# Patient Record
Sex: MALE | Race: WHITE | NOT HISPANIC OR LATINO | Employment: OTHER | URBAN - METROPOLITAN AREA
[De-identification: names, ages, dates, MRNs, and addresses within clinical notes are randomized per-mention and may not be internally consistent; named-entity substitution may affect disease eponyms.]

---

## 2017-02-17 ENCOUNTER — OFFICE VISIT (OUTPATIENT)
Dept: FAMILY MEDICINE | Facility: CLINIC | Age: 57
End: 2017-02-17
Payer: COMMERCIAL

## 2017-02-17 VITALS
DIASTOLIC BLOOD PRESSURE: 92 MMHG | HEART RATE: 73 BPM | HEIGHT: 71 IN | TEMPERATURE: 98 F | WEIGHT: 196.56 LBS | BODY MASS INDEX: 27.52 KG/M2 | SYSTOLIC BLOOD PRESSURE: 138 MMHG

## 2017-02-17 DIAGNOSIS — H61.22 IMPACTED CERUMEN, LEFT EAR: Primary | ICD-10-CM

## 2017-02-17 PROCEDURE — 99213 OFFICE O/P EST LOW 20 MIN: CPT | Mod: S$GLB,,, | Performed by: NURSE PRACTITIONER

## 2017-02-17 PROCEDURE — 99999 PR PBB SHADOW E&M-EST. PATIENT-LVL III: CPT | Mod: PBBFAC,,, | Performed by: NURSE PRACTITIONER

## 2017-02-17 NOTE — PROGRESS NOTES
Subjective:       Patient ID: Ned Toledo Jr. is a 56 y.o. male.    Chief Complaint: Ear Fullness    Ear Fullness    There is pain in the left ear. This is a new problem. The current episode started 1 to 4 weeks ago. The problem occurs constantly. The problem has been unchanged. There has been no fever. The patient is experiencing no pain. Pertinent negatives include no abdominal pain, coughing, diarrhea, ear discharge, headaches, hearing loss, neck pain, rash, rhinorrhea, sore throat or vomiting. He has tried nothing for the symptoms. The treatment provided no relief. There is no history of a chronic ear infection, hearing loss or a tympanostomy tube.   History reviewed. No pertinent past medical history.  Social History     Social History    Marital status:      Spouse name: N/A    Number of children: N/A    Years of education: N/A     Occupational History         Social History Main Topics    Smoking status: Never Smoker    Smokeless tobacco: Not on file    Alcohol use No    Drug use: Not on file    Sexual activity: Not on file     Past Surgical History   Procedure Laterality Date    Hernia repair         Review of Systems   Constitutional: Negative.    HENT: Negative.  Negative for ear discharge, hearing loss, rhinorrhea and sore throat.    Eyes: Negative.    Respiratory: Negative.  Negative for cough.    Cardiovascular: Negative.    Gastrointestinal: Negative.  Negative for abdominal pain, diarrhea and vomiting.   Endocrine: Negative.    Genitourinary: Negative.    Musculoskeletal: Negative.  Negative for neck pain.   Skin: Negative.  Negative for rash.   Allergic/Immunologic: Negative.    Neurological: Negative.  Negative for headaches.   Psychiatric/Behavioral: Negative.        Objective:      Physical Exam   Constitutional: He is oriented to person, place, and time. He appears well-developed and well-nourished.   HENT:   Head: Normocephalic.   Right Ear: Hearing, tympanic membrane,  external ear and ear canal normal.   Left Ear: Hearing, tympanic membrane, external ear and ear canal normal.   Nose: Nose normal.   Mouth/Throat: Oropharynx is clear and moist.   Impacted cerumen to left ear prior to ear wash   Eyes: Conjunctivae are normal. Pupils are equal, round, and reactive to light.   Neck: Normal range of motion. Neck supple.   Cardiovascular: Normal rate, regular rhythm and normal heart sounds.    Pulmonary/Chest: Effort normal and breath sounds normal.   Abdominal: Soft. Bowel sounds are normal.   Musculoskeletal: Normal range of motion.   Neurological: He is alert and oriented to person, place, and time.   Skin: Skin is warm and dry.   Psychiatric: He has a normal mood and affect. His behavior is normal. Judgment and thought content normal.   Nursing note and vitals reviewed.      Assessment:       1. Impacted cerumen, left ear        Plan:           Ned was seen today for ear fullness.    Diagnoses and all orders for this visit:    Impacted cerumen, left ear  -     Ear wax removal  Avoid qtips  Debrox OTC as directed   RTC as needed

## 2017-02-27 ENCOUNTER — PATIENT OUTREACH (OUTPATIENT)
Dept: ADMINISTRATIVE | Facility: HOSPITAL | Age: 57
End: 2017-02-27
Payer: COMMERCIAL

## 2017-02-27 DIAGNOSIS — Z13.220 SCREENING FOR CHOLESTEROL LEVEL: Primary | ICD-10-CM

## 2017-02-27 NOTE — LETTER
February 27, 2017    Ned Toledo JrShiraz  [address] [address]           [facility]  [address]  [facility] [address]  Phone: [phone] Dear Mr. Toledo:    [facility] is committed to your overall health.  To help you get the most out of each of your visits, we will review your information to make sure you are up to date on all of your recommended tests and/or procedures.      Jaime Hernández MD has found that you may be due for   Health Maintenance Due   Topic    Lipid Panel     Colonoscopy     Influenza Vaccine         If you have had any of the above done at another facility, please bring the records or information with you so that your record at [facility] will be complete.    If you are currently taking medication, please bring it with you to your appointment for review.    We will be happy to assist you with scheduling any necessary appointments or you may contact the [facility] appointment desk at [phone] to schedule at your convenience.     Thank you for choosing [facility] for your healthcare needs,        If you have any questions or concerns, please don't hesitate to call.    Sincerely,    Angélica OWENS LPN  Care Coordination Department  [facility]

## 2017-03-14 ENCOUNTER — OFFICE VISIT (OUTPATIENT)
Dept: FAMILY MEDICINE | Facility: CLINIC | Age: 57
End: 2017-03-14
Payer: COMMERCIAL

## 2017-03-14 VITALS
HEIGHT: 71 IN | HEART RATE: 110 BPM | BODY MASS INDEX: 27.78 KG/M2 | SYSTOLIC BLOOD PRESSURE: 129 MMHG | WEIGHT: 198.44 LBS | TEMPERATURE: 98 F | DIASTOLIC BLOOD PRESSURE: 83 MMHG

## 2017-03-14 DIAGNOSIS — M54.50 CHRONIC LEFT-SIDED LOW BACK PAIN WITHOUT SCIATICA: Primary | ICD-10-CM

## 2017-03-14 DIAGNOSIS — G89.29 CHRONIC LEFT-SIDED LOW BACK PAIN WITHOUT SCIATICA: Primary | ICD-10-CM

## 2017-03-14 PROCEDURE — 1160F RVW MEDS BY RX/DR IN RCRD: CPT | Mod: S$GLB,,, | Performed by: FAMILY MEDICINE

## 2017-03-14 PROCEDURE — 99999 PR PBB SHADOW E&M-EST. PATIENT-LVL III: CPT | Mod: PBBFAC,,, | Performed by: FAMILY MEDICINE

## 2017-03-14 PROCEDURE — 99213 OFFICE O/P EST LOW 20 MIN: CPT | Mod: S$GLB,,, | Performed by: FAMILY MEDICINE

## 2017-03-14 RX ORDER — CARISOPRODOL 350 MG/1
350 TABLET ORAL 4 TIMES DAILY PRN
Qty: 120 TABLET | Refills: 0 | Status: SHIPPED | OUTPATIENT
Start: 2017-05-13 | End: 2017-06-14 | Stop reason: SDUPTHER

## 2017-03-14 RX ORDER — HYDROCODONE BITARTRATE AND ACETAMINOPHEN 7.5; 325 MG/1; MG/1
2 TABLET ORAL EVERY 6 HOURS PRN
Qty: 240 TABLET | Refills: 0 | Status: SHIPPED | OUTPATIENT
Start: 2017-03-14 | End: 2017-06-14 | Stop reason: SDUPTHER

## 2017-03-14 RX ORDER — CARISOPRODOL 350 MG/1
350 TABLET ORAL 4 TIMES DAILY PRN
Qty: 120 TABLET | Refills: 0 | Status: SHIPPED | OUTPATIENT
Start: 2017-04-13 | End: 2017-06-14 | Stop reason: SDUPTHER

## 2017-03-14 RX ORDER — HYDROCODONE BITARTRATE AND ACETAMINOPHEN 7.5; 325 MG/1; MG/1
2 TABLET ORAL EVERY 6 HOURS PRN
Qty: 240 TABLET | Refills: 0 | Status: SHIPPED | OUTPATIENT
Start: 2017-04-13 | End: 2017-06-14 | Stop reason: SDUPTHER

## 2017-03-14 RX ORDER — HYDROCODONE BITARTRATE AND ACETAMINOPHEN 7.5; 325 MG/1; MG/1
2 TABLET ORAL EVERY 6 HOURS PRN
Qty: 240 TABLET | Refills: 0 | Status: SHIPPED | OUTPATIENT
Start: 2017-05-13 | End: 2017-06-14 | Stop reason: SDUPTHER

## 2017-03-14 RX ORDER — CARISOPRODOL 350 MG/1
350 TABLET ORAL 4 TIMES DAILY PRN
Qty: 120 TABLET | Refills: 0 | Status: SHIPPED | OUTPATIENT
Start: 2017-03-14 | End: 2017-06-14 | Stop reason: SDUPTHER

## 2017-03-14 NOTE — PROGRESS NOTES
Ned Toledo Jr. presents with waxing waning LBP primary lt SI and paralumbar         History reviewed. No pertinent past medical history.  Past Surgical History:   Procedure Laterality Date    HERNIA REPAIR       Review of patient's allergies indicates:   Allergen Reactions    Amoxil [amoxicillin]      Current Outpatient Prescriptions on File Prior to Visit   Medication Sig Dispense Refill    carisoprodol (SOMA) 350 MG tablet Take 1 tablet (350 mg total) by mouth 4 (four) times daily as needed. 120 tablet 0    carisoprodol (SOMA) 350 MG tablet Take 1 tablet (350 mg total) by mouth 4 (four) times daily as needed. 120 tablet 0    carisoprodol (SOMA) 350 MG tablet Take 1 tablet (350 mg total) by mouth 4 (four) times daily as needed. 120 tablet 0    hydrocodone-acetaminophen 7.5-325mg (NORCO) 7.5-325 mg per tablet Take 2 tablets by mouth every 6 (six) hours as needed for Pain. 240 tablet 0    hydrocodone-acetaminophen 7.5-325mg (NORCO) 7.5-325 mg per tablet Take 2 tablets by mouth every 6 (six) hours as needed for Pain. 240 tablet 0    hydrocodone-acetaminophen 7.5-325mg (NORCO) 7.5-325 mg per tablet Take 2 tablets by mouth every 6 (six) hours as needed for Pain. 240 tablet 0    naproxen (NAPROSYN) 500 MG tablet 6 TAKE 1 TABLET BY MOUTH TWICE DAILY 60 tablet 11    ketoconazole (NIZORAL) 2 % cream Apply topically 2 (two) times daily. 60 g 6     No current facility-administered medications on file prior to visit.      Social History     Social History    Marital status:      Spouse name: N/A    Number of children: N/A    Years of education: N/A     Occupational History    Not on file.     Social History Main Topics    Smoking status: Never Smoker    Smokeless tobacco: Not on file    Alcohol use No    Drug use: Not on file    Sexual activity: Not on file     Other Topics Concern    Not on file     Social History Narrative     History reviewed. No pertinent family history.      ROS:  SKIN:  No rashes, itching or changes in color or texture of skin.  EYES: Visual acuity fine. No photophobia, ocular pain or diplopia.EARS: Denies ear pain, discharge or vertigo.NOSE: No loss of smell, no epistaxis some postnasal drip.MOUTH & THROAT: No hoarseness or change in voice. No excessive gum bleeding.CHEST: Denies YANCEY, cyanosis, wheezing  CARDIOVASCULAR: Denies chest pain, PND, orthopnea or reduced exercise tolerance.  ABDOMEN:  No weight loss.No abdominal pain, no hematemesis or blood in stool.  URINARY: No flank pain, dysuria or hematuria.  PERIPHERAL VASCULAR: No claudication or cyanosis.  MUSCULOSKELETAL: Negative   NEUROLOGIC: No history of seizures, paralysis, alteration of gait or coordination.    PE: Vital signs as noted  Heent:Normocephalic with no recent cranial trauma,PERRLA,EOMI,conjunctiva clear,fundi reveal no hemmorhage exudate or papilledema.Otic canals clear, tympanic membranes slightly dull bilaterally.Nasal mucosa slightly red and edematous.Posterior pharynx slightly red but without exudate.  Neck:Supple with minimal anterior cervical adenopathy.  Chest:Clear bilateral breath sounds    Heart:Regular rhthym without murmer  Abdomen:Soft, non tender,no masses, no hepatosplenomegaly  Extremeties:Tender lateral ankle Neurologic:Grossly within normal limits  Back:Severe Tenderness lt paravertebral L1L3 but also tender L3-4 midline;increased tenderness    Impression:LBP acute exac    Ankle sprain   Plan: Continue HC,  carisopradol  If ftp PT   Considered retrial gabapentin (over sedated w 300 hs)

## 2017-06-14 ENCOUNTER — TELEPHONE (OUTPATIENT)
Dept: FAMILY MEDICINE | Facility: CLINIC | Age: 57
End: 2017-06-14

## 2017-06-14 ENCOUNTER — OFFICE VISIT (OUTPATIENT)
Dept: FAMILY MEDICINE | Facility: CLINIC | Age: 57
End: 2017-06-14
Payer: COMMERCIAL

## 2017-06-14 VITALS
HEIGHT: 71 IN | DIASTOLIC BLOOD PRESSURE: 88 MMHG | WEIGHT: 196.19 LBS | SYSTOLIC BLOOD PRESSURE: 138 MMHG | TEMPERATURE: 98 F | HEART RATE: 122 BPM | BODY MASS INDEX: 27.47 KG/M2

## 2017-06-14 DIAGNOSIS — M54.50 CHRONIC LEFT-SIDED LOW BACK PAIN WITHOUT SCIATICA: ICD-10-CM

## 2017-06-14 DIAGNOSIS — Z00.00 ROUTINE HEALTH MAINTENANCE: Primary | ICD-10-CM

## 2017-06-14 DIAGNOSIS — J06.9 UPPER RESPIRATORY TRACT INFECTION, UNSPECIFIED TYPE: Primary | ICD-10-CM

## 2017-06-14 DIAGNOSIS — G89.29 CHRONIC LEFT-SIDED LOW BACK PAIN WITHOUT SCIATICA: ICD-10-CM

## 2017-06-14 PROCEDURE — 99213 OFFICE O/P EST LOW 20 MIN: CPT | Mod: S$GLB,,, | Performed by: FAMILY MEDICINE

## 2017-06-14 PROCEDURE — 99999 PR PBB SHADOW E&M-EST. PATIENT-LVL II: CPT | Mod: PBBFAC,,, | Performed by: FAMILY MEDICINE

## 2017-06-14 RX ORDER — CHLOPHEDIANOL HYDROCHLORIDE, DEXBROMPHENIRAMINE MALEATE 12.5; 1 MG/5ML; MG/5ML
LIQUID ORAL
Refills: 0 | COMMUNITY
Start: 2017-06-11 | End: 2017-09-14

## 2017-06-14 RX ORDER — CARISOPRODOL 350 MG/1
350 TABLET ORAL 4 TIMES DAILY PRN
Qty: 120 TABLET | Refills: 0 | Status: SHIPPED | OUTPATIENT
Start: 2017-08-12 | End: 2017-09-14 | Stop reason: SDUPTHER

## 2017-06-14 RX ORDER — MONTELUKAST SODIUM 10 MG/1
10 TABLET ORAL NIGHTLY
Qty: 30 TABLET | Refills: 0 | Status: SHIPPED | OUTPATIENT
Start: 2017-06-14 | End: 2017-07-14

## 2017-06-14 RX ORDER — DOXYCYCLINE 100 MG/1
CAPSULE ORAL
Refills: 0 | COMMUNITY
Start: 2017-06-11 | End: 2017-09-14

## 2017-06-14 RX ORDER — CARISOPRODOL 350 MG/1
350 TABLET ORAL 4 TIMES DAILY PRN
Qty: 120 TABLET | Refills: 0 | Status: SHIPPED | OUTPATIENT
Start: 2017-06-14 | End: 2017-09-14 | Stop reason: SDUPTHER

## 2017-06-14 RX ORDER — PREDNISONE 20 MG/1
TABLET ORAL
Refills: 0 | COMMUNITY
Start: 2017-06-11 | End: 2017-09-14

## 2017-06-14 RX ORDER — HYDROCODONE BITARTRATE AND ACETAMINOPHEN 7.5; 325 MG/1; MG/1
2 TABLET ORAL EVERY 6 HOURS PRN
Qty: 240 TABLET | Refills: 0 | Status: SHIPPED | OUTPATIENT
Start: 2017-06-14 | End: 2017-09-14 | Stop reason: SDUPTHER

## 2017-06-14 RX ORDER — HYDROCODONE BITARTRATE AND ACETAMINOPHEN 7.5; 325 MG/1; MG/1
2 TABLET ORAL EVERY 6 HOURS PRN
Qty: 240 TABLET | Refills: 0 | Status: SHIPPED | OUTPATIENT
Start: 2017-07-14 | End: 2017-09-14 | Stop reason: SDUPTHER

## 2017-06-14 RX ORDER — NAPROXEN 500 MG/1
TABLET ORAL
Qty: 60 TABLET | Refills: 11 | Status: SHIPPED | OUTPATIENT
Start: 2017-06-14 | End: 2018-07-30 | Stop reason: SDUPTHER

## 2017-06-14 RX ORDER — CARISOPRODOL 350 MG/1
350 TABLET ORAL 4 TIMES DAILY PRN
Qty: 120 TABLET | Refills: 0 | Status: SHIPPED | OUTPATIENT
Start: 2017-07-14 | End: 2017-09-14 | Stop reason: SDUPTHER

## 2017-06-14 RX ORDER — HYDROCODONE BITARTRATE AND ACETAMINOPHEN 7.5; 325 MG/1; MG/1
2 TABLET ORAL EVERY 6 HOURS PRN
Qty: 240 TABLET | Refills: 0 | Status: SHIPPED | OUTPATIENT
Start: 2017-08-12 | End: 2017-09-14 | Stop reason: SDUPTHER

## 2017-06-14 NOTE — PROGRESS NOTES
Ned Toledo Jr. presents with moderate upper respiratory congestion,rhinnorhea,moderate cough past 2-3 days. OTC help slightly. Denies nausea,vomiting,diarrhea or significant fever.    History reviewed. No pertinent past medical history.  Past Surgical History:   Procedure Laterality Date    HERNIA REPAIR       Review of patient's allergies indicates:   Allergen Reactions    Amoxil [amoxicillin]      Current Outpatient Prescriptions on File Prior to Visit   Medication Sig Dispense Refill    carisoprodol (SOMA) 350 MG tablet Take 1 tablet (350 mg total) by mouth 4 (four) times daily as needed. 120 tablet 0    carisoprodol (SOMA) 350 MG tablet Take 1 tablet (350 mg total) by mouth 4 (four) times daily as needed. 120 tablet 0    carisoprodol (SOMA) 350 MG tablet Take 1 tablet (350 mg total) by mouth 4 (four) times daily as needed. 120 tablet 0    hydrocodone-acetaminophen 7.5-325mg (NORCO) 7.5-325 mg per tablet Take 2 tablets by mouth every 6 (six) hours as needed for Pain. 240 tablet 0    hydrocodone-acetaminophen 7.5-325mg (NORCO) 7.5-325 mg per tablet Take 2 tablets by mouth every 6 (six) hours as needed for Pain. 240 tablet 0    hydrocodone-acetaminophen 7.5-325mg (NORCO) 7.5-325 mg per tablet Take 2 tablets by mouth every 6 (six) hours as needed for Pain. 240 tablet 0    naproxen (NAPROSYN) 500 MG tablet 6 TAKE 1 TABLET BY MOUTH TWICE DAILY 60 tablet 11    ketoconazole (NIZORAL) 2 % cream Apply topically 2 (two) times daily. 60 g 6     No current facility-administered medications on file prior to visit.      Social History     Social History    Marital status:      Spouse name: N/A    Number of children: N/A    Years of education: N/A     Occupational History    Not on file.     Social History Main Topics    Smoking status: Never Smoker    Smokeless tobacco: Not on file    Alcohol use No    Drug use: Unknown    Sexual activity: Not on file     Other Topics Concern    Not on file      Social History Narrative    No narrative on file     History reviewed. No pertinent family history.      ROS:  SKIN: No rashes, itching or changes in color or texture of skin.  EYES: Visual acuity fine. No photophobia, ocular pain or diplopia.EARS: Denies ear pain, discharge or vertigo.NOSE: No loss of smell, no epistaxis some postnasal drip.MOUTH & THROAT: No hoarseness or change in voice. No excessive gum bleeding.CHEST: Denies YANCEY, cyanosis, wheezing  CARDIOVASCULAR: Denies chest pain, PND, orthopnea or reduced exercise tolerance.  ABDOMEN:  No weight loss.No abdominal pain, no hematemesis or blood in stool.  URINARY: No flank pain, dysuria or hematuria.  PERIPHERAL VASCULAR: No claudication or cyanosis.  MUSCULOSKELETAL: Negative   NEUROLOGIC: No history of seizures, paralysis, alteration of gait or coordination.    PE: Vital signs as noted  Heent:Normocephalic with no recent cranial trauma,PERRLA,EOMI,conjunctiva clear,fundi reveal no hemmorhage exudate or papilledema.Otic canals clear, tympanic membranes slightly dull bilaterally.Nasal mucosa slightly red and edematous.Posterior pharynx slightly red but without exudate.  Neck:Supple with minimal anterior cervical adenopathy.  Chest:Clear bilateral breath sounds with mild scattered ronchi  Heart:Regular rhthym without murmer  Abdomen:Soft, non tender,no masses, no hepatosplenomegalyExtremeties and Neurologic:Grossly within normal limits  Impression: Upper Respiratory Infection. 465.9  Plan:   Add montelukast CWP   Pulse ox 97 RA

## 2017-09-08 ENCOUNTER — LAB VISIT (OUTPATIENT)
Dept: LAB | Facility: HOSPITAL | Age: 57
End: 2017-09-08
Attending: FAMILY MEDICINE
Payer: COMMERCIAL

## 2017-09-08 DIAGNOSIS — Z00.00 ROUTINE HEALTH MAINTENANCE: ICD-10-CM

## 2017-09-08 LAB
ALBUMIN SERPL BCP-MCNC: 4 G/DL
ALP SERPL-CCNC: 68 U/L
ALT SERPL W/O P-5'-P-CCNC: 83 U/L
ANION GAP SERPL CALC-SCNC: 11 MMOL/L
AST SERPL-CCNC: 55 U/L
BASOPHILS # BLD AUTO: 0.01 K/UL
BASOPHILS NFR BLD: 0.2 %
BILIRUB SERPL-MCNC: 1 MG/DL
BUN SERPL-MCNC: 16 MG/DL
CALCIUM SERPL-MCNC: 9.7 MG/DL
CHLORIDE SERPL-SCNC: 103 MMOL/L
CHOLEST SERPL-MCNC: 217 MG/DL
CHOLEST/HDLC SERPL: 5.6 {RATIO}
CO2 SERPL-SCNC: 23 MMOL/L
CREAT SERPL-MCNC: 1 MG/DL
DIFFERENTIAL METHOD: ABNORMAL
EOSINOPHIL # BLD AUTO: 0.2 K/UL
EOSINOPHIL NFR BLD: 2.4 %
ERYTHROCYTE [DISTWIDTH] IN BLOOD BY AUTOMATED COUNT: 12.5 %
EST. GFR  (AFRICAN AMERICAN): >60 ML/MIN/1.73 M^2
EST. GFR  (NON AFRICAN AMERICAN): >60 ML/MIN/1.73 M^2
GLUCOSE SERPL-MCNC: 132 MG/DL
HCT VFR BLD AUTO: 45.4 %
HDLC SERPL-MCNC: 39 MG/DL
HDLC SERPL: 18 %
HGB BLD-MCNC: 15.7 G/DL
LDLC SERPL CALC-MCNC: 151.6 MG/DL
LYMPHOCYTES # BLD AUTO: 2.4 K/UL
LYMPHOCYTES NFR BLD: 38.7 %
MCH RBC QN AUTO: 32.2 PG
MCHC RBC AUTO-ENTMCNC: 34.6 G/DL
MCV RBC AUTO: 93 FL
MONOCYTES # BLD AUTO: 0.5 K/UL
MONOCYTES NFR BLD: 8.5 %
NEUTROPHILS # BLD AUTO: 3.1 K/UL
NEUTROPHILS NFR BLD: 50 %
NONHDLC SERPL-MCNC: 178 MG/DL
PLATELET # BLD AUTO: 311 K/UL
PMV BLD AUTO: 9.2 FL
POTASSIUM SERPL-SCNC: 4.4 MMOL/L
PROT SERPL-MCNC: 7.8 G/DL
RBC # BLD AUTO: 4.87 M/UL
SODIUM SERPL-SCNC: 137 MMOL/L
TRIGL SERPL-MCNC: 132 MG/DL
TSH SERPL DL<=0.005 MIU/L-ACNC: 1.46 UIU/ML
WBC # BLD AUTO: 6.23 K/UL

## 2017-09-08 PROCEDURE — 85025 COMPLETE CBC W/AUTO DIFF WBC: CPT

## 2017-09-08 PROCEDURE — 80061 LIPID PANEL: CPT

## 2017-09-08 PROCEDURE — 36415 COLL VENOUS BLD VENIPUNCTURE: CPT | Mod: PO

## 2017-09-08 PROCEDURE — 84443 ASSAY THYROID STIM HORMONE: CPT

## 2017-09-08 PROCEDURE — 80053 COMPREHEN METABOLIC PANEL: CPT

## 2017-09-14 ENCOUNTER — OFFICE VISIT (OUTPATIENT)
Dept: FAMILY MEDICINE | Facility: CLINIC | Age: 57
End: 2017-09-14
Payer: COMMERCIAL

## 2017-09-14 VITALS
HEART RATE: 98 BPM | SYSTOLIC BLOOD PRESSURE: 130 MMHG | BODY MASS INDEX: 27.97 KG/M2 | TEMPERATURE: 98 F | DIASTOLIC BLOOD PRESSURE: 84 MMHG | WEIGHT: 199.81 LBS | HEIGHT: 71 IN

## 2017-09-14 DIAGNOSIS — Z12.11 SPECIAL SCREENING FOR MALIGNANT NEOPLASMS, COLON: ICD-10-CM

## 2017-09-14 DIAGNOSIS — Z00.00 ROUTINE CHECK-UP: Primary | ICD-10-CM

## 2017-09-14 PROCEDURE — 99396 PREV VISIT EST AGE 40-64: CPT | Mod: S$GLB,,, | Performed by: FAMILY MEDICINE

## 2017-09-14 PROCEDURE — 99999 PR PBB SHADOW E&M-EST. PATIENT-LVL II: CPT | Mod: PBBFAC,,, | Performed by: FAMILY MEDICINE

## 2017-09-14 RX ORDER — CARISOPRODOL 350 MG/1
350 TABLET ORAL 4 TIMES DAILY PRN
Qty: 120 TABLET | Refills: 0 | Status: SHIPPED | OUTPATIENT
Start: 2017-10-14 | End: 2017-10-19

## 2017-09-14 RX ORDER — CARISOPRODOL 350 MG/1
350 TABLET ORAL 4 TIMES DAILY PRN
Qty: 120 TABLET | Refills: 0 | Status: SHIPPED | OUTPATIENT
Start: 2017-09-14 | End: 2017-12-14 | Stop reason: SDUPTHER

## 2017-09-14 RX ORDER — HYDROCODONE BITARTRATE AND ACETAMINOPHEN 7.5; 325 MG/1; MG/1
2 TABLET ORAL EVERY 6 HOURS PRN
Qty: 240 TABLET | Refills: 0 | Status: SHIPPED | OUTPATIENT
Start: 2017-09-14 | End: 2017-12-14 | Stop reason: SDUPTHER

## 2017-09-14 RX ORDER — HYDROCODONE BITARTRATE AND ACETAMINOPHEN 7.5; 325 MG/1; MG/1
2 TABLET ORAL EVERY 6 HOURS PRN
Qty: 240 TABLET | Refills: 0 | Status: SHIPPED | OUTPATIENT
Start: 2017-11-13 | End: 2017-12-14 | Stop reason: SDUPTHER

## 2017-09-14 RX ORDER — CARISOPRODOL 350 MG/1
350 TABLET ORAL 4 TIMES DAILY PRN
Qty: 120 TABLET | Refills: 0 | Status: SHIPPED | OUTPATIENT
Start: 2017-11-13 | End: 2017-12-14 | Stop reason: SDUPTHER

## 2017-09-14 RX ORDER — HYDROCODONE BITARTRATE AND ACETAMINOPHEN 7.5; 325 MG/1; MG/1
2 TABLET ORAL EVERY 6 HOURS PRN
Qty: 240 TABLET | Refills: 0 | Status: SHIPPED | OUTPATIENT
Start: 2017-10-14 | End: 2017-12-14 | Stop reason: SDUPTHER

## 2017-09-14 RX ORDER — KETOCONAZOLE 20 MG/G
CREAM TOPICAL 2 TIMES DAILY
Qty: 60 G | Refills: 6 | Status: SHIPPED | OUTPATIENT
Start: 2017-09-14 | End: 2018-01-02 | Stop reason: SDUPTHER

## 2017-09-14 NOTE — PROGRESS NOTES
The patient presents today for general health evaluation and counseling      Past Medical History:  History reviewed. No pertinent past medical history.  Past Surgical History:   Procedure Laterality Date    HERNIA REPAIR       Review of patient's allergies indicates:   Allergen Reactions    Amoxil [amoxicillin]      Current Outpatient Prescriptions on File Prior to Visit   Medication Sig Dispense Refill    carisoprodol (SOMA) 350 MG tablet Take 1 tablet (350 mg total) by mouth 4 (four) times daily as needed. 120 tablet 0    carisoprodol (SOMA) 350 MG tablet Take 1 tablet (350 mg total) by mouth 4 (four) times daily as needed. 120 tablet 0    carisoprodol (SOMA) 350 MG tablet Take 1 tablet (350 mg total) by mouth 4 (four) times daily as needed. 120 tablet 0    hydrocodone-acetaminophen 7.5-325mg (NORCO) 7.5-325 mg per tablet Take 2 tablets by mouth every 6 (six) hours as needed for Pain. 240 tablet 0    hydrocodone-acetaminophen 7.5-325mg (NORCO) 7.5-325 mg per tablet Take 2 tablets by mouth every 6 (six) hours as needed for Pain. 240 tablet 0    hydrocodone-acetaminophen 7.5-325mg (NORCO) 7.5-325 mg per tablet Take 2 tablets by mouth every 6 (six) hours as needed for Pain. 240 tablet 0    ketoconazole (NIZORAL) 2 % cream Apply topically 2 (two) times daily. 60 g 6    naproxen (NAPROSYN) 500 MG tablet 6 TAKE 1 TABLET BY MOUTH TWICE DAILY 60 tablet 11    [DISCONTINUED] CHLO HIST 1-12.5 mg/5 mL Soln TK 10ML PO Q 6 H PRF COUGH  0    [DISCONTINUED] doxycycline (MONODOX) 100 MG capsule TK ONE C PO Q 12 H FOR 10 DAYS  0    [DISCONTINUED] predniSONE (DELTASONE) 20 MG tablet TK 1 T PO QD FOR 5 DAYS  0     No current facility-administered medications on file prior to visit.      Social History     Social History    Marital status:      Spouse name: N/A    Number of children: N/A    Years of education: N/A     Occupational History    Not on file.     Social History Main Topics    Smoking status: Never  Smoker    Smokeless tobacco: Not on file    Alcohol use No    Drug use: Unknown    Sexual activity: Not on file     Other Topics Concern    Not on file     Social History Narrative    No narrative on file     History reviewed. No pertinent family history.      ROS:GENERAL: No fever, chills, fatigability or weight loss.  SKIN: No rashes, itching or changes in color or texture of skin.  HEAD: No headaches or recent head trauma.EYES: Visual acuity fine. No photophobia, ocular pain or diplopia.EARS: Denies ear pain, discharge or vertigo.NOSE: No loss of smell, no epistaxis or postnasal drip.MOUTH & THROAT: No hoarseness or change in voice. No excessive gum bleeding.NODES: Denies swollen glands.  CHEST: Denies YANCEY, cyanosis, wheezing, cough and sputum production.  CARDIOVASCULAR: Denies chest pain, PND, orthopnea or reduced exercise tolerance.  ABDOMEN: Appetite fine. No weight loss. Denies diarrhea, abdominal pain, hematemesis or blood in stool.  URINARY: No flank pain, dysuria or hematuria.  PERIPHERAL VASCULAR: No claudication or cyanosis.  MUSCULOSKELETAL: See above.  NEUROLOGIC: No history of seizures, paralysis, alteration of gait or coordination.  PE:   HEAD: Normocephalic, atraumatic.EYES: PERRL. EOMI.   EARS: TM's intact. Light reflex normal. No retraction or perforation.   NOSE: Mucosa pink. Airway clear.MOUTH & THROAT: No tonsillar enlargement. No pharyngeal erythema or exudate. No stridor.  NODES: No cervical, axillary or inguinal lymph node enlargement.  CHEST: Lungs clear to auscultation.  CARDIOVASCULAR: Normal S1, S2. No rubs, murmurs or gallops.  ABDOMEN: Bowel sounds normal. Not distended. Soft. No tenderness or masses.  MUSCULOSKELETAL: No palpable abnormality  NEUROLOGIC: Cranial Nerves: II-XII grossly intact.  Motor: 5/5 strength major flexors/extensors.  DTR's: Knees, Ankles 2+ and equal bilaterally; downgoing toes.  Sensory: Intact to light touch distally.  Gait & Posture: Normal gait and  fine motion. No cerebellar signs.     Impression:Routine health check  Plan:Lab eval  Rec diet and ex recs  Rev age appropriate screenings    Defers c scope OK w FIT

## 2017-10-02 ENCOUNTER — PATIENT OUTREACH (OUTPATIENT)
Dept: ADMINISTRATIVE | Facility: HOSPITAL | Age: 57
End: 2017-10-02

## 2017-10-02 NOTE — PROGRESS NOTES
Call patient as a reminder to mail fit kit. Patient will try to collect sample on Saturday, 10/07/17. Patient in agreement and vocalize understanding.

## 2017-10-19 ENCOUNTER — OFFICE VISIT (OUTPATIENT)
Dept: FAMILY MEDICINE | Facility: CLINIC | Age: 57
End: 2017-10-19
Payer: COMMERCIAL

## 2017-10-19 VITALS
HEIGHT: 71 IN | SYSTOLIC BLOOD PRESSURE: 135 MMHG | TEMPERATURE: 99 F | HEART RATE: 59 BPM | BODY MASS INDEX: 27.72 KG/M2 | DIASTOLIC BLOOD PRESSURE: 87 MMHG | WEIGHT: 198 LBS

## 2017-10-19 DIAGNOSIS — H61.23 BILATERAL IMPACTED CERUMEN: Primary | ICD-10-CM

## 2017-10-19 PROCEDURE — 99213 OFFICE O/P EST LOW 20 MIN: CPT | Mod: 25,S$GLB,, | Performed by: NURSE PRACTITIONER

## 2017-10-19 PROCEDURE — 69209 REMOVE IMPACTED EAR WAX UNI: CPT | Mod: 50,S$GLB,, | Performed by: NURSE PRACTITIONER

## 2017-10-19 PROCEDURE — 99999 PR PBB SHADOW E&M-EST. PATIENT-LVL III: CPT | Mod: PBBFAC,,, | Performed by: NURSE PRACTITIONER

## 2017-10-19 NOTE — PROGRESS NOTES
"Subjective:      Patient ID: Ned Toledo Jr. is a 56 y.o. male.    Chief Complaint: Hearing Problem    HPI   Patient to clinic with report of decreased hearing over the last 2 weeks, right ear worse than left.  He has been wearing ear plugs for work and has had a cerumen impaction in past.  He denies pain, fever, sinus pressure/pain, rhinorrhea, cough, post nasal drip.    Review of Systems   Constitutional: Negative for chills, fatigue and fever.   HENT: Positive for hearing loss. Negative for congestion, ear discharge, ear pain, postnasal drip, rhinorrhea, sinus pain, sinus pressure and sore throat.    Eyes: Negative.    Respiratory: Negative for cough, shortness of breath and wheezing.    Cardiovascular: Negative for chest pain, palpitations and leg swelling.   Gastrointestinal: Negative.    Endocrine: Negative.    Skin: Negative for rash and wound.   Allergic/Immunologic: Negative.    Neurological: Negative for weakness and numbness.   Psychiatric/Behavioral: The patient is not nervous/anxious.        Objective:     /87   Pulse (!) 59   Temp 98.7 °F (37.1 °C) (Oral)   Ht 5' 11" (1.803 m)   Wt 89.8 kg (198 lb)   BMI 27.62 kg/m²     Physical Exam   Constitutional: He is oriented to person, place, and time. He appears well-developed and well-nourished.   HENT:   Head: Normocephalic.   Nose: No mucosal edema or rhinorrhea. Right sinus exhibits no maxillary sinus tenderness and no frontal sinus tenderness. Left sinus exhibits no maxillary sinus tenderness and no frontal sinus tenderness.   Mouth/Throat: Oropharynx is clear and moist. No oropharyngeal exudate, posterior oropharyngeal edema or posterior oropharyngeal erythema.   Bilateral T.M. View obstructed by cerumen.   Eyes: Pupils are equal, round, and reactive to light.   Cardiovascular: Normal rate, regular rhythm and normal heart sounds.    Pulmonary/Chest: Effort normal and breath sounds normal. No respiratory distress. He has no wheezes. He has " no rales.   Lymphadenopathy:     He has no cervical adenopathy.   Neurological: He is alert and oriented to person, place, and time.   Skin: Skin is warm and dry. No rash noted.   Psychiatric: He has a normal mood and affect. His behavior is normal. Judgment and thought content normal.   Vitals reviewed.      Assessment:     1. Bilateral impacted cerumen        Plan:   Bilateral impacted cerumen    Bilateral ear irrigation performed by JASON Lei LPN.  After irrigation performed, bilateral T.M. Visualized and are WNL.  Educational handout provided and reviewed.  Instructed patient to avoid putting Q-tips in ears.    Return if symptoms worsen or fail to improve.

## 2017-10-19 NOTE — PATIENT INSTRUCTIONS
Impacted Earwax    Impacted earwax is a buildup of the natural wax in the ear (cerumen). Impacted earwax is very common. It can cause symptoms such as hearing loss. It can also stop a doctor doing an exam of your ear.  Understanding earwax  Tiny glands in your ear make substances that combine with dead skin cells to form earwax. Earwax helps protect your ear canal from water, dirt, infection, and injury. Over time, earwax travels from the inner part of your ear canal to the entrance of the canal. Then it falls away naturally. But in some cases, it cant travel to the entrance of the canal. This may be because of a health condition or objects put in the ear. With age, earwax tends to become harder and less fluid. Older adults are more likely to have problems with earwax buildup.  What causes impacted earwax?  Earwax can build up because of many health conditions. Some cause a physical blockage. Others cause too much earwax to be made. Health conditions that can cause earwax buildup include:  · Bony blockage in the ear (osteoma or exostoses)  · Infections, such as swimmers ear (external otitis)  · Skin disease, such as eczema  · Autoimmune diseases, such as lupus  · A narrowed ear canal from birth, chronic inflammation, or injury  · Too much earwax because of injury  · Too much earwax because of  water in the ear canal  Objects repeatedly placed in the ear can also cause impacted earwax. For example, putting cotton swabs in the ear may push the wax deeper into the ear. Over time, this may cause blockage. Hearing aids, swimming plugs, and swim molds can cause the same problem when used again and again.  In some cases, the cause of impacted earwax is not known.  Symptoms of impacted earwax  Excess earwax usually does not cause any symptoms, unless there is a large amount of buildup. Then it may cause symptoms such as:  · Hearing loss  · Earache  · Sense of ear fullness  · Itching in the ear  · Dizziness  · Ringing in  the ears  · Cough  Treatment for impacted earwax  If you dont have symptoms, you may not need treatment. Often the earwax goes away on its own with time. If you have symptoms, you may have 1 or more treatments such as:  · Ear drops. These help to soften the earwax. This helps it leave the ear over time.  · Rinsing (irrigation) of the ear canal with water. This is done in a doctors office.  · Removal of the earwax with small tools. This is also done in a doctors office.  In rare cases, some treatments for earwax removal may cause complications such as:  · Swimmers ear (otitis external)  · Earache  · Short-term hearing loss  · Dizziness  · Water trapped in the ear canal  · Hole in the eardrum  · Ringing in the ears  · Bleeding from the ear  Talk with your health care provider about which risks apply most to you.  Dont use these at home  Health care providers do not advise use of ear candles or ear vacuum kits. These methods are not shown to work.   Preventing impacted earwax  You may not be able to prevent impacted earwax if you have a health condition that causes it, such as eczema. In other cases, you may be able to prevent earwax buildup by:  · Using ear drops once a week  · Having routine cleaning of the ear about every 6 months  · Not using cotton swabs in the ear  When to call the health care provider  Call your health care provider right away if you have severe symptoms after earwax removal. These may include bleeding or severe ear pain.   Date Last Reviewed: 3/19/2015  © 6531-1852 [employer]. [address] [place] [address], [place] [address]. All rights reserved. This information is not intended as a substitute for professional medical care. Always follow your healthcare professional's instructions.        Earwax Removal    The ear canal makes earwax from the canals lining. The ears make wax to lubricate and protect the ear canal. The ear canal is the tube that connects the middle ear to the  outside of the ear. The wax protects the ear from bacteria, infection, and damage from water or trauma.  The wax that forms in the canal naturally moves toward the outside of the ear and falls out. In some cases, the ear may make too much wax. If the wax causes problems or keeps the healthcare provider from seeing into the ear, the extra wax may be removed.  Too much wax can affect your hearing. It can cause itching. In rare cases, it can be painful. Earwax should not be removed unless it is causing a problem. You should not stick objects into your ear to remove wax unless told to do so by your healthcare provider.  Healthcare providers can remove earwax safely. It is important to stay still during the procedure to avoid damage to the ear canal. But removing earwax generally doesnt hurt. You will not usually need anesthesia or pain medicine when the provider removes the earwax.  A number of conditions lead to earwax buildup. These include some skin problems, a narrow ear canal, or ears that make too much earwax. Using cotton swabs in the canal pushes earwax deeper into the ear and contributes to the buildup of earwax.  Home care  · The healthcare provider may recommend mineral oil or an over-the-counter eardrop to use at home to soften the earwax. Use these products only if the provider recommends them. Use these products only if the provider recommends them. Carefully follow the instructions given.  · Dont use mineral oil or OTC eardrops if you might have an ear infection or a ruptured eardrum. Tell your healthcare provider right away if you have diabetes or an immune disorder.  · Dont use cotton swabs in your ears. Cotton swabs may push wax deeper into the ear canal or damage the eardrum. Use cotton gauze or a wet washcloth  to gently remove wax on the outside of the ear and around the opening to the ear canal.  · Don't use any probing device or object such as cotton-tipped swabs or sal pins to clean the  inside of your ears.  · Dont use ear candles to clean your ears. Candling can be dangerous. It can burn the ear canal. It can also make the condition worse instead of better.  · Dont use cold water to rinse the ear. This will make you dizzy. If your provider tells you to rinse your ear, use only warm water or follow his or her instructions.  · Check the ear for signs of infection or irritation listed below under When to seek medical advice.  Steps for using eardrops  1. Warm the medicine bottle by rubbing it between your hands for a few minutes.  2. Lie down on your side, with the affected ear up.  3. Place the recommended number of drops in the ear. Wet a cotton ball with the medicine. Gently put the cotton ball into the ear opening.  Follow-up care  Follow up with your healthcare provider, or as directed.  When to seek medical advice  Call the provider right away if you have:  · Ear pain that gets worse  · Fever of 100.4F°F (38°C) or higher, or as directed by your healthcare provider  · Worsening wax buildup  · Severe pain, dizziness, or nausea  · Bleeding from the ear  · Hearing problems  · Signs of irritation from the eardrops, such as burning, stinging, or swelling and tenderness  · Foul-smelling fluid draining from the ear  · Swelling, redness, or tenderness of the outer ear  · Headache, neck pain, or stiff neck  Date Last Reviewed: 3/22/2015  © 8557-4954 [employer]. [address] [place] [address], [place] [address]. All rights reserved. This information is not intended as a substitute for professional medical care. Always follow your healthcare professional's instructions.

## 2017-12-07 ENCOUNTER — PATIENT OUTREACH (OUTPATIENT)
Dept: ADMINISTRATIVE | Facility: HOSPITAL | Age: 57
End: 2017-12-07

## 2017-12-07 NOTE — LETTER
December 7, 2017    Ned Toledo JrShiraz  [address] [address]  [facility] [address]           [facility]  [address]  [facility] [address]  Phone: [phone] Dear Mr. Toledo:    [facility] is committed to your overall health.  We see that you where issued a Fit Kit to check for colon cancer screening 9/14/2017.  Dr Hernández wants to make sure your test is completed and dropped off at any [facility] lab that is closet to you.   If you had this test done at another facility, please let us know so that your record at [facility] will be complete. We will be happy to answer any questions or concerns .    Thank you for choosing [facility] for your healthcare needs,       THIAGO Lindsay  Care Coordination Department  [facility] [address]  [phone]

## 2017-12-07 NOTE — PROGRESS NOTES
Attempted to contact pt concerning FitKit issued 9/14/2017. No answer and unable to leave message.  Pt has an appt scheduled 12/14/17.  Note placed in Appt note regard overdue FitKit.  Letter also mailed regarding FitKit.

## 2017-12-14 ENCOUNTER — OFFICE VISIT (OUTPATIENT)
Dept: FAMILY MEDICINE | Facility: CLINIC | Age: 57
End: 2017-12-14
Payer: COMMERCIAL

## 2017-12-14 VITALS
BODY MASS INDEX: 27.28 KG/M2 | SYSTOLIC BLOOD PRESSURE: 135 MMHG | DIASTOLIC BLOOD PRESSURE: 82 MMHG | TEMPERATURE: 98 F | WEIGHT: 194.88 LBS | HEIGHT: 71 IN | HEART RATE: 86 BPM

## 2017-12-14 DIAGNOSIS — G89.29 CHRONIC LEFT-SIDED LOW BACK PAIN WITHOUT SCIATICA: Primary | ICD-10-CM

## 2017-12-14 DIAGNOSIS — M54.50 CHRONIC LEFT-SIDED LOW BACK PAIN WITHOUT SCIATICA: Primary | ICD-10-CM

## 2017-12-14 PROCEDURE — 99999 PR PBB SHADOW E&M-EST. PATIENT-LVL III: CPT | Mod: PBBFAC,,, | Performed by: FAMILY MEDICINE

## 2017-12-14 PROCEDURE — 99213 OFFICE O/P EST LOW 20 MIN: CPT | Mod: S$GLB,,, | Performed by: FAMILY MEDICINE

## 2017-12-14 RX ORDER — CARISOPRODOL 350 MG/1
350 TABLET ORAL 4 TIMES DAILY PRN
Qty: 120 TABLET | Refills: 0 | Status: SHIPPED | OUTPATIENT
Start: 2018-02-12 | End: 2018-03-14 | Stop reason: SDUPTHER

## 2017-12-14 RX ORDER — CARISOPRODOL 350 MG/1
350 TABLET ORAL 4 TIMES DAILY PRN
Qty: 120 TABLET | Refills: 0 | Status: SHIPPED | OUTPATIENT
Start: 2018-01-13 | End: 2018-01-02 | Stop reason: SDUPTHER

## 2017-12-14 RX ORDER — CARISOPRODOL 350 MG/1
350 TABLET ORAL 4 TIMES DAILY PRN
Qty: 120 TABLET | Refills: 0 | Status: SHIPPED | OUTPATIENT
Start: 2017-12-14 | End: 2018-01-02

## 2017-12-14 RX ORDER — HYDROCODONE BITARTRATE AND ACETAMINOPHEN 7.5; 325 MG/1; MG/1
2 TABLET ORAL EVERY 6 HOURS PRN
Qty: 240 TABLET | Refills: 0 | Status: SHIPPED | OUTPATIENT
Start: 2018-02-12 | End: 2018-01-02

## 2017-12-14 RX ORDER — MONTELUKAST SODIUM 10 MG/1
10 TABLET ORAL NIGHTLY
Qty: 30 TABLET | Refills: 0 | Status: SHIPPED | OUTPATIENT
Start: 2017-12-14 | End: 2018-01-02

## 2017-12-14 RX ORDER — HYDROCODONE BITARTRATE AND ACETAMINOPHEN 7.5; 325 MG/1; MG/1
2 TABLET ORAL EVERY 6 HOURS PRN
Qty: 240 TABLET | Refills: 0 | Status: SHIPPED | OUTPATIENT
Start: 2018-01-13 | End: 2018-01-02

## 2017-12-14 RX ORDER — HYDROCODONE BITARTRATE AND ACETAMINOPHEN 7.5; 325 MG/1; MG/1
2 TABLET ORAL EVERY 6 HOURS PRN
Qty: 240 TABLET | Refills: 0 | Status: SHIPPED | OUTPATIENT
Start: 2017-12-14 | End: 2017-12-15 | Stop reason: SDUPTHER

## 2017-12-14 NOTE — PROGRESS NOTES
The patient presents today to  chronic LBP    Past Medical History:  History reviewed. No pertinent past medical history.  Past Surgical History:   Procedure Laterality Date    HERNIA REPAIR       Review of patient's allergies indicates:   Allergen Reactions    Amoxil [amoxicillin]      Current Outpatient Prescriptions on File Prior to Visit   Medication Sig Dispense Refill    carisoprodol (SOMA) 350 MG tablet Take 1 tablet (350 mg total) by mouth 4 (four) times daily as needed. 120 tablet 0    carisoprodol (SOMA) 350 MG tablet Take 1 tablet (350 mg total) by mouth 4 (four) times daily as needed. 120 tablet 0    hydrocodone-acetaminophen 7.5-325mg (NORCO) 7.5-325 mg per tablet Take 2 tablets by mouth every 6 (six) hours as needed for Pain. 240 tablet 0    hydrocodone-acetaminophen 7.5-325mg (NORCO) 7.5-325 mg per tablet Take 2 tablets by mouth every 6 (six) hours as needed for Pain. 240 tablet 0    hydrocodone-acetaminophen 7.5-325mg (NORCO) 7.5-325 mg per tablet Take 2 tablets by mouth every 6 (six) hours as needed for Pain. 240 tablet 0    naproxen (NAPROSYN) 500 MG tablet 6 TAKE 1 TABLET BY MOUTH TWICE DAILY 60 tablet 11    ketoconazole (NIZORAL) 2 % cream Apply topically 2 (two) times daily. 60 g 6     No current facility-administered medications on file prior to visit.      Social History     Social History    Marital status:      Spouse name: N/A    Number of children: N/A    Years of education: N/A     Occupational History    Not on file.     Social History Main Topics    Smoking status: Never Smoker    Smokeless tobacco: Not on file    Alcohol use No    Drug use: Unknown    Sexual activity: Not on file     Other Topics Concern    Not on file     Social History Narrative    No narrative on file     History reviewed. No pertinent family history.      ROS:GENERAL: No fever, chills, fatigability or weight loss.  SKIN: No rashes, itching or changes in color or texture of skin.  HEAD:  No headaches or recent head trauma.EYES: Visual acuity fine. No photophobia, ocular pain or diplopia.EARS: Denies ear pain, discharge or vertigo.NOSE: No loss of smell, no epistaxis or postnasal drip.MOUTH & THROAT: No hoarseness or change in voice. No excessive gum bleeding.NODES: Denies swollen glands.  CHEST: Denies YANCEY, cyanosis, wheezing, cough and sputum production.  CARDIOVASCULAR: Denies chest pain, PND, orthopnea or reduced exercise tolerance.  ABDOMEN: Appetite fine. No weight loss. Denies diarrhea, abdominal pain, hematemesis or blood in stool.  URINARY: No flank pain, dysuria or hematuria.  PERIPHERAL VASCULAR: No claudication or cyanosis.  MUSCULOSKELETAL: See above.  NEUROLOGIC: No history of seizures, paralysis, alteration of gait or coordination.  PE:   HEAD: Normocephalic, atraumatic.EYES: PERRL. EOMI.   EARS: TM's intact. Light reflex normal. No retraction or perforation.   NOSE: Mucosa pink. Airway clear.MOUTH & THROAT: No tonsillar enlargement. No pharyngeal erythema or exudate. No stridor.  NODES: No cervical, axillary or inguinal lymph node enlargement.  CHEST: Lungs clear to auscultation.  CARDIOVASCULAR: Normal S1, S2. No rubs, murmurs or gallops.  ABDOMEN: Bowel sounds normal. Not distended. Soft. No tenderness or masses.  MUSCULOSKELETAL: Tender bilateral paralumbar w spasm   NEUROLOGIC: Cranial Nerves: II-XII grossly intact.  Motor: 5/5 strength major flexors/extensors.  DTR's: Knees, Ankles 2+ and equal bilaterally; downgoing toes.  Sensory: Intact to light touch distally.  Gait & Posture: Normal gait and fine motion. No cerebellar signs.     Impression:LBP   Plan:  meds

## 2017-12-15 RX ORDER — HYDROCODONE BITARTRATE AND ACETAMINOPHEN 7.5; 325 MG/1; MG/1
2 TABLET ORAL EVERY 6 HOURS PRN
Qty: 240 TABLET | Refills: 0 | Status: SHIPPED | OUTPATIENT
Start: 2017-12-15 | End: 2017-12-18 | Stop reason: SDUPTHER

## 2017-12-15 NOTE — TELEPHONE ENCOUNTER
jakimary is unable to fill his hydrocodone prescription at this time because they can't get it in, states he can get it at [facility]. Asking if this months can be sent there.

## 2017-12-15 NOTE — TELEPHONE ENCOUNTER
----- Message from Paloma Kruse sent at 12/14/2017  4:56 PM CST -----  Contact: Pt   Pt calling in regards to his medication. Pt would like to receive a call back as soon as possible.     Pt can be reached at .[phone] (home) [phone] (work)

## 2017-12-18 ENCOUNTER — TELEPHONE (OUTPATIENT)
Dept: FAMILY MEDICINE | Facility: CLINIC | Age: 57
End: 2017-12-18

## 2017-12-18 RX ORDER — HYDROCODONE BITARTRATE AND ACETAMINOPHEN 7.5; 325 MG/1; MG/1
2 TABLET ORAL EVERY 6 HOURS PRN
Qty: 240 TABLET | Refills: 0 | Status: SHIPPED | OUTPATIENT
Start: 2017-12-18 | End: 2018-01-02

## 2017-12-18 NOTE — TELEPHONE ENCOUNTER
Sorry, I didn't change the pharmacy on Friday when I sent the message to you about [facility] not having the medication

## 2017-12-18 NOTE — TELEPHONE ENCOUNTER
----- Message from Hafsa Strong sent at 12/18/2017  8:59 AM CST -----  Contact: patient  Calling concerning the status of his RX Hydrocodone. Please call patient ASAP @ [phone]. Thanks, mily      [facility] [place]

## 2017-12-18 NOTE — TELEPHONE ENCOUNTER
----- Message from Paloma Parekh sent at 12/18/2017  2:00 PM CST -----  Contact: Patient  Patient stated that he is still waiting on someone to call him about the prescription request, Please call him at [phone].    Thanks  td

## 2018-01-02 ENCOUNTER — OFFICE VISIT (OUTPATIENT)
Dept: FAMILY MEDICINE | Facility: CLINIC | Age: 58
End: 2018-01-02
Payer: COMMERCIAL

## 2018-01-02 VITALS
BODY MASS INDEX: 26.67 KG/M2 | HEART RATE: 85 BPM | TEMPERATURE: 98 F | HEIGHT: 72 IN | SYSTOLIC BLOOD PRESSURE: 133 MMHG | WEIGHT: 196.88 LBS | DIASTOLIC BLOOD PRESSURE: 89 MMHG

## 2018-01-02 DIAGNOSIS — J40 BRONCHITIS: Primary | ICD-10-CM

## 2018-01-02 DIAGNOSIS — H61.23 BILATERAL IMPACTED CERUMEN: ICD-10-CM

## 2018-01-02 PROCEDURE — 99999 PR PBB SHADOW E&M-EST. PATIENT-LVL III: CPT | Mod: PBBFAC,,, | Performed by: NURSE PRACTITIONER

## 2018-01-02 PROCEDURE — 99213 OFFICE O/P EST LOW 20 MIN: CPT | Mod: S$GLB,,, | Performed by: NURSE PRACTITIONER

## 2018-01-02 RX ORDER — SULFAMETHOXAZOLE AND TRIMETHOPRIM 800; 160 MG/1; MG/1
1 TABLET ORAL 2 TIMES DAILY
Qty: 20 TABLET | Refills: 0 | Status: SHIPPED | OUTPATIENT
Start: 2018-01-02 | End: 2018-01-12

## 2018-01-02 RX ORDER — KETOCONAZOLE 20 MG/G
CREAM TOPICAL
Refills: 5 | COMMUNITY
Start: 2017-12-20 | End: 2019-09-09

## 2018-01-02 RX ORDER — PREDNISONE 20 MG/1
20 TABLET ORAL 2 TIMES DAILY
Qty: 10 TABLET | Refills: 0 | Status: SHIPPED | OUTPATIENT
Start: 2018-01-02 | End: 2018-01-07

## 2018-01-02 NOTE — PROGRESS NOTES
Subjective:       Patient ID: Ned Toledo Jr. is a 57 y.o. male.    Chief Complaint: Nasal Congestion and Sore Throat    Cough   This is a new problem. The current episode started in the past 7 days. The problem has been unchanged. The problem occurs constantly. The cough is non-productive. Associated symptoms include postnasal drip and a sore throat. Pertinent negatives include no chest pain, ear pain, fever, headaches, myalgias, rash or shortness of breath. Nothing aggravates the symptoms. He has tried OTC cough suppressant for the symptoms. The treatment provided no relief.       Review of Systems   Constitutional: Negative for fatigue, fever and unexpected weight change.   HENT: Positive for congestion, postnasal drip and sore throat. Negative for ear pain.         Bilateral canals with impacted cerumen   Eyes: Negative.  Negative for pain and visual disturbance.   Respiratory: Positive for cough. Negative for shortness of breath.    Cardiovascular: Negative for chest pain and palpitations.   Gastrointestinal: Negative for abdominal pain, diarrhea, nausea and vomiting.   Genitourinary: Negative for dysuria and frequency.   Musculoskeletal: Negative for arthralgias and myalgias.   Skin: Negative for color change and rash.   Neurological: Negative for dizziness and headaches.   Psychiatric/Behavioral: Negative for sleep disturbance. The patient is not nervous/anxious.        Vitals:    01/02/18 0852   BP: 133/89   Pulse: 85   Temp: 98.1 °F (36.7 °C)       Objective:     Current Outpatient Prescriptions   Medication Sig Dispense Refill    [START ON 2/12/2018] carisoprodol (SOMA) 350 MG tablet Take 1 tablet (350 mg total) by mouth 4 (four) times daily as needed for Muscle spasms. 120 tablet 0    [START ON 1/13/2018] carisoprodol (SOMA) 350 MG tablet Take 1 tablet (350 mg total) by mouth 4 (four) times daily as needed. 120 tablet 0    [START ON 1/13/2018] hydrocodone-acetaminophen 7.5-325mg (NORCO) 7.5-325 mg  per tablet Take 2 tablets by mouth every 6 (six) hours as needed for Pain. 240 tablet 0    [START ON 2/12/2018] hydrocodone-acetaminophen 7.5-325mg (NORCO) 7.5-325 mg per tablet Take 2 tablets by mouth every 6 (six) hours as needed for Pain. 240 tablet 0    hydrocodone-acetaminophen 7.5-325mg (NORCO) 7.5-325 mg per tablet Take 2 tablets by mouth every 6 (six) hours as needed for Pain. 240 tablet 0    ketoconazole (NIZORAL) 2 % cream EVELIA AA BID  5    naproxen (NAPROSYN) 500 MG tablet 6 TAKE 1 TABLET BY MOUTH TWICE DAILY 60 tablet 11    predniSONE (DELTASONE) 20 MG tablet Take 1 tablet (20 mg total) by mouth 2 (two) times daily. 10 tablet 0    sulfamethoxazole-trimethoprim 800-160mg (BACTRIM DS) 800-160 mg Tab Take 1 tablet by mouth 2 (two) times daily. 20 tablet 0     No current facility-administered medications for this visit.        Physical Exam   Constitutional: He is oriented to person, place, and time. He appears well-developed. No distress.   HENT:   Head: Normocephalic and atraumatic.   Right Ear: Tympanic membrane normal.   Left Ear: Tympanic membrane normal.   Nose: Nose normal.   Mouth/Throat: Posterior oropharyngeal edema ( post nasal mucus) present.   Eyes: EOM are normal. Pupils are equal, round, and reactive to light.   Neck: Normal range of motion. Neck supple.   Cardiovascular: Normal rate and regular rhythm.    Pulmonary/Chest: Effort normal. He has wheezes. He has rhonchi.   + dry cough   Musculoskeletal: Normal range of motion.   Neurological: He is alert and oriented to person, place, and time.   Skin: Skin is warm and dry. No rash noted.   Psychiatric: He has a normal mood and affect. Thought content normal.   Nursing note and vitals reviewed.      Ear lavage performed with warm water and peroxide, alligator forceps used to remove remaining cerumen. Canals with mild erythema from irritation. Pt tolerated well.     Assessment:       1. Bronchitis    2. Bilateral impacted cerumen         Plan:   Bronchitis    Bilateral impacted cerumen    Other orders  -     sulfamethoxazole-trimethoprim 800-160mg (BACTRIM DS) 800-160 mg Tab; Take 1 tablet by mouth 2 (two) times daily.  Dispense: 20 tablet; Refill: 0  -     predniSONE (DELTASONE) 20 MG tablet; Take 1 tablet (20 mg total) by mouth 2 (two) times daily.  Dispense: 10 tablet; Refill: 0        Return if symptoms worsen or fail to improve.

## 2018-03-14 ENCOUNTER — OFFICE VISIT (OUTPATIENT)
Dept: FAMILY MEDICINE | Facility: CLINIC | Age: 58
End: 2018-03-14
Payer: COMMERCIAL

## 2018-03-14 VITALS
WEIGHT: 195 LBS | HEART RATE: 71 BPM | HEIGHT: 72 IN | DIASTOLIC BLOOD PRESSURE: 89 MMHG | SYSTOLIC BLOOD PRESSURE: 139 MMHG | BODY MASS INDEX: 26.41 KG/M2

## 2018-03-14 DIAGNOSIS — G89.29 CHRONIC LEFT-SIDED LOW BACK PAIN WITHOUT SCIATICA: Primary | ICD-10-CM

## 2018-03-14 DIAGNOSIS — G43.109 MIGRAINE WITH AURA AND WITHOUT STATUS MIGRAINOSUS, NOT INTRACTABLE: ICD-10-CM

## 2018-03-14 DIAGNOSIS — M54.50 CHRONIC LEFT-SIDED LOW BACK PAIN WITHOUT SCIATICA: Primary | ICD-10-CM

## 2018-03-14 PROCEDURE — 99999 PR PBB SHADOW E&M-EST. PATIENT-LVL II: CPT | Mod: PBBFAC,,, | Performed by: FAMILY MEDICINE

## 2018-03-14 PROCEDURE — 99213 OFFICE O/P EST LOW 20 MIN: CPT | Mod: S$GLB,,, | Performed by: FAMILY MEDICINE

## 2018-03-14 RX ORDER — MINERAL OIL
180 ENEMA (ML) RECTAL DAILY
Qty: 90 TABLET | Refills: 3 | Status: SHIPPED | OUTPATIENT
Start: 2018-03-14 | End: 2020-09-09 | Stop reason: SDUPTHER

## 2018-03-14 RX ORDER — HYDROCODONE BITARTRATE AND ACETAMINOPHEN 7.5; 325 MG/1; MG/1
2 TABLET ORAL EVERY 6 HOURS PRN
Qty: 120 TABLET | Refills: 0 | Status: SHIPPED | OUTPATIENT
Start: 2018-05-12 | End: 2018-05-10

## 2018-03-14 RX ORDER — CARISOPRODOL 350 MG/1
350 TABLET ORAL 4 TIMES DAILY PRN
Qty: 120 TABLET | Refills: 0 | Status: SHIPPED | OUTPATIENT
Start: 2018-05-14 | End: 2018-05-24

## 2018-03-14 RX ORDER — PROMETHAZINE HYDROCHLORIDE 25 MG/1
25 TABLET ORAL EVERY 4 HOURS
Qty: 40 TABLET | Refills: 1 | Status: SHIPPED | OUTPATIENT
Start: 2018-03-14 | End: 2018-12-12 | Stop reason: SDUPTHER

## 2018-03-14 RX ORDER — HYDROCODONE BITARTRATE AND ACETAMINOPHEN 7.5; 325 MG/1; MG/1
2 TABLET ORAL EVERY 6 HOURS PRN
Qty: 120 TABLET | Refills: 0 | Status: SHIPPED | OUTPATIENT
Start: 2018-03-14 | End: 2018-04-13

## 2018-03-14 RX ORDER — CARISOPRODOL 350 MG/1
350 TABLET ORAL 4 TIMES DAILY PRN
Qty: 120 TABLET | Refills: 0 | Status: SHIPPED | OUTPATIENT
Start: 2018-04-14 | End: 2018-04-24

## 2018-03-14 RX ORDER — HYDROCODONE BITARTRATE AND ACETAMINOPHEN 7.5; 325 MG/1; MG/1
2 TABLET ORAL EVERY 6 HOURS PRN
Qty: 120 TABLET | Refills: 0 | Status: SHIPPED | OUTPATIENT
Start: 2018-04-13 | End: 2018-05-12

## 2018-03-14 RX ORDER — CARISOPRODOL 350 MG/1
350 TABLET ORAL 4 TIMES DAILY PRN
Qty: 120 TABLET | Refills: 0 | Status: SHIPPED | OUTPATIENT
Start: 2018-03-14 | End: 2018-04-13

## 2018-03-14 RX ORDER — HYDROCODONE BITARTRATE AND ACETAMINOPHEN 7.5; 325 MG/1; MG/1
TABLET ORAL
Refills: 0 | COMMUNITY
Start: 2018-02-17 | End: 2018-03-14

## 2018-03-14 NOTE — PROGRESS NOTES
The patient presents today to fu chronic left LBP    Past Medical History:  History reviewed. No pertinent past medical history.  Past Surgical History:   Procedure Laterality Date    HERNIA REPAIR       Review of patient's allergies indicates:   Allergen Reactions    Amoxil [amoxicillin]      Current Outpatient Prescriptions on File Prior to Visit   Medication Sig Dispense Refill    carisoprodol (SOMA) 350 MG tablet Take 1 tablet (350 mg total) by mouth 4 (four) times daily as needed for Muscle spasms. 120 tablet 0    ketoconazole (NIZORAL) 2 % cream EVELIA AA BID  5    naproxen (NAPROSYN) 500 MG tablet 6 TAKE 1 TABLET BY MOUTH TWICE DAILY 60 tablet 11     No current facility-administered medications on file prior to visit.      Social History     Social History    Marital status:      Spouse name: N/A    Number of children: N/A    Years of education: N/A     Occupational History    Not on file.     Social History Main Topics    Smoking status: Never Smoker    Smokeless tobacco: Not on file    Alcohol use No    Drug use: Unknown    Sexual activity: Not on file     Other Topics Concern    Not on file     Social History Narrative    No narrative on file     History reviewed. No pertinent family history.      ROS:GENERAL: No fever, chills, fatigability or weight loss.  SKIN: No rashes, itching or changes in color or texture of skin.  HEAD: No headaches or recent head trauma.EYES: Visual acuity fine. No photophobia, ocular pain or diplopia.EARS: Denies ear pain, discharge or vertigo.NOSE: No loss of smell, no epistaxis or postnasal drip.MOUTH & THROAT: No hoarseness or change in voice. No excessive gum bleeding.NODES: Denies swollen glands.  CHEST: Denies YANCEY, cyanosis, wheezing, cough and sputum production.  CARDIOVASCULAR: Denies chest pain, PND, orthopnea or reduced exercise tolerance.  ABDOMEN: Appetite fine. No weight loss. Denies diarrhea, abdominal pain, hematemesis or blood in  stool.  URINARY: No flank pain, dysuria or hematuria.  PERIPHERAL VASCULAR: No claudication or cyanosis.  MUSCULOSKELETAL: See above.  NEUROLOGIC: No history of seizures, paralysis, alteration of gait or coordination.  PE:   HEAD: Normocephalic, atraumatic.EYES: PERRL. EOMI.   EARS: TM's intact. Light reflex normal. No retraction or perforation.   NOSE: Mucosa pink. Airway clear.MOUTH & THROAT: No tonsillar enlargement. No pharyngeal erythema or exudate. No stridor.  NODES: No cervical, axillary or inguinal lymph node enlargement.  CHEST: Lungs clear to auscultation.  CARDIOVASCULAR: Normal S1, S2. No rubs, murmurs or gallops.  ABDOMEN: Bowel sounds normal. Not distended. Soft. No tenderness or masses.  MUSCULOSKELETAL: Tender bilateral paralumbar w spasm   NEUROLOGIC: Cranial Nerves: II-XII grossly intact.  Motor: 5/5 strength major flexors/extensors.  DTR's: Knees, Ankles 2+ and equal bilaterally; downgoing toes.  Sensory: Intact to light touch distally.  Gait & Posture: Normal gait and fine motion. No cerebellar signs.     Impression:LBP   Migraines   Plan: RF meds   Rx phenergan ,allegra

## 2018-05-10 ENCOUNTER — OFFICE VISIT (OUTPATIENT)
Dept: FAMILY MEDICINE | Facility: CLINIC | Age: 58
End: 2018-05-10
Payer: COMMERCIAL

## 2018-05-10 VITALS
BODY MASS INDEX: 28.14 KG/M2 | SYSTOLIC BLOOD PRESSURE: 132 MMHG | TEMPERATURE: 98 F | HEART RATE: 96 BPM | HEIGHT: 71 IN | OXYGEN SATURATION: 97 % | WEIGHT: 201 LBS | DIASTOLIC BLOOD PRESSURE: 87 MMHG

## 2018-05-10 DIAGNOSIS — J20.9 BRONCHITIS WITH BRONCHOSPASM: Primary | ICD-10-CM

## 2018-05-10 PROCEDURE — 3008F BODY MASS INDEX DOCD: CPT | Mod: CPTII,S$GLB,, | Performed by: NURSE PRACTITIONER

## 2018-05-10 PROCEDURE — 99999 PR PBB SHADOW E&M-EST. PATIENT-LVL III: CPT | Mod: PBBFAC,,, | Performed by: NURSE PRACTITIONER

## 2018-05-10 PROCEDURE — 99213 OFFICE O/P EST LOW 20 MIN: CPT | Mod: S$GLB,,, | Performed by: NURSE PRACTITIONER

## 2018-05-10 RX ORDER — SULFAMETHOXAZOLE AND TRIMETHOPRIM 800; 160 MG/1; MG/1
1 TABLET ORAL 2 TIMES DAILY
Qty: 20 TABLET | Refills: 0 | Status: SHIPPED | OUTPATIENT
Start: 2018-05-10 | End: 2018-05-20

## 2018-05-10 RX ORDER — PREDNISONE 20 MG/1
20 TABLET ORAL DAILY
Qty: 10 TABLET | Refills: 0 | Status: SHIPPED | OUTPATIENT
Start: 2018-05-10 | End: 2018-05-20

## 2018-05-10 NOTE — PROGRESS NOTES
Subjective:       Patient ID: Ned Toledo Jr. is a 57 y.o. male.    Chief Complaint: Cough and Wheezing    Cough   This is a new problem. The current episode started 1 to 4 weeks ago. The problem has been unchanged. The problem occurs every few minutes. The cough is productive of sputum. Associated symptoms include nasal congestion, rhinorrhea, shortness of breath and wheezing. Pertinent negatives include no chest pain, ear pain, fever, headaches, myalgias, rash or sore throat. Exacerbated by: activity. [place] [facility] [place] doxy[place]. The treatment provided no relief.       Review of Systems   Constitutional: Negative for fatigue, fever and unexpected weight change.   HENT: Positive for rhinorrhea. Negative for ear pain and sore throat.    Eyes: Negative.  Negative for pain and visual disturbance.   Respiratory: Positive for shortness of breath and wheezing. Negative for cough.    Cardiovascular: Negative for chest pain and palpitations.   Gastrointestinal: Negative for abdominal pain, diarrhea, nausea and vomiting.   Genitourinary: Negative for dysuria and frequency.   Musculoskeletal: Negative for arthralgias and myalgias.   Skin: Negative for color change and rash.   Neurological: Negative for dizziness and headaches.   Psychiatric/Behavioral: Negative for sleep disturbance. The patient is not nervous/anxious.        Vitals:    05/10/18 1508   BP: 132/87   Pulse: 96   Temp: 97.9 °F (36.6 °C)       Objective:     Current Outpatient Prescriptions   Medication Sig Dispense Refill    [START ON 5/14/2018] carisoprodol (SOMA) 350 MG tablet Take 1 tablet (350 mg total) by mouth 4 (four) times daily as needed for Muscle spasms. 120 tablet 0    fexofenadine (ALLEGRA) 180 MG tablet Take 1 tablet (180 mg total) by mouth once daily. 90 tablet 3    hydrocodone-acetaminophen 7.5-325mg (NORCO) 7.5-325 mg per tablet Take 2 tablets by mouth every 6 (six) hours as needed for Pain. 120 tablet 0     ketoconazole (NIZORAL) 2 % cream EVELIA AA BID  5    naproxen (NAPROSYN) 500 MG tablet 6 TAKE 1 TABLET BY MOUTH TWICE DAILY 60 tablet 11    promethazine (PHENERGAN) 25 MG tablet Take 1 tablet (25 mg total) by mouth every 4 (four) hours. 40 tablet 1    predniSONE (DELTASONE) 20 MG tablet Take 1 tablet (20 mg total) by mouth once daily. 10 tablet 0    sulfamethoxazole-trimethoprim 800-160mg (BACTRIM DS) 800-160 mg Tab Take 1 tablet by mouth 2 (two) times daily. 20 tablet 0     No current facility-administered medications for this visit.        Physical Exam   Constitutional: He is oriented to person, place, and time. He appears well-developed. No distress.   HENT:   Head: Normocephalic and atraumatic.   Right Ear: Tympanic membrane normal.   Left Ear: Tympanic membrane normal.   Nose: Mucosal edema present.   Mouth/Throat: Posterior oropharyngeal edema present.   Eyes: EOM are normal. Pupils are equal, round, and reactive to light.   Neck: Normal range of motion. Neck supple.   Cardiovascular: Normal rate and regular rhythm.    Pulmonary/Chest: Effort normal. He has wheezes. He has rhonchi.   Loose cough   Musculoskeletal: Normal range of motion.   Neurological: He is alert and oriented to person, place, and time.   Skin: Skin is warm and dry. No rash noted.   Psychiatric: He has a normal mood and affect. Thought content normal.   Nursing note and vitals reviewed.      Assessment:       1. Bronchitis with bronchospasm        Plan:   Bronchitis with bronchospasm    Other orders  -     sulfamethoxazole-trimethoprim 800-160mg (BACTRIM DS) 800-160 mg Tab; Take 1 tablet by mouth 2 (two) times daily.  Dispense: 20 tablet; Refill: 0  -     predniSONE (DELTASONE) 20 MG tablet; Take 1 tablet (20 mg total) by mouth once daily.  Dispense: 10 tablet; Refill: 0        Follow-up if symptoms worsen or fail to improve.

## 2018-05-10 NOTE — LETTER
May 10, 2018      [facility]  [address] [address]  Phone: [phone]  Fax: [phone]       Patient: Ned Toledo   YOB: 1960  Date of Visit: 05/10/2018    To Whom It May Concern:    Samir Toledo  was at [facility] on 05/10/2018. He may return to work/school on 5/11/18 with no restrictions. If you have any questions or concerns, or if I can be of further assistance, please do not hesitate to contact me.    Sincerely,    Dre Aguila NP

## 2018-05-31 ENCOUNTER — PATIENT OUTREACH (OUTPATIENT)
Dept: ADMINISTRATIVE | Facility: HOSPITAL | Age: 58
End: 2018-05-31

## 2018-06-13 NOTE — PROGRESS NOTES
The patient presents today to fu chronic left LBP    Past Medical History:  No past medical history on file.  Past Surgical History:   Procedure Laterality Date    HERNIA REPAIR       Review of patient's allergies indicates:   Allergen Reactions    Amoxil [amoxicillin]      Current Outpatient Prescriptions on File Prior to Visit   Medication Sig Dispense Refill    fexofenadine (ALLEGRA) 180 MG tablet Take 1 tablet (180 mg total) by mouth once daily. 90 tablet 3    ketoconazole (NIZORAL) 2 % cream EVELIA AA BID  5    naproxen (NAPROSYN) 500 MG tablet 6 TAKE 1 TABLET BY MOUTH TWICE DAILY 60 tablet 11    promethazine (PHENERGAN) 25 MG tablet Take 1 tablet (25 mg total) by mouth every 4 (four) hours. 40 tablet 1     No current facility-administered medications on file prior to visit.      Social History     Social History    Marital status:      Spouse name: N/A    Number of children: N/A    Years of education: N/A     Occupational History    Not on file.     Social History Main Topics    Smoking status: Never Smoker    Smokeless tobacco: Not on file    Alcohol use No    Drug use: Unknown    Sexual activity: Not on file     Other Topics Concern    Not on file     Social History Narrative    No narrative on file     No family history on file.      ROS:GENERAL: No fever, chills, fatigability or weight loss.  SKIN: No rashes, itching or changes in color or texture of skin.  HEAD: No headaches or recent head trauma.EYES: Visual acuity fine. No photophobia, ocular pain or diplopia.EARS: Denies ear pain, discharge or vertigo.NOSE: No loss of smell, no epistaxis or postnasal drip.MOUTH & THROAT: No hoarseness or change in voice. No excessive gum bleeding.NODES: Denies swollen glands.  CHEST: Denies YANCEY, cyanosis, wheezing, cough and sputum production.  CARDIOVASCULAR: Denies chest pain, PND, orthopnea or reduced exercise tolerance.  ABDOMEN: Appetite fine. No weight loss. Denies diarrhea, abdominal pain,  hematemesis or blood in stool.  URINARY: No flank pain, dysuria or hematuria.  PERIPHERAL VASCULAR: No claudication or cyanosis.  MUSCULOSKELETAL: See above.  NEUROLOGIC: No history of seizures, paralysis, alteration of gait or coordination.  PE:   HEAD: Normocephalic, atraumatic.EYES: PERRL. EOMI.   EARS: TM's intact. Light reflex normal. No retraction or perforation.   NOSE: Mucosa pink. Airway clear.MOUTH & THROAT: No tonsillar enlargement. No pharyngeal erythema or exudate. No stridor.  NODES: No cervical, axillary or inguinal lymph node enlargement.  CHEST: Lungs clear to auscultation.  CARDIOVASCULAR: Normal S1, S2. No rubs, murmurs or gallops.  ABDOMEN: Bowel sounds normal. Not distended. Soft. No tenderness or masses.  MUSCULOSKELETAL: Tender bilateral paralumbar w spasm   NEUROLOGIC: Cranial Nerves: II-XII grossly intact.  Motor: 5/5 strength major flexors/extensors.  DTR's: Knees, Ankles 2+ and equal bilaterally; downgoing toes.  Sensory: Intact to light touch distally.  Gait & Posture: Normal gait and fine motion. No cerebellar signs.     Impression:LBP   Migraines   Plan: RF meds   Rx phenergan prn

## 2018-06-14 ENCOUNTER — OFFICE VISIT (OUTPATIENT)
Dept: FAMILY MEDICINE | Facility: CLINIC | Age: 58
End: 2018-06-14
Payer: COMMERCIAL

## 2018-06-14 VITALS
BODY MASS INDEX: 28.56 KG/M2 | HEART RATE: 107 BPM | WEIGHT: 204 LBS | DIASTOLIC BLOOD PRESSURE: 78 MMHG | SYSTOLIC BLOOD PRESSURE: 122 MMHG | TEMPERATURE: 98 F | HEIGHT: 71 IN

## 2018-06-14 DIAGNOSIS — M54.50 CHRONIC LEFT-SIDED LOW BACK PAIN WITHOUT SCIATICA: Primary | ICD-10-CM

## 2018-06-14 DIAGNOSIS — G89.29 CHRONIC LEFT-SIDED LOW BACK PAIN WITHOUT SCIATICA: Primary | ICD-10-CM

## 2018-06-14 PROCEDURE — 3008F BODY MASS INDEX DOCD: CPT | Mod: CPTII,S$GLB,, | Performed by: FAMILY MEDICINE

## 2018-06-14 PROCEDURE — 99213 OFFICE O/P EST LOW 20 MIN: CPT | Mod: S$GLB,,, | Performed by: FAMILY MEDICINE

## 2018-06-14 PROCEDURE — 99999 PR PBB SHADOW E&M-EST. PATIENT-LVL III: CPT | Mod: PBBFAC,,, | Performed by: FAMILY MEDICINE

## 2018-06-14 RX ORDER — CARISOPRODOL 350 MG/1
350 TABLET ORAL 4 TIMES DAILY PRN
Qty: 120 TABLET | Refills: 0 | Status: SHIPPED | OUTPATIENT
Start: 2018-06-14 | End: 2018-06-24

## 2018-06-14 RX ORDER — HYDROCODONE BITARTRATE AND ACETAMINOPHEN 7.5; 325 MG/1; MG/1
1 TABLET ORAL EVERY 6 HOURS PRN
Qty: 120 TABLET | Refills: 0 | Status: SHIPPED | OUTPATIENT
Start: 2018-08-13 | End: 2018-09-13 | Stop reason: SDUPTHER

## 2018-06-14 RX ORDER — CARISOPRODOL 350 MG/1
350 TABLET ORAL 4 TIMES DAILY PRN
COMMUNITY
End: 2018-09-13 | Stop reason: SDUPTHER

## 2018-06-14 RX ORDER — HYDROCODONE BITARTRATE AND ACETAMINOPHEN 7.5; 325 MG/1; MG/1
1 TABLET ORAL EVERY 6 HOURS PRN
COMMUNITY
End: 2019-08-20

## 2018-06-14 RX ORDER — HYDROCODONE BITARTRATE AND ACETAMINOPHEN 7.5; 325 MG/1; MG/1
1 TABLET ORAL EVERY 6 HOURS PRN
Qty: 120 TABLET | Refills: 0 | Status: SHIPPED | OUTPATIENT
Start: 2018-07-14 | End: 2018-09-13 | Stop reason: SDUPTHER

## 2018-06-14 RX ORDER — HYDROCODONE BITARTRATE AND ACETAMINOPHEN 7.5; 325 MG/1; MG/1
1 TABLET ORAL EVERY 6 HOURS PRN
Qty: 120 TABLET | Refills: 0 | Status: SHIPPED | OUTPATIENT
Start: 2018-06-14 | End: 2018-09-13 | Stop reason: SDUPTHER

## 2018-06-14 RX ORDER — CARISOPRODOL 350 MG/1
350 TABLET ORAL 4 TIMES DAILY PRN
Qty: 120 TABLET | Refills: 0 | Status: SHIPPED | OUTPATIENT
Start: 2018-08-13 | End: 2018-08-23

## 2018-06-14 RX ORDER — CARISOPRODOL 350 MG/1
350 TABLET ORAL 4 TIMES DAILY PRN
Qty: 120 TABLET | Refills: 0 | Status: SHIPPED | OUTPATIENT
Start: 2018-07-14 | End: 2018-07-24

## 2018-07-31 RX ORDER — NAPROXEN 500 MG/1
TABLET ORAL
Qty: 60 TABLET | Refills: 12 | Status: SHIPPED | OUTPATIENT
Start: 2018-07-31 | End: 2018-12-31

## 2018-08-30 ENCOUNTER — PATIENT OUTREACH (OUTPATIENT)
Dept: ADMINISTRATIVE | Facility: HOSPITAL | Age: 58
End: 2018-08-30

## 2018-09-12 NOTE — PROGRESS NOTES
The patient presents today to fu chronic left LBP    Past Medical History:  No past medical history on file.  Past Surgical History:   Procedure Laterality Date    HERNIA REPAIR       Review of patient's allergies indicates:   Allergen Reactions    Amoxil [amoxicillin]      Current Outpatient Medications on File Prior to Visit   Medication Sig Dispense Refill    carisoprodol (SOMA) 350 MG tablet Take 350 mg by mouth 4 (four) times daily as needed for Muscle spasms.      fexofenadine (ALLEGRA) 180 MG tablet Take 1 tablet (180 mg total) by mouth once daily. 90 tablet 3    HYDROcodone-acetaminophen (NORCO) 7.5-325 mg per tablet Take 1 tablet by mouth every 6 (six) hours as needed for Pain. 120 tablet 0    HYDROcodone-acetaminophen (NORCO) 7.5-325 mg per tablet Take 1 tablet by mouth every 6 (six) hours as needed for Pain. 120 tablet 0    HYDROcodone-acetaminophen (NORCO) 7.5-325 mg per tablet Take 1 tablet by mouth every 6 (six) hours as needed for Pain. 120 tablet 0    HYDROcodone-acetaminophen (NORCO) 7.5-325 mg per tablet Take 1 tablet by mouth every 6 (six) hours as needed for Pain.      ketoconazole (NIZORAL) 2 % cream EVELIA AA BID  5    naproxen (NAPROSYN) 500 MG tablet TAKE 1 TABLET BY MOUTH TWICE DAILY 60 tablet 12    promethazine (PHENERGAN) 25 MG tablet Take 1 tablet (25 mg total) by mouth every 4 (four) hours. 40 tablet 1     No current facility-administered medications on file prior to visit.      Social History     Socioeconomic History    Marital status:      Spouse name: Not on file    Number of children: Not on file    Years of education: Not on file    Highest education level: Not on file   Social Needs    Financial resource strain: Not on file    Food insecurity - worry: Not on file    Food insecurity - inability: Not on file    Transportation needs - medical: Not on file    Transportation needs - non-medical: Not on file   Occupational History    Not on file   Tobacco Use     Smoking status: Never Smoker   Substance and Sexual Activity    Alcohol use: No    Drug use: Not on file    Sexual activity: Not on file   Other Topics Concern    Not on file   Social History Narrative    Not on file     No family history on file.      ROS:GENERAL: No fever, chills, fatigability or weight loss.  SKIN: No rashes, itching or changes in color or texture of skin.  HEAD: No headaches or recent head trauma.EYES: Visual acuity fine. No photophobia, ocular pain or diplopia.EARS: Denies ear pain, discharge or vertigo.NOSE: No loss of smell, no epistaxis or postnasal drip.MOUTH & THROAT: No hoarseness or change in voice. No excessive gum bleeding.NODES: Denies swollen glands.  CHEST: Denies YANCEY, cyanosis, wheezing, cough and sputum production.  CARDIOVASCULAR: Denies chest pain, PND, orthopnea or reduced exercise tolerance.  ABDOMEN: Appetite fine. No weight loss. Denies diarrhea, abdominal pain, hematemesis or blood in stool.  URINARY: No flank pain, dysuria or hematuria.  PERIPHERAL VASCULAR: No claudication or cyanosis.  MUSCULOSKELETAL: See above.  NEUROLOGIC: No history of seizures, paralysis, alteration of gait or coordination.  PE:   HEAD: Normocephalic, atraumatic.EYES: PERRL. EOMI.   EARS: TM's intact. Light reflex normal. No retraction or perforation.   NOSE: Mucosa pink. Airway clear.MOUTH & THROAT: No tonsillar enlargement. No pharyngeal erythema or exudate. No stridor.  NODES: No cervical, axillary or inguinal lymph node enlargement.  CHEST: Lungs clear to auscultation.  CARDIOVASCULAR: Normal S1, S2. No rubs, murmurs or gallops.  ABDOMEN: Bowel sounds normal. Not distended. Soft. No tenderness or masses.  MUSCULOSKELETAL: Tender bilateral paralumbar w spasm   NEUROLOGIC: Cranial Nerves: II-XII grossly intact.  Motor: 5/5 strength major flexors/extensors.  DTR's: Knees, Ankles 2+ and equal bilaterally; downgoing toes.  Sensory: Intact to light touch distally.  Gait & Posture: Normal gait  and fine motion. No cerebellar signs.     Impression:LBP   Pharyngitis  Migraines   Plan: RF meds   Rx phenergan prn

## 2018-09-13 ENCOUNTER — OFFICE VISIT (OUTPATIENT)
Dept: FAMILY MEDICINE | Facility: CLINIC | Age: 58
End: 2018-09-13
Payer: COMMERCIAL

## 2018-09-13 ENCOUNTER — TELEPHONE (OUTPATIENT)
Dept: FAMILY MEDICINE | Facility: CLINIC | Age: 58
End: 2018-09-13

## 2018-09-13 VITALS
WEIGHT: 207 LBS | HEART RATE: 87 BPM | TEMPERATURE: 98 F | DIASTOLIC BLOOD PRESSURE: 75 MMHG | BODY MASS INDEX: 28.04 KG/M2 | SYSTOLIC BLOOD PRESSURE: 124 MMHG | HEIGHT: 72 IN

## 2018-09-13 DIAGNOSIS — J02.9 PHARYNGITIS, UNSPECIFIED ETIOLOGY: ICD-10-CM

## 2018-09-13 DIAGNOSIS — M54.50 CHRONIC LEFT-SIDED LOW BACK PAIN WITHOUT SCIATICA: Primary | ICD-10-CM

## 2018-09-13 DIAGNOSIS — G89.29 CHRONIC LEFT-SIDED LOW BACK PAIN WITHOUT SCIATICA: Primary | ICD-10-CM

## 2018-09-13 DIAGNOSIS — Z00.00 ROUTINE ADULT HEALTH MAINTENANCE: Primary | ICD-10-CM

## 2018-09-13 PROCEDURE — 3008F BODY MASS INDEX DOCD: CPT | Mod: CPTII,S$GLB,, | Performed by: FAMILY MEDICINE

## 2018-09-13 PROCEDURE — 99213 OFFICE O/P EST LOW 20 MIN: CPT | Mod: S$GLB,,, | Performed by: FAMILY MEDICINE

## 2018-09-13 PROCEDURE — 99999 PR PBB SHADOW E&M-EST. PATIENT-LVL III: CPT | Mod: PBBFAC,,, | Performed by: FAMILY MEDICINE

## 2018-09-13 RX ORDER — CARISOPRODOL 350 MG/1
350 TABLET ORAL 4 TIMES DAILY PRN
Qty: 120 TABLET | Refills: 0 | Status: SHIPPED | OUTPATIENT
Start: 2018-11-12 | End: 2018-11-22

## 2018-09-13 RX ORDER — CARISOPRODOL 350 MG/1
350 TABLET ORAL 4 TIMES DAILY PRN
Qty: 120 TABLET | Refills: 0 | Status: SHIPPED | OUTPATIENT
Start: 2018-10-13 | End: 2018-10-23

## 2018-09-13 RX ORDER — HYDROCODONE BITARTRATE AND ACETAMINOPHEN 7.5; 325 MG/1; MG/1
1 TABLET ORAL EVERY 6 HOURS PRN
Qty: 120 TABLET | Refills: 0 | Status: SHIPPED | OUTPATIENT
Start: 2018-10-13 | End: 2018-12-12 | Stop reason: SDUPTHER

## 2018-09-13 RX ORDER — CARISOPRODOL 350 MG/1
350 TABLET ORAL 4 TIMES DAILY PRN
Qty: 120 TABLET | Refills: 0 | Status: SHIPPED | OUTPATIENT
Start: 2018-09-13 | End: 2018-12-12 | Stop reason: SDUPTHER

## 2018-09-13 RX ORDER — HYDROCODONE BITARTRATE AND ACETAMINOPHEN 7.5; 325 MG/1; MG/1
1 TABLET ORAL EVERY 6 HOURS PRN
Qty: 120 TABLET | Refills: 0 | Status: SHIPPED | OUTPATIENT
Start: 2018-09-13 | End: 2018-12-12 | Stop reason: SDUPTHER

## 2018-09-13 RX ORDER — HYDROCODONE BITARTRATE AND ACETAMINOPHEN 7.5; 325 MG/1; MG/1
1 TABLET ORAL EVERY 6 HOURS PRN
Qty: 120 TABLET | Refills: 0 | Status: SHIPPED | OUTPATIENT
Start: 2018-11-12 | End: 2018-12-12 | Stop reason: SDUPTHER

## 2018-09-13 RX ORDER — CEPHALEXIN 500 MG/1
500 CAPSULE ORAL EVERY 12 HOURS
Qty: 14 CAPSULE | Refills: 0 | Status: SHIPPED | OUTPATIENT
Start: 2018-09-13 | End: 2018-09-27

## 2018-09-27 ENCOUNTER — TELEPHONE (OUTPATIENT)
Dept: FAMILY MEDICINE | Facility: CLINIC | Age: 58
End: 2018-09-27

## 2018-09-27 ENCOUNTER — OFFICE VISIT (OUTPATIENT)
Dept: FAMILY MEDICINE | Facility: CLINIC | Age: 58
End: 2018-09-27
Payer: COMMERCIAL

## 2018-09-27 VITALS
BODY MASS INDEX: 28.22 KG/M2 | HEIGHT: 72 IN | DIASTOLIC BLOOD PRESSURE: 95 MMHG | TEMPERATURE: 99 F | WEIGHT: 208.38 LBS | SYSTOLIC BLOOD PRESSURE: 155 MMHG | HEART RATE: 86 BPM

## 2018-09-27 DIAGNOSIS — S29.019A THORACIC MYOFASCIAL STRAIN, INITIAL ENCOUNTER: ICD-10-CM

## 2018-09-27 DIAGNOSIS — S16.1XXA STRAIN OF NECK MUSCLE, INITIAL ENCOUNTER: Primary | ICD-10-CM

## 2018-09-27 DIAGNOSIS — S93.402A INVERSION SPRAIN OF ANKLE, LEFT, INITIAL ENCOUNTER: ICD-10-CM

## 2018-09-27 PROCEDURE — 99999 PR PBB SHADOW E&M-EST. PATIENT-LVL III: CPT | Mod: PBBFAC,,, | Performed by: FAMILY MEDICINE

## 2018-09-27 PROCEDURE — 3008F BODY MASS INDEX DOCD: CPT | Mod: CPTII,S$GLB,, | Performed by: FAMILY MEDICINE

## 2018-09-27 PROCEDURE — 99214 OFFICE O/P EST MOD 30 MIN: CPT | Mod: S$GLB,,, | Performed by: FAMILY MEDICINE
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
## 2018-10-08 ENCOUNTER — OFFICE VISIT (OUTPATIENT)
Dept: FAMILY MEDICINE | Facility: CLINIC | Age: 58
End: 2018-10-08
Payer: COMMERCIAL

## 2018-10-08 VITALS
DIASTOLIC BLOOD PRESSURE: 80 MMHG | WEIGHT: 206 LBS | TEMPERATURE: 98 F | SYSTOLIC BLOOD PRESSURE: 128 MMHG | HEIGHT: 72 IN | BODY MASS INDEX: 27.9 KG/M2 | HEART RATE: 85 BPM

## 2018-10-08 DIAGNOSIS — S83.92XD SPRAIN OF LEFT KNEE, UNSPECIFIED LIGAMENT, SUBSEQUENT ENCOUNTER: ICD-10-CM

## 2018-10-08 DIAGNOSIS — S29.019D THORACIC MYOFASCIAL STRAIN, SUBSEQUENT ENCOUNTER: ICD-10-CM

## 2018-10-08 DIAGNOSIS — S16.1XXD STRAIN OF NECK MUSCLE, SUBSEQUENT ENCOUNTER: Primary | ICD-10-CM

## 2018-10-08 PROCEDURE — 99214 OFFICE O/P EST MOD 30 MIN: CPT | Mod: S$GLB,,, | Performed by: FAMILY MEDICINE

## 2018-10-08 PROCEDURE — 99999 PR PBB SHADOW E&M-EST. PATIENT-LVL III: CPT | Mod: PBBFAC,,, | Performed by: FAMILY MEDICINE

## 2018-10-08 PROCEDURE — 3008F BODY MASS INDEX DOCD: CPT | Mod: CPTII,S$GLB,, | Performed by: FAMILY MEDICINE

## 2018-10-08 RX ORDER — METHYLPREDNISOLONE 4 MG/1
TABLET ORAL
Qty: 1 PACKAGE | Refills: 0 | Status: SHIPPED | OUTPATIENT
Start: 2018-10-08 | End: 2018-12-12
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
## 2018-10-11 ENCOUNTER — TELEPHONE (OUTPATIENT)
Dept: FAMILY MEDICINE | Facility: CLINIC | Age: 58
End: 2018-10-11

## 2018-10-11 NOTE — TELEPHONE ENCOUNTER
----- Message from Jarad Nieves sent at 10/11/2018 12:50 PM CDT -----  Contact: pt   Pt is requesting a call back from the nurse in regards to pt getting a new Dr myrna stating that he was under Dr care 10/08/2018 to 10/17/2018 so that his job can pay him for his days off.  [phone] (home)

## 2018-11-05 ENCOUNTER — TELEPHONE (OUTPATIENT)
Dept: FAMILY MEDICINE | Facility: CLINIC | Age: 58
End: 2018-11-05

## 2018-11-05 DIAGNOSIS — M54.2 NECK AND SHOULDER PAIN: Primary | ICD-10-CM

## 2018-11-05 DIAGNOSIS — M25.519 NECK AND SHOULDER PAIN: Primary | ICD-10-CM

## 2018-11-05 NOTE — TELEPHONE ENCOUNTER
----- Message from Bebeto Manzano sent at 11/5/2018  1:14 PM CST -----  Contact: self  Requesting a call back regarding getting a sooner appt than the next available. pleae call back at [phone].    Thanks,  Bebeto Manzano

## 2018-11-05 NOTE — TELEPHONE ENCOUNTER
----- Message from Kelsey Jennings sent at 11/5/2018  7:08 AM CST -----  Contact: pt  Please call pt @ [phone] regarding result from last visit, pt is still having neck/shoulder pain, pt need to know what to do.

## 2018-12-05 ENCOUNTER — LAB VISIT (OUTPATIENT)
Dept: LAB | Facility: HOSPITAL | Age: 58
End: 2018-12-05
Attending: FAMILY MEDICINE
Payer: COMMERCIAL

## 2018-12-05 DIAGNOSIS — Z00.00 ROUTINE ADULT HEALTH MAINTENANCE: ICD-10-CM

## 2018-12-05 LAB
ALBUMIN SERPL BCP-MCNC: 3.9 G/DL
ALP SERPL-CCNC: 75 U/L
ALT SERPL W/O P-5'-P-CCNC: 72 U/L
ANION GAP SERPL CALC-SCNC: 6 MMOL/L
AST SERPL-CCNC: 48 U/L
BASOPHILS # BLD AUTO: 0.05 K/UL
BASOPHILS NFR BLD: 0.7 %
BILIRUB SERPL-MCNC: 1 MG/DL
BUN SERPL-MCNC: 12 MG/DL
CALCIUM SERPL-MCNC: 9.7 MG/DL
CHLORIDE SERPL-SCNC: 104 MMOL/L
CHOLEST SERPL-MCNC: 189 MG/DL
CHOLEST/HDLC SERPL: 5.3 {RATIO}
CO2 SERPL-SCNC: 28 MMOL/L
CREAT SERPL-MCNC: 1.1 MG/DL
DIFFERENTIAL METHOD: ABNORMAL
EOSINOPHIL # BLD AUTO: 0.2 K/UL
EOSINOPHIL NFR BLD: 2.8 %
ERYTHROCYTE [DISTWIDTH] IN BLOOD BY AUTOMATED COUNT: 11.9 %
EST. GFR  (AFRICAN AMERICAN): >60 ML/MIN/1.73 M^2
EST. GFR  (NON AFRICAN AMERICAN): >60 ML/MIN/1.73 M^2
GLUCOSE SERPL-MCNC: 138 MG/DL
HCT VFR BLD AUTO: 49.2 %
HDLC SERPL-MCNC: 36 MG/DL
HDLC SERPL: 19 %
HGB BLD-MCNC: 16 G/DL
IMM GRANULOCYTES # BLD AUTO: 0.04 K/UL
IMM GRANULOCYTES NFR BLD AUTO: 0.6 %
LDLC SERPL CALC-MCNC: 131.4 MG/DL
LYMPHOCYTES # BLD AUTO: 2.3 K/UL
LYMPHOCYTES NFR BLD: 32.9 %
MCH RBC QN AUTO: 32.1 PG
MCHC RBC AUTO-ENTMCNC: 32.5 G/DL
MCV RBC AUTO: 99 FL
MONOCYTES # BLD AUTO: 0.7 K/UL
MONOCYTES NFR BLD: 9.3 %
NEUTROPHILS # BLD AUTO: 3.8 K/UL
NEUTROPHILS NFR BLD: 53.7 %
NONHDLC SERPL-MCNC: 153 MG/DL
NRBC BLD-RTO: 0 /100 WBC
PLATELET # BLD AUTO: 407 K/UL
PMV BLD AUTO: 9.2 FL
POTASSIUM SERPL-SCNC: 4.4 MMOL/L
PROT SERPL-MCNC: 7.9 G/DL
RBC # BLD AUTO: 4.98 M/UL
SODIUM SERPL-SCNC: 138 MMOL/L
TRIGL SERPL-MCNC: 108 MG/DL
TSH SERPL DL<=0.005 MIU/L-ACNC: 1.63 UIU/ML
WBC # BLD AUTO: 7.09 K/UL

## 2018-12-05 PROCEDURE — 85025 COMPLETE CBC W/AUTO DIFF WBC: CPT

## 2018-12-05 PROCEDURE — 80053 COMPREHEN METABOLIC PANEL: CPT

## 2018-12-05 PROCEDURE — 36415 COLL VENOUS BLD VENIPUNCTURE: CPT | Mod: PO

## 2018-12-05 PROCEDURE — 80061 LIPID PANEL: CPT

## 2018-12-05 PROCEDURE — 84443 ASSAY THYROID STIM HORMONE: CPT

## 2018-12-12 ENCOUNTER — OFFICE VISIT (OUTPATIENT)
Dept: FAMILY MEDICINE | Facility: CLINIC | Age: 58
End: 2018-12-12
Payer: COMMERCIAL

## 2018-12-12 VITALS
DIASTOLIC BLOOD PRESSURE: 82 MMHG | WEIGHT: 204 LBS | HEART RATE: 87 BPM | SYSTOLIC BLOOD PRESSURE: 126 MMHG | TEMPERATURE: 98 F | BODY MASS INDEX: 28.56 KG/M2 | HEIGHT: 71 IN

## 2018-12-12 DIAGNOSIS — D75.839 THROMBOCYTOSIS: ICD-10-CM

## 2018-12-12 DIAGNOSIS — R73.9 HYPERGLYCEMIA: ICD-10-CM

## 2018-12-12 DIAGNOSIS — Z00.00 ROUTINE CHECK-UP: Primary | ICD-10-CM

## 2018-12-12 PROCEDURE — 99396 PREV VISIT EST AGE 40-64: CPT | Mod: S$GLB,,, | Performed by: FAMILY MEDICINE

## 2018-12-12 PROCEDURE — 99999 PR PBB SHADOW E&M-EST. PATIENT-LVL III: CPT | Mod: PBBFAC,,, | Performed by: FAMILY MEDICINE

## 2018-12-12 RX ORDER — HYDROCODONE BITARTRATE AND ACETAMINOPHEN 7.5; 325 MG/1; MG/1
1 TABLET ORAL EVERY 6 HOURS PRN
Qty: 120 TABLET | Refills: 0 | Status: SHIPPED | OUTPATIENT
Start: 2019-02-09 | End: 2019-03-12 | Stop reason: SDUPTHER

## 2018-12-12 RX ORDER — CARISOPRODOL 350 MG/1
350 TABLET ORAL 4 TIMES DAILY PRN
Qty: 120 TABLET | Refills: 0 | Status: SHIPPED | OUTPATIENT
Start: 2018-12-12 | End: 2019-03-12 | Stop reason: SDUPTHER

## 2018-12-12 RX ORDER — CARISOPRODOL 350 MG/1
350 TABLET ORAL 4 TIMES DAILY PRN
Qty: 120 TABLET | Refills: 0 | Status: SHIPPED | OUTPATIENT
Start: 2019-01-11 | End: 2019-01-21

## 2018-12-12 RX ORDER — PROMETHAZINE HYDROCHLORIDE 25 MG/1
25 TABLET ORAL EVERY 4 HOURS
Qty: 60 TABLET | Refills: 6 | Status: SHIPPED | OUTPATIENT
Start: 2018-12-12 | End: 2019-09-09

## 2018-12-12 RX ORDER — HYDROCODONE BITARTRATE AND ACETAMINOPHEN 7.5; 325 MG/1; MG/1
1 TABLET ORAL EVERY 6 HOURS PRN
Qty: 120 TABLET | Refills: 0 | Status: SHIPPED | OUTPATIENT
Start: 2018-12-12 | End: 2019-03-12 | Stop reason: SDUPTHER

## 2018-12-12 RX ORDER — HYDROCODONE BITARTRATE AND ACETAMINOPHEN 7.5; 325 MG/1; MG/1
1 TABLET ORAL EVERY 6 HOURS PRN
Qty: 120 TABLET | Refills: 0 | Status: SHIPPED | OUTPATIENT
Start: 2019-01-11 | End: 2019-03-12 | Stop reason: SDUPTHER

## 2018-12-12 RX ORDER — CARISOPRODOL 350 MG/1
350 TABLET ORAL 4 TIMES DAILY PRN
Qty: 120 TABLET | Refills: 0 | Status: SHIPPED | OUTPATIENT
Start: 2019-02-09 | End: 2019-02-19

## 2018-12-12 NOTE — PROGRESS NOTES
The patient presents today for general health evaluation and counseling      Past Medical History:  History reviewed. No pertinent past medical history.  Past Surgical History:   Procedure Laterality Date    HERNIA REPAIR       Review of patient's allergies indicates:  No Known Allergies  Current Outpatient Medications on File Prior to Visit   Medication Sig Dispense Refill    carisoprodol (SOMA) 350 MG tablet Take 1 tablet (350 mg total) by mouth 4 (four) times daily as needed for Muscle spasms. 120 tablet 0    fexofenadine (ALLEGRA) 180 MG tablet Take 1 tablet (180 mg total) by mouth once daily. 90 tablet 3    HYDROcodone-acetaminophen (NORCO) 7.5-325 mg per tablet Take 1 tablet by mouth every 6 (six) hours as needed for Pain. 120 tablet 0    HYDROcodone-acetaminophen (NORCO) 7.5-325 mg per tablet Take 1 tablet by mouth every 6 (six) hours as needed for Pain. 120 tablet 0    HYDROcodone-acetaminophen (NORCO) 7.5-325 mg per tablet Take 1 tablet by mouth every 6 (six) hours as needed for Pain. 120 tablet 0    ketoconazole (NIZORAL) 2 % cream EVELIA AA BID  5    naproxen (NAPROSYN) 500 MG tablet TAKE 1 TABLET BY MOUTH TWICE DAILY 60 tablet 12    promethazine (PHENERGAN) 25 MG tablet Take 1 tablet (25 mg total) by mouth every 4 (four) hours. 40 tablet 1    HYDROcodone-acetaminophen (NORCO) 7.5-325 mg per tablet Take 1 tablet by mouth every 6 (six) hours as needed for Pain.      [DISCONTINUED] methylPREDNISolone (MEDROL DOSEPACK) 4 mg tablet As directed 1 Package 0     No current facility-administered medications on file prior to visit.      Social History     Socioeconomic History    Marital status:      Spouse name: Not on file    Number of children: Not on file    Years of education: Not on file    Highest education level: Not on file   Social Needs    Financial resource strain: Not on file    Food insecurity - worry: Not on file    Food insecurity - inability: Not on file    Transportation  needs - medical: Not on file    Transportation needs - non-medical: Not on file   Occupational History    Not on file   Tobacco Use    Smoking status: Never Smoker   Substance and Sexual Activity    Alcohol use: No    Drug use: Not on file    Sexual activity: Not on file   Other Topics Concern    Not on file   Social History Narrative    Not on file     History reviewed. No pertinent family history.      ROS:GENERAL: No fever, chills, fatigability or weight loss.  SKIN: No rashes, itching or changes in color or texture of skin.  HEAD: No headaches or recent head trauma.EYES: Visual acuity fine. No photophobia, ocular pain or diplopia.EARS: Denies ear pain, discharge or vertigo.NOSE: No loss of smell, no epistaxis or postnasal drip.MOUTH & THROAT: No hoarseness or change in voice. No excessive gum bleeding.NODES: Denies swollen glands.  CHEST: Denies YANCEY, cyanosis, wheezing, cough and sputum production.  CARDIOVASCULAR: Denies chest pain, PND, orthopnea or reduced exercise tolerance.  ABDOMEN: Appetite fine. No weight loss. Denies diarrhea, abdominal pain, hematemesis or blood in stool.  URINARY: No flank pain, dysuria or hematuria.  PERIPHERAL VASCULAR: No claudication or cyanosis.  MUSCULOSKELETAL: See above.  NEUROLOGIC: No history of seizures, paralysis, alteration of gait or coordination.  PE:   HEAD: Normocephalic, atraumatic.EYES: PERRL. EOMI.   EARS: TM's intact. Light reflex normal. No retraction or perforation.   NOSE: Mucosa pink. Airway clear.MOUTH & THROAT: No tonsillar enlargement. No pharyngeal erythema or exudate. No stridor.  NODES: No cervical, axillary or inguinal lymph node enlargement.  CHEST: Lungs clear to auscultation.  CARDIOVASCULAR: Normal S1, S2. No rubs, murmurs or gallops.  ABDOMEN: Bowel sounds normal. Not distended. Soft. No tenderness or masses.  MUSCULOSKELETAL: No palpable abnormality  NEUROLOGIC: Cranial Nerves: II-XII grossly intact.  Motor: 5/5 strength major  flexors/extensors.  DTR's: Knees, Ankles 2+ and equal bilaterally; downgoing toes.  Sensory: Intact to light touch distally.  Gait & Posture: Normal gait and fine motion. No cerebellar signs.   SALVATORE-Pt defers   Impression:Routine health check  Plan:Lab eval  Rec diet and ex recs  Rev age appropriate screenings    Health Maintenance Due   Topic Date Due    Pneumococcal Vaccine (Medium Risk) (1 of 1 - PPSV23) 10/24/1978    Influenza Vaccine  08/01/2018

## 2018-12-19 ENCOUNTER — PATIENT OUTREACH (OUTPATIENT)
Dept: ADMINISTRATIVE | Facility: HOSPITAL | Age: 58
End: 2018-12-19

## 2019-03-12 ENCOUNTER — OFFICE VISIT (OUTPATIENT)
Dept: FAMILY MEDICINE | Facility: CLINIC | Age: 59
End: 2019-03-12
Payer: COMMERCIAL

## 2019-03-12 VITALS
TEMPERATURE: 98 F | HEIGHT: 71 IN | DIASTOLIC BLOOD PRESSURE: 82 MMHG | WEIGHT: 204 LBS | HEART RATE: 96 BPM | BODY MASS INDEX: 28.56 KG/M2 | SYSTOLIC BLOOD PRESSURE: 121 MMHG

## 2019-03-12 DIAGNOSIS — M54.50 CHRONIC LEFT-SIDED LOW BACK PAIN WITHOUT SCIATICA: Primary | ICD-10-CM

## 2019-03-12 DIAGNOSIS — G89.29 CHRONIC LEFT-SIDED LOW BACK PAIN WITHOUT SCIATICA: Primary | ICD-10-CM

## 2019-03-12 PROCEDURE — 99999 PR PBB SHADOW E&M-EST. PATIENT-LVL III: CPT | Mod: PBBFAC,,, | Performed by: FAMILY MEDICINE

## 2019-03-12 PROCEDURE — 3008F BODY MASS INDEX DOCD: CPT | Mod: CPTII,S$GLB,, | Performed by: FAMILY MEDICINE

## 2019-03-12 PROCEDURE — 99999 PR PBB SHADOW E&M-EST. PATIENT-LVL III: ICD-10-PCS | Mod: PBBFAC,,, | Performed by: FAMILY MEDICINE

## 2019-03-12 PROCEDURE — 3008F PR BODY MASS INDEX (BMI) DOCUMENTED: ICD-10-PCS | Mod: CPTII,S$GLB,, | Performed by: FAMILY MEDICINE

## 2019-03-12 PROCEDURE — 99213 OFFICE O/P EST LOW 20 MIN: CPT | Mod: S$GLB,,, | Performed by: FAMILY MEDICINE

## 2019-03-12 PROCEDURE — 99213 PR OFFICE/OUTPT VISIT, EST, LEVL III, 20-29 MIN: ICD-10-PCS | Mod: S$GLB,,, | Performed by: FAMILY MEDICINE

## 2019-03-12 RX ORDER — CARISOPRODOL 350 MG/1
350 TABLET ORAL 4 TIMES DAILY PRN
Qty: 120 TABLET | Refills: 0 | Status: SHIPPED | OUTPATIENT
Start: 2019-04-11 | End: 2019-04-12 | Stop reason: SDUPTHER

## 2019-03-12 RX ORDER — CARISOPRODOL 350 MG/1
350 TABLET ORAL 4 TIMES DAILY PRN
Qty: 120 TABLET | Refills: 0 | Status: SHIPPED | OUTPATIENT
Start: 2019-03-12 | End: 2019-04-12 | Stop reason: SDUPTHER

## 2019-03-12 RX ORDER — HYDROCODONE BITARTRATE AND ACETAMINOPHEN 7.5; 325 MG/1; MG/1
1 TABLET ORAL EVERY 6 HOURS PRN
Qty: 120 TABLET | Refills: 0 | Status: SHIPPED | OUTPATIENT
Start: 2019-03-12 | End: 2019-06-12 | Stop reason: SDUPTHER

## 2019-03-12 RX ORDER — HYDROCODONE BITARTRATE AND ACETAMINOPHEN 7.5; 325 MG/1; MG/1
1 TABLET ORAL EVERY 6 HOURS PRN
Qty: 120 TABLET | Refills: 0 | Status: SHIPPED | OUTPATIENT
Start: 2019-05-11 | End: 2019-06-12 | Stop reason: SDUPTHER

## 2019-03-12 RX ORDER — CARISOPRODOL 350 MG/1
350 TABLET ORAL 4 TIMES DAILY PRN
Qty: 120 TABLET | Refills: 0 | Status: SHIPPED | OUTPATIENT
Start: 2019-05-11 | End: 2019-05-21

## 2019-03-12 RX ORDER — HYDROCODONE BITARTRATE AND ACETAMINOPHEN 7.5; 325 MG/1; MG/1
1 TABLET ORAL EVERY 6 HOURS PRN
Qty: 120 TABLET | Refills: 0 | Status: SHIPPED | OUTPATIENT
Start: 2019-04-11 | End: 2019-06-12 | Stop reason: SDUPTHER

## 2019-03-12 NOTE — PROGRESS NOTES
The patient presents today to fu back and lt leg pain Had exacerbation 12/31 and is sl better now   History reviewed. No pertinent past medical history.  Past Surgical History:   Procedure Laterality Date    HERNIA REPAIR       Review of patient's allergies indicates:   Allergen Reactions    Amoxil [amoxicillin]      Current Outpatient Medications on File Prior to Visit   Medication Sig Dispense Refill    carisoprodol (SOMA) 350 MG tablet Take 1 tablet (350 mg total) by mouth 4 (four) times daily as needed for Muscle spasms. 120 tablet 0    fexofenadine (ALLEGRA) 180 MG tablet Take 1 tablet (180 mg total) by mouth once daily. 90 tablet 3    HYDROcodone-acetaminophen (NORCO) 7.5-325 mg per tablet Take 1 tablet by mouth every 6 (six) hours as needed for Pain.      HYDROcodone-acetaminophen (NORCO) 7.5-325 mg per tablet Take 1 tablet by mouth every 6 (six) hours as needed for Pain. 120 tablet 0    HYDROcodone-acetaminophen (NORCO) 7.5-325 mg per tablet Take 1 tablet by mouth every 6 (six) hours as needed for Pain. 120 tablet 0    HYDROcodone-acetaminophen (NORCO) 7.5-325 mg per tablet Take 1 tablet by mouth every 6 (six) hours as needed for Pain. 120 tablet 0    ketoconazole (NIZORAL) 2 % cream EVELIA AA BID  5    naproxen (NAPROSYN) 500 MG tablet Take 1 tablet (500 mg total) by mouth 2 (two) times daily with meals. 20 tablet 0    promethazine (PHENERGAN) 25 MG tablet Take 1 tablet (25 mg total) by mouth every 4 (four) hours. 60 tablet 6     No current facility-administered medications on file prior to visit.      Social History     Socioeconomic History    Marital status:      Spouse name: Not on file    Number of children: Not on file    Years of education: Not on file    Highest education level: Not on file   Social Needs    Financial resource strain: Not on file    Food insecurity - worry: Not on file    Food insecurity - inability: Not on file    Transportation needs - medical: Not on file     Transportation needs - non-medical: Not on file   Occupational History    Not on file   Tobacco Use    Smoking status: Never Smoker   Substance and Sexual Activity    Alcohol use: No    Drug use: Not on file    Sexual activity: Not on file   Other Topics Concern    Not on file   Social History Narrative    Not on file     History reviewed. No pertinent family history.      ROS:GENERAL: No fever, chills, fatigability or weight loss.  SKIN: No rashes, itching or changes in color or texture of skin.  HEAD: No headaches or recent head trauma.EYES: Visual acuity fine. No photophobia, ocular pain or diplopia.EARS: Denies ear pain, discharge or vertigo.NOSE: No loss of smell, no epistaxis or postnasal drip.MOUTH & THROAT: No hoarseness or change in voice. No excessive gum bleeding.NODES: Denies swollen glands.  CHEST: Denies YANCEY, cyanosis, wheezing, cough and sputum production.  CARDIOVASCULAR: Denies chest pain, PND, orthopnea or reduced exercise tolerance.  ABDOMEN: Appetite fine. No weight loss. Denies diarrhea, abdominal pain, hematemesis or blood in stool.  URINARY: No flank pain, dysuria or hematuria.  PERIPHERAL VASCULAR: No claudication or cyanosis.  MUSCULOSKELETAL: See above.  NEUROLOGIC: No history of seizures, paralysis, alteration of gait or coordination.  PE:   HEAD: Normocephalic, atraumatic.EYES: PERRL. EOMI.   EARS: TM's intact. Light reflex normal. No retraction or perforation.   NOSE: Mucosa pink. Airway clear.MOUTH & THROAT: No tonsillar enlargement. No pharyngeal erythema or exudate. No stridor.  NODES: No cervical, axillary or inguinal lymph node enlargement.  CHEST: Lungs clear to auscultation.  CARDIOVASCULAR: Normal S1, S2. No rubs, murmurs or gallops.  ABDOMEN: Bowel sounds normal. Not distended. Soft. No tenderness or masses.  MUSCULOSKELETAL: Mod tender bilateral paracervicals  neg spurling ,paralumbar tenderness w spasm   NEUROLOGIC: Cranial Nerves: II-XII grossly intact.  Motor: 5/5  strength major flexors/extensors.  DTR's: Knees, Ankles 2+ and equal bilaterally; downgoing toes.  Sensory: Intact to light touch distally.  Gait & Posture: Normal gait and fine motion. No cerebellar signs.     Impression:Lumbar strain   Knee sprain   Plan: Rev phys therapy measures heat stretching  Add medrol dspk  RTO 3m

## 2019-04-12 ENCOUNTER — TELEPHONE (OUTPATIENT)
Dept: FAMILY MEDICINE | Facility: CLINIC | Age: 59
End: 2019-04-12

## 2019-04-12 NOTE — TELEPHONE ENCOUNTER
----- Message from Kelsey Jennings sent at 4/12/2019  7:43 AM CDT -----  Contact: pt  Type:  RX Refill Request    Who Called: Patient  Refill or New Rx Refill  RX Name and Strength:Soma/Mulsce relaxer  How is the patient currently taking it? (ex. 1XDay):1xday  Is this a 30 day or 90 day RX:30  Preferred Pharmacy with phone number:[facility]/[place]/[phone]  Local or Mail Order:Local  Ordering Provider:Local  Would the patient rather a call back or a response via [facility]? Call back  Best Call Back Number:[phone]  Additional Information: na

## 2019-04-12 NOTE — TELEPHONE ENCOUNTER
Spoke with Cira at  who states that there are no refills on file of patient's  Soma, the med is also on back order and they aren't expecting the med any time soon, unable to contact patient, will call again.

## 2019-04-12 NOTE — TELEPHONE ENCOUNTER
Spoke with patient to let him know what Cira at  stated that they had no refills for his soma and had no soma as it was on backorder.  Patient request RX refills be redone and printed and he is ok with waiting until Monday, orders pended

## 2019-04-12 NOTE — TELEPHONE ENCOUNTER
----- Message from Kelsey Jennings sent at 4/12/2019  7:43 AM CDT -----  Contact: pt  Type:  RX Refill Request    Who Called: Patient  Refill or New Rx Refill  RX Name and Strength:Soma/Mulsce relaxer  How is the patient currently taking it? (ex. 1XDay):1xday  Is this a 30 day or 90 day RX:30  Preferred Pharmacy with phone number:[facility]/[place]/[phone]  Local or Mail Order:Local  Ordering Provider:Local  Would the patient rather a call back or a response via [facility]? Call back  Best Call Back Number:[phone]  Additional Information: na

## 2019-04-15 RX ORDER — CARISOPRODOL 350 MG/1
350 TABLET ORAL 4 TIMES DAILY PRN
Qty: 120 TABLET | Refills: 0 | Status: SHIPPED | OUTPATIENT
Start: 2019-04-15 | End: 2019-06-12 | Stop reason: SDUPTHER

## 2019-04-15 RX ORDER — CARISOPRODOL 350 MG/1
350 TABLET ORAL 4 TIMES DAILY PRN
Qty: 120 TABLET | Refills: 0 | Status: SHIPPED | OUTPATIENT
Start: 2019-05-11 | End: 2019-05-21

## 2019-05-08 ENCOUNTER — TELEPHONE (OUTPATIENT)
Dept: FAMILY MEDICINE | Facility: CLINIC | Age: 59
End: 2019-05-08

## 2019-05-08 ENCOUNTER — NURSE TRIAGE (OUTPATIENT)
Dept: ADMINISTRATIVE | Facility: CLINIC | Age: 59
End: 2019-05-08

## 2019-05-08 LAB — HBA1C MFR BLD: 12.4 %

## 2019-05-08 NOTE — TELEPHONE ENCOUNTER
Spoke with patient instructed patient to go to the emergency room, patient verbalized understanding, stated that on-call nurse had said the same thing.

## 2019-05-08 NOTE — TELEPHONE ENCOUNTER
Reason for Disposition   Blood glucose > 500 mg/dl (27.5 mmol/l)    Protocols used: DIABETES - HIGH BLOOD SUGAR-A-OH    Blood Sugars have been trending high, patient states that he has been feeling very thirsty, enough to drink a gallon of Gatorade and water after that, has Lost 15LBS in 1 wk. Feeling dizzy with a CBG of 553. No current dibetes diagnoses or management of Blood sugarst. Advised to go to the ED.

## 2019-05-08 NOTE — TELEPHONE ENCOUNTER
----- Message from Renetta Duran sent at 5/8/2019  4:31 PM CDT -----  Contact: self  Pt is calling to speak with nurse in regards to pt's blood sugar level. Pt states blood sugar is 553 and he's feeling a little dizzy. Pt was transferred to on call nurse. Please call pt back at [phone].      Thanks,   Renetta Duran

## 2019-05-09 ENCOUNTER — OFFICE VISIT (OUTPATIENT)
Dept: FAMILY MEDICINE | Facility: CLINIC | Age: 59
End: 2019-05-09
Payer: COMMERCIAL

## 2019-05-09 VITALS
TEMPERATURE: 98 F | HEART RATE: 69 BPM | BODY MASS INDEX: 26.63 KG/M2 | DIASTOLIC BLOOD PRESSURE: 88 MMHG | WEIGHT: 190.19 LBS | HEIGHT: 71 IN | SYSTOLIC BLOOD PRESSURE: 136 MMHG

## 2019-05-09 DIAGNOSIS — R73.9 ELEVATED BLOOD SUGAR LEVEL: ICD-10-CM

## 2019-05-09 DIAGNOSIS — R03.0 ELEVATED BP WITHOUT DIAGNOSIS OF HYPERTENSION: ICD-10-CM

## 2019-05-09 DIAGNOSIS — E11.9 NEW ONSET TYPE 2 DIABETES MELLITUS: Primary | ICD-10-CM

## 2019-05-09 LAB — GLUCOSE SERPL-MCNC: 279 MG/DL (ref 70–110)

## 2019-05-09 PROCEDURE — 99214 PR OFFICE/OUTPT VISIT, EST, LEVL IV, 30-39 MIN: ICD-10-PCS | Mod: S$GLB,,, | Performed by: FAMILY MEDICINE

## 2019-05-09 PROCEDURE — 3008F BODY MASS INDEX DOCD: CPT | Mod: CPTII,S$GLB,, | Performed by: FAMILY MEDICINE

## 2019-05-09 PROCEDURE — 99999 PR PBB SHADOW E&M-EST. PATIENT-LVL III: ICD-10-PCS | Mod: PBBFAC,,, | Performed by: FAMILY MEDICINE

## 2019-05-09 PROCEDURE — 99214 OFFICE O/P EST MOD 30 MIN: CPT | Mod: S$GLB,,, | Performed by: FAMILY MEDICINE

## 2019-05-09 PROCEDURE — 3008F PR BODY MASS INDEX (BMI) DOCUMENTED: ICD-10-PCS | Mod: CPTII,S$GLB,, | Performed by: FAMILY MEDICINE

## 2019-05-09 PROCEDURE — 82962 GLUCOSE BLOOD TEST: CPT | Mod: S$GLB,,, | Performed by: FAMILY MEDICINE

## 2019-05-09 PROCEDURE — 99999 PR PBB SHADOW E&M-EST. PATIENT-LVL III: CPT | Mod: PBBFAC,,, | Performed by: FAMILY MEDICINE

## 2019-05-09 PROCEDURE — 82962 POCT GLUCOSE, HAND-HELD DEVICE: ICD-10-PCS | Mod: S$GLB,,, | Performed by: FAMILY MEDICINE

## 2019-05-09 RX ORDER — METFORMIN HYDROCHLORIDE 500 MG/1
500 TABLET ORAL
COMMUNITY
Start: 2019-05-08 | End: 2019-05-14

## 2019-05-09 RX ORDER — INSULIN PUMP SYRINGE, 3 ML
EACH MISCELLANEOUS
Qty: 1 EACH | Refills: 0 | Status: SHIPPED | OUTPATIENT
Start: 2019-05-09 | End: 2020-02-11

## 2019-05-09 RX ORDER — LANCETS
EACH MISCELLANEOUS
Qty: 100 EACH | Refills: 11 | Status: SHIPPED | OUTPATIENT
Start: 2019-05-09 | End: 2020-02-11

## 2019-05-09 NOTE — PROGRESS NOTES
The patient presents today to fu onset of weakness polydipsia polyuria  and glu >500. Rev med records. BP was elevated See below    Past Medical History:  History reviewed. No pertinent past medical history.  Past Surgical History:   Procedure Laterality Date    HERNIA REPAIR       Review of patient's allergies indicates:  No Known Allergies  Current Outpatient Medications on File Prior to Visit   Medication Sig Dispense Refill    [START ON 5/11/2019] carisoprodol (SOMA) 350 MG tablet Take 1 tablet (350 mg total) by mouth 4 (four) times daily as needed for Muscle spasms. 120 tablet 0    [START ON 5/11/2019] carisoprodol (SOMA) 350 MG tablet Take 1 tablet (350 mg total) by mouth 4 (four) times daily as needed for Muscle spasms. 120 tablet 0    carisoprodol (SOMA) 350 MG tablet Take 1 tablet (350 mg total) by mouth 4 (four) times daily as needed for Muscle spasms. 120 tablet 0    fexofenadine (ALLEGRA) 180 MG tablet Take 1 tablet (180 mg total) by mouth once daily. 90 tablet 3    HYDROcodone-acetaminophen (NORCO) 7.5-325 mg per tablet Take 1 tablet by mouth every 6 (six) hours as needed for Pain.      [START ON 5/11/2019] HYDROcodone-acetaminophen (NORCO) 7.5-325 mg per tablet Take 1 tablet by mouth every 6 (six) hours as needed for Pain. 120 tablet 0    HYDROcodone-acetaminophen (NORCO) 7.5-325 mg per tablet Take 1 tablet by mouth every 6 (six) hours as needed for Pain. 120 tablet 0    HYDROcodone-acetaminophen (NORCO) 7.5-325 mg per tablet Take 1 tablet by mouth every 6 (six) hours as needed for Pain. 120 tablet 0    ketoconazole (NIZORAL) 2 % cream EVELIA AA BID  5    metFORMIN (GLUCOPHAGE) 500 MG tablet Take 500 mg by mouth.      naproxen (NAPROSYN) 500 MG tablet Take 1 tablet (500 mg total) by mouth 2 (two) times daily with meals. 20 tablet 0    promethazine (PHENERGAN) 25 MG tablet Take 1 tablet (25 mg total) by mouth every 4 (four) hours. 60 tablet 6     No current facility-administered medications on  file prior to visit.      Social History     Socioeconomic History    Marital status:      Spouse name: Not on file    Number of children: Not on file    Years of education: Not on file    Highest education level: Not on file   Occupational History    Not on file   Social Needs    Financial resource strain: Not on file    Food insecurity:     Worry: Not on file     Inability: Not on file    Transportation needs:     Medical: Not on file     Non-medical: Not on file   Tobacco Use    Smoking status: Never Smoker   Substance and Sexual Activity    Alcohol use: No    Drug use: Not on file    Sexual activity: Not on file   Lifestyle    Physical activity:     Days per week: Not on file     Minutes per session: Not on file    Stress: Not on file   Relationships    Social connections:     Talks on phone: Not on file     Gets together: Not on file     Attends Baptism service: Not on file     Active member of club or organization: Not on file     Attends meetings of clubs or organizations: Not on file     Relationship status: Not on file   Other Topics Concern    Not on file   Social History Narrative    Not on file     History reviewed. No pertinent family history.      ROS:GENERAL: No fever, chills, fatigability or weight loss.  SKIN: No rashes, itching or changes in color or texture of skin.  HEAD: No headaches or recent head trauma.EYES: Visual acuity fine. No photophobia, ocular pain or diplopia.EARS: Denies ear pain, discharge or vertigo.NOSE: No loss of smell, no epistaxis or postnasal drip.MOUTH & THROAT: No hoarseness or change in voice. No excessive gum bleeding.NODES: Denies swollen glands.  CHEST: Denies YANCEY, cyanosis, wheezing, cough and sputum production.  CARDIOVASCULAR: Denies chest pain, PND, orthopnea or reduced exercise tolerance.  ABDOMEN: Appetite fine. No weight loss. Denies diarrhea, abdominal pain, hematemesis or blood in stool.  URINARY: No flank pain, dysuria or  hematuria.  PERIPHERAL VASCULAR: No claudication or cyanosis.  MUSCULOSKELETAL: See above.  NEUROLOGIC: No history of seizures, paralysis, alteration of gait or coordination.  PE:   HEAD: Normocephalic, atraumatic.EYES: PERRL. EOMI.   EARS: TM's intact. Light reflex normal. No retraction or perforation.   NOSE: Mucosa pink. Airway clear.MOUTH & THROAT: No tonsillar enlargement. No pharyngeal erythema or exudate. No stridor.  NODES: No cervical, axillary or inguinal lymph node enlargement.  CHEST: Lungs clear to auscultation.  CARDIOVASCULAR: Normal S1, S2. No rubs, murmurs or gallops.  ABDOMEN: Bowel sounds normal. Not distended. Soft. No tenderness or masses.  MUSCULOSKELETAL: No palpable abnormality  NEUROLOGIC: Cranial Nerves: II-XII grossly intact.  Motor: 5/5 strength major flexors/extensors.  DTR's: Knees, Ankles 2+ and equal bilaterally; downgoing toes.  Sensory: Intact to light touch distally.  Gait & Posture: Normal gait and fine motion. No cerebellar signs.     Impression:New onset DM  Hpt     Plan:Lab eval rev  Rec diet and ex recs   DM Ed con get glucometer   Glu now 279  Metformin 500 bid

## 2019-05-13 NOTE — TELEPHONE ENCOUNTER
----- Message from Erma Jarvis sent at 5/13/2019  8:16 AM CDT -----  Contact: pt   States his blood sugar running high [id number] not sure what to do, he can be reached at [phone]

## 2019-05-14 RX ORDER — METFORMIN HYDROCHLORIDE 1000 MG/1
1000 TABLET ORAL 2 TIMES DAILY WITH MEALS
Qty: 180 TABLET | Refills: 3 | Status: SHIPPED | OUTPATIENT
Start: 2019-05-14 | End: 2019-08-20

## 2019-05-17 ENCOUNTER — TELEPHONE (OUTPATIENT)
Dept: FAMILY MEDICINE | Facility: CLINIC | Age: 59
End: 2019-05-17

## 2019-05-17 NOTE — TELEPHONE ENCOUNTER
pts wife states that pt did have diarrhea on 500mg bid but wasn't as bad. Asking if they can try once a day with the 50mg januvia. Also asking for a rx for zofran to help with the nausea. She is aware this will be addressed tomorrow

## 2019-05-17 NOTE — TELEPHONE ENCOUNTER
----- Message from Chase Jenkins sent at 5/17/2019  3:12 PM CDT -----  Contact: Mrs. Toledo-Pt's Wife  Type:  Needs Medical Advice    Who Called: Pt  Symptoms (please be specific): allergic reaction possibly to his Metformin 1000 mg, the patient is having loose bowels & nausea   How long has patient had these symptoms:  Last few days  Pharmacy name and phone #:  n/a  Would the patient rather a call back or a response via [facility]? Call back   Best Call Back Number: [phone]  Additional Information: n/a

## 2019-05-17 NOTE — TELEPHONE ENCOUNTER
See if he was tolerating 500  Bid-if so return to that  (1/2 1000mg tab) and I'll send in another rx to take once a day

## 2019-05-18 RX ORDER — ONDANSETRON 8 MG/1
8 TABLET, ORALLY DISINTEGRATING ORAL EVERY 6 HOURS PRN
Qty: 20 TABLET | Refills: 1 | Status: SHIPPED | OUTPATIENT
Start: 2019-05-18 | End: 2020-09-09

## 2019-05-29 ENCOUNTER — PATIENT OUTREACH (OUTPATIENT)
Dept: ADMINISTRATIVE | Facility: HOSPITAL | Age: 59
End: 2019-05-29

## 2019-06-12 ENCOUNTER — OFFICE VISIT (OUTPATIENT)
Dept: FAMILY MEDICINE | Facility: CLINIC | Age: 59
End: 2019-06-12
Payer: COMMERCIAL

## 2019-06-12 VITALS
DIASTOLIC BLOOD PRESSURE: 75 MMHG | BODY MASS INDEX: 26.18 KG/M2 | TEMPERATURE: 98 F | HEART RATE: 74 BPM | HEIGHT: 71 IN | WEIGHT: 187 LBS | SYSTOLIC BLOOD PRESSURE: 115 MMHG

## 2019-06-12 DIAGNOSIS — I10 HYPERTENSION, UNSPECIFIED TYPE: ICD-10-CM

## 2019-06-12 DIAGNOSIS — E11.9 TYPE 2 DIABETES MELLITUS WITHOUT COMPLICATION, WITHOUT LONG-TERM CURRENT USE OF INSULIN: ICD-10-CM

## 2019-06-12 DIAGNOSIS — M54.50 CHRONIC LEFT-SIDED LOW BACK PAIN WITHOUT SCIATICA: Primary | ICD-10-CM

## 2019-06-12 DIAGNOSIS — G89.29 CHRONIC LEFT-SIDED LOW BACK PAIN WITHOUT SCIATICA: Primary | ICD-10-CM

## 2019-06-12 PROCEDURE — 3078F PR MOST RECENT DIASTOLIC BLOOD PRESSURE < 80 MM HG: ICD-10-PCS | Mod: CPTII,S$GLB,, | Performed by: FAMILY MEDICINE

## 2019-06-12 PROCEDURE — 3078F DIAST BP <80 MM HG: CPT | Mod: CPTII,S$GLB,, | Performed by: FAMILY MEDICINE

## 2019-06-12 PROCEDURE — 99214 OFFICE O/P EST MOD 30 MIN: CPT | Mod: S$GLB,,, | Performed by: FAMILY MEDICINE

## 2019-06-12 PROCEDURE — 99214 PR OFFICE/OUTPT VISIT, EST, LEVL IV, 30-39 MIN: ICD-10-PCS | Mod: S$GLB,,, | Performed by: FAMILY MEDICINE

## 2019-06-12 PROCEDURE — 3008F BODY MASS INDEX DOCD: CPT | Mod: CPTII,S$GLB,, | Performed by: FAMILY MEDICINE

## 2019-06-12 PROCEDURE — 3074F PR MOST RECENT SYSTOLIC BLOOD PRESSURE < 130 MM HG: ICD-10-PCS | Mod: CPTII,S$GLB,, | Performed by: FAMILY MEDICINE

## 2019-06-12 PROCEDURE — 3074F SYST BP LT 130 MM HG: CPT | Mod: CPTII,S$GLB,, | Performed by: FAMILY MEDICINE

## 2019-06-12 PROCEDURE — 3008F PR BODY MASS INDEX (BMI) DOCUMENTED: ICD-10-PCS | Mod: CPTII,S$GLB,, | Performed by: FAMILY MEDICINE

## 2019-06-12 PROCEDURE — 99999 PR PBB SHADOW E&M-EST. PATIENT-LVL III: CPT | Mod: PBBFAC,,, | Performed by: FAMILY MEDICINE

## 2019-06-12 PROCEDURE — 99999 PR PBB SHADOW E&M-EST. PATIENT-LVL III: ICD-10-PCS | Mod: PBBFAC,,, | Performed by: FAMILY MEDICINE

## 2019-06-12 RX ORDER — CARISOPRODOL 350 MG/1
350 TABLET ORAL 4 TIMES DAILY PRN
Qty: 120 TABLET | Refills: 0 | Status: SHIPPED | OUTPATIENT
Start: 2019-06-12 | End: 2019-09-09 | Stop reason: SDUPTHER

## 2019-06-12 RX ORDER — HYDROCODONE BITARTRATE AND ACETAMINOPHEN 7.5; 325 MG/1; MG/1
1 TABLET ORAL EVERY 6 HOURS PRN
Qty: 120 TABLET | Refills: 0 | Status: SHIPPED | OUTPATIENT
Start: 2019-08-10 | End: 2019-09-09

## 2019-06-12 RX ORDER — CARISOPRODOL 350 MG/1
350 TABLET ORAL 4 TIMES DAILY PRN
Qty: 120 TABLET | Refills: 0 | Status: SHIPPED | OUTPATIENT
Start: 2019-07-12 | End: 2019-07-22

## 2019-06-12 RX ORDER — HYDROCODONE BITARTRATE AND ACETAMINOPHEN 7.5; 325 MG/1; MG/1
1 TABLET ORAL EVERY 6 HOURS PRN
Qty: 120 TABLET | Refills: 0 | Status: SHIPPED | OUTPATIENT
Start: 2019-07-12 | End: 2019-08-11

## 2019-06-12 RX ORDER — CARISOPRODOL 350 MG/1
350 TABLET ORAL 4 TIMES DAILY PRN
Qty: 120 TABLET | Refills: 0 | Status: SHIPPED | OUTPATIENT
Start: 2019-08-10 | End: 2019-08-20

## 2019-06-12 RX ORDER — HYDROCODONE BITARTRATE AND ACETAMINOPHEN 7.5; 325 MG/1; MG/1
1 TABLET ORAL EVERY 6 HOURS PRN
Qty: 120 TABLET | Refills: 0 | Status: SHIPPED | OUTPATIENT
Start: 2019-06-12 | End: 2019-08-20 | Stop reason: SDUPTHER

## 2019-06-12 NOTE — PROGRESS NOTES
The patient presents today to fu DM and  BP sig better w diet and off meds 2 w with cont good numbers   Back pain persistys w intermittent exac Past Medical History:  History reviewed. No pertinent past medical history.  Past Surgical History:   Procedure Laterality Date    HERNIA REPAIR       Review of patient's allergies indicates:  No Known Allergies  Current Outpatient Medications on File Prior to Visit   Medication Sig Dispense Refill    blood sugar diagnostic Strp To check BG 2 times daily, to use with insurance preferred meter 100 each 11    blood-glucose meter kit To check BG 2 times daily, to use with insurance preferred meter 1 each 0    carisoprodol (SOMA) 350 MG tablet Take 1 tablet (350 mg total) by mouth 4 (four) times daily as needed for Muscle spasms. 120 tablet 0    fexofenadine (ALLEGRA) 180 MG tablet Take 1 tablet (180 mg total) by mouth once daily. 90 tablet 3    HYDROcodone-acetaminophen (NORCO) 7.5-325 mg per tablet Take 1 tablet by mouth every 6 (six) hours as needed for Pain.      HYDROcodone-acetaminophen (NORCO) 7.5-325 mg per tablet Take 1 tablet by mouth every 6 (six) hours as needed for Pain. 120 tablet 0    HYDROcodone-acetaminophen (NORCO) 7.5-325 mg per tablet Take 1 tablet by mouth every 6 (six) hours as needed for Pain. 120 tablet 0    HYDROcodone-acetaminophen (NORCO) 7.5-325 mg per tablet Take 1 tablet by mouth every 6 (six) hours as needed for Pain. 120 tablet 0    ketoconazole (NIZORAL) 2 % cream EVELIA AA BID  5    lancets Misc To check BG 2 times daily, to use with insurance preferred meter 100 each 11    naproxen (NAPROSYN) 500 MG tablet Take 1 tablet (500 mg total) by mouth 2 (two) times daily with meals. 20 tablet 0    ondansetron (ZOFRAN-ODT) 8 MG TbDL Take 1 tablet (8 mg total) by mouth every 6 (six) hours as needed. 20 tablet 1    promethazine (PHENERGAN) 25 MG tablet Take 1 tablet (25 mg total) by mouth every 4 (four) hours. 60 tablet 6    metFORMIN  (GLUCOPHAGE) 1000 MG tablet Take 1 tablet (1,000 mg total) by mouth 2 (two) times daily with meals. 180 tablet 3    SITagliptin (JANUVIA) 50 MG Tab Take 1 tablet (50 mg total) by mouth once daily. 90 tablet 3     No current facility-administered medications on file prior to visit.      Social History     Socioeconomic History    Marital status:      Spouse name: Not on file    Number of children: Not on file    Years of education: Not on file    Highest education level: Not on file   Occupational History    Not on file   Social Needs    Financial resource strain: Not on file    Food insecurity:     Worry: Not on file     Inability: Not on file    Transportation needs:     Medical: Not on file     Non-medical: Not on file   Tobacco Use    Smoking status: Never Smoker   Substance and Sexual Activity    Alcohol use: No    Drug use: Not on file    Sexual activity: Not on file   Lifestyle    Physical activity:     Days per week: Not on file     Minutes per session: Not on file    Stress: Not on file   Relationships    Social connections:     Talks on phone: Not on file     Gets together: Not on file     Attends Tenriism service: Not on file     Active member of club or organization: Not on file     Attends meetings of clubs or organizations: Not on file     Relationship status: Not on file   Other Topics Concern    Not on file   Social History Narrative    Not on file     History reviewed. No pertinent family history.      ROS:GENERAL: No fever, chills, fatigability or weight loss.  SKIN: No rashes, itching or changes in color or texture of skin.  HEAD: No headaches or recent head trauma.EYES: Visual acuity fine. No photophobia, ocular pain or diplopia.EARS: Denies ear pain, discharge or vertigo.NOSE: No loss of smell, no epistaxis or postnasal drip.MOUTH & THROAT: No hoarseness or change in voice. No excessive gum bleeding.NODES: Denies swollen glands.  CHEST: Denies YANCEY, cyanosis, wheezing,  cough and sputum production.  CARDIOVASCULAR: Denies chest pain, PND, orthopnea or reduced exercise tolerance.  ABDOMEN: Appetite fine. No weight loss. Denies diarrhea, abdominal pain, hematemesis or blood in stool.  URINARY: No flank pain, dysuria or hematuria.  PERIPHERAL VASCULAR: No claudication or cyanosis.  MUSCULOSKELETAL: See above.  NEUROLOGIC: No history of seizures, paralysis, alteration of gait or coordination.  PE:   HEAD: Normocephalic, atraumatic.EYES: PERRL. EOMI.   EARS: TM's intact. Light reflex normal. No retraction or perforation.   NOSE: Mucosa pink. Airway clear.MOUTH & THROAT: No tonsillar enlargement. No pharyngeal erythema or exudate. No stridor.  NODES: No cervical, axillary or inguinal lymph node enlargement.  CHEST: Lungs clear to auscultation.  CARDIOVASCULAR: Normal S1, S2. No rubs, murmurs or gallops.  ABDOMEN: Bowel sounds normal. Not distended. Soft. No tenderness or masses.  MUSCULOSKELETAL: No palpable abnormality  NEUROLOGIC: Cranial Nerves: II-XII grossly intact.  Motor: 5/5 strength major flexors/extensors.  DTR's: Knees, Ankles 2+ and equal bilaterally; downgoing toes.  Sensory: Intact to light touch distally.  Gait & Posture: Normal gait and fine motion. No cerebellar signs.     Impression: DM  Hpt   LBP  Plan:Lab eval rev  Rec diet and ex recs

## 2019-06-14 ENCOUNTER — PATIENT OUTREACH (OUTPATIENT)
Dept: ADMINISTRATIVE | Facility: HOSPITAL | Age: 59
End: 2019-06-14

## 2019-06-14 DIAGNOSIS — E11.9 TYPE 2 DIABETES MELLITUS WITHOUT COMPLICATION, WITHOUT LONG-TERM CURRENT USE OF INSULIN: Primary | ICD-10-CM

## 2019-06-14 NOTE — PROGRESS NOTES
Health Maintenance reviewed. Hemoglobin A1c performed at [place] dated 5/8/19 entered.  Urine Microalbumin ordered and linked to labs.

## 2019-07-24 ENCOUNTER — PATIENT OUTREACH (OUTPATIENT)
Dept: ADMINISTRATIVE | Facility: HOSPITAL | Age: 59
End: 2019-07-24

## 2019-07-24 NOTE — PROGRESS NOTES
I have contacted patient to see if he had his dm eye exam done. Patient stated that he had his eye exam at [facility] in [place]. Report requested

## 2019-08-20 ENCOUNTER — OFFICE VISIT (OUTPATIENT)
Dept: FAMILY MEDICINE | Facility: CLINIC | Age: 59
End: 2019-08-20
Payer: COMMERCIAL

## 2019-08-20 VITALS
BODY MASS INDEX: 25.76 KG/M2 | WEIGHT: 184 LBS | SYSTOLIC BLOOD PRESSURE: 138 MMHG | TEMPERATURE: 98 F | HEIGHT: 71 IN | HEART RATE: 69 BPM | OXYGEN SATURATION: 98 % | DIASTOLIC BLOOD PRESSURE: 87 MMHG

## 2019-08-20 DIAGNOSIS — J20.9 BRONCHITIS WITH BRONCHOSPASM: Primary | ICD-10-CM

## 2019-08-20 DIAGNOSIS — H61.23 IMPACTED CERUMEN OF BOTH EARS: ICD-10-CM

## 2019-08-20 PROCEDURE — 99214 PR OFFICE/OUTPT VISIT, EST, LEVL IV, 30-39 MIN: ICD-10-PCS | Mod: S$GLB,,, | Performed by: NURSE PRACTITIONER

## 2019-08-20 PROCEDURE — 3079F DIAST BP 80-89 MM HG: CPT | Mod: CPTII,S$GLB,, | Performed by: NURSE PRACTITIONER

## 2019-08-20 PROCEDURE — 99214 OFFICE O/P EST MOD 30 MIN: CPT | Mod: S$GLB,,, | Performed by: NURSE PRACTITIONER

## 2019-08-20 PROCEDURE — 3079F PR MOST RECENT DIASTOLIC BLOOD PRESSURE 80-89 MM HG: ICD-10-PCS | Mod: CPTII,S$GLB,, | Performed by: NURSE PRACTITIONER

## 2019-08-20 PROCEDURE — 3008F BODY MASS INDEX DOCD: CPT | Mod: CPTII,S$GLB,, | Performed by: NURSE PRACTITIONER

## 2019-08-20 PROCEDURE — 3075F PR MOST RECENT SYSTOLIC BLOOD PRESS GE 130-139MM HG: ICD-10-PCS | Mod: CPTII,S$GLB,, | Performed by: NURSE PRACTITIONER

## 2019-08-20 PROCEDURE — 3008F PR BODY MASS INDEX (BMI) DOCUMENTED: ICD-10-PCS | Mod: CPTII,S$GLB,, | Performed by: NURSE PRACTITIONER

## 2019-08-20 PROCEDURE — 99999 PR PBB SHADOW E&M-EST. PATIENT-LVL III: ICD-10-PCS | Mod: PBBFAC,,, | Performed by: NURSE PRACTITIONER

## 2019-08-20 PROCEDURE — 99999 PR PBB SHADOW E&M-EST. PATIENT-LVL III: CPT | Mod: PBBFAC,,, | Performed by: NURSE PRACTITIONER

## 2019-08-20 PROCEDURE — 3075F SYST BP GE 130 - 139MM HG: CPT | Mod: CPTII,S$GLB,, | Performed by: NURSE PRACTITIONER

## 2019-08-20 RX ORDER — SULFAMETHOXAZOLE AND TRIMETHOPRIM 800; 160 MG/1; MG/1
1 TABLET ORAL 2 TIMES DAILY
Qty: 20 TABLET | Refills: 0 | Status: SHIPPED | OUTPATIENT
Start: 2019-08-20 | End: 2019-08-20 | Stop reason: ALTCHOICE

## 2019-08-20 RX ORDER — AMOXICILLIN 875 MG/1
875 TABLET, FILM COATED ORAL EVERY 12 HOURS
Qty: 20 TABLET | Refills: 0 | Status: SHIPPED | OUTPATIENT
Start: 2019-08-20 | End: 2019-09-09

## 2019-08-20 RX ORDER — PREDNISONE 20 MG/1
20 TABLET ORAL 2 TIMES DAILY
Qty: 10 TABLET | Refills: 0 | Status: SHIPPED | OUTPATIENT
Start: 2019-08-20 | End: 2019-08-25

## 2019-08-20 NOTE — PROGRESS NOTES
Subjective:       Patient ID: Ned Toledo Jr. is a 58 y.o. male.    Chief Complaint: Cough    Cough   This is a new problem. The current episode started 1 to 4 weeks ago. The problem has been rapidly worsening. The problem occurs every few minutes. The cough is non-productive. Associated symptoms include postnasal drip, shortness of breath and wheezing. Pertinent negatives include no chest pain, ear pain, fever, headaches, myalgias, rash or sore throat.        Review of Systems   Constitutional: Negative for fatigue, fever and unexpected weight change.   HENT: Positive for postnasal drip. Negative for ear pain and sore throat.    Eyes: Negative.  Negative for pain and visual disturbance.   Respiratory: Positive for shortness of breath and wheezing. Negative for cough.    Cardiovascular: Negative for chest pain and palpitations.   Gastrointestinal: Negative for abdominal pain, diarrhea, nausea and vomiting.   Genitourinary: Negative for dysuria and frequency.   Musculoskeletal: Negative for arthralgias and myalgias.   Skin: Negative for color change and rash.   Neurological: Negative for dizziness and headaches.   Psychiatric/Behavioral: Negative for sleep disturbance. The patient is not nervous/anxious.        Vitals:    08/20/19 0902   BP: 138/87   Pulse: 69   Temp: 98 °F (36.7 °C)       Objective:     Current Outpatient Medications   Medication Sig Dispense Refill    blood sugar diagnostic Strp To check BG 2 times daily, to use with insurance preferred meter 100 each 11    blood-glucose meter kit To check BG 2 times daily, to use with insurance preferred meter 1 each 0    carisoprodol (SOMA) 350 MG tablet Take 1 tablet (350 mg total) by mouth 4 (four) times daily as needed for Muscle spasms. 120 tablet 0    HYDROcodone-acetaminophen (NORCO) 7.5-325 mg per tablet Take 1 tablet by mouth every 6 (six) hours as needed for Pain. 120 tablet 0    ketoconazole (NIZORAL) 2 % cream EVELIA AA BID  5    lancets Misc To  check BG 2 times daily, to use with insurance preferred meter 100 each 11    naproxen (NAPROSYN) 500 MG tablet Take 1 tablet (500 mg total) by mouth 2 (two) times daily with meals. 20 tablet 0    ondansetron (ZOFRAN-ODT) 8 MG TbDL Take 1 tablet (8 mg total) by mouth every 6 (six) hours as needed. 20 tablet 1    promethazine (PHENERGAN) 25 MG tablet Take 1 tablet (25 mg total) by mouth every 4 (four) hours. 60 tablet 6    amoxicillin (AMOXIL) 875 MG tablet Take 1 tablet (875 mg total) by mouth every 12 (twelve) hours. 20 tablet 0    fexofenadine (ALLEGRA) 180 MG tablet Take 1 tablet (180 mg total) by mouth once daily. 90 tablet 3     No current facility-administered medications for this visit.        Physical Exam   Constitutional: He is oriented to person, place, and time. He appears well-developed. No distress.   HENT:   Head: Normocephalic and atraumatic.   Eyes: Pupils are equal, round, and reactive to light. EOM are normal.   Neck: Normal range of motion. Neck supple.   Cardiovascular: Normal rate and regular rhythm.   Pulmonary/Chest: Effort normal. He has wheezes. He has rhonchi.   Musculoskeletal: Normal range of motion.   Neurological: He is alert and oriented to person, place, and time.   Skin: Skin is warm and dry. No rash noted.   Psychiatric: He has a normal mood and affect. Thought content normal.   Nursing note and vitals reviewed.      Assessment:       1. Bronchitis with bronchospasm    2. Impacted cerumen of both ears        Plan:   Bronchitis with bronchospasm    Other orders  -     predniSONE (DELTASONE) 20 MG tablet; Take 1 tablet (20 mg total) by mouth 2 (two) times daily. for 5 days  Dispense: 10 tablet; Refill: 0  -     amoxicillin (AMOXIL) 875 MG tablet; Take 1 tablet (875 mg total) by mouth every 12 (twelve) hours.  Dispense: 20 tablet; Refill: 0          Follow up if symptoms worsen or fail to improve.    There are no Patient Instructions on file for this visit.

## 2019-08-21 ENCOUNTER — PATIENT OUTREACH (OUTPATIENT)
Dept: ADMINISTRATIVE | Facility: HOSPITAL | Age: 59
End: 2019-08-21

## 2019-08-21 DIAGNOSIS — Z12.11 SCREENING FOR COLON CANCER: Primary | ICD-10-CM

## 2019-08-21 NOTE — PROGRESS NOTES
Contacted patient to follow up on overdue Colonoscopy. Patient opted for a fit kit Patient requested fit kit be mailed to their home.     FitKit was mailed to patient on 8/21/2019 3:50 PM

## 2019-08-27 ENCOUNTER — PATIENT OUTREACH (OUTPATIENT)
Dept: ADMINISTRATIVE | Facility: HOSPITAL | Age: 59
End: 2019-08-27

## 2019-08-27 NOTE — PROGRESS NOTES
Contacted patient to follow up on overdue fit kit. Patient states he hasn't received the kit, but will check the mail for it today.

## 2019-09-06 ENCOUNTER — LAB VISIT (OUTPATIENT)
Dept: LAB | Facility: HOSPITAL | Age: 59
End: 2019-09-06
Attending: FAMILY MEDICINE
Payer: COMMERCIAL

## 2019-09-06 DIAGNOSIS — E11.9 TYPE 2 DIABETES MELLITUS WITHOUT COMPLICATION, WITHOUT LONG-TERM CURRENT USE OF INSULIN: ICD-10-CM

## 2019-09-06 LAB
ALBUMIN/CREAT UR: 8.4 UG/MG (ref 0–30)
CREAT UR-MCNC: 251 MG/DL (ref 23–375)
ESTIMATED AVG GLUCOSE: 131 MG/DL (ref 68–131)
HBA1C MFR BLD HPLC: 6.2 % (ref 4–5.6)
MICROALBUMIN UR DL<=1MG/L-MCNC: 21 UG/ML

## 2019-09-06 PROCEDURE — 82043 UR ALBUMIN QUANTITATIVE: CPT

## 2019-09-06 PROCEDURE — 36415 COLL VENOUS BLD VENIPUNCTURE: CPT | Mod: PO

## 2019-09-06 PROCEDURE — 83036 HEMOGLOBIN GLYCOSYLATED A1C: CPT

## 2019-09-09 ENCOUNTER — OFFICE VISIT (OUTPATIENT)
Dept: FAMILY MEDICINE | Facility: CLINIC | Age: 59
End: 2019-09-09
Payer: COMMERCIAL

## 2019-09-09 VITALS
HEART RATE: 85 BPM | DIASTOLIC BLOOD PRESSURE: 72 MMHG | SYSTOLIC BLOOD PRESSURE: 113 MMHG | BODY MASS INDEX: 24.64 KG/M2 | HEIGHT: 71 IN | WEIGHT: 176 LBS

## 2019-09-09 DIAGNOSIS — E11.9 TYPE 2 DIABETES MELLITUS WITHOUT COMPLICATION, WITHOUT LONG-TERM CURRENT USE OF INSULIN: ICD-10-CM

## 2019-09-09 DIAGNOSIS — M54.50 CHRONIC LEFT-SIDED LOW BACK PAIN WITHOUT SCIATICA: Primary | ICD-10-CM

## 2019-09-09 DIAGNOSIS — I10 HYPERTENSION, UNSPECIFIED TYPE: ICD-10-CM

## 2019-09-09 DIAGNOSIS — G89.29 CHRONIC LEFT-SIDED LOW BACK PAIN WITHOUT SCIATICA: Primary | ICD-10-CM

## 2019-09-09 PROCEDURE — 3044F PR MOST RECENT HEMOGLOBIN A1C LEVEL <7.0%: ICD-10-PCS | Mod: CPTII,S$GLB,, | Performed by: FAMILY MEDICINE

## 2019-09-09 PROCEDURE — 3074F SYST BP LT 130 MM HG: CPT | Mod: CPTII,S$GLB,, | Performed by: FAMILY MEDICINE

## 2019-09-09 PROCEDURE — 3044F HG A1C LEVEL LT 7.0%: CPT | Mod: CPTII,S$GLB,, | Performed by: FAMILY MEDICINE

## 2019-09-09 PROCEDURE — 99999 PR PBB SHADOW E&M-EST. PATIENT-LVL III: ICD-10-PCS | Mod: PBBFAC,,, | Performed by: FAMILY MEDICINE

## 2019-09-09 PROCEDURE — 3008F PR BODY MASS INDEX (BMI) DOCUMENTED: ICD-10-PCS | Mod: CPTII,S$GLB,, | Performed by: FAMILY MEDICINE

## 2019-09-09 PROCEDURE — 3078F PR MOST RECENT DIASTOLIC BLOOD PRESSURE < 80 MM HG: ICD-10-PCS | Mod: CPTII,S$GLB,, | Performed by: FAMILY MEDICINE

## 2019-09-09 PROCEDURE — 3074F PR MOST RECENT SYSTOLIC BLOOD PRESSURE < 130 MM HG: ICD-10-PCS | Mod: CPTII,S$GLB,, | Performed by: FAMILY MEDICINE

## 2019-09-09 PROCEDURE — 99999 PR PBB SHADOW E&M-EST. PATIENT-LVL III: CPT | Mod: PBBFAC,,, | Performed by: FAMILY MEDICINE

## 2019-09-09 PROCEDURE — 3078F DIAST BP <80 MM HG: CPT | Mod: CPTII,S$GLB,, | Performed by: FAMILY MEDICINE

## 2019-09-09 PROCEDURE — 99213 OFFICE O/P EST LOW 20 MIN: CPT | Mod: S$GLB,,, | Performed by: FAMILY MEDICINE

## 2019-09-09 PROCEDURE — 99213 PR OFFICE/OUTPT VISIT, EST, LEVL III, 20-29 MIN: ICD-10-PCS | Mod: S$GLB,,, | Performed by: FAMILY MEDICINE

## 2019-09-09 PROCEDURE — 3008F BODY MASS INDEX DOCD: CPT | Mod: CPTII,S$GLB,, | Performed by: FAMILY MEDICINE

## 2019-09-09 RX ORDER — HYDROCODONE BITARTRATE AND ACETAMINOPHEN 7.5; 325 MG/1; MG/1
1 TABLET ORAL 3 TIMES DAILY PRN
Qty: 120 TABLET | Refills: 0 | Status: SHIPPED | OUTPATIENT
Start: 2019-09-09 | End: 2019-10-09

## 2019-09-09 RX ORDER — HYDROCODONE BITARTRATE AND ACETAMINOPHEN 7.5; 325 MG/1; MG/1
1 TABLET ORAL EVERY 6 HOURS PRN
Qty: 120 TABLET | Refills: 0 | Status: CANCELLED | OUTPATIENT
Start: 2019-09-09 | End: 2019-10-09

## 2019-09-09 RX ORDER — HYDROCODONE BITARTRATE AND ACETAMINOPHEN 7.5; 325 MG/1; MG/1
1 TABLET ORAL 3 TIMES DAILY PRN
Qty: 120 TABLET | Refills: 0 | Status: SHIPPED | OUTPATIENT
Start: 2019-11-09 | End: 2019-10-14 | Stop reason: SDUPTHER

## 2019-09-09 RX ORDER — CARISOPRODOL 350 MG/1
350 TABLET ORAL 4 TIMES DAILY PRN
Qty: 120 TABLET | Refills: 0 | Status: SHIPPED | OUTPATIENT
Start: 2019-11-09 | End: 2019-11-19

## 2019-09-09 RX ORDER — CARISOPRODOL 350 MG/1
350 TABLET ORAL 4 TIMES DAILY PRN
Qty: 120 TABLET | Refills: 0 | Status: SHIPPED | OUTPATIENT
Start: 2019-09-09 | End: 2019-12-12 | Stop reason: SDUPTHER

## 2019-09-09 RX ORDER — CARISOPRODOL 350 MG/1
350 TABLET ORAL 4 TIMES DAILY PRN
Qty: 120 TABLET | Refills: 0 | Status: SHIPPED | OUTPATIENT
Start: 2019-10-10 | End: 2019-10-20

## 2019-09-09 RX ORDER — HYDROCODONE BITARTRATE AND ACETAMINOPHEN 7.5; 325 MG/1; MG/1
1 TABLET ORAL 3 TIMES DAILY PRN
Qty: 120 TABLET | Refills: 0 | Status: SHIPPED | OUTPATIENT
Start: 2019-10-10 | End: 2019-10-14 | Stop reason: SDUPTHER

## 2019-09-09 NOTE — PROGRESS NOTES
The patient presents today to fu DM and  BP sig better w diet and off meds a1c 6.2 Back pain persistys w intermittent exac. Lt shoulder MRI showed tear    Past Medical History:  History reviewed. No pertinent past medical history.  Past Surgical History:   Procedure Laterality Date    HERNIA REPAIR       Review of patient's allergies indicates:  No Known Allergies  Current Outpatient Medications on File Prior to Visit   Medication Sig Dispense Refill    blood sugar diagnostic Strp To check BG 2 times daily, to use with insurance preferred meter 100 each 11    blood-glucose meter kit To check BG 2 times daily, to use with insurance preferred meter 1 each 0    carisoprodol (SOMA) 350 MG tablet Take 1 tablet (350 mg total) by mouth 4 (four) times daily as needed for Muscle spasms. 120 tablet 0    HYDROcodone-acetaminophen (NORCO) 7.5-325 mg per tablet Take 1 tablet by mouth every 6 (six) hours as needed for Pain. 120 tablet 0    lancets Misc To check BG 2 times daily, to use with insurance preferred meter 100 each 11    naproxen (NAPROSYN) 500 MG tablet Take 1 tablet (500 mg total) by mouth 2 (two) times daily with meals. 20 tablet 0    ondansetron (ZOFRAN-ODT) 8 MG TbDL Take 1 tablet (8 mg total) by mouth every 6 (six) hours as needed. 20 tablet 1    fexofenadine (ALLEGRA) 180 MG tablet Take 1 tablet (180 mg total) by mouth once daily. 90 tablet 3    [DISCONTINUED] amoxicillin (AMOXIL) 875 MG tablet Take 1 tablet (875 mg total) by mouth every 12 (twelve) hours. 20 tablet 0    [DISCONTINUED] ketoconazole (NIZORAL) 2 % cream EVELIA AA BID  5    [DISCONTINUED] promethazine (PHENERGAN) 25 MG tablet Take 1 tablet (25 mg total) by mouth every 4 (four) hours. 60 tablet 6     No current facility-administered medications on file prior to visit.      Social History     Socioeconomic History    Marital status:      Spouse name: Not on file    Number of children: Not on file    Years of education: Not on file     Highest education level: Not on file   Occupational History    Not on file   Social Needs    Financial resource strain: Not on file    Food insecurity:     Worry: Not on file     Inability: Not on file    Transportation needs:     Medical: Not on file     Non-medical: Not on file   Tobacco Use    Smoking status: Never Smoker   Substance and Sexual Activity    Alcohol use: No    Drug use: Not on file    Sexual activity: Not on file   Lifestyle    Physical activity:     Days per week: Not on file     Minutes per session: Not on file    Stress: Not on file   Relationships    Social connections:     Talks on phone: Not on file     Gets together: Not on file     Attends [employer] service: Not on file     Active member of club or organization: Not on file     Attends meetings of clubs or organizations: Not on file     Relationship status: Not on file   Other Topics Concern    Not on file   Social History Narrative    Not on file     History reviewed. No pertinent family history.      ROS:GENERAL: No fever, chills, fatigability or weight loss.  SKIN: No rashes, itching or changes in color or texture of skin.  HEAD: No headaches or recent head trauma.EYES: Visual acuity fine. No photophobia, ocular pain or diplopia.EARS: Denies ear pain, discharge or vertigo.NOSE: No loss of smell, no epistaxis or postnasal drip.MOUTH & THROAT: No hoarseness or change in voice. No excessive gum bleeding.NODES: Denies swollen glands.  CHEST: Denies YANCEY, cyanosis, wheezing, cough and sputum production.  CARDIOVASCULAR: Denies chest pain, PND, orthopnea or reduced exercise tolerance.  ABDOMEN: Appetite fine. No weight loss. Denies diarrhea, abdominal pain, hematemesis or blood in stool.  URINARY: No flank pain, dysuria or hematuria.  PERIPHERAL VASCULAR: No claudication or cyanosis.  MUSCULOSKELETAL: See above.  NEUROLOGIC: No history of seizures, paralysis, alteration of gait or coordination.  PE:   HEAD: Normocephalic,  atraumatic.EYES: PERRL. EOMI.   EARS: TM's intact. Light reflex normal. No retraction or perforation.   NOSE: Mucosa pink. Airway clear.MOUTH & THROAT: No tonsillar enlargement. No pharyngeal erythema or exudate. No stridor.  NODES: No cervical, axillary or inguinal lymph node enlargement.  CHEST: Lungs clear to auscultation.  CARDIOVASCULAR: Normal S1, S2. No rubs, murmurs or gallops.  ABDOMEN: Bowel sounds normal. Not distended. Soft. No tenderness or masses.  MUSCULOSKELETAL: Tender post/ant lt shoulder   NEUROLOGIC: Cranial Nerves: II-XII grossly intact.  Motor: 5/5 strength major flexors/extensors.  DTR's: Knees, Ankles 2+ and equal bilaterally; downgoing toes.  Sensory: Intact to light touch distally.  Gait & Posture: Normal gait and fine motion. No cerebellar signs.     Impression: DM  Hpt   LBP  Plan:Lab eval rev  Rec diet and ex recs

## 2019-09-26 ENCOUNTER — PATIENT OUTREACH (OUTPATIENT)
Dept: ADMINISTRATIVE | Facility: HOSPITAL | Age: 59
End: 2019-09-26

## 2019-09-26 NOTE — PROGRESS NOTES
No indication in record that statin has been addressed.  Appt. Note message to PCP to address statin.

## 2019-10-02 ENCOUNTER — TELEPHONE (OUTPATIENT)
Dept: FAMILY MEDICINE | Facility: CLINIC | Age: 59
End: 2019-10-02

## 2019-10-02 ENCOUNTER — OFFICE VISIT (OUTPATIENT)
Dept: FAMILY MEDICINE | Facility: CLINIC | Age: 59
End: 2019-10-02
Payer: COMMERCIAL

## 2019-10-02 VITALS
WEIGHT: 176.38 LBS | DIASTOLIC BLOOD PRESSURE: 80 MMHG | HEART RATE: 70 BPM | HEIGHT: 71 IN | SYSTOLIC BLOOD PRESSURE: 117 MMHG | TEMPERATURE: 98 F | BODY MASS INDEX: 24.69 KG/M2

## 2019-10-02 DIAGNOSIS — J06.9 UPPER RESPIRATORY TRACT INFECTION, UNSPECIFIED TYPE: Primary | ICD-10-CM

## 2019-10-02 PROCEDURE — 99213 OFFICE O/P EST LOW 20 MIN: CPT | Mod: S$GLB,,, | Performed by: FAMILY MEDICINE

## 2019-10-02 PROCEDURE — 3008F BODY MASS INDEX DOCD: CPT | Mod: CPTII,S$GLB,, | Performed by: FAMILY MEDICINE

## 2019-10-02 PROCEDURE — 99999 PR PBB SHADOW E&M-EST. PATIENT-LVL III: ICD-10-PCS | Mod: PBBFAC,,, | Performed by: FAMILY MEDICINE

## 2019-10-02 PROCEDURE — 99213 PR OFFICE/OUTPT VISIT, EST, LEVL III, 20-29 MIN: ICD-10-PCS | Mod: S$GLB,,, | Performed by: FAMILY MEDICINE

## 2019-10-02 PROCEDURE — 3074F PR MOST RECENT SYSTOLIC BLOOD PRESSURE < 130 MM HG: ICD-10-PCS | Mod: CPTII,S$GLB,, | Performed by: FAMILY MEDICINE

## 2019-10-02 PROCEDURE — 3079F DIAST BP 80-89 MM HG: CPT | Mod: CPTII,S$GLB,, | Performed by: FAMILY MEDICINE

## 2019-10-02 PROCEDURE — 3074F SYST BP LT 130 MM HG: CPT | Mod: CPTII,S$GLB,, | Performed by: FAMILY MEDICINE

## 2019-10-02 PROCEDURE — 3008F PR BODY MASS INDEX (BMI) DOCUMENTED: ICD-10-PCS | Mod: CPTII,S$GLB,, | Performed by: FAMILY MEDICINE

## 2019-10-02 PROCEDURE — 3079F PR MOST RECENT DIASTOLIC BLOOD PRESSURE 80-89 MM HG: ICD-10-PCS | Mod: CPTII,S$GLB,, | Performed by: FAMILY MEDICINE

## 2019-10-02 PROCEDURE — 99999 PR PBB SHADOW E&M-EST. PATIENT-LVL III: CPT | Mod: PBBFAC,,, | Performed by: FAMILY MEDICINE

## 2019-10-02 RX ORDER — CODEINE PHOSPHATE AND GUAIFENESIN 10; 100 MG/5ML; MG/5ML
5 SOLUTION ORAL EVERY 6 HOURS PRN
Qty: 240 ML | Refills: 0 | Status: SHIPPED | OUTPATIENT
Start: 2019-10-02 | End: 2019-10-12

## 2019-10-02 NOTE — PROGRESS NOTES
Ned Toledo . presents with moderate upper respiratory congestion,rhinnorhea,moderate cough past 2-3 days. OTC help slightly. Denies nausea,vomiting,diarrhea or significant fever.    History reviewed. No pertinent past medical history.  Past Surgical History:   Procedure Laterality Date    HERNIA REPAIR       Review of patient's allergies indicates:  No Known Allergies  Current Outpatient Medications on File Prior to Visit   Medication Sig Dispense Refill    blood sugar diagnostic Strp To check BG 2 times daily, to use with insurance preferred meter 100 each 11    blood-glucose meter kit To check BG 2 times daily, to use with insurance preferred meter 1 each 0    carisoprodol (SOMA) 350 MG tablet Take 1 tablet (350 mg total) by mouth 4 (four) times daily as needed for Muscle spasms. 120 tablet 0    [START ON 11/9/2019] carisoprodol (SOMA) 350 MG tablet Take 1 tablet (350 mg total) by mouth 4 (four) times daily as needed for Muscle spasms. 120 tablet 0    [START ON 10/10/2019] carisoprodol (SOMA) 350 MG tablet Take 1 tablet (350 mg total) by mouth 4 (four) times daily as needed for Muscle spasms. 120 tablet 0    HYDROcodone-acetaminophen (NORCO) 7.5-325 mg per tablet Take 1 tablet by mouth 3 (three) times daily as needed for Pain. 120 tablet 0    [START ON 10/10/2019] HYDROcodone-acetaminophen (NORCO) 7.5-325 mg per tablet Take 1 tablet by mouth 3 (three) times daily as needed for Pain. 120 tablet 0    [START ON 11/9/2019] HYDROcodone-acetaminophen (NORCO) 7.5-325 mg per tablet Take 1 tablet by mouth 3 (three) times daily as needed for Pain. 120 tablet 0    lancets Misc To check BG 2 times daily, to use with insurance preferred meter 100 each 11    naproxen (NAPROSYN) 500 MG tablet Take 1 tablet (500 mg total) by mouth 2 (two) times daily with meals. 20 tablet 0    ondansetron (ZOFRAN-ODT) 8 MG TbDL Take 1 tablet (8 mg total) by mouth every 6 (six) hours as needed. 20 tablet 1    fexofenadine  (ALLEGRA) 180 MG tablet Take 1 tablet (180 mg total) by mouth once daily. 90 tablet 3     No current facility-administered medications on file prior to visit.      Social History     Socioeconomic History    Marital status:      Spouse name: Not on file    Number of children: Not on file    Years of education: Not on file    Highest education level: Not on file   Occupational History    Not on file   Social Needs    Financial resource strain: Not on file    Food insecurity:     Worry: Not on file     Inability: Not on file    Transportation needs:     Medical: Not on file     Non-medical: Not on file   Tobacco Use    Smoking status: Never Smoker   Substance and Sexual Activity    Alcohol use: No    Drug use: Not on file    Sexual activity: Not on file   Lifestyle    Physical activity:     Days per week: Not on file     Minutes per session: Not on file    Stress: Not on file   Relationships    Social connections:     Talks on phone: Not on file     Gets together: Not on file     Attends [employer] service: Not on file     Active member of club or organization: Not on file     Attends meetings of clubs or organizations: Not on file     Relationship status: Not on file   Other Topics Concern    Not on file   Social History Narrative    Not on file     History reviewed. No pertinent family history.      ROS:  SKIN: No rashes, itching or changes in color or texture of skin.  EYES: Visual acuity fine. No photophobia, ocular pain or diplopia.EARS: Denies ear pain, discharge or vertigo.NOSE: No loss of smell, no epistaxis some postnasal drip.MOUTH & THROAT: No hoarseness or change in voice. No excessive gum bleeding.CHEST: Denies YANCEY, cyanosis, wheezing  CARDIOVASCULAR: Denies chest pain, PND, orthopnea or reduced exercise tolerance.  ABDOMEN:  No weight loss.No abdominal pain, no hematemesis or blood in stool.  URINARY: No flank pain, dysuria or hematuria.  PERIPHERAL VASCULAR: No claudication or  cyanosis.  MUSCULOSKELETAL: Negative   NEUROLOGIC: No history of seizures, paralysis, alteration of gait or coordination.    PE: Vital signs as noted  Heent:Normocephalic with no recent cranial trauma,PERRLA,EOMI,conjunctiva clear,fundi reveal no hemmorhage exudate or papilledema.Otic canals clear, tympanic membranes slightly dull bilaterally.Nasal mucosa slightly red and edematous.Posterior pharynx slightly red but without exudate.  Neck:Supple with minimal anterior cervical adenopathy.  Chest:Clear bilateral breath sounds with mild scattered ronchi  Heart:Regular rhthym without murmer  Abdomen:Soft, non tender,no masses, no hepatosplenomegalyExtremeties and Neurologic:Grossly within normal limits  Impression: Upper Respiratory Infection. 465.5  Plan: TO w cod

## 2019-10-02 NOTE — TELEPHONE ENCOUNTER
----- Message from Emmett JUARES Frisard sent at 10/2/2019  7:33 AM CDT -----  Contact: same  Patient called in and wanted to see if he could be seen today Wednesday 10/2/19, anytime.  Patient stated he has a bad cough & congestion.  Call back number is [phone]

## 2019-10-14 RX ORDER — HYDROCODONE BITARTRATE AND ACETAMINOPHEN 7.5; 325 MG/1; MG/1
1 TABLET ORAL EVERY 6 HOURS PRN
Qty: 120 TABLET | Refills: 0 | Status: SHIPPED | OUTPATIENT
Start: 2019-11-13 | End: 2019-12-12 | Stop reason: SDUPTHER

## 2019-10-14 RX ORDER — HYDROCODONE BITARTRATE AND ACETAMINOPHEN 7.5; 325 MG/1; MG/1
1 TABLET ORAL EVERY 6 HOURS PRN
Qty: 120 TABLET | Refills: 0 | Status: SHIPPED | OUTPATIENT
Start: 2019-10-14 | End: 2019-11-12

## 2019-10-14 NOTE — TELEPHONE ENCOUNTER
----- Message from Edith Durbin sent at 10/14/2019  7:53 AM CDT -----  Contact: RUDDY MCKEON JR. [[id number]]  Name of Who is Calling: RUDDY MCKEON JR. [[id number]]      What is the request in detail: patient would like to speak with nurse regarding norco medication. Please call        Can the clinic reply by [facility]: no      What Number to Call Back if not in [facility]: [phone]

## 2019-12-12 ENCOUNTER — OFFICE VISIT (OUTPATIENT)
Dept: FAMILY MEDICINE | Facility: CLINIC | Age: 59
End: 2019-12-12
Payer: COMMERCIAL

## 2019-12-12 VITALS
WEIGHT: 179.19 LBS | HEIGHT: 71 IN | BODY MASS INDEX: 25.09 KG/M2 | DIASTOLIC BLOOD PRESSURE: 70 MMHG | SYSTOLIC BLOOD PRESSURE: 120 MMHG | HEART RATE: 76 BPM | TEMPERATURE: 98 F

## 2019-12-12 DIAGNOSIS — Z23 IMMUNIZATION DUE: ICD-10-CM

## 2019-12-12 DIAGNOSIS — G89.29 CHRONIC LEFT-SIDED LOW BACK PAIN WITHOUT SCIATICA: Primary | ICD-10-CM

## 2019-12-12 DIAGNOSIS — M54.50 CHRONIC LEFT-SIDED LOW BACK PAIN WITHOUT SCIATICA: Primary | ICD-10-CM

## 2019-12-12 PROCEDURE — 3074F SYST BP LT 130 MM HG: CPT | Mod: CPTII,S$GLB,, | Performed by: FAMILY MEDICINE

## 2019-12-12 PROCEDURE — 90686 FLU VACCINE (QUAD) GREATER THAN OR EQUAL TO 3YO PRESERVATIVE FREE IM: ICD-10-PCS | Mod: S$GLB,,, | Performed by: FAMILY MEDICINE

## 2019-12-12 PROCEDURE — 3078F DIAST BP <80 MM HG: CPT | Mod: CPTII,S$GLB,, | Performed by: FAMILY MEDICINE

## 2019-12-12 PROCEDURE — 90686 IIV4 VACC NO PRSV 0.5 ML IM: CPT | Mod: S$GLB,,, | Performed by: FAMILY MEDICINE

## 2019-12-12 PROCEDURE — 99213 PR OFFICE/OUTPT VISIT, EST, LEVL III, 20-29 MIN: ICD-10-PCS | Mod: 25,S$GLB,, | Performed by: FAMILY MEDICINE

## 2019-12-12 PROCEDURE — 99999 PR PBB SHADOW E&M-EST. PATIENT-LVL III: ICD-10-PCS | Mod: PBBFAC,,, | Performed by: FAMILY MEDICINE

## 2019-12-12 PROCEDURE — 90471 FLU VACCINE (QUAD) GREATER THAN OR EQUAL TO 3YO PRESERVATIVE FREE IM: ICD-10-PCS | Mod: S$GLB,,, | Performed by: FAMILY MEDICINE

## 2019-12-12 PROCEDURE — 3008F BODY MASS INDEX DOCD: CPT | Mod: CPTII,S$GLB,, | Performed by: FAMILY MEDICINE

## 2019-12-12 PROCEDURE — 90471 IMMUNIZATION ADMIN: CPT | Mod: S$GLB,,, | Performed by: FAMILY MEDICINE

## 2019-12-12 PROCEDURE — 3078F PR MOST RECENT DIASTOLIC BLOOD PRESSURE < 80 MM HG: ICD-10-PCS | Mod: CPTII,S$GLB,, | Performed by: FAMILY MEDICINE

## 2019-12-12 PROCEDURE — 99999 PR PBB SHADOW E&M-EST. PATIENT-LVL III: CPT | Mod: PBBFAC,,, | Performed by: FAMILY MEDICINE

## 2019-12-12 PROCEDURE — 3074F PR MOST RECENT SYSTOLIC BLOOD PRESSURE < 130 MM HG: ICD-10-PCS | Mod: CPTII,S$GLB,, | Performed by: FAMILY MEDICINE

## 2019-12-12 PROCEDURE — 3008F PR BODY MASS INDEX (BMI) DOCUMENTED: ICD-10-PCS | Mod: CPTII,S$GLB,, | Performed by: FAMILY MEDICINE

## 2019-12-12 PROCEDURE — 99213 OFFICE O/P EST LOW 20 MIN: CPT | Mod: 25,S$GLB,, | Performed by: FAMILY MEDICINE

## 2019-12-12 RX ORDER — CARISOPRODOL 350 MG/1
350 TABLET ORAL 4 TIMES DAILY PRN
Qty: 120 TABLET | Refills: 0 | Status: SHIPPED | OUTPATIENT
Start: 2020-01-10 | End: 2020-01-20

## 2019-12-12 RX ORDER — CARISOPRODOL 350 MG/1
350 TABLET ORAL 4 TIMES DAILY PRN
Qty: 120 TABLET | Refills: 0 | Status: SHIPPED | OUTPATIENT
Start: 2020-02-08 | End: 2020-02-18

## 2019-12-12 RX ORDER — CARISOPRODOL 350 MG/1
350 TABLET ORAL 4 TIMES DAILY PRN
Qty: 120 TABLET | Refills: 0 | Status: SHIPPED | OUTPATIENT
Start: 2019-12-12 | End: 2020-03-12 | Stop reason: SDUPTHER

## 2019-12-12 RX ORDER — HYDROCODONE BITARTRATE AND ACETAMINOPHEN 7.5; 325 MG/1; MG/1
1 TABLET ORAL EVERY 6 HOURS PRN
Qty: 120 TABLET | Refills: 0 | Status: SHIPPED | OUTPATIENT
Start: 2020-01-10 | End: 2020-03-12 | Stop reason: SDUPTHER

## 2019-12-12 RX ORDER — HYDROCODONE BITARTRATE AND ACETAMINOPHEN 7.5; 325 MG/1; MG/1
1 TABLET ORAL EVERY 6 HOURS PRN
Qty: 120 TABLET | Refills: 0 | Status: SHIPPED | OUTPATIENT
Start: 2020-02-08 | End: 2020-03-08

## 2019-12-12 RX ORDER — HYDROCODONE BITARTRATE AND ACETAMINOPHEN 7.5; 325 MG/1; MG/1
1 TABLET ORAL 4 TIMES DAILY
Qty: 120 TABLET | Refills: 0 | Status: SHIPPED | OUTPATIENT
Start: 2019-12-12 | End: 2020-03-12 | Stop reason: SDUPTHER

## 2019-12-12 RX ORDER — PROMETHAZINE HYDROCHLORIDE 25 MG/1
TABLET ORAL
Refills: 6 | COMMUNITY
Start: 2019-11-12 | End: 2020-09-09 | Stop reason: SDUPTHER

## 2019-12-12 NOTE — PROGRESS NOTES
The patient presents today to fu DM and  BP sig better w diet and off meds a1c 6.2 Back pain persistys w intermittent exac. Lt shoulder MRI showed tear    Past Medical History:  History reviewed. No pertinent past medical history.  Past Surgical History:   Procedure Laterality Date    HERNIA REPAIR       Review of patient's allergies indicates:  No Known Allergies  Current Outpatient Medications on File Prior to Visit   Medication Sig Dispense Refill    blood sugar diagnostic Strp To check BG 2 times daily, to use with insurance preferred meter 100 each 11    blood-glucose meter kit To check BG 2 times daily, to use with insurance preferred meter 1 each 0    lancets Misc To check BG 2 times daily, to use with insurance preferred meter 100 each 11    naproxen (NAPROSYN) 500 MG tablet Take 1 tablet (500 mg total) by mouth 2 (two) times daily with meals. 20 tablet 0    ondansetron (ZOFRAN-ODT) 8 MG TbDL Take 1 tablet (8 mg total) by mouth every 6 (six) hours as needed. 20 tablet 1    [DISCONTINUED] carisoprodol (SOMA) 350 MG tablet Take 1 tablet (350 mg total) by mouth 4 (four) times daily as needed for Muscle spasms. 120 tablet 0    [DISCONTINUED] HYDROcodone-acetaminophen (NORCO) 7.5-325 mg per tablet Take 1 tablet by mouth every 6 (six) hours as needed for Pain. 120 tablet 0    fexofenadine (ALLEGRA) 180 MG tablet Take 1 tablet (180 mg total) by mouth once daily. 90 tablet 3    promethazine (PHENERGAN) 25 MG tablet   6     No current facility-administered medications on file prior to visit.      Social History     Socioeconomic History    Marital status:      Spouse name: Not on file    Number of children: Not on file    Years of education: Not on file    Highest education level: Not on file   Occupational History    Not on file   Social Needs    Financial resource strain: Not on file    Food insecurity:     Worry: Not on file     Inability: Not on file    Transportation needs:     Medical:  Not on file     Non-medical: Not on file   Tobacco Use    Smoking status: Never Smoker   Substance and Sexual Activity    Alcohol use: No    Drug use: Not on file    Sexual activity: Not on file   Lifestyle    Physical activity:     Days per week: Not on file     Minutes per session: Not on file    Stress: Not on file   Relationships    Social connections:     Talks on phone: Not on file     Gets together: Not on file     Attends [employer] service: Not on file     Active member of club or organization: Not on file     Attends meetings of clubs or organizations: Not on file     Relationship status: Not on file   Other Topics Concern    Not on file   Social History Narrative    Not on file     History reviewed. No pertinent family history.      ROS:GENERAL: No fever, chills, fatigability or weight loss.  SKIN: No rashes, itching or changes in color or texture of skin.  HEAD: No headaches or recent head trauma.EYES: Visual acuity fine. No photophobia, ocular pain or diplopia.EARS: Denies ear pain, discharge or vertigo.NOSE: No loss of smell, no epistaxis or postnasal drip.MOUTH & THROAT: No hoarseness or change in voice. No excessive gum bleeding.NODES: Denies swollen glands.  CHEST: Denies YANCEY, cyanosis, wheezing, cough and sputum production.  CARDIOVASCULAR: Denies chest pain, PND, orthopnea or reduced exercise tolerance.  ABDOMEN: Appetite fine. No weight loss. Denies diarrhea, abdominal pain, hematemesis or blood in stool.  URINARY: No flank pain, dysuria or hematuria.  PERIPHERAL VASCULAR: No claudication or cyanosis.  MUSCULOSKELETAL: See above.  NEUROLOGIC: No history of seizures, paralysis, alteration of gait or coordination.  PE:   HEAD: Normocephalic, atraumatic.EYES: PERRL. EOMI.   EARS: TM's intact. Light reflex normal. No retraction or perforation.   NOSE: Mucosa pink. Airway clear.MOUTH & THROAT: No tonsillar enlargement. No pharyngeal erythema or exudate. No stridor.  NODES: No cervical,  axillary or inguinal lymph node enlargement.  CHEST: Lungs clear to auscultation.  CARDIOVASCULAR: Normal S1, S2. No rubs, murmurs or gallops.  ABDOMEN: Bowel sounds normal. Not distended. Soft. No tenderness or masses.  MUSCULOSKELETAL: Tender post/ant lt shoulder   NEUROLOGIC: Cranial Nerves: II-XII grossly intact.  Motor: 5/5 strength major flexors/extensors.  DTR's: Knees, Ankles 2+ and equal bilaterally; downgoing toes.  Sensory: Intact to light touch distally.  Gait & Posture: Normal gait and fine motion. No cerebellar signs.     Impression: DM  Hpt   LBP  Plan:Lab eval rev  Rec diet and ex recs

## 2020-02-03 ENCOUNTER — OFFICE VISIT (OUTPATIENT)
Dept: FAMILY MEDICINE | Facility: CLINIC | Age: 60
End: 2020-02-03
Payer: COMMERCIAL

## 2020-02-03 VITALS
WEIGHT: 176.81 LBS | TEMPERATURE: 98 F | SYSTOLIC BLOOD PRESSURE: 134 MMHG | DIASTOLIC BLOOD PRESSURE: 85 MMHG | HEART RATE: 75 BPM | BODY MASS INDEX: 23.95 KG/M2 | HEIGHT: 72 IN

## 2020-02-03 DIAGNOSIS — H65.93 BILATERAL OTITIS MEDIA WITH EFFUSION: Primary | ICD-10-CM

## 2020-02-03 DIAGNOSIS — J32.9 SINUSITIS, UNSPECIFIED CHRONICITY, UNSPECIFIED LOCATION: ICD-10-CM

## 2020-02-03 DIAGNOSIS — H61.23 IMPACTED CERUMEN OF BOTH EARS: ICD-10-CM

## 2020-02-03 PROCEDURE — 99999 PR PBB SHADOW E&M-EST. PATIENT-LVL III: CPT | Mod: PBBFAC,,, | Performed by: NURSE PRACTITIONER

## 2020-02-03 PROCEDURE — 99999 PR PBB SHADOW E&M-EST. PATIENT-LVL III: ICD-10-PCS | Mod: PBBFAC,,, | Performed by: NURSE PRACTITIONER

## 2020-02-03 PROCEDURE — 3079F DIAST BP 80-89 MM HG: CPT | Mod: CPTII,S$GLB,, | Performed by: NURSE PRACTITIONER

## 2020-02-03 PROCEDURE — 99213 OFFICE O/P EST LOW 20 MIN: CPT | Mod: S$GLB,,, | Performed by: NURSE PRACTITIONER

## 2020-02-03 PROCEDURE — 99213 PR OFFICE/OUTPT VISIT, EST, LEVL III, 20-29 MIN: ICD-10-PCS | Mod: S$GLB,,, | Performed by: NURSE PRACTITIONER

## 2020-02-03 PROCEDURE — 3075F PR MOST RECENT SYSTOLIC BLOOD PRESS GE 130-139MM HG: ICD-10-PCS | Mod: CPTII,S$GLB,, | Performed by: NURSE PRACTITIONER

## 2020-02-03 PROCEDURE — 3008F BODY MASS INDEX DOCD: CPT | Mod: CPTII,S$GLB,, | Performed by: NURSE PRACTITIONER

## 2020-02-03 PROCEDURE — 3008F PR BODY MASS INDEX (BMI) DOCUMENTED: ICD-10-PCS | Mod: CPTII,S$GLB,, | Performed by: NURSE PRACTITIONER

## 2020-02-03 PROCEDURE — 3075F SYST BP GE 130 - 139MM HG: CPT | Mod: CPTII,S$GLB,, | Performed by: NURSE PRACTITIONER

## 2020-02-03 PROCEDURE — 3079F PR MOST RECENT DIASTOLIC BLOOD PRESSURE 80-89 MM HG: ICD-10-PCS | Mod: CPTII,S$GLB,, | Performed by: NURSE PRACTITIONER

## 2020-02-03 RX ORDER — PROMETHAZINE HYDROCHLORIDE AND DEXTROMETHORPHAN HYDROBROMIDE 6.25; 15 MG/5ML; MG/5ML
5 SYRUP ORAL 3 TIMES DAILY PRN
Qty: 118 ML | Refills: 0 | Status: SHIPPED | OUTPATIENT
Start: 2020-02-03 | End: 2020-02-13

## 2020-02-03 RX ORDER — AMOXICILLIN 875 MG/1
875 TABLET, FILM COATED ORAL EVERY 12 HOURS
Qty: 20 TABLET | Refills: 0 | Status: SHIPPED | OUTPATIENT
Start: 2020-02-03 | End: 2020-02-13

## 2020-02-03 NOTE — PROGRESS NOTES
Subjective:       Patient ID: Ned Toledo Jr. is a 59 y.o. male.    Chief Complaint: Sore Throat; Sinus Problem; and Headache    Sinus Problem   This is a new problem. The current episode started in the past 7 days. The problem has been rapidly worsening since onset. There has been no fever. The pain is moderate. Associated symptoms include congestion, coughing, ear pain, sinus pressure and a sore throat. Pertinent negatives include no headaches or shortness of breath. Past treatments include oral decongestants and acetaminophen. The treatment provided no relief.        Review of Systems   Constitutional: Negative for fatigue, fever and unexpected weight change.   HENT: Positive for congestion, ear pain, sinus pressure and sore throat.    Eyes: Negative.  Negative for pain and visual disturbance.   Respiratory: Positive for cough. Negative for shortness of breath.    Cardiovascular: Negative for chest pain and palpitations.   Gastrointestinal: Negative for abdominal pain, diarrhea, nausea and vomiting.   Genitourinary: Negative for dysuria and frequency.   Musculoskeletal: Negative for arthralgias and myalgias.   Skin: Negative for color change and rash.   Neurological: Negative for dizziness and headaches.   Psychiatric/Behavioral: Negative for sleep disturbance. The patient is not nervous/anxious.        Vitals:    02/03/20 1015   BP: 134/85   Pulse: 75   Temp: 97.7 °F (36.5 °C)       Objective:     Current Outpatient Medications   Medication Sig Dispense Refill    blood sugar diagnostic Strp To check BG 2 times daily, to use with insurance preferred meter 100 each 11    blood-glucose meter kit To check BG 2 times daily, to use with insurance preferred meter 1 each 0    carisoprodol (SOMA) 350 MG tablet Take 1 tablet (350 mg total) by mouth 4 (four) times daily as needed for Muscle spasms. 120 tablet 0    [START ON 2/8/2020] carisoprodol (SOMA) 350 MG tablet Take 1 tablet (350 mg total) by mouth 4 (four)  times daily as needed for Muscle spasms. 120 tablet 0    fexofenadine (ALLEGRA) 180 MG tablet Take 1 tablet (180 mg total) by mouth once daily. 90 tablet 3    [START ON 2/8/2020] HYDROcodone-acetaminophen (NORCO) 7.5-325 mg per tablet Take 1 tablet by mouth every 6 (six) hours as needed for Pain. 120 tablet 0    HYDROcodone-acetaminophen (NORCO) 7.5-325 mg per tablet Take 1 tablet by mouth every 6 (six) hours as needed for Pain. 120 tablet 0    HYDROcodone-acetaminophen (NORCO) 7.5-325 mg per tablet Take 1 tablet by mouth 4 (four) times daily. 120 tablet 0    lancets Misc To check BG 2 times daily, to use with insurance preferred meter 100 each 11    naproxen (NAPROSYN) 500 MG tablet Take 1 tablet (500 mg total) by mouth 2 (two) times daily with meals. 20 tablet 0    ondansetron (ZOFRAN-ODT) 8 MG TbDL Take 1 tablet (8 mg total) by mouth every 6 (six) hours as needed. 20 tablet 1    promethazine (PHENERGAN) 25 MG tablet   6    amoxicillin (AMOXIL) 875 MG tablet Take 1 tablet (875 mg total) by mouth every 12 (twelve) hours. for 10 days 20 tablet 0    promethazine-dextromethorphan (PROMETHAZINE-DM) 6.25-15 mg/5 mL Syrp Take 5 mLs by mouth 3 (three) times daily as needed. 118 mL 0     No current facility-administered medications for this visit.        Physical Exam   Constitutional: He is oriented to person, place, and time. He appears well-developed. No distress.   HENT:   Head: Normocephalic and atraumatic.   Right Ear: Tympanic membrane is injected and erythematous. A middle ear effusion is present.   Left Ear: Tympanic membrane is injected and erythematous. A middle ear effusion is present.   Nose: Mucosal edema and rhinorrhea present. Right sinus exhibits frontal sinus tenderness. Left sinus exhibits frontal sinus tenderness.   Bilateral canals impacted with cerumen   Eyes: Pupils are equal, round, and reactive to light. EOM are normal.   Neck: Normal range of motion. Neck supple.   Cardiovascular: Normal  rate and regular rhythm.   Pulmonary/Chest: Effort normal and breath sounds normal.   Musculoskeletal: Normal range of motion.   Neurological: He is alert and oriented to person, place, and time.   Skin: Skin is warm and dry. No rash noted.   Psychiatric: He has a normal mood and affect. Thought content normal.   Nursing note and vitals reviewed.      Ear lavage performed, large amount of cerumen removed from both ears. Pt tolerated well.    Assessment:       1. Bilateral otitis media with effusion    2. Sinusitis, unspecified chronicity, unspecified location    3. Impacted cerumen of both ears        Plan:   Bilateral otitis media with effusion    Sinusitis, unspecified chronicity, unspecified location    Impacted cerumen of both ears    Other orders  -     amoxicillin (AMOXIL) 875 MG tablet; Take 1 tablet (875 mg total) by mouth every 12 (twelve) hours. for 10 days  Dispense: 20 tablet; Refill: 0  -     promethazine-dextromethorphan (PROMETHAZINE-DM) 6.25-15 mg/5 mL Syrp; Take 5 mLs by mouth 3 (three) times daily as needed.  Dispense: 118 mL; Refill: 0        Follow up if symptoms worsen or fail to improve.    There are no Patient Instructions on file for this visit.

## 2020-02-11 RX ORDER — LANCETS
EACH MISCELLANEOUS
Qty: 100 EACH | Refills: 0 | Status: SHIPPED | OUTPATIENT
Start: 2020-02-11 | End: 2021-11-23

## 2020-02-11 RX ORDER — INSULIN PUMP SYRINGE, 3 ML
EACH MISCELLANEOUS
Qty: 1 EACH | Refills: 0 | Status: SHIPPED | OUTPATIENT
Start: 2020-02-11 | End: 2021-11-23

## 2020-02-11 NOTE — TELEPHONE ENCOUNTER
----- Message from Cheyanne Villasenor sent at 2/11/2020 10:33 AM CST -----  Contact: pt   He's calling in regards to refill,    pls call pt back at [phone] (home) [phone] (work)        [id number] - [place] - [address] AT [facility] [address]  [address] [address]  Phone: [phone] Fax: [phone]

## 2020-03-12 ENCOUNTER — OFFICE VISIT (OUTPATIENT)
Dept: FAMILY MEDICINE | Facility: CLINIC | Age: 60
End: 2020-03-12
Payer: COMMERCIAL

## 2020-03-12 VITALS
BODY MASS INDEX: 24.36 KG/M2 | DIASTOLIC BLOOD PRESSURE: 84 MMHG | HEIGHT: 71 IN | WEIGHT: 174 LBS | HEART RATE: 76 BPM | SYSTOLIC BLOOD PRESSURE: 138 MMHG

## 2020-03-12 DIAGNOSIS — M54.50 CHRONIC LEFT-SIDED LOW BACK PAIN WITHOUT SCIATICA: Primary | ICD-10-CM

## 2020-03-12 DIAGNOSIS — G89.29 CHRONIC LEFT-SIDED LOW BACK PAIN WITHOUT SCIATICA: Primary | ICD-10-CM

## 2020-03-12 PROCEDURE — 3079F DIAST BP 80-89 MM HG: CPT | Mod: CPTII,S$GLB,, | Performed by: FAMILY MEDICINE

## 2020-03-12 PROCEDURE — 3079F PR MOST RECENT DIASTOLIC BLOOD PRESSURE 80-89 MM HG: ICD-10-PCS | Mod: CPTII,S$GLB,, | Performed by: FAMILY MEDICINE

## 2020-03-12 PROCEDURE — 99213 PR OFFICE/OUTPT VISIT, EST, LEVL III, 20-29 MIN: ICD-10-PCS | Mod: S$GLB,,, | Performed by: FAMILY MEDICINE

## 2020-03-12 PROCEDURE — 3075F SYST BP GE 130 - 139MM HG: CPT | Mod: CPTII,S$GLB,, | Performed by: FAMILY MEDICINE

## 2020-03-12 PROCEDURE — 3075F PR MOST RECENT SYSTOLIC BLOOD PRESS GE 130-139MM HG: ICD-10-PCS | Mod: CPTII,S$GLB,, | Performed by: FAMILY MEDICINE

## 2020-03-12 PROCEDURE — 99999 PR PBB SHADOW E&M-EST. PATIENT-LVL III: ICD-10-PCS | Mod: PBBFAC,,, | Performed by: FAMILY MEDICINE

## 2020-03-12 PROCEDURE — 99213 OFFICE O/P EST LOW 20 MIN: CPT | Mod: S$GLB,,, | Performed by: FAMILY MEDICINE

## 2020-03-12 PROCEDURE — 3008F PR BODY MASS INDEX (BMI) DOCUMENTED: ICD-10-PCS | Mod: CPTII,S$GLB,, | Performed by: FAMILY MEDICINE

## 2020-03-12 PROCEDURE — 3008F BODY MASS INDEX DOCD: CPT | Mod: CPTII,S$GLB,, | Performed by: FAMILY MEDICINE

## 2020-03-12 PROCEDURE — 99999 PR PBB SHADOW E&M-EST. PATIENT-LVL III: CPT | Mod: PBBFAC,,, | Performed by: FAMILY MEDICINE

## 2020-03-12 RX ORDER — HYDROCODONE BITARTRATE AND ACETAMINOPHEN 7.5; 325 MG/1; MG/1
1 TABLET ORAL EVERY 6 HOURS PRN
Qty: 120 TABLET | Refills: 0 | Status: SHIPPED | OUTPATIENT
Start: 2020-05-09 | End: 2020-06-09 | Stop reason: SDUPTHER

## 2020-03-12 RX ORDER — HYDROCODONE BITARTRATE AND ACETAMINOPHEN 7.5; 325 MG/1; MG/1
1 TABLET ORAL EVERY 6 HOURS PRN
Qty: 120 TABLET | Refills: 0 | Status: SHIPPED | OUTPATIENT
Start: 2020-03-12 | End: 2020-06-09 | Stop reason: SDUPTHER

## 2020-03-12 RX ORDER — CARISOPRODOL 350 MG/1
350 TABLET ORAL 4 TIMES DAILY PRN
Qty: 120 TABLET | Refills: 0 | Status: SHIPPED | OUTPATIENT
Start: 2020-03-12 | End: 2020-06-09 | Stop reason: SDUPTHER

## 2020-03-12 RX ORDER — HYDROCODONE BITARTRATE AND ACETAMINOPHEN 7.5; 325 MG/1; MG/1
1 TABLET ORAL 4 TIMES DAILY
Qty: 120 TABLET | Refills: 0 | Status: SHIPPED | OUTPATIENT
Start: 2020-04-10 | End: 2020-06-09 | Stop reason: SDUPTHER

## 2020-03-12 RX ORDER — CARISOPRODOL 350 MG/1
350 TABLET ORAL 4 TIMES DAILY PRN
Qty: 120 TABLET | Refills: 0 | Status: SHIPPED | OUTPATIENT
Start: 2020-04-10 | End: 2020-04-20

## 2020-03-12 RX ORDER — CARISOPRODOL 350 MG/1
350 TABLET ORAL 4 TIMES DAILY PRN
Qty: 120 TABLET | Refills: 0 | Status: SHIPPED | OUTPATIENT
Start: 2020-05-09 | End: 2020-05-19

## 2020-03-12 NOTE — PROGRESS NOTES
The patient presents today to fu   Back pain persists w intermittent exac.   Past Medical History:  History reviewed. No pertinent past medical history.  Past Surgical History:   Procedure Laterality Date    HERNIA REPAIR       Review of patient's allergies indicates:  No Known Allergies  Current Outpatient Medications on File Prior to Visit   Medication Sig Dispense Refill    blood sugar diagnostic Strp To check BG 2 times daily, to use with insurance preferred meter 100 each 0    blood-glucose meter kit To check BG 2 times daily, to use with insurance preferred meter 1 each 0    carisoprodol (SOMA) 350 MG tablet Take 1 tablet (350 mg total) by mouth 4 (four) times daily as needed for Muscle spasms. 120 tablet 0    HYDROcodone-acetaminophen (NORCO) 7.5-325 mg per tablet Take 1 tablet by mouth every 6 (six) hours as needed for Pain. 120 tablet 0    HYDROcodone-acetaminophen (NORCO) 7.5-325 mg per tablet Take 1 tablet by mouth 4 (four) times daily. 120 tablet 0    lancets Misc To check BG 2 times daily, to use with insurance preferred meter 100 each 0    naproxen (NAPROSYN) 500 MG tablet Take 1 tablet (500 mg total) by mouth 2 (two) times daily with meals. 20 tablet 0    ondansetron (ZOFRAN-ODT) 8 MG TbDL Take 1 tablet (8 mg total) by mouth every 6 (six) hours as needed. 20 tablet 1    promethazine (PHENERGAN) 25 MG tablet   6    fexofenadine (ALLEGRA) 180 MG tablet Take 1 tablet (180 mg total) by mouth once daily. 90 tablet 3     No current facility-administered medications on file prior to visit.      Social History     Socioeconomic History    Marital status:      Spouse name: Not on file    Number of children: Not on file    Years of education: Not on file    Highest education level: Not on file   Occupational History    Not on file   Social Needs    Financial resource strain: Not on file    Food insecurity:     Worry: Not on file     Inability: Not on file    Transportation needs:      Medical: Not on file     Non-medical: Not on file   Tobacco Use    Smoking status: Never Smoker   Substance and Sexual Activity    Alcohol use: No    Drug use: Not on file    Sexual activity: Not on file   Lifestyle    Physical activity:     Days per week: Not on file     Minutes per session: Not on file    Stress: Not on file   Relationships    Social connections:     Talks on phone: Not on file     Gets together: Not on file     Attends Church service: Not on file     Active member of club or organization: Not on file     Attends meetings of clubs or organizations: Not on file     Relationship status: Not on file   Other Topics Concern    Not on file   Social History Narrative    Not on file     History reviewed. No pertinent family history.      ROS:GENERAL: No fever, chills, fatigability or weight loss.  SKIN: No rashes, itching or changes in color or texture of skin.  HEAD: No headaches or recent head trauma.EYES: Visual acuity fine. No photophobia, ocular pain or diplopia.EARS: Denies ear pain, discharge or vertigo.NOSE: No loss of smell, no epistaxis or postnasal drip.MOUTH & THROAT: No hoarseness or change in voice. No excessive gum bleeding.NODES: Denies swollen glands.  CHEST: Denies YANCEY, cyanosis, wheezing, cough and sputum production.  CARDIOVASCULAR: Denies chest pain, PND, orthopnea or reduced exercise tolerance.  ABDOMEN: Appetite fine. No weight loss. Denies diarrhea, abdominal pain, hematemesis or blood in stool.  URINARY: No flank pain, dysuria or hematuria.  PERIPHERAL VASCULAR: No claudication or cyanosis.  MUSCULOSKELETAL: See above.  NEUROLOGIC: No history of seizures, paralysis, alteration of gait or coordination.  PE:   HEAD: Normocephalic, atraumatic.EYES: PERRL. EOMI.   EARS: TM's intact. Light reflex normal. No retraction or perforation.   NOSE: Mucosa pink. Airway clear.MOUTH & THROAT: No tonsillar enlargement. No pharyngeal erythema or exudate. No stridor.  NODES: No  cervical, axillary or inguinal lymph node enlargement.  CHEST: Lungs clear to auscultation.  CARDIOVASCULAR: Normal S1, S2. No rubs, murmurs or gallops.  ABDOMEN: Bowel sounds normal. Not distended. Soft. No tenderness or masses.  MUSCULOSKELETAL: Tender post/ant lt shoulder   NEUROLOGIC: Cranial Nerves: II-XII grossly intact.  Motor: 5/5 strength major flexors/extensors.  DTR's: Knees, Ankles 2+ and equal bilaterally; downgoing toes.  Sensory: Intact to light touch distally.  Gait & Posture: Normal gait and fine motion. No cerebellar signs.     Impression:   LBP  Plan:RF meds   Rec diet and ex recs

## 2020-03-24 ENCOUNTER — TELEPHONE (OUTPATIENT)
Dept: FAMILY MEDICINE | Facility: CLINIC | Age: 60
End: 2020-03-24

## 2020-03-25 ENCOUNTER — TELEPHONE (OUTPATIENT)
Dept: FAMILY MEDICINE | Facility: CLINIC | Age: 60
End: 2020-03-25

## 2020-03-25 ENCOUNTER — OFFICE VISIT (OUTPATIENT)
Dept: FAMILY MEDICINE | Facility: CLINIC | Age: 60
End: 2020-03-25
Payer: COMMERCIAL

## 2020-03-25 ENCOUNTER — HOSPITAL ENCOUNTER (OUTPATIENT)
Dept: RADIOLOGY | Facility: HOSPITAL | Age: 60
Discharge: HOME | End: 2020-03-25
Attending: FAMILY MEDICINE
Payer: COMMERCIAL

## 2020-03-25 VITALS
DIASTOLIC BLOOD PRESSURE: 86 MMHG | BODY MASS INDEX: 24.69 KG/M2 | TEMPERATURE: 98 F | HEART RATE: 88 BPM | HEIGHT: 71 IN | SYSTOLIC BLOOD PRESSURE: 136 MMHG | WEIGHT: 176.38 LBS

## 2020-03-25 DIAGNOSIS — S91.309A WOUND OF FOOT: Primary | ICD-10-CM

## 2020-03-25 DIAGNOSIS — E11.9 TYPE 2 DIABETES MELLITUS WITHOUT COMPLICATION, WITHOUT LONG-TERM CURRENT USE OF INSULIN: ICD-10-CM

## 2020-03-25 DIAGNOSIS — Z79.899 ENCOUNTER FOR LONG-TERM (CURRENT) USE OF MEDICATIONS: ICD-10-CM

## 2020-03-25 DIAGNOSIS — S91.309A WOUND OF FOOT: ICD-10-CM

## 2020-03-25 DIAGNOSIS — Z13.6 ENCOUNTER FOR LIPID SCREENING FOR CARDIOVASCULAR DISEASE: ICD-10-CM

## 2020-03-25 DIAGNOSIS — Z13.220 ENCOUNTER FOR LIPID SCREENING FOR CARDIOVASCULAR DISEASE: ICD-10-CM

## 2020-03-25 PROCEDURE — 3075F SYST BP GE 130 - 139MM HG: CPT | Mod: CPTII,S$GLB,, | Performed by: FAMILY MEDICINE

## 2020-03-25 PROCEDURE — 96372 PR INJECTION,THERAP/PROPH/DIAG2ST, IM OR SUBCUT: ICD-10-PCS | Mod: S$GLB,,, | Performed by: FAMILY MEDICINE

## 2020-03-25 PROCEDURE — 99999 PR PBB SHADOW E&M-EST. PATIENT-LVL III: CPT | Mod: PBBFAC,,, | Performed by: FAMILY MEDICINE

## 2020-03-25 PROCEDURE — 73630 XR FOOT COMPLETE 3 VIEW LEFT: ICD-10-PCS | Mod: 26,LT,, | Performed by: RADIOLOGY

## 2020-03-25 PROCEDURE — 90471 TDAP VACCINE GREATER THAN OR EQUAL TO 7YO IM: ICD-10-PCS | Mod: 59,S$GLB,, | Performed by: FAMILY MEDICINE

## 2020-03-25 PROCEDURE — 96372 THER/PROPH/DIAG INJ SC/IM: CPT | Mod: S$GLB,,, | Performed by: FAMILY MEDICINE

## 2020-03-25 PROCEDURE — 3075F PR MOST RECENT SYSTOLIC BLOOD PRESS GE 130-139MM HG: ICD-10-PCS | Mod: CPTII,S$GLB,, | Performed by: FAMILY MEDICINE

## 2020-03-25 PROCEDURE — 3079F PR MOST RECENT DIASTOLIC BLOOD PRESSURE 80-89 MM HG: ICD-10-PCS | Mod: CPTII,S$GLB,, | Performed by: FAMILY MEDICINE

## 2020-03-25 PROCEDURE — 3079F DIAST BP 80-89 MM HG: CPT | Mod: CPTII,S$GLB,, | Performed by: FAMILY MEDICINE

## 2020-03-25 PROCEDURE — 99999 PR PBB SHADOW E&M-EST. PATIENT-LVL III: ICD-10-PCS | Mod: PBBFAC,,, | Performed by: FAMILY MEDICINE

## 2020-03-25 PROCEDURE — 3008F PR BODY MASS INDEX (BMI) DOCUMENTED: ICD-10-PCS | Mod: CPTII,S$GLB,, | Performed by: FAMILY MEDICINE

## 2020-03-25 PROCEDURE — 90715 TDAP VACCINE 7 YRS/> IM: CPT | Mod: S$GLB,,, | Performed by: FAMILY MEDICINE

## 2020-03-25 PROCEDURE — 73630 X-RAY EXAM OF FOOT: CPT | Mod: 26,LT,, | Performed by: RADIOLOGY

## 2020-03-25 PROCEDURE — 73630 X-RAY EXAM OF FOOT: CPT | Mod: TC,PO,LT

## 2020-03-25 PROCEDURE — 3044F HG A1C LEVEL LT 7.0%: CPT | Mod: CPTII,S$GLB,, | Performed by: FAMILY MEDICINE

## 2020-03-25 PROCEDURE — 3044F PR MOST RECENT HEMOGLOBIN A1C LEVEL <7.0%: ICD-10-PCS | Mod: CPTII,S$GLB,, | Performed by: FAMILY MEDICINE

## 2020-03-25 PROCEDURE — 90715 TDAP VACCINE GREATER THAN OR EQUAL TO 7YO IM: ICD-10-PCS | Mod: S$GLB,,, | Performed by: FAMILY MEDICINE

## 2020-03-25 PROCEDURE — 3008F BODY MASS INDEX DOCD: CPT | Mod: CPTII,S$GLB,, | Performed by: FAMILY MEDICINE

## 2020-03-25 PROCEDURE — 99214 PR OFFICE/OUTPT VISIT, EST, LEVL IV, 30-39 MIN: ICD-10-PCS | Mod: 25,S$GLB,, | Performed by: FAMILY MEDICINE

## 2020-03-25 PROCEDURE — 90471 IMMUNIZATION ADMIN: CPT | Mod: 59,S$GLB,, | Performed by: FAMILY MEDICINE

## 2020-03-25 PROCEDURE — 99214 OFFICE O/P EST MOD 30 MIN: CPT | Mod: 25,S$GLB,, | Performed by: FAMILY MEDICINE

## 2020-03-25 RX ORDER — AMOXICILLIN AND CLAVULANATE POTASSIUM 875; 125 MG/1; MG/1
1 TABLET, FILM COATED ORAL 2 TIMES DAILY
Qty: 20 TABLET | Refills: 0 | Status: SHIPPED | OUTPATIENT
Start: 2020-03-25 | End: 2020-04-04

## 2020-03-25 RX ORDER — CEFTRIAXONE 1 G/1
1 INJECTION, POWDER, FOR SOLUTION INTRAMUSCULAR; INTRAVENOUS
Status: COMPLETED | OUTPATIENT
Start: 2020-03-25 | End: 2020-03-25

## 2020-03-25 RX ORDER — LANCETS 33 GAUGE
EACH MISCELLANEOUS
COMMUNITY
Start: 2020-02-10 | End: 2021-11-23

## 2020-03-25 RX ADMIN — CEFTRIAXONE 1 G: 1 INJECTION, POWDER, FOR SOLUTION INTRAMUSCULAR; INTRAVENOUS at 07:03

## 2020-03-25 NOTE — TELEPHONE ENCOUNTER
Called and left message notifying  patient that Dr. Chao was trying to call him with his results of his xray and to THANK him for the food this morning, left message asking patient to call the clinic back.

## 2020-03-25 NOTE — ASSESSMENT & PLAN NOTE
Patient here with a foot wound today.  Diabetes appears to be well controlled.  Will plan to recheck A1c and lipid panel.    Monitor hemoglobin A1c.  Discussed diabetic diet and exercise protocol.  Patient is not currently on any medications and is diet controlled.  Monitor for side effects.  Discussed checking blood glucose.  Discussed symptoms to monitor for and to notify me immediately if persistent or worsening.  Follow up with Ophthalmology/Optometry and Podiatry.

## 2020-03-25 NOTE — ASSESSMENT & PLAN NOTE
Update tetanus shot today.    Start antibiotics.  Intramuscular Rocephin shot 1 g given in office.  Will start Augmentin orally for 10 days.    X-ray of left foot today.    Discussed condition course and signs and symptoms to expect.  Patient advised take anti-inflammatories for pain or fever.  ER precautions.  Call MD or follow-up to clinic if not improving or worsening symptoms.    Avoid Tylenol due to elevated liver enzymes in 2018.  Update labs.    Patient is on chronic opiate therapy from previous PCP.

## 2020-03-25 NOTE — PROGRESS NOTES
Please CALL patient with results and Document verification.   [phone]    Please advise patient that chronic changes are seen.  There are no acute changes and no foreign body noted on x-ray.  Please tell him thank you for dropping off food for us.  We appreciate that.  Tell him to let us know how he is doing in a few days.  And to follow-up if not improved.

## 2020-03-25 NOTE — TELEPHONE ENCOUNTER
----- Message from Geraldo Mott sent at 3/25/2020  9:11 AM CDT -----  Contact: Self- [phone]  .Type:  Patient Returning Call    Who Called:Ned Toledo Jr.  Who Left Message for Patient:unsure   Does the patient know what this is regarding?:unsure   Would the patient rather a call back or a response via [facility]? Call back   Best Call Back Number:.[phone]  Additional Information:     Thank you,   Geraldo Mott

## 2020-03-25 NOTE — PATIENT INSTRUCTIONS
Follow up in about 3 months (around 6/25/2020), or if symptoms worsen or fail to improve, for DM, Med refills, LAB RESULTS, one week follow-up for wound on foot.     If no improvement in symptoms or symptoms worsen, please be advised to call MD, follow-up at clinic and/or go to ER if becomes severe.    Jb Chao M.D.        We Offer TELEHEALTH & Same Day Appointments!   Book your Telehealth appointment with me through my nurse or   Clinic appointments on [facility]!    [address] [place] [address]    Office: [phone]   FAX: [phone]    Check out my Facebook Page and Follow Me at: [email or web address]    Check out my website at [facility] by clicking on: [email or web address]    To Schedule appointments online, go to [facility]: [email or web address]

## 2020-03-25 NOTE — ASSESSMENT & PLAN NOTE
Patient is overdue for labs.  Patient did have mild elevation in liver enzymes.  Patient advised to avoid excessive amounts of Tylenol including his chronic pain medications which contains Tylenol.  Avoid alcohol as well until further labs are drawn.    Also due for diabetes status with A1c.    Lab orders entered for today.    Complete history and physical was completed today.  Complete and thorough medication reconciliation was performed.  Discussed risks and benefits of medications.  Advised patient on orders and health maintenance.  We discussed old records and old labs if available.  Will request any records not available through epic.  Continue current medications listed on your summary sheet.

## 2020-03-26 DIAGNOSIS — E11.9 TYPE 2 DIABETES MELLITUS WITHOUT COMPLICATION, WITHOUT LONG-TERM CURRENT USE OF INSULIN: Primary | ICD-10-CM

## 2020-03-26 DIAGNOSIS — E78.5 HYPERLIPIDEMIA LDL GOAL <130: ICD-10-CM

## 2020-03-26 RX ORDER — ATORVASTATIN CALCIUM 20 MG/1
20 TABLET, FILM COATED ORAL DAILY
Qty: 90 TABLET | Refills: 3 | Status: SHIPPED | OUTPATIENT
Start: 2020-03-26 | End: 2020-09-09

## 2020-03-26 RX ORDER — METFORMIN HYDROCHLORIDE 500 MG/1
500 TABLET, EXTENDED RELEASE ORAL
Qty: 90 TABLET | Refills: 3 | Status: SHIPPED | OUTPATIENT
Start: 2020-03-26 | End: 2020-06-09

## 2020-04-13 ENCOUNTER — PATIENT OUTREACH (OUTPATIENT)
Dept: ADMINISTRATIVE | Facility: HOSPITAL | Age: 60
End: 2020-04-13

## 2020-04-15 ENCOUNTER — TELEPHONE (OUTPATIENT)
Dept: FAMILY MEDICINE | Facility: CLINIC | Age: 60
End: 2020-04-15

## 2020-04-15 NOTE — TELEPHONE ENCOUNTER
----- Message from Myles Wilkins sent at 4/15/2020  1:52 PM CDT -----  Contact: pt   .Type:  Patient Returning Call    Who Called: pt   Who Left Message for Patient: nurse   Does the patient know what this is regarding?: no   Would the patient rather a call back or a response via [facility]? callback   Best Call Back Number: .[phone] (home) [phone] (work)   Additional Information:

## 2020-04-15 NOTE — TELEPHONE ENCOUNTER
"Returned call to patient, patient notified of results and recommendations of Dr. Chao.  Patient verbalized understanding of this and states "I am going to hold off taken metformin and diet more".  "

## 2020-06-05 ENCOUNTER — LAB VISIT (OUTPATIENT)
Dept: LAB | Facility: HOSPITAL | Age: 60
End: 2020-06-05
Attending: FAMILY MEDICINE
Payer: COMMERCIAL

## 2020-06-05 DIAGNOSIS — E11.9 TYPE 2 DIABETES MELLITUS WITHOUT COMPLICATION, WITHOUT LONG-TERM CURRENT USE OF INSULIN: ICD-10-CM

## 2020-06-05 DIAGNOSIS — E78.5 HYPERLIPIDEMIA LDL GOAL <130: ICD-10-CM

## 2020-06-05 LAB
CHOLEST SERPL-MCNC: 165 MG/DL (ref 120–199)
CHOLEST/HDLC SERPL: 3.8 {RATIO} (ref 2–5)
HDLC SERPL-MCNC: 44 MG/DL (ref 40–75)
HDLC SERPL: 26.7 % (ref 20–50)
LDLC SERPL CALC-MCNC: 105.4 MG/DL (ref 63–159)
NONHDLC SERPL-MCNC: 121 MG/DL
TRIGL SERPL-MCNC: 78 MG/DL (ref 30–150)

## 2020-06-05 PROCEDURE — 80061 LIPID PANEL: CPT

## 2020-06-05 PROCEDURE — 83036 HEMOGLOBIN GLYCOSYLATED A1C: CPT

## 2020-06-05 PROCEDURE — 36415 COLL VENOUS BLD VENIPUNCTURE: CPT | Mod: PO

## 2020-06-06 LAB
ESTIMATED AVG GLUCOSE: 137 MG/DL (ref 68–131)
HBA1C MFR BLD HPLC: 6.4 % (ref 4–5.6)

## 2020-06-08 ENCOUNTER — PATIENT OUTREACH (OUTPATIENT)
Dept: ADMINISTRATIVE | Facility: HOSPITAL | Age: 60
End: 2020-06-08

## 2020-06-08 NOTE — PROGRESS NOTES
Health Maintenance Updated.   Immunizations: Abstracted.   Care Everywhere: Abstracted: No new results found.     Health Maintenance Due   Topic    PROSTATE-SPECIFIC ANTIGEN     Pneumococcal Vaccine (Medium Risk) (1 of 1 - PPSV23)    Colonoscopy     Eye Exam

## 2020-06-09 ENCOUNTER — OFFICE VISIT (OUTPATIENT)
Dept: FAMILY MEDICINE | Facility: CLINIC | Age: 60
End: 2020-06-09
Payer: COMMERCIAL

## 2020-06-09 VITALS
HEART RATE: 95 BPM | WEIGHT: 178.38 LBS | HEIGHT: 71 IN | DIASTOLIC BLOOD PRESSURE: 88 MMHG | BODY MASS INDEX: 24.97 KG/M2 | SYSTOLIC BLOOD PRESSURE: 143 MMHG | TEMPERATURE: 98 F

## 2020-06-09 DIAGNOSIS — Z79.899 ENCOUNTER FOR LONG-TERM (CURRENT) USE OF MEDICATIONS: ICD-10-CM

## 2020-06-09 DIAGNOSIS — Z12.5 ENCOUNTER FOR PROSTATE CANCER SCREENING: ICD-10-CM

## 2020-06-09 DIAGNOSIS — M54.50 CHRONIC LEFT-SIDED LOW BACK PAIN WITHOUT SCIATICA: Primary | ICD-10-CM

## 2020-06-09 DIAGNOSIS — Z12.11 COLON CANCER SCREENING: ICD-10-CM

## 2020-06-09 DIAGNOSIS — G89.29 CHRONIC LEFT-SIDED LOW BACK PAIN WITHOUT SCIATICA: Primary | ICD-10-CM

## 2020-06-09 PROBLEM — E11.9 TYPE 2 DIABETES MELLITUS WITHOUT COMPLICATION, WITHOUT LONG-TERM CURRENT USE OF INSULIN: Chronic | Status: RESOLVED | Noted: 2019-06-12 | Resolved: 2020-06-09

## 2020-06-09 PROCEDURE — 99999 PR PBB SHADOW E&M-EST. PATIENT-LVL III: ICD-10-PCS | Mod: PBBFAC,,, | Performed by: FAMILY MEDICINE

## 2020-06-09 PROCEDURE — 3008F PR BODY MASS INDEX (BMI) DOCUMENTED: ICD-10-PCS | Mod: CPTII,S$GLB,, | Performed by: FAMILY MEDICINE

## 2020-06-09 PROCEDURE — 3077F PR MOST RECENT SYSTOLIC BLOOD PRESSURE >= 140 MM HG: ICD-10-PCS | Mod: CPTII,S$GLB,, | Performed by: FAMILY MEDICINE

## 2020-06-09 PROCEDURE — 99213 OFFICE O/P EST LOW 20 MIN: CPT | Mod: S$GLB,,, | Performed by: FAMILY MEDICINE

## 2020-06-09 PROCEDURE — 99213 PR OFFICE/OUTPT VISIT, EST, LEVL III, 20-29 MIN: ICD-10-PCS | Mod: S$GLB,,, | Performed by: FAMILY MEDICINE

## 2020-06-09 PROCEDURE — 99999 PR PBB SHADOW E&M-EST. PATIENT-LVL III: CPT | Mod: PBBFAC,,, | Performed by: FAMILY MEDICINE

## 2020-06-09 PROCEDURE — 3077F SYST BP >= 140 MM HG: CPT | Mod: CPTII,S$GLB,, | Performed by: FAMILY MEDICINE

## 2020-06-09 PROCEDURE — 3008F BODY MASS INDEX DOCD: CPT | Mod: CPTII,S$GLB,, | Performed by: FAMILY MEDICINE

## 2020-06-09 PROCEDURE — 3079F DIAST BP 80-89 MM HG: CPT | Mod: CPTII,S$GLB,, | Performed by: FAMILY MEDICINE

## 2020-06-09 PROCEDURE — 3079F PR MOST RECENT DIASTOLIC BLOOD PRESSURE 80-89 MM HG: ICD-10-PCS | Mod: CPTII,S$GLB,, | Performed by: FAMILY MEDICINE

## 2020-06-09 RX ORDER — HYDROCODONE BITARTRATE AND ACETAMINOPHEN 7.5; 325 MG/1; MG/1
1 TABLET ORAL EVERY 6 HOURS PRN
Qty: 120 TABLET | Refills: 0 | Status: SHIPPED | OUTPATIENT
Start: 2020-08-10 | End: 2020-09-09 | Stop reason: SDUPTHER

## 2020-06-09 RX ORDER — HYDROCODONE BITARTRATE AND ACETAMINOPHEN 7.5; 325 MG/1; MG/1
1 TABLET ORAL 4 TIMES DAILY
Qty: 120 TABLET | Refills: 0 | Status: SHIPPED | OUTPATIENT
Start: 2020-06-09 | End: 2020-07-09

## 2020-06-09 RX ORDER — CARISOPRODOL 350 MG/1
350 TABLET ORAL 4 TIMES DAILY PRN
Qty: 120 TABLET | Refills: 0 | Status: SHIPPED | OUTPATIENT
Start: 2020-06-09 | End: 2020-07-13 | Stop reason: SDUPTHER

## 2020-06-09 RX ORDER — HYDROCODONE BITARTRATE AND ACETAMINOPHEN 7.5; 325 MG/1; MG/1
1 TABLET ORAL EVERY 6 HOURS PRN
Qty: 120 TABLET | Refills: 0 | Status: SHIPPED | OUTPATIENT
Start: 2020-07-10 | End: 2020-08-09

## 2020-06-09 NOTE — PROGRESS NOTES
Please CALL patient with results and Document verification.   [phone]    A1c and cholesterol have improved.  Keep follow-up appointment to discuss further detail.    Repeat A1c and lipid panel in six months.

## 2020-06-09 NOTE — PROGRESS NOTES
======================================================  GOAL of current visit: Est CareRefill Pain Meds  ======================================================  PLAN:      Problem List Items Addressed This Visit     Left-sided low back pain without sciatica - Primary (Chronic)     Refill pain medication.  Patient is being monitored by .The patient was checked in the [facility] Board of Pharmacy's  Prescription Monitoring Program. There appears to be no incongruities with the patient's verbalized history.            Relevant Medications    carisoprodoL (SOMA) 350 MG tablet    HYDROcodone-acetaminophen (NORCO) 7.5-325 mg per tablet (Start on 8/10/2020)    HYDROcodone-acetaminophen (NORCO) 7.5-325 mg per tablet (Start on 7/10/2020)    HYDROcodone-acetaminophen (NORCO) 7.5-325 mg per tablet    Encounter for long-term (current) use of medications (Chronic)     Labs are stable.  Refill medication.         Relevant Medications    carisoprodoL (SOMA) 350 MG tablet    HYDROcodone-acetaminophen (NORCO) 7.5-325 mg per tablet (Start on 8/10/2020)    HYDROcodone-acetaminophen (NORCO) 7.5-325 mg per tablet (Start on 7/10/2020)    HYDROcodone-acetaminophen (NORCO) 7.5-325 mg per tablet      Other Visit Diagnoses     Colon cancer screening        Relevant Orders    Cologuard Screening (Multitarget Stool DNA)    Encounter for prostate cancer screening        Relevant Orders    PSA, Screening        Future Appointments     Date Provider Specialty Appt Notes    9/4/2020  Lab     9/9/2020 Jb Chao MD Family Medicine 3 mo f/u           Medication List with Changes/Refills   Current Medications    ATORVASTATIN (LIPITOR) 20 MG TABLET    Take 1 tablet (20 mg total) by mouth once daily.    BLOOD SUGAR DIAGNOSTIC STRP    To check BG 2 times daily, to use with insurance preferred meter    BLOOD-GLUCOSE METER KIT    To check BG 2 times daily, to use with insurance preferred meter    FEXOFENADINE (ALLEGRA) 180 MG TABLET     Take 1 tablet (180 mg total) by mouth once daily.    LANCETS MISC    To check BG 2 times daily, to use with insurance preferred meter    NAPROXEN (NAPROSYN) 500 MG TABLET    Take 1 tablet (500 mg total) by mouth 2 (two) times daily with meals.    ONDANSETRON (ZOFRAN-ODT) 8 MG TBDL    Take 1 tablet (8 mg total) by mouth every 6 (six) hours as needed.    PROMETHAZINE (PHENERGAN) 25 MG TABLET        TRUEPLUS LANCETS 33 GAUGE MISC    CHECK BLOOD SUGAR BID   Changed and/or Refilled Medications    Modified Medication Previous Medication    CARISOPRODOL (SOMA) 350 MG TABLET carisoprodoL (SOMA) 350 MG tablet       Take 1 tablet (350 mg total) by mouth 4 (four) times daily as needed for Muscle spasms.    Take 1 tablet (350 mg total) by mouth 4 (four) times daily as needed for Muscle spasms.    HYDROCODONE-ACETAMINOPHEN (NORCO) 7.5-325 MG PER TABLET HYDROcodone-acetaminophen (NORCO) 7.5-325 mg per tablet       Take 1 tablet by mouth every 6 (six) hours as needed for Pain.    Take 1 tablet by mouth every 6 (six) hours as needed for Pain.    HYDROCODONE-ACETAMINOPHEN (NORCO) 7.5-325 MG PER TABLET HYDROcodone-acetaminophen (NORCO) 7.5-325 mg per tablet       Take 1 tablet by mouth every 6 (six) hours as needed for Pain.    Take 1 tablet by mouth every 6 (six) hours as needed for Pain.    HYDROCODONE-ACETAMINOPHEN (NORCO) 7.5-325 MG PER TABLET HYDROcodone-acetaminophen (NORCO) 7.5-325 mg per tablet       Take 1 tablet by mouth 4 (four) times daily.    Take 1 tablet by mouth 4 (four) times daily.   Discontinued Medications    METFORMIN (GLUCOPHAGE-XR) 500 MG XR 24HR TABLET    Take 1 tablet (500 mg total) by mouth daily with breakfast.       Jb Chao M.D.     ==========================================================================  Subjective:      Patient ID: Ned Toledo Jr. is a 59 y.o. male.  has a past medical history of Diabetes mellitus, type 2, Fatty liver, Hypertension, Penetrating foot wound, and Sciatica.      Chief Complaint: Establish Care and Medication Refill      Problem List Items Addressed This Visit     Left-sided low back pain without sciatica - Primary (Chronic)    Overview     Chronic.  Stable on current pain medication regimen.  Patient was being managed by previous PCP.  Prescriptions are being taken over by me.  Patient reports that he has had MRI and injections in the past which did not help.  Patient had a lifting injury many years ago.    The patient was checked in the [place] Board of Pharmacy's  Prescription Monitoring Program. There appears to be no incongruities with the patient's verbalized history.            Current Assessment & Plan     Refill pain medication.  Patient is being monitored by .The patient was checked in the [place] Board of Pharmacy's  Prescription Monitoring Program. There appears to be no incongruities with the patient's verbalized history.            Encounter for long-term (current) use of medications (Chronic)    Overview     CHRONIC. Stable. Compliant with medications for managed conditions. See medication list. No SE reported.   Routine lab analysis is being monitored. Refills were addressed.  =======================================================  JUNE 2020:  Labs are stable.  Refill medication.  CHRONIC. Stable. Compliant with medications for managed conditions. See medication list. No SE reported.   Routine lab analysis is being monitored. Refills were addressed.  Lab Results   Component Value Date    WBC 7.78 03/25/2020    HGB 14.9 03/25/2020    HCT 48.5 03/25/2020     (H) 03/25/2020     03/25/2020         Chemistry        Component Value Date/Time     03/25/2020 0756    K 4.3 03/25/2020 0756     03/25/2020 0756    CO2 28 03/25/2020 0756    BUN 16 03/25/2020 0756    CREATININE 1.1 03/25/2020 0756     (H) 03/25/2020 0756        Component Value Date/Time    CALCIUM 9.3 03/25/2020 0756    ALKPHOS 56 03/25/2020 0756     AST 22 03/25/2020 0756    ALT 19 03/25/2020 0756    BILITOT 0.8 03/25/2020 0756    ESTGFRAFRICA >60.0 03/25/2020 0756    EGFRNONAA >60.0 03/25/2020 0756          Lab Results   Component Value Date    TSH 1.565 03/25/2020       ======================================================         Current Assessment & Plan     Labs are stable.  Refill medication.           Other Visit Diagnoses     Colon cancer screening        Encounter for prostate cancer screening               Past Medical History:  Past Medical History:   Diagnosis Date    Diabetes mellitus, type 2     Fatty liver     Hypertension     Penetrating foot wound     Sciatica      Past Surgical History:   Procedure Laterality Date    HERNIA REPAIR       Review of patient's allergies indicates:  No Known Allergies  Medication List with Changes/Refills   Current Medications    ATORVASTATIN (LIPITOR) 20 MG TABLET    Take 1 tablet (20 mg total) by mouth once daily.    BLOOD SUGAR DIAGNOSTIC STRP    To check BG 2 times daily, to use with insurance preferred meter    BLOOD-GLUCOSE METER KIT    To check BG 2 times daily, to use with insurance preferred meter    FEXOFENADINE (ALLEGRA) 180 MG TABLET    Take 1 tablet (180 mg total) by mouth once daily.    LANCETS MISC    To check BG 2 times daily, to use with insurance preferred meter    NAPROXEN (NAPROSYN) 500 MG TABLET    Take 1 tablet (500 mg total) by mouth 2 (two) times daily with meals.    ONDANSETRON (ZOFRAN-ODT) 8 MG TBDL    Take 1 tablet (8 mg total) by mouth every 6 (six) hours as needed.    PROMETHAZINE (PHENERGAN) 25 MG TABLET        TRUEPLUS LANCETS 33 GAUGE MISC    CHECK BLOOD SUGAR BID   Changed and/or Refilled Medications    Modified Medication Previous Medication    CARISOPRODOL (SOMA) 350 MG TABLET carisoprodoL (SOMA) 350 MG tablet       Take 1 tablet (350 mg total) by mouth 4 (four) times daily as needed for Muscle spasms.    Take 1 tablet (350 mg total) by mouth 4 (four) times daily as needed  "for Muscle spasms.    HYDROCODONE-ACETAMINOPHEN (NORCO) 7.5-325 MG PER TABLET HYDROcodone-acetaminophen (NORCO) 7.5-325 mg per tablet       Take 1 tablet by mouth every 6 (six) hours as needed for Pain.    Take 1 tablet by mouth every 6 (six) hours as needed for Pain.    HYDROCODONE-ACETAMINOPHEN (NORCO) 7.5-325 MG PER TABLET HYDROcodone-acetaminophen (NORCO) 7.5-325 mg per tablet       Take 1 tablet by mouth every 6 (six) hours as needed for Pain.    Take 1 tablet by mouth every 6 (six) hours as needed for Pain.    HYDROCODONE-ACETAMINOPHEN (NORCO) 7.5-325 MG PER TABLET HYDROcodone-acetaminophen (NORCO) 7.5-325 mg per tablet       Take 1 tablet by mouth 4 (four) times daily.    Take 1 tablet by mouth 4 (four) times daily.   Discontinued Medications    METFORMIN (GLUCOPHAGE-XR) 500 MG XR 24HR TABLET    Take 1 tablet (500 mg total) by mouth daily with breakfast.      Social History     Tobacco Use    Smoking status: Never Smoker    Smokeless tobacco: Never Used   Substance Use Topics    Alcohol use: No      History reviewed. No pertinent family history.    I have reviewed the complete PMH, social history, surgical history, allergies and medications.  As well as family history.    Review of Systems see HPI otherwise negative  Objective:   BP (!) 143/88   Pulse 95   Temp 98 °F (36.7 °C) (Oral)   Ht 5' 11" (1.803 m)   Wt 80.9 kg (178 lb 6.4 oz)   BMI 24.88 kg/m²   Physical Exam   Constitutional: He is oriented to person, place, and time. He appears well-developed and well-nourished. No distress.   HENT:   Head: Normocephalic and atraumatic.   Eyes: Pupils are equal, round, and reactive to light. EOM are normal.   Neck: Normal range of motion. Neck supple.   Cardiovascular: Normal rate, regular rhythm, normal heart sounds and intact distal pulses.   No murmur heard.  Pulmonary/Chest: Effort normal and breath sounds normal. No respiratory distress. He has no wheezes.   Musculoskeletal: Normal range of motion. He " exhibits tenderness. He exhibits no edema.   Neurological: He is alert and oriented to person, place, and time. No cranial nerve deficit.   Skin: Skin is warm and dry. Capillary refill takes less than 2 seconds.   Psychiatric: He has a normal mood and affect. His behavior is normal.   Nursing note and vitals reviewed.      Assessment:     1. Chronic left-sided low back pain without sciatica    2. Encounter for long-term (current) use of medications    3. Colon cancer screening    4. Encounter for prostate cancer screening      MDM:   Moderate complexity.  Moderate risk.  I have Reviewed and summarized old records.  I have performed thorough medication reconciliation today and discussed risk and benefits of each medication.  I have reviewed labs and discussed with patient.  All questions were answered.  I am requesting old records and will review them once they are available.  Ophthalmology    I have signed for the following orders AND/OR meds.  Orders Placed This Encounter   Procedures    Cologuard Screening (Multitarget Stool DNA)     Standing Status:   Future     Number of Occurrences:   1     Standing Expiration Date:   8/8/2021    PSA, Screening     Standing Status:   Future     Standing Expiration Date:   8/8/2021     Medications Ordered This Encounter   Medications    carisoprodoL (SOMA) 350 MG tablet     Sig: Take 1 tablet (350 mg total) by mouth 4 (four) times daily as needed for Muscle spasms.     Dispense:  120 tablet     Refill:  0    HYDROcodone-acetaminophen (NORCO) 7.5-325 mg per tablet     Sig: Take 1 tablet by mouth every 6 (six) hours as needed for Pain.     Dispense:  120 tablet     Refill:  0     Quantity prescribed more than 7 day supply? Yes, quantity medically necessary    HYDROcodone-acetaminophen (NORCO) 7.5-325 mg per tablet     Sig: Take 1 tablet by mouth every 6 (six) hours as needed for Pain.     Dispense:  120 tablet     Refill:  0     Quantity prescribed more than 7 day supply?  Yes, quantity medically necessary    HYDROcodone-acetaminophen (NORCO) 7.5-325 mg per tablet     Sig: Take 1 tablet by mouth 4 (four) times daily.     Dispense:  120 tablet     Refill:  0     Quantity prescribed more than 7 day supply? Yes, quantity medically necessary        Follow up in about 3 months (around 9/9/2020), or if symptoms worsen or fail to improve, for Med refills, LAB RESULTS.    If no improvement in symptoms or symptoms worsen, advised to call/follow-up at clinic or go to ER. Patient voiced understanding and all questions/concerns were addressed.     DISCLAIMER: This note was compiled by using a speech recognition dictation system and therefore please be aware that typographical / speech recognition errors can and do occur.  Please contact me if you see any errors specifically.    Jb Chao M.D.       Office: [phone] [address] [place] [address]  FAX: [phone]

## 2020-06-09 NOTE — PATIENT INSTRUCTIONS
Follow up in about 3 months (around 9/9/2020), or if symptoms worsen or fail to improve, for Med refills, LAB RESULTS.     If no improvement in symptoms or symptoms worsen, please be advised to call MD, follow-up at clinic and/or go to ER if becomes severe.    Jb Chao M.D.        We Offer TELEHEALTH & Same Day Appointments!   Book your Telehealth appointment with me through my nurse or   Clinic appointments on [employer]!    [address] [place] [address]    Office: [phone]   FAX: [phone]    Check out my Facebook Page and Follow Me at: [email or web address]    Check out my website at [employer] by clicking on: [email or web address]    To Schedule appointments online, go to [employer]: [email or web address]

## 2020-06-09 NOTE — ASSESSMENT & PLAN NOTE
Refill pain medication.  Patient is being monitored by .The patient was checked in the [facility] Board of Pharmacy's  Prescription Monitoring Program. There appears to be no incongruities with the patient's verbalized history.

## 2020-07-06 LAB — FECAL OCCULT BLOOD SCREEN, POC: NEGATIVE

## 2020-07-13 DIAGNOSIS — G89.29 CHRONIC LEFT-SIDED LOW BACK PAIN WITHOUT SCIATICA: ICD-10-CM

## 2020-07-13 DIAGNOSIS — Z79.899 ENCOUNTER FOR LONG-TERM (CURRENT) USE OF MEDICATIONS: ICD-10-CM

## 2020-07-13 DIAGNOSIS — M54.50 CHRONIC LEFT-SIDED LOW BACK PAIN WITHOUT SCIATICA: ICD-10-CM

## 2020-07-13 RX ORDER — CARISOPRODOL 350 MG/1
350 TABLET ORAL 4 TIMES DAILY PRN
Qty: 120 TABLET | Refills: 0 | Status: SHIPPED | OUTPATIENT
Start: 2020-07-13 | End: 2020-08-10 | Stop reason: SDUPTHER

## 2020-07-13 NOTE — TELEPHONE ENCOUNTER
----- Message from Leann Knowles sent at 7/13/2020  8:36 AM CDT -----  Regarding: refill  Contact: pt  Type:  RX Refill Request    Who Called: pt  Refill or New Rx:refill  RX Name and Strength:carisoprodoL (SOMA) 350 MG tablet  How is the patient currently taking it? (ex. 1XDay):4x  Is this a 30 day or 90 day RX:90  Preferred Pharmacy with phone number:  [id number] - [place] [address] - [facility] [address] [facility] [address]  [facility] [address]  [address] [place] [address]  Phone: [phone] Fax: [phone]  Local or Mail Order:local  Ordering Provider:zach  Would the patient rather a call back or a response via [facility]? Call back  Best Call Back Number:[phone]  Additional Information: caller is out of the medication

## 2020-08-10 DIAGNOSIS — Z79.899 ENCOUNTER FOR LONG-TERM (CURRENT) USE OF MEDICATIONS: ICD-10-CM

## 2020-08-10 DIAGNOSIS — M54.50 CHRONIC LEFT-SIDED LOW BACK PAIN WITHOUT SCIATICA: ICD-10-CM

## 2020-08-10 DIAGNOSIS — G89.29 CHRONIC LEFT-SIDED LOW BACK PAIN WITHOUT SCIATICA: ICD-10-CM

## 2020-08-10 RX ORDER — CARISOPRODOL 350 MG/1
350 TABLET ORAL 4 TIMES DAILY PRN
Qty: 120 TABLET | Refills: 0 | Status: SHIPPED | OUTPATIENT
Start: 2020-08-10 | End: 2020-09-09 | Stop reason: SDUPTHER

## 2020-08-10 NOTE — TELEPHONE ENCOUNTER
----- Message from Paloma Parekh sent at 8/10/2020  7:14 AM CDT -----  Contact: Ned  Type:  RX Refill Request    Who Called: Ned  Refill or New Rx: refill  RX Name and Strength: Carisoprodol 350 mg   How is the patient currently taking it? (ex. 1XDay):   Is this a 30 day or 90 day RX: 30  Preferred Pharmacy with phone number:   [facility] #[id number] - [place] - [facility] [address] [facility] [address]  [facility] [address] [place] [address]  Phone: [phone] Fax: [phone]  Local or Mail Order: Local  Ordering Provider: Dr. Chao  Would the patient rather a call back or a response via [facility]? Call back  Best Call Back Number: Please call him at [phone]  Additional Information: n/a

## 2020-08-10 NOTE — TELEPHONE ENCOUNTER
The patient was checked in the [facility] Board of Pharmacy's  Prescription Monitoring Program. There appears to be no incongruities with the patient's verbalized history.

## 2020-09-04 ENCOUNTER — LAB VISIT (OUTPATIENT)
Dept: LAB | Facility: HOSPITAL | Age: 60
End: 2020-09-04
Attending: FAMILY MEDICINE
Payer: COMMERCIAL

## 2020-09-04 DIAGNOSIS — E11.9 TYPE 2 DIABETES MELLITUS WITHOUT COMPLICATION, WITHOUT LONG-TERM CURRENT USE OF INSULIN: ICD-10-CM

## 2020-09-04 DIAGNOSIS — Z79.899 ENCOUNTER FOR LONG-TERM (CURRENT) USE OF MEDICATIONS: ICD-10-CM

## 2020-09-04 DIAGNOSIS — Z12.5 ENCOUNTER FOR PROSTATE CANCER SCREENING: ICD-10-CM

## 2020-09-04 DIAGNOSIS — Z13.220 ENCOUNTER FOR LIPID SCREENING FOR CARDIOVASCULAR DISEASE: ICD-10-CM

## 2020-09-04 DIAGNOSIS — Z13.6 ENCOUNTER FOR LIPID SCREENING FOR CARDIOVASCULAR DISEASE: ICD-10-CM

## 2020-09-04 LAB
ALBUMIN SERPL BCP-MCNC: 4.1 G/DL (ref 3.5–5.2)
ALP SERPL-CCNC: 46 U/L (ref 55–135)
ALT SERPL W/O P-5'-P-CCNC: 16 U/L (ref 10–44)
ANION GAP SERPL CALC-SCNC: 7 MMOL/L (ref 8–16)
AST SERPL-CCNC: 18 U/L (ref 10–40)
BILIRUB SERPL-MCNC: 0.9 MG/DL (ref 0.1–1)
BUN SERPL-MCNC: 17 MG/DL (ref 6–20)
CALCIUM SERPL-MCNC: 9.2 MG/DL (ref 8.7–10.5)
CHLORIDE SERPL-SCNC: 106 MMOL/L (ref 95–110)
CHOLEST SERPL-MCNC: 175 MG/DL (ref 120–199)
CHOLEST/HDLC SERPL: 4.3 {RATIO} (ref 2–5)
CO2 SERPL-SCNC: 24 MMOL/L (ref 23–29)
COMPLEXED PSA SERPL-MCNC: 1.2 NG/ML (ref 0–4)
CREAT SERPL-MCNC: 1 MG/DL (ref 0.5–1.4)
EST. GFR  (AFRICAN AMERICAN): >60 ML/MIN/1.73 M^2
EST. GFR  (NON AFRICAN AMERICAN): >60 ML/MIN/1.73 M^2
GLUCOSE SERPL-MCNC: 134 MG/DL (ref 70–110)
HDLC SERPL-MCNC: 41 MG/DL (ref 40–75)
HDLC SERPL: 23.4 % (ref 20–50)
LDLC SERPL CALC-MCNC: 116.8 MG/DL (ref 63–159)
NONHDLC SERPL-MCNC: 134 MG/DL
POTASSIUM SERPL-SCNC: 4.3 MMOL/L (ref 3.5–5.1)
PROT SERPL-MCNC: 7.2 G/DL (ref 6–8.4)
SODIUM SERPL-SCNC: 137 MMOL/L (ref 136–145)
TRIGL SERPL-MCNC: 86 MG/DL (ref 30–150)
TSH SERPL DL<=0.005 MIU/L-ACNC: 1.24 UIU/ML (ref 0.4–4)

## 2020-09-04 PROCEDURE — 36415 COLL VENOUS BLD VENIPUNCTURE: CPT | Mod: PO

## 2020-09-04 PROCEDURE — 80053 COMPREHEN METABOLIC PANEL: CPT

## 2020-09-04 PROCEDURE — 83036 HEMOGLOBIN GLYCOSYLATED A1C: CPT

## 2020-09-04 PROCEDURE — 84153 ASSAY OF PSA TOTAL: CPT

## 2020-09-04 PROCEDURE — 84443 ASSAY THYROID STIM HORMONE: CPT

## 2020-09-04 PROCEDURE — 80061 LIPID PANEL: CPT

## 2020-09-05 LAB
ESTIMATED AVG GLUCOSE: 148 MG/DL (ref 68–131)
HBA1C MFR BLD HPLC: 6.8 % (ref 4–5.6)

## 2020-09-07 NOTE — PROGRESS NOTES
Please set up urine microalbumin at upcoming office visit.  Will discuss with patient in detail these lab abnormalities.  Repeat annual labs in one year.

## 2020-09-08 ENCOUNTER — TELEPHONE (OUTPATIENT)
Dept: FAMILY MEDICINE | Facility: CLINIC | Age: 60
End: 2020-09-08

## 2020-09-08 DIAGNOSIS — R80.9 MICROALBUMINURIA: Primary | ICD-10-CM

## 2020-09-08 NOTE — TELEPHONE ENCOUNTER
----- Message from Jb Chao MD sent at 9/7/2020  3:56 PM CDT -----  Please set up urine microalbumin at upcoming office visit.  Will discuss with patient in detail these lab abnormalities.  Repeat annual labs in one year.

## 2020-09-09 ENCOUNTER — OFFICE VISIT (OUTPATIENT)
Dept: FAMILY MEDICINE | Facility: CLINIC | Age: 60
End: 2020-09-09
Payer: COMMERCIAL

## 2020-09-09 ENCOUNTER — TELEPHONE (OUTPATIENT)
Dept: FAMILY MEDICINE | Facility: CLINIC | Age: 60
End: 2020-09-09

## 2020-09-09 VITALS
WEIGHT: 179 LBS | DIASTOLIC BLOOD PRESSURE: 80 MMHG | HEART RATE: 66 BPM | TEMPERATURE: 98 F | SYSTOLIC BLOOD PRESSURE: 138 MMHG | HEIGHT: 72 IN | BODY MASS INDEX: 24.24 KG/M2

## 2020-09-09 DIAGNOSIS — M46.1 SACROILIITIS: ICD-10-CM

## 2020-09-09 DIAGNOSIS — G72.0 STATIN MYOPATHY: ICD-10-CM

## 2020-09-09 DIAGNOSIS — E78.5 HYPERLIPIDEMIA LDL GOAL <130: ICD-10-CM

## 2020-09-09 DIAGNOSIS — T46.6X5A STATIN MYOPATHY: ICD-10-CM

## 2020-09-09 DIAGNOSIS — Z00.00 WELL ADULT EXAM: Primary | ICD-10-CM

## 2020-09-09 DIAGNOSIS — F11.20 COMBINED OPIOID WITH NON-OPIOID DRUG DEPENDENCE, CONTINUOUS: ICD-10-CM

## 2020-09-09 DIAGNOSIS — E11.65 TYPE 2 DIABETES MELLITUS WITH HYPERGLYCEMIA, WITHOUT LONG-TERM CURRENT USE OF INSULIN: ICD-10-CM

## 2020-09-09 DIAGNOSIS — Z79.899 ENCOUNTER FOR LONG-TERM (CURRENT) USE OF MEDICATIONS: ICD-10-CM

## 2020-09-09 DIAGNOSIS — G89.29 CHRONIC LEFT-SIDED LOW BACK PAIN WITHOUT SCIATICA: ICD-10-CM

## 2020-09-09 DIAGNOSIS — F19.20 COMBINED OPIOID WITH NON-OPIOID DRUG DEPENDENCE, CONTINUOUS: ICD-10-CM

## 2020-09-09 DIAGNOSIS — E78.5 HYPERLIPIDEMIA ASSOCIATED WITH TYPE 2 DIABETES MELLITUS: ICD-10-CM

## 2020-09-09 DIAGNOSIS — E11.69 HYPERLIPIDEMIA ASSOCIATED WITH TYPE 2 DIABETES MELLITUS: ICD-10-CM

## 2020-09-09 DIAGNOSIS — M54.50 CHRONIC LEFT-SIDED LOW BACK PAIN WITHOUT SCIATICA: ICD-10-CM

## 2020-09-09 PROCEDURE — 99396 PREV VISIT EST AGE 40-64: CPT | Mod: S$GLB,,, | Performed by: FAMILY MEDICINE

## 2020-09-09 PROCEDURE — 3008F BODY MASS INDEX DOCD: CPT | Mod: CPTII,S$GLB,, | Performed by: FAMILY MEDICINE

## 2020-09-09 PROCEDURE — 3075F SYST BP GE 130 - 139MM HG: CPT | Mod: CPTII,S$GLB,, | Performed by: FAMILY MEDICINE

## 2020-09-09 PROCEDURE — 3044F PR MOST RECENT HEMOGLOBIN A1C LEVEL <7.0%: ICD-10-PCS | Mod: CPTII,S$GLB,, | Performed by: FAMILY MEDICINE

## 2020-09-09 PROCEDURE — 3008F PR BODY MASS INDEX (BMI) DOCUMENTED: ICD-10-PCS | Mod: CPTII,S$GLB,, | Performed by: FAMILY MEDICINE

## 2020-09-09 PROCEDURE — 3079F PR MOST RECENT DIASTOLIC BLOOD PRESSURE 80-89 MM HG: ICD-10-PCS | Mod: CPTII,S$GLB,, | Performed by: FAMILY MEDICINE

## 2020-09-09 PROCEDURE — 3079F DIAST BP 80-89 MM HG: CPT | Mod: CPTII,S$GLB,, | Performed by: FAMILY MEDICINE

## 2020-09-09 PROCEDURE — 99999 PR PBB SHADOW E&M-EST. PATIENT-LVL IV: ICD-10-PCS | Mod: PBBFAC,,, | Performed by: FAMILY MEDICINE

## 2020-09-09 PROCEDURE — 3044F HG A1C LEVEL LT 7.0%: CPT | Mod: CPTII,S$GLB,, | Performed by: FAMILY MEDICINE

## 2020-09-09 PROCEDURE — 3075F PR MOST RECENT SYSTOLIC BLOOD PRESS GE 130-139MM HG: ICD-10-PCS | Mod: CPTII,S$GLB,, | Performed by: FAMILY MEDICINE

## 2020-09-09 PROCEDURE — 99213 PR OFFICE/OUTPT VISIT, EST, LEVL III, 20-29 MIN: ICD-10-PCS | Mod: 25,S$GLB,, | Performed by: FAMILY MEDICINE

## 2020-09-09 PROCEDURE — 99213 OFFICE O/P EST LOW 20 MIN: CPT | Mod: 25,S$GLB,, | Performed by: FAMILY MEDICINE

## 2020-09-09 PROCEDURE — 99999 PR PBB SHADOW E&M-EST. PATIENT-LVL IV: CPT | Mod: PBBFAC,,, | Performed by: FAMILY MEDICINE

## 2020-09-09 PROCEDURE — 99396 PR PREVENTIVE VISIT,EST,40-64: ICD-10-PCS | Mod: S$GLB,,, | Performed by: FAMILY MEDICINE

## 2020-09-09 RX ORDER — CARISOPRODOL 350 MG/1
350 TABLET ORAL 4 TIMES DAILY PRN
Qty: 120 TABLET | Refills: 0 | Status: SHIPPED | OUTPATIENT
Start: 2020-11-05 | End: 2020-12-10 | Stop reason: SDUPTHER

## 2020-09-09 RX ORDER — ONDANSETRON 8 MG/1
8 TABLET, ORALLY DISINTEGRATING ORAL EVERY 6 HOURS PRN
Qty: 20 TABLET | Refills: 1 | Status: SHIPPED | OUTPATIENT
Start: 2020-09-09 | End: 2023-03-02 | Stop reason: SDUPTHER

## 2020-09-09 RX ORDER — CARISOPRODOL 350 MG/1
350 TABLET ORAL 4 TIMES DAILY PRN
Qty: 120 TABLET | Refills: 0 | Status: SHIPPED | OUTPATIENT
Start: 2020-10-07 | End: 2020-12-10 | Stop reason: SDUPTHER

## 2020-09-09 RX ORDER — HYDROCODONE BITARTRATE AND ACETAMINOPHEN 7.5; 325 MG/1; MG/1
1 TABLET ORAL EVERY 6 HOURS PRN
Qty: 120 TABLET | Refills: 0 | Status: SHIPPED | OUTPATIENT
Start: 2020-11-05 | End: 2020-12-10 | Stop reason: SDUPTHER

## 2020-09-09 RX ORDER — CARISOPRODOL 350 MG/1
350 TABLET ORAL 4 TIMES DAILY PRN
Qty: 120 TABLET | Refills: 0 | Status: SHIPPED | OUTPATIENT
Start: 2020-09-09 | End: 2020-12-10 | Stop reason: SDUPTHER

## 2020-09-09 RX ORDER — NAPROXEN 500 MG/1
500 TABLET ORAL 2 TIMES DAILY WITH MEALS
Qty: 60 TABLET | Refills: 11 | OUTPATIENT
Start: 2020-09-09 | End: 2023-09-26 | Stop reason: SDUPTHER

## 2020-09-09 RX ORDER — ATORVASTATIN CALCIUM 20 MG/1
20 TABLET, FILM COATED ORAL DAILY
Qty: 90 TABLET | Refills: 3 | Status: SHIPPED | OUTPATIENT
Start: 2020-09-09 | End: 2020-09-09 | Stop reason: SINTOL

## 2020-09-09 RX ORDER — MINERAL OIL
180 ENEMA (ML) RECTAL DAILY
Qty: 90 TABLET | Refills: 3 | Status: SHIPPED | OUTPATIENT
Start: 2020-09-09 | End: 2023-01-17 | Stop reason: SDUPTHER

## 2020-09-09 RX ORDER — HYDROCODONE BITARTRATE AND ACETAMINOPHEN 7.5; 325 MG/1; MG/1
1 TABLET ORAL EVERY 6 HOURS PRN
Qty: 120 TABLET | Refills: 0 | Status: SHIPPED | OUTPATIENT
Start: 2020-09-09 | End: 2020-12-10 | Stop reason: SDUPTHER

## 2020-09-09 RX ORDER — PROMETHAZINE HYDROCHLORIDE 25 MG/1
25 TABLET ORAL EVERY 6 HOURS PRN
Qty: 30 TABLET | Refills: 6 | Status: SHIPPED | OUTPATIENT
Start: 2020-09-09 | End: 2022-03-03 | Stop reason: SDUPTHER

## 2020-09-09 RX ORDER — HYDROCODONE BITARTRATE AND ACETAMINOPHEN 7.5; 325 MG/1; MG/1
1 TABLET ORAL EVERY 6 HOURS PRN
Qty: 120 TABLET | Refills: 0 | Status: SHIPPED | OUTPATIENT
Start: 2020-10-07 | End: 2020-12-10 | Stop reason: SDUPTHER

## 2020-09-09 NOTE — PATIENT INSTRUCTIONS
Follow up in about 3 months (around 12/9/2020), or if symptoms worsen or fail to improve, for Med refills.     If no improvement in symptoms or symptoms worsen, please be advised to call MD, follow-up at clinic and/or go to ER if becomes severe.    Jb Chao M.D.        We Offer TELEHEALTH & Same Day Appointments!   Book your Telehealth appointment with me through my nurse or   Clinic appointments on [email or web address]!    [address] [place] [address]    Office: [phone]   FAX: [phone]    Check out my Facebook Page and Follow Me at: [email or web address]    Check out my website at [email or web address] by clicking on: [email or web address]    To Schedule appointments online, go to [email or web address]: [email or web address]

## 2020-09-09 NOTE — ASSESSMENT & PLAN NOTE
Diet controlled.  Monitor hemoglobin A1c.  Discussed diabetic diet and exercise protocol.  Patient defers medications.  Monitor for side effects.  Discussed checking blood glucose.  Discussed symptoms to monitor for and to notify me immediately if persistent or worsening.  Follow up with Ophthalmology/Optometry and Podiatry.

## 2020-09-09 NOTE — ASSESSMENT & PLAN NOTE
Reviewed labs.Complete history and physical was completed today.  Complete and thorough medication reconciliation was performed.  Discussed risks and benefits of medications.  Advised patient on orders and health maintenance.  We discussed old records and old labs if available.  Will request any records not available through epic.  Continue current medications listed on your summary sheet.

## 2020-09-09 NOTE — PROGRESS NOTES
This note is specifically for wellness visit performed today.     WELLNESS EXAM      Patient ID: Ned Toledo Jr. is a 59 y.o. male.  has a past medical history of Diabetes mellitus, type 2, Fatty liver, Hypertension, Penetrating foot wound, and Sciatica.     Chief Complaint:  Encounter for wellness exam    Well Adult Physical: Patient here for a comprehensive physical exam.The patient reports chronic problems.  See other note  Do you take any herbs or supplements that were not prescribed by a doctor? no   Are you taking calcium supplements? no      History:  None reported  UROLOGIST:  None reported    Date last PSA: Reviewed  Lab Results   Component Value Date    PSA 1.2 09/04/2020      No history of STDs    Health Maintenance Topics with due status: Not Due       Topic Last Completion Date    TETANUS VACCINE 03/25/2020    Foot Exam 03/25/2020    Colorectal Cancer Screening 07/06/2020    PROSTATE-SPECIFIC ANTIGEN 09/04/2020    Lipid Panel 09/04/2020    Hemoglobin A1c 09/04/2020        ==============================================  History reviewed.     Health Maintenance Due   Topic Date Due    Sign Pain Contract  10/24/1977    Pneumococcal Vaccine (Medium Risk) (1 of 1 - PPSV23) 10/24/1978    Shingles Vaccine (1 of 2) 10/24/2009    Eye Exam  05/23/2020    Influenza Vaccine (1) 08/01/2020    Urine Microalbumin  09/06/2020     Patient refused vaccines today.  Past Medical History:  Past Medical History:   Diagnosis Date    Diabetes mellitus, type 2     Fatty liver     Hypertension     Penetrating foot wound     Sciatica      Past Surgical History:   Procedure Laterality Date    HERNIA REPAIR       Review of patient's allergies indicates:   Allergen Reactions    Atorvastatin      Current Outpatient Medications on File Prior to Visit   Medication Sig Dispense Refill    blood sugar diagnostic Strp To check BG 2 times daily, to use with insurance preferred meter 100 each 0    blood-glucose meter kit  To check BG 2 times daily, to use with insurance preferred meter 1 each 0    lancets Misc To check BG 2 times daily, to use with insurance preferred meter 100 each 0    TRUEPLUS LANCETS 33 gauge Misc CHECK BLOOD SUGAR BID      [DISCONTINUED] carisoprodoL (SOMA) 350 MG tablet Take 1 tablet (350 mg total) by mouth 4 (four) times daily as needed for Muscle spasms. 120 tablet 0    [DISCONTINUED] HYDROcodone-acetaminophen (NORCO) 7.5-325 mg per tablet Take 1 tablet by mouth every 6 (six) hours as needed for Pain. 120 tablet 0    [DISCONTINUED] naproxen (NAPROSYN) 500 MG tablet Take 1 tablet (500 mg total) by mouth 2 (two) times daily with meals. 20 tablet 0    [DISCONTINUED] ondansetron (ZOFRAN-ODT) 8 MG TbDL Take 1 tablet (8 mg total) by mouth every 6 (six) hours as needed. 20 tablet 1    [DISCONTINUED] promethazine (PHENERGAN) 25 MG tablet   6    [DISCONTINUED] atorvastatin (LIPITOR) 20 MG tablet Take 1 tablet (20 mg total) by mouth once daily. (Patient not taking: Reported on 9/9/2020) 90 tablet 3    [DISCONTINUED] fexofenadine (ALLEGRA) 180 MG tablet Take 1 tablet (180 mg total) by mouth once daily. 90 tablet 3     No current facility-administered medications on file prior to visit.      Social History     Socioeconomic History    Marital status:      Spouse name: Not on file    Number of children: Not on file    Years of education: Not on file    Highest education level: Not on file   Occupational History    Not on file   Social Needs    Financial resource strain: Not on file    Food insecurity     Worry: Not on file     Inability: Not on file    Transportation needs     Medical: Not on file     Non-medical: Not on file   Tobacco Use    Smoking status: Never Smoker    Smokeless tobacco: Never Used   Substance and Sexual Activity    Alcohol use: No    Drug use: Never    Sexual activity: Yes     Partners: Female     Birth control/protection: None   Lifestyle    Physical activity     Days  per week: Not on file     Minutes per session: Not on file    Stress: Only a little   Relationships    Social connections     Talks on phone: Not on file     Gets together: Not on file     Attends Jain service: Not on file     Active member of club or organization: Not on file     Attends meetings of clubs or organizations: Not on file     Relationship status: Not on file   Other Topics Concern    Not on file   Social History Narrative    Not on file     History reviewed. No pertinent family history.    Vitals:    09/09/20 0739   BP: 138/80   Pulse:    Temp:       Body mass index is 24.28 kg/m².     Review of Systems   See other note for review of systems.  Otherwise negative    Objective:      Physical Exam  Vitals signs and nursing note reviewed.   Constitutional:       General: He is not in acute distress.     Appearance: He is well-developed.   HENT:      Head: Normocephalic and atraumatic.   Eyes:      Pupils: Pupils are equal, round, and reactive to light.   Neck:      Musculoskeletal: Normal range of motion and neck supple.   Cardiovascular:      Rate and Rhythm: Normal rate and regular rhythm.      Heart sounds: Normal heart sounds. No murmur.   Pulmonary:      Effort: Pulmonary effort is normal. No respiratory distress.      Breath sounds: Normal breath sounds. No wheezing.   Musculoskeletal: Normal range of motion.         General: Tenderness and deformity present.   Skin:     General: Skin is warm and dry.      Capillary Refill: Capillary refill takes less than 2 seconds.   Neurological:      Mental Status: He is alert and oriented to person, place, and time.      Cranial Nerves: No cranial nerve deficit.   Psychiatric:         Behavior: Behavior normal.          Assessment / Plan:      1.  Routine health exam-patient here for annual wellness exam.  Labs ordered.  Health maintenance was reviewed and ordered.    Complete history and physical was completed today.  Complete and thorough medication  reconciliation was performed.  Discussed risks and benefits of medications.  Advised patient on orders and health maintenance.  We discussed old records and old labs if available.  Will request any records not available through epic.  Continue current medications listed on your summary sheet.    All questions were answered. Patient had no further concerns. Advised of diagnoses and plan. Follow up as planned or return sooner if symptoms persist or worsen.     Orders Placed This Encounter   Procedures    Toxicology screen, urine     Standing Status:   Future     Standing Expiration Date:   11/8/2021     Order Specific Question:   Specimen Source     Answer:   Urine       Medications Ordered This Encounter   Medications    carisoprodoL (SOMA) 350 MG tablet     Sig: Take 1 tablet (350 mg total) by mouth 4 (four) times daily as needed for Muscle spasms.     Dispense:  120 tablet     Refill:  0    carisoprodoL (SOMA) 350 MG tablet     Sig: Take 1 tablet (350 mg total) by mouth 4 (four) times daily as needed for Muscle spasms.     Dispense:  120 tablet     Refill:  0    carisoprodoL (SOMA) 350 MG tablet     Sig: Take 1 tablet (350 mg total) by mouth 4 (four) times daily as needed for Muscle spasms.     Dispense:  120 tablet     Refill:  0    fexofenadine (ALLEGRA) 180 MG tablet     Sig: Take 1 tablet (180 mg total) by mouth once daily.     Dispense:  90 tablet     Refill:  3    HYDROcodone-acetaminophen (NORCO) 7.5-325 mg per tablet     Sig: Take 1 tablet by mouth every 6 (six) hours as needed for Pain.     Dispense:  120 tablet     Refill:  0     Quantity prescribed more than 7 day supply? Yes, quantity medically necessary    HYDROcodone-acetaminophen (NORCO) 7.5-325 mg per tablet     Sig: Take 1 tablet by mouth every 6 (six) hours as needed for Pain.     Dispense:  120 tablet     Refill:  0     Quantity prescribed more than 7 day supply? Yes, quantity medically necessary    HYDROcodone-acetaminophen (NORCO)  7.5-325 mg per tablet     Sig: Take 1 tablet by mouth every 6 (six) hours as needed for Pain.     Dispense:  120 tablet     Refill:  0     Quantity prescribed more than 7 day supply? Yes, quantity medically necessary    naproxen (NAPROSYN) 500 MG tablet     Sig: Take 1 tablet (500 mg total) by mouth 2 (two) times daily with meals.     Dispense:  60 tablet     Refill:  11    ondansetron (ZOFRAN-ODT) 8 MG TbDL     Sig: Take 1 tablet (8 mg total) by mouth every 6 (six) hours as needed.     Dispense:  20 tablet     Refill:  1    promethazine (PHENERGAN) 25 MG tablet     Sig: Take 1 tablet (25 mg total) by mouth every 6 (six) hours as needed for Nausea.     Dispense:  30 tablet     Refill:  6        Jb Chao MD

## 2020-09-09 NOTE — ASSESSMENT & PLAN NOTE
Refill pain medication.  Patient is being monitored by .The patient was checked in the [facility] [employer]'s  Prescription Monitoring Program. There appears to be no incongruities with the patient's verbalized history.

## 2020-09-09 NOTE — PROGRESS NOTES
PLAN:      Problem List Items Addressed This Visit     Left-sided low back pain without sciatica (Chronic)     Refill pain medication.  Patient is being monitored by .The patient was checked in the [facility] Board of Pharmacy's  Prescription Monitoring Program. There appears to be no incongruities with the patient's verbalized history.            Relevant Medications    carisoprodoL (SOMA) 350 MG tablet    HYDROcodone-acetaminophen (NORCO) 7.5-325 mg per tablet    carisoprodoL (SOMA) 350 MG tablet (Start on 11/5/2020)    HYDROcodone-acetaminophen (NORCO) 7.5-325 mg per tablet (Start on 11/5/2020)    HYDROcodone-acetaminophen (NORCO) 7.5-325 mg per tablet (Start on 10/7/2020)    carisoprodoL (SOMA) 350 MG tablet (Start on 10/7/2020)    Other Relevant Orders    Toxicology screen, urine    Encounter for long-term (current) use of medications (Chronic)     Reviewed labs.Complete history and physical was completed today.  Complete and thorough medication reconciliation was performed.  Discussed risks and benefits of medications.  Advised patient on orders and health maintenance.  We discussed old records and old labs if available.  Will request any records not available through epic.  Continue current medications listed on your summary sheet.           Relevant Medications    carisoprodoL (SOMA) 350 MG tablet    HYDROcodone-acetaminophen (NORCO) 7.5-325 mg per tablet    naproxen (NAPROSYN) 500 MG tablet    fexofenadine (ALLEGRA) 180 MG tablet    promethazine (PHENERGAN) 25 MG tablet    ondansetron (ZOFRAN-ODT) 8 MG TbDL    carisoprodoL (SOMA) 350 MG tablet (Start on 11/5/2020)    HYDROcodone-acetaminophen (NORCO) 7.5-325 mg per tablet (Start on 11/5/2020)    HYDROcodone-acetaminophen (NORCO) 7.5-325 mg per tablet (Start on 10/7/2020)    carisoprodoL (SOMA) 350 MG tablet (Start on 10/7/2020)    Type 2 diabetes mellitus with hyperglycemia, without long-term current use of insulin     Diet controlled.  Monitor  hemoglobin A1c.  Discussed diabetic diet and exercise protocol.  Patient defers medications.  Monitor for side effects.  Discussed checking blood glucose.  Discussed symptoms to monitor for and to notify me immediately if persistent or worsening.  Follow up with Ophthalmology/Optometry and Podiatry.           Combined opioid with non-opioid drug dependence, continuous     Refill medications every three months.   reviewed.         Relevant Orders    Toxicology screen, urine    Hyperlipidemia LDL goal <130    Hyperlipidemia associated with type 2 diabetes mellitus      Other Visit Diagnoses     Well adult exam    -  Primary    Statin myopathy            Future Appointments     Date Provider Specialty Appt Notes    12/10/2020 Jb Chao MD Family Medicine FOLLOW UP           Medication List with Changes/Refills   New Medications    CARISOPRODOL (SOMA) 350 MG TABLET    Take 1 tablet (350 mg total) by mouth 4 (four) times daily as needed for Muscle spasms.    HYDROCODONE-ACETAMINOPHEN (NORCO) 7.5-325 MG PER TABLET    Take 1 tablet by mouth every 6 (six) hours as needed for Pain.   Current Medications    BLOOD SUGAR DIAGNOSTIC STRP    To check BG 2 times daily, to use with insurance preferred meter    BLOOD-GLUCOSE METER KIT    To check BG 2 times daily, to use with insurance preferred meter    LANCETS MISC    To check BG 2 times daily, to use with insurance preferred meter    TRUEPLUS LANCETS 33 GAUGE MISC    CHECK BLOOD SUGAR BID   Changed and/or Refilled Medications    Modified Medication Previous Medication    CARISOPRODOL (SOMA) 350 MG TABLET carisoprodoL (SOMA) 350 MG tablet       Take 1 tablet (350 mg total) by mouth 4 (four) times daily as needed for Muscle spasms.    Take 1 tablet (350 mg total) by mouth 4 (four) times daily as needed for Muscle spasms.    CARISOPRODOL (SOMA) 350 MG TABLET carisoprodoL (SOMA) 350 MG tablet       Take 1 tablet (350 mg total) by mouth 4 (four) times daily as needed for  Muscle spasms.    Take 1 tablet (350 mg total) by mouth 4 (four) times daily as needed for Muscle spasms.    FEXOFENADINE (ALLEGRA) 180 MG TABLET fexofenadine (ALLEGRA) 180 MG tablet       Take 1 tablet (180 mg total) by mouth once daily.    Take 1 tablet (180 mg total) by mouth once daily.    HYDROCODONE-ACETAMINOPHEN (NORCO) 7.5-325 MG PER TABLET HYDROcodone-acetaminophen (NORCO) 7.5-325 mg per tablet       Take 1 tablet by mouth every 6 (six) hours as needed for Pain.    Take 1 tablet by mouth every 6 (six) hours as needed for Pain.    HYDROCODONE-ACETAMINOPHEN (NORCO) 7.5-325 MG PER TABLET HYDROcodone-acetaminophen (NORCO) 7.5-325 mg per tablet       Take 1 tablet by mouth every 6 (six) hours as needed for Pain.    Take 1 tablet by mouth every 6 (six) hours as needed for Pain.    NAPROXEN (NAPROSYN) 500 MG TABLET naproxen (NAPROSYN) 500 MG tablet       Take 1 tablet (500 mg total) by mouth 2 (two) times daily with meals.    Take 1 tablet (500 mg total) by mouth 2 (two) times daily with meals.    ONDANSETRON (ZOFRAN-ODT) 8 MG TBDL ondansetron (ZOFRAN-ODT) 8 MG TbDL       Take 1 tablet (8 mg total) by mouth every 6 (six) hours as needed.    Take 1 tablet (8 mg total) by mouth every 6 (six) hours as needed.    PROMETHAZINE (PHENERGAN) 25 MG TABLET promethazine (PHENERGAN) 25 MG tablet       Take 1 tablet (25 mg total) by mouth every 6 (six) hours as needed for Nausea.       Discontinued Medications    ATORVASTATIN (LIPITOR) 20 MG TABLET    Take 1 tablet (20 mg total) by mouth once daily.       Jb Chao M.D.     ==========================================================================  Subjective:      Patient ID: Ned Toledo Jr. is a 59 y.o. male.  has a past medical history of Diabetes mellitus, type 2, Fatty liver, Hypertension, Penetrating foot wound, and Sciatica.     Chief Complaint: Annual Exam      Problem List Items Addressed This Visit     Left-sided low back pain without sciatica (Chronic)     Overview     Chronic.  Stable on current pain medication regimen.  Patient was being managed by previous PCP.  Prescriptions are being taken over by me.  Patient reports that he has had MRI and injections in the past which did not help.  Patient had a lifting injury many years ago.    The patient was checked in the [place] [employer]'s  Prescription Monitoring Program. There appears to be no incongruities with the patient's verbalized history.            Current Assessment & Plan     Refill pain medication.  Patient is being monitored by .The patient was checked in the [place] [employer]'s  Prescription Monitoring Program. There appears to be no incongruities with the patient's verbalized history.            Encounter for long-term (current) use of medications (Chronic)    Overview     Initial HPI March 2020:  CHRONIC. Stable. Compliant with medications for managed conditions. See medication list. No SE reported.   Routine lab analysis is being monitored. Refills were addressed.  ====================================================  JUNE 2020:  Labs are stable.  Refill medication.  CHRONIC. Stable. Compliant with medications for managed conditions. See medication list. No SE reported.   Routine lab analysis is being monitored. Refills were addressed.  ==================================================  SEPTEMBER 2020:    CHRONIC. Stable. Compliant with medications for managed conditions. See medication list. No SE reported.   Routine lab analysis is being monitored. Refills were addressed.  Lab Results   Component Value Date    WBC 7.78 03/25/2020    HGB 14.9 03/25/2020    HCT 48.5 03/25/2020     (H) 03/25/2020     03/25/2020         Chemistry        Component Value Date/Time     09/04/2020 0735    K 4.3 09/04/2020 0735     09/04/2020 0735    CO2 24 09/04/2020 0735    BUN 17 09/04/2020 0735    CREATININE 1.0 09/04/2020 0735     (H) 09/04/2020 0735         Component Value Date/Time    CALCIUM 9.2 09/04/2020 0735    ALKPHOS 46 (L) 09/04/2020 0735    AST 18 09/04/2020 0735    ALT 16 09/04/2020 0735    BILITOT 0.9 09/04/2020 0735    ESTGFRAFRICA >60.0 09/04/2020 0735    EGFRNONAA >60.0 09/04/2020 0735          Lab Results   Component Value Date    TSH 1.236 09/04/2020       =================================================         Current Assessment & Plan     Reviewed labs.Complete history and physical was completed today.  Complete and thorough medication reconciliation was performed.  Discussed risks and benefits of medications.  Advised patient on orders and health maintenance.  We discussed old records and old labs if available.  Will request any records not available through epic.  Continue current medications listed on your summary sheet.           Type 2 diabetes mellitus with hyperglycemia, without long-term current use of insulin    Overview     March 2020:  Reviewed Diabetes Management Status  Patient cannot take statin due to side effects.  ==================================================  SEPTEMBER 2020:    Diabetes Management Status    Statin: Taking  ACE/ARB: Not taking    Screening or Prevention Patient's value Goal Complete/Controlled?   HgA1C Testing and Control   Lab Results   Component Value Date    HGBA1C 6.8 (H) 09/04/2020      Annually/Less than 8% Yes   Lipid profile : 09/04/2020 Annually Yes   LDL control Lab Results   Component Value Date    LDLCALC 116.8 09/04/2020    Annually/Less than 100 mg/dl  No   Nephropathy screening Lab Results   Component Value Date    LABMICR 21.0 09/06/2019     Lab Results   Component Value Date    PROTEINUA Negative 12/31/2018    Annually No   Blood pressure BP Readings from Last 1 Encounters:   09/09/20 138/80    Less than 140/90 Yes   Dilated retinal exam : 05/23/2019 Annually No   Foot exam   : 03/25/2020 Annually Yes       =================================================         Current Assessment &  Plan     Diet controlled.  Monitor hemoglobin A1c.  Discussed diabetic diet and exercise protocol.  Patient defers medications.  Monitor for side effects.  Discussed checking blood glucose.  Discussed symptoms to monitor for and to notify me immediately if persistent or worsening.  Follow up with Ophthalmology/Optometry and Podiatry.           Combined opioid with non-opioid drug dependence, continuous    Overview     Chronic.  Stable.  Patient is maintained on Amorita and Soma for his chronic back and joint pains.  Patient has failed joint injections in the past.  Patient is not interested in surgery.  Patient has transition care from previous PCP Dr. Jaime Hernández who was managing these prescriptions.  I will continue to fill these to avoid withdrawal.    No abuse suspected.The patient was checked in the [facility] Board of Pharmacy's  Prescription Monitoring Program. There appears to be no incongruities with the patient's verbalized history.            Current Assessment & Plan     Refill medications every three months.   reviewed.         Hyperlipidemia LDL goal <130    Hyperlipidemia associated with type 2 diabetes mellitus      Other Visit Diagnoses     Well adult exam    -  Primary    Statin myopathy              Patient has issues with statins.  He cannot take due to muscle aches.  Past Medical History:  Past Medical History:   Diagnosis Date    Diabetes mellitus, type 2     Fatty liver     Hypertension     Penetrating foot wound     Sciatica      Past Surgical History:   Procedure Laterality Date    HERNIA REPAIR       Review of patient's allergies indicates:   Allergen Reactions    Atorvastatin      Medication List with Changes/Refills   New Medications    CARISOPRODOL (SOMA) 350 MG TABLET    Take 1 tablet (350 mg total) by mouth 4 (four) times daily as needed for Muscle spasms.    HYDROCODONE-ACETAMINOPHEN (NORCO) 7.5-325 MG PER TABLET    Take 1 tablet by mouth every 6 (six) hours as needed  for Pain.   Current Medications    BLOOD SUGAR DIAGNOSTIC STRP    To check BG 2 times daily, to use with insurance preferred meter    BLOOD-GLUCOSE METER KIT    To check BG 2 times daily, to use with insurance preferred meter    LANCETS MISC    To check BG 2 times daily, to use with insurance preferred meter    TRUEPLUS LANCETS 33 GAUGE MISC    CHECK BLOOD SUGAR BID   Changed and/or Refilled Medications    Modified Medication Previous Medication    CARISOPRODOL (SOMA) 350 MG TABLET carisoprodoL (SOMA) 350 MG tablet       Take 1 tablet (350 mg total) by mouth 4 (four) times daily as needed for Muscle spasms.    Take 1 tablet (350 mg total) by mouth 4 (four) times daily as needed for Muscle spasms.    CARISOPRODOL (SOMA) 350 MG TABLET carisoprodoL (SOMA) 350 MG tablet       Take 1 tablet (350 mg total) by mouth 4 (four) times daily as needed for Muscle spasms.    Take 1 tablet (350 mg total) by mouth 4 (four) times daily as needed for Muscle spasms.    FEXOFENADINE (ALLEGRA) 180 MG TABLET fexofenadine (ALLEGRA) 180 MG tablet       Take 1 tablet (180 mg total) by mouth once daily.    Take 1 tablet (180 mg total) by mouth once daily.    HYDROCODONE-ACETAMINOPHEN (NORCO) 7.5-325 MG PER TABLET HYDROcodone-acetaminophen (NORCO) 7.5-325 mg per tablet       Take 1 tablet by mouth every 6 (six) hours as needed for Pain.    Take 1 tablet by mouth every 6 (six) hours as needed for Pain.    HYDROCODONE-ACETAMINOPHEN (NORCO) 7.5-325 MG PER TABLET HYDROcodone-acetaminophen (NORCO) 7.5-325 mg per tablet       Take 1 tablet by mouth every 6 (six) hours as needed for Pain.    Take 1 tablet by mouth every 6 (six) hours as needed for Pain.    NAPROXEN (NAPROSYN) 500 MG TABLET naproxen (NAPROSYN) 500 MG tablet       Take 1 tablet (500 mg total) by mouth 2 (two) times daily with meals.    Take 1 tablet (500 mg total) by mouth 2 (two) times daily with meals.    ONDANSETRON (ZOFRAN-ODT) 8 MG TBDL ondansetron (ZOFRAN-ODT) 8 MG TbDL        Take 1 tablet (8 mg total) by mouth every 6 (six) hours as needed.    Take 1 tablet (8 mg total) by mouth every 6 (six) hours as needed.    PROMETHAZINE (PHENERGAN) 25 MG TABLET promethazine (PHENERGAN) 25 MG tablet       Take 1 tablet (25 mg total) by mouth every 6 (six) hours as needed for Nausea.       Discontinued Medications    ATORVASTATIN (LIPITOR) 20 MG TABLET    Take 1 tablet (20 mg total) by mouth once daily.      Social History     Tobacco Use    Smoking status: Never Smoker    Smokeless tobacco: Never Used   Substance Use Topics    Alcohol use: No      History reviewed. No pertinent family history.    I have reviewed the complete PMH, social history, surgical history, allergies and medications.  As well as family history.    Review of Systems   Constitutional: Negative for chills, fatigue, fever and unexpected weight change.   HENT: Negative for ear pain and sore throat.    Eyes: Negative for redness and visual disturbance.   Respiratory: Negative for cough and shortness of breath.    Cardiovascular: Negative for chest pain and palpitations.   Gastrointestinal: Negative for nausea and vomiting.   Endocrine: Negative for cold intolerance and heat intolerance.   Genitourinary: Negative for difficulty urinating and hematuria.   Musculoskeletal: Positive for arthralgias and back pain. Negative for myalgias.   Skin: Negative for rash and wound.   Allergic/Immunologic: Negative for environmental allergies and food allergies.   Neurological: Negative for weakness and headaches.   Hematological: Negative for adenopathy. Does not bruise/bleed easily.   Psychiatric/Behavioral: Negative for sleep disturbance. The patient is not nervous/anxious.      Objective:   /80   Pulse 66   Temp 98.1 °F (36.7 °C) (Temporal)   Ht 6' (1.829 m)   Wt 81.2 kg (179 lb)   BMI 24.28 kg/m²   Physical Exam  Vitals signs and nursing note reviewed.   Constitutional:       General: He is not in acute distress.     Appearance:  He is well-developed.   HENT:      Head: Normocephalic and atraumatic.   Eyes:      Pupils: Pupils are equal, round, and reactive to light.   Neck:      Musculoskeletal: Normal range of motion and neck supple.   Cardiovascular:      Rate and Rhythm: Normal rate and regular rhythm.      Heart sounds: Normal heart sounds. No murmur.   Pulmonary:      Effort: Pulmonary effort is normal. No respiratory distress.      Breath sounds: Normal breath sounds. No wheezing.   Musculoskeletal:         General: Tenderness and deformity present.      Right shoulder: He exhibits decreased range of motion, tenderness, bony tenderness, crepitus, deformity, pain and spasm. He exhibits no swelling, no effusion and no laceration.      Lumbar back: He exhibits decreased range of motion, tenderness, bony tenderness, deformity, pain and spasm. He exhibits no swelling, no edema and no laceration.   Skin:     General: Skin is warm and dry.      Capillary Refill: Capillary refill takes less than 2 seconds.   Neurological:      Mental Status: He is alert and oriented to person, place, and time.      Cranial Nerves: No cranial nerve deficit.   Psychiatric:         Behavior: Behavior normal.         Assessment:     1. Well adult exam    2. Chronic left-sided low back pain without sciatica    3. Encounter for long-term (current) use of medications    4. Hyperlipidemia LDL goal <130    5. Combined opioid with non-opioid drug dependence, continuous    6. Type 2 diabetes mellitus with hyperglycemia, without long-term current use of insulin    7. Hyperlipidemia associated with type 2 diabetes mellitus    8. Statin myopathy      MDM:   Low medical complexity.  Patient is here for annual wellness visit.  See other note.  I have Reviewed and summarized old records.  I have performed thorough medication reconciliation today and discussed risk and benefits of each medication.  I have reviewed labs and discussed with patient.  All questions were  answered.    I have signed for the following orders AND/OR meds.  Orders Placed This Encounter   Procedures    Toxicology screen, urine     Standing Status:   Future     Standing Expiration Date:   11/8/2021     Order Specific Question:   Specimen Source     Answer:   Urine     Medications Ordered This Encounter   Medications    carisoprodoL (SOMA) 350 MG tablet     Sig: Take 1 tablet (350 mg total) by mouth 4 (four) times daily as needed for Muscle spasms.     Dispense:  120 tablet     Refill:  0    carisoprodoL (SOMA) 350 MG tablet     Sig: Take 1 tablet (350 mg total) by mouth 4 (four) times daily as needed for Muscle spasms.     Dispense:  120 tablet     Refill:  0    carisoprodoL (SOMA) 350 MG tablet     Sig: Take 1 tablet (350 mg total) by mouth 4 (four) times daily as needed for Muscle spasms.     Dispense:  120 tablet     Refill:  0    fexofenadine (ALLEGRA) 180 MG tablet     Sig: Take 1 tablet (180 mg total) by mouth once daily.     Dispense:  90 tablet     Refill:  3    HYDROcodone-acetaminophen (NORCO) 7.5-325 mg per tablet     Sig: Take 1 tablet by mouth every 6 (six) hours as needed for Pain.     Dispense:  120 tablet     Refill:  0     Quantity prescribed more than 7 day supply? Yes, quantity medically necessary    HYDROcodone-acetaminophen (NORCO) 7.5-325 mg per tablet     Sig: Take 1 tablet by mouth every 6 (six) hours as needed for Pain.     Dispense:  120 tablet     Refill:  0     Quantity prescribed more than 7 day supply? Yes, quantity medically necessary    HYDROcodone-acetaminophen (NORCO) 7.5-325 mg per tablet     Sig: Take 1 tablet by mouth every 6 (six) hours as needed for Pain.     Dispense:  120 tablet     Refill:  0     Quantity prescribed more than 7 day supply? Yes, quantity medically necessary    naproxen (NAPROSYN) 500 MG tablet     Sig: Take 1 tablet (500 mg total) by mouth 2 (two) times daily with meals.     Dispense:  60 tablet     Refill:  11    ondansetron (ZOFRAN-ODT)  8 MG TbDL     Sig: Take 1 tablet (8 mg total) by mouth every 6 (six) hours as needed.     Dispense:  20 tablet     Refill:  1    promethazine (PHENERGAN) 25 MG tablet     Sig: Take 1 tablet (25 mg total) by mouth every 6 (six) hours as needed for Nausea.     Dispense:  30 tablet     Refill:  6        Follow up in about 3 months (around 12/9/2020), or if symptoms worsen or fail to improve, for Med refills.    If no improvement in symptoms or symptoms worsen, advised to call/follow-up at clinic or go to ER. Patient voiced understanding and all questions/concerns were addressed.     DISCLAIMER: This note was compiled by using a speech recognition dictation system and therefore please be aware that typographical / speech recognition errors can and do occur.  Please contact me if you see any errors specifically.    Jb Chao M.D.       Office: [phone]   [address] [place] [address]  FAX: [phone]

## 2020-09-09 NOTE — TELEPHONE ENCOUNTER
Called and spoke with the patient, Patient states that he did not leave a urine sample this morning when he was here in clinic for his appointment,Brit states that he is on his way to [place] for a dental appointment now but can come leave a urine sample in the morning, Patient states that he will come drop of the urine tomorrow morning.  Urine appointment was set up for the patient.  Patient verbalized understanding of the date and time of urine appointment.

## 2020-10-19 ENCOUNTER — OFFICE VISIT (OUTPATIENT)
Dept: FAMILY MEDICINE | Facility: CLINIC | Age: 60
End: 2020-10-19
Payer: COMMERCIAL

## 2020-10-19 VITALS
HEIGHT: 72 IN | TEMPERATURE: 99 F | SYSTOLIC BLOOD PRESSURE: 144 MMHG | HEART RATE: 65 BPM | BODY MASS INDEX: 24.49 KG/M2 | WEIGHT: 180.81 LBS | DIASTOLIC BLOOD PRESSURE: 80 MMHG

## 2020-10-19 DIAGNOSIS — H61.23 BILATERAL IMPACTED CERUMEN: Primary | ICD-10-CM

## 2020-10-19 DIAGNOSIS — Z23 FLU VACCINE NEED: ICD-10-CM

## 2020-10-19 PROCEDURE — 90686 IIV4 VACC NO PRSV 0.5 ML IM: CPT | Mod: S$GLB,,, | Performed by: FAMILY MEDICINE

## 2020-10-19 PROCEDURE — 90686 FLU VACCINE (QUAD) GREATER THAN OR EQUAL TO 3YO PRESERVATIVE FREE IM: ICD-10-PCS | Mod: S$GLB,,, | Performed by: FAMILY MEDICINE

## 2020-10-19 PROCEDURE — 69210 REMOVE IMPACTED EAR WAX UNI: CPT | Mod: S$GLB,,, | Performed by: FAMILY MEDICINE

## 2020-10-19 PROCEDURE — 99212 PR OFFICE/OUTPT VISIT, EST, LEVL II, 10-19 MIN: ICD-10-PCS | Mod: 25,S$GLB,, | Performed by: FAMILY MEDICINE

## 2020-10-19 PROCEDURE — 99999 PR PBB SHADOW E&M-EST. PATIENT-LVL IV: CPT | Mod: PBBFAC,,, | Performed by: FAMILY MEDICINE

## 2020-10-19 PROCEDURE — 69210 EAR CERUMEN REMOVAL: ICD-10-PCS | Mod: S$GLB,,, | Performed by: FAMILY MEDICINE

## 2020-10-19 PROCEDURE — 99999 PR PBB SHADOW E&M-EST. PATIENT-LVL IV: ICD-10-PCS | Mod: PBBFAC,,, | Performed by: FAMILY MEDICINE

## 2020-10-19 PROCEDURE — 3079F PR MOST RECENT DIASTOLIC BLOOD PRESSURE 80-89 MM HG: ICD-10-PCS | Mod: CPTII,S$GLB,, | Performed by: FAMILY MEDICINE

## 2020-10-19 PROCEDURE — 99212 OFFICE O/P EST SF 10 MIN: CPT | Mod: 25,S$GLB,, | Performed by: FAMILY MEDICINE

## 2020-10-19 PROCEDURE — 3079F DIAST BP 80-89 MM HG: CPT | Mod: CPTII,S$GLB,, | Performed by: FAMILY MEDICINE

## 2020-10-19 PROCEDURE — 3077F PR MOST RECENT SYSTOLIC BLOOD PRESSURE >= 140 MM HG: ICD-10-PCS | Mod: CPTII,S$GLB,, | Performed by: FAMILY MEDICINE

## 2020-10-19 PROCEDURE — 3008F BODY MASS INDEX DOCD: CPT | Mod: CPTII,S$GLB,, | Performed by: FAMILY MEDICINE

## 2020-10-19 PROCEDURE — 3077F SYST BP >= 140 MM HG: CPT | Mod: CPTII,S$GLB,, | Performed by: FAMILY MEDICINE

## 2020-10-19 PROCEDURE — 90471 FLU VACCINE (QUAD) GREATER THAN OR EQUAL TO 3YO PRESERVATIVE FREE IM: ICD-10-PCS | Mod: S$GLB,,, | Performed by: FAMILY MEDICINE

## 2020-10-19 PROCEDURE — 90471 IMMUNIZATION ADMIN: CPT | Mod: S$GLB,,, | Performed by: FAMILY MEDICINE

## 2020-10-19 PROCEDURE — 3008F PR BODY MASS INDEX (BMI) DOCUMENTED: ICD-10-PCS | Mod: CPTII,S$GLB,, | Performed by: FAMILY MEDICINE

## 2020-10-19 RX ORDER — ASPIRIN 81 MG
5 TABLET, DELAYED RELEASE (ENTERIC COATED) ORAL 2 TIMES DAILY
Refills: 0
Start: 2020-10-19 | End: 2021-05-06

## 2020-10-19 RX ORDER — ATORVASTATIN CALCIUM 20 MG/1
TABLET, FILM COATED ORAL
COMMUNITY
Start: 2020-09-09 | End: 2021-05-06

## 2020-10-19 NOTE — PROCEDURES
"Ear Cerumen Removal    Date/Time: 10/19/2020 10:00 AM  Performed by: Jb Chao MD  Authorized by: Jb Chao MD     Time out: Immediately prior to procedure a "time out" was called to verify the correct patient, procedure, equipment, support staff and site/side marked as required.    Consent Done?:  Yes (Verbal)    Local anesthetic:  None  Ceruminolytics applied: Ceruminolytics applied prior to the procedure    Location details:  Both ears  Procedure type: curette    Procedure type comment:  With irrigation  Cerumen  Removal Results:  Cerumen completely removed  Patient tolerance:  Patient tolerated the procedure well with no immediate complications     Sterile water and hydrogen peroxide was used.      "

## 2020-10-19 NOTE — PROGRESS NOTES
PLAN:      Problem List Items Addressed This Visit     Bilateral impacted cerumen - Primary    Relevant Medications    carbamide peroxide (DEBROX) 6.5 % otic solution    Other Relevant Orders    Ear Cerumen Removal (Completed)      Other Visit Diagnoses     Flu vaccine need        Relevant Orders    Influenza - Quadrivalent (PF) (Completed)        Future Appointments     Date Provider Specialty Appt Notes    12/10/2020 Jb Chao MD Family Medicine FOLLOW UP           Medication List with Changes/Refills   New Medications    CARBAMIDE PEROXIDE (DEBROX) 6.5 % OTIC SOLUTION    Place 5 drops into both ears 2 (two) times daily.   Current Medications    ATORVASTATIN (LIPITOR) 20 MG TABLET    TK 1 T PO  QD    BLOOD SUGAR DIAGNOSTIC STRP    To check BG 2 times daily, to use with insurance preferred meter    BLOOD-GLUCOSE METER KIT    To check BG 2 times daily, to use with insurance preferred meter    CARISOPRODOL (SOMA) 350 MG TABLET    Take 1 tablet (350 mg total) by mouth 4 (four) times daily as needed for Muscle spasms.    CARISOPRODOL (SOMA) 350 MG TABLET    Take 1 tablet (350 mg total) by mouth 4 (four) times daily as needed for Muscle spasms.    CARISOPRODOL (SOMA) 350 MG TABLET    Take 1 tablet (350 mg total) by mouth 4 (four) times daily as needed for Muscle spasms.    FEXOFENADINE (ALLEGRA) 180 MG TABLET    Take 1 tablet (180 mg total) by mouth once daily.    HYDROCODONE-ACETAMINOPHEN (NORCO) 7.5-325 MG PER TABLET    Take 1 tablet by mouth every 6 (six) hours as needed for Pain.    HYDROCODONE-ACETAMINOPHEN (NORCO) 7.5-325 MG PER TABLET    Take 1 tablet by mouth every 6 (six) hours as needed for Pain.    LANCETS MISC    To check BG 2 times daily, to use with insurance preferred meter    NAPROXEN (NAPROSYN) 500 MG TABLET    Take 1 tablet (500 mg total) by mouth 2 (two) times daily with meals.    ONDANSETRON (ZOFRAN-ODT) 8 MG TBDL    Take 1 tablet (8 mg total) by mouth every 6 (six) hours as needed.     PROMETHAZINE (PHENERGAN) 25 MG TABLET    Take 1 tablet (25 mg total) by mouth every 6 (six) hours as needed for Nausea.    TRUEPLUS LANCETS 33 GAUGE MISC    CHECK BLOOD SUGAR BID       Jb Chao M.D.     ==========================================================================  Subjective:      Patient ID: Ned Toledo Jr. is a 59 y.o. male.  has a past medical history of Diabetes mellitus, type 2, Fatty liver, Hypertension, Penetrating foot wound, and Sciatica.     Chief Complaint: Follow-up (right ear stopped up, requesting wax removal)    Subacute.  Recurrent.  Patient has had earwax removed previously with curette.  Patient has been using over-the-counter drops without relief.  Patient reports decreased hearing bilaterally.  Problem List Items Addressed This Visit     Bilateral impacted cerumen - Primary      Other Visit Diagnoses     Flu vaccine need               Past Medical History:  Past Medical History:   Diagnosis Date    Diabetes mellitus, type 2     Fatty liver     Hypertension     Penetrating foot wound     Sciatica      Past Surgical History:   Procedure Laterality Date    HERNIA REPAIR       Review of patient's allergies indicates:   Allergen Reactions    Atorvastatin      Medication List with Changes/Refills   New Medications    CARBAMIDE PEROXIDE (DEBROX) 6.5 % OTIC SOLUTION    Place 5 drops into both ears 2 (two) times daily.   Current Medications    ATORVASTATIN (LIPITOR) 20 MG TABLET    TK 1 T PO  QD    BLOOD SUGAR DIAGNOSTIC STRP    To check BG 2 times daily, to use with insurance preferred meter    BLOOD-GLUCOSE METER KIT    To check BG 2 times daily, to use with insurance preferred meter    CARISOPRODOL (SOMA) 350 MG TABLET    Take 1 tablet (350 mg total) by mouth 4 (four) times daily as needed for Muscle spasms.    CARISOPRODOL (SOMA) 350 MG TABLET    Take 1 tablet (350 mg total) by mouth 4 (four) times daily as needed for Muscle spasms.    CARISOPRODOL (SOMA) 350 MG  TABLET    Take 1 tablet (350 mg total) by mouth 4 (four) times daily as needed for Muscle spasms.    FEXOFENADINE (ALLEGRA) 180 MG TABLET    Take 1 tablet (180 mg total) by mouth once daily.    HYDROCODONE-ACETAMINOPHEN (NORCO) 7.5-325 MG PER TABLET    Take 1 tablet by mouth every 6 (six) hours as needed for Pain.    HYDROCODONE-ACETAMINOPHEN (NORCO) 7.5-325 MG PER TABLET    Take 1 tablet by mouth every 6 (six) hours as needed for Pain.    LANCETS MISC    To check BG 2 times daily, to use with insurance preferred meter    NAPROXEN (NAPROSYN) 500 MG TABLET    Take 1 tablet (500 mg total) by mouth 2 (two) times daily with meals.    ONDANSETRON (ZOFRAN-ODT) 8 MG TBDL    Take 1 tablet (8 mg total) by mouth every 6 (six) hours as needed.    PROMETHAZINE (PHENERGAN) 25 MG TABLET    Take 1 tablet (25 mg total) by mouth every 6 (six) hours as needed for Nausea.    TRUEPLUS LANCETS 33 GAUGE MISC    CHECK BLOOD SUGAR BID      Social History     Tobacco Use    Smoking status: Never Smoker    Smokeless tobacco: Never Used   Substance Use Topics    Alcohol use: No      History reviewed. No pertinent family history.    I have reviewed the complete PMH, social history, surgical history, allergies and medications.  As well as family history.    Review of Systems   Constitutional: Negative for activity change and fatigue.   HENT: Negative for congestion and sinus pain.         Right ear stopped up   Eyes: Negative for visual disturbance.   Respiratory: Negative for chest tightness and shortness of breath.    Cardiovascular: Negative for palpitations and leg swelling.   Gastrointestinal: Negative for abdominal pain, diarrhea and nausea.   Endocrine: Negative for polyuria.   Genitourinary: Negative for difficulty urinating and frequency.   Musculoskeletal: Positive for arthralgias and back pain. Negative for joint swelling.   Skin: Negative for rash.   Neurological: Negative for dizziness and headaches.   Psychiatric/Behavioral:  Negative for agitation. The patient is not nervous/anxious.      Objective:   BP (!) 144/80   Pulse 65   Temp 98.8 °F (37.1 °C) (Temporal)   Ht 6' (1.829 m)   Wt 82 kg (180 lb 12.8 oz)   BMI 24.52 kg/m²   Physical Exam  Vitals signs and nursing note reviewed.   Constitutional:       General: He is not in acute distress.     Appearance: He is well-developed.   HENT:      Head: Normocephalic and atraumatic.      Comments: Bilateral cerumen impaction.  Worse on the right than the left.  Eyes:      Pupils: Pupils are equal, round, and reactive to light.   Neck:      Musculoskeletal: Normal range of motion and neck supple.   Cardiovascular:      Rate and Rhythm: Normal rate and regular rhythm.      Heart sounds: Normal heart sounds. No murmur.   Pulmonary:      Effort: Pulmonary effort is normal. No respiratory distress.      Breath sounds: Normal breath sounds. No wheezing.   Musculoskeletal: Normal range of motion.         General: Tenderness and deformity present.   Skin:     General: Skin is warm and dry.      Capillary Refill: Capillary refill takes less than 2 seconds.   Neurological:      Mental Status: He is alert and oriented to person, place, and time.      Cranial Nerves: No cranial nerve deficit.   Psychiatric:         Behavior: Behavior normal.         Assessment:     1. Bilateral impacted cerumen    2. Flu vaccine need      MDM:   Low medical complexity.  Patient had cerumen removed bilaterally.  See procedure note.  I have Reviewed and summarized old records.  I have performed thorough medication reconciliation today and discussed risk and benefits of each medication.    I have signed for the following orders AND/OR meds.  Orders Placed This Encounter   Procedures    Ear Cerumen Removal     This order was created via procedure documentation    Influenza - Quadrivalent (PF)     Medications Ordered This Encounter   Medications    carbamide peroxide (DEBROX) 6.5 % otic solution     Sig: Place 5 drops  into both ears 2 (two) times daily.     Refill:  0        Follow up if symptoms worsen or fail to improve, for As scheduled.    If no improvement in symptoms or symptoms worsen, advised to call/follow-up at clinic or go to ER. Patient voiced understanding and all questions/concerns were addressed.     DISCLAIMER: This note was compiled by using a speech recognition dictation system and therefore please be aware that typographical / speech recognition errors can and do occur.  Please contact me if you see any errors specifically.    Jb Chao M.D.       Office: [phone] [address] [place] [address]  FAX: [phone]

## 2020-10-19 NOTE — PATIENT INSTRUCTIONS
Follow up if symptoms worsen or fail to improve, for As scheduled.     If no improvement in symptoms or symptoms worsen, please be advised to call MD, follow-up at clinic and/or go to ER if becomes severe.    Jb Chao M.D.        We Offer TELEHEALTH & Same Day Appointments!   Book your Telehealth appointment with me through my nurse or   Clinic appointments on [employer]!    [address] [place] [address]    Office: [phone]   FAX: [phone]    Check out my Facebook Page and Follow Me at: [email or web address]    Check out my website at [employer] by clicking on: [email or web address]    To Schedule appointments online, go to [employer]: [email or web address]

## 2020-10-26 ENCOUNTER — TELEPHONE (OUTPATIENT)
Dept: FAMILY MEDICINE | Facility: CLINIC | Age: 60
End: 2020-10-26

## 2020-10-26 ENCOUNTER — OFFICE VISIT (OUTPATIENT)
Dept: FAMILY MEDICINE | Facility: CLINIC | Age: 60
End: 2020-10-26
Payer: COMMERCIAL

## 2020-10-26 VITALS
TEMPERATURE: 98 F | HEIGHT: 72 IN | BODY MASS INDEX: 25.01 KG/M2 | WEIGHT: 184.63 LBS | DIASTOLIC BLOOD PRESSURE: 88 MMHG | SYSTOLIC BLOOD PRESSURE: 136 MMHG | HEART RATE: 83 BPM

## 2020-10-26 DIAGNOSIS — L72.3 INFECTED SEBACEOUS CYST: Primary | ICD-10-CM

## 2020-10-26 DIAGNOSIS — L08.9 INFECTED SEBACEOUS CYST: Primary | ICD-10-CM

## 2020-10-26 PROCEDURE — 3008F PR BODY MASS INDEX (BMI) DOCUMENTED: ICD-10-PCS | Mod: CPTII,S$GLB,, | Performed by: NURSE PRACTITIONER

## 2020-10-26 PROCEDURE — 99213 OFFICE O/P EST LOW 20 MIN: CPT | Mod: 25,S$GLB,, | Performed by: NURSE PRACTITIONER

## 2020-10-26 PROCEDURE — 10060 I&D ABSCESS SIMPLE/SINGLE: CPT | Mod: S$GLB,,, | Performed by: NURSE PRACTITIONER

## 2020-10-26 PROCEDURE — 99999 PR PBB SHADOW E&M-EST. PATIENT-LVL III: ICD-10-PCS | Mod: PBBFAC,,, | Performed by: NURSE PRACTITIONER

## 2020-10-26 PROCEDURE — 99999 PR PBB SHADOW E&M-EST. PATIENT-LVL III: CPT | Mod: PBBFAC,,, | Performed by: NURSE PRACTITIONER

## 2020-10-26 PROCEDURE — 99213 PR OFFICE/OUTPT VISIT, EST, LEVL III, 20-29 MIN: ICD-10-PCS | Mod: 25,S$GLB,, | Performed by: NURSE PRACTITIONER

## 2020-10-26 PROCEDURE — 3079F PR MOST RECENT DIASTOLIC BLOOD PRESSURE 80-89 MM HG: ICD-10-PCS | Mod: CPTII,S$GLB,, | Performed by: NURSE PRACTITIONER

## 2020-10-26 PROCEDURE — 3008F BODY MASS INDEX DOCD: CPT | Mod: CPTII,S$GLB,, | Performed by: NURSE PRACTITIONER

## 2020-10-26 PROCEDURE — 3075F SYST BP GE 130 - 139MM HG: CPT | Mod: CPTII,S$GLB,, | Performed by: NURSE PRACTITIONER

## 2020-10-26 PROCEDURE — 10060 PR DRAIN SKIN ABSCESS SIMPLE: ICD-10-PCS | Mod: S$GLB,,, | Performed by: NURSE PRACTITIONER

## 2020-10-26 PROCEDURE — 3079F DIAST BP 80-89 MM HG: CPT | Mod: CPTII,S$GLB,, | Performed by: NURSE PRACTITIONER

## 2020-10-26 PROCEDURE — 3075F PR MOST RECENT SYSTOLIC BLOOD PRESS GE 130-139MM HG: ICD-10-PCS | Mod: CPTII,S$GLB,, | Performed by: NURSE PRACTITIONER

## 2020-10-26 RX ORDER — MUPIROCIN 20 MG/G
OINTMENT TOPICAL 3 TIMES DAILY
Qty: 15 G | Refills: 0 | Status: SHIPPED | OUTPATIENT
Start: 2020-10-26

## 2020-10-26 RX ORDER — SULFAMETHOXAZOLE AND TRIMETHOPRIM 800; 160 MG/1; MG/1
1 TABLET ORAL 2 TIMES DAILY
Qty: 20 TABLET | Refills: 0 | Status: SHIPPED | OUTPATIENT
Start: 2020-10-26 | End: 2020-10-27

## 2020-10-26 NOTE — TELEPHONE ENCOUNTER
----- Message from Savanah Sousa sent at 10/26/2020 10:29 AM CDT -----  Pt is calling regarding antibiotics. Would like an new prescriptions due to other one making him sick. Please call back at [phone]

## 2020-10-26 NOTE — PROGRESS NOTES
Subjective:       Patient ID: Ned Toledo Jr. is a 60 y.o. male.    Chief Complaint: Recurrent Skin Infections    AbscessProgression Since Onset: gradually worsening  Abscess location: back.  Associated Symptoms: no fever, no chills  Characteristics: painful, redness and swelling    Pain Scale:  3/10  Treatments Tried:  Topical antibiotics  Relieved by:  Nothing      Review of Systems   Constitutional: Negative for chills, fatigue, fever and unexpected weight change.   HENT: Negative for ear pain and sore throat.    Eyes: Negative.  Negative for pain and visual disturbance.   Respiratory: Negative for cough and shortness of breath.    Cardiovascular: Negative for chest pain and palpitations.   Gastrointestinal: Negative for abdominal pain, diarrhea, nausea and vomiting.   Genitourinary: Negative for dysuria and frequency.   Musculoskeletal: Negative for arthralgias and myalgias.   Skin: Negative for color change and rash.   Neurological: Negative for dizziness and headaches.   Psychiatric/Behavioral: Negative for sleep disturbance. The patient is not nervous/anxious.        Vitals:    10/26/20 0917   BP: 136/88   Pulse: 83   Temp: 97.9 °F (36.6 °C)       Objective:     Current Outpatient Medications   Medication Sig Dispense Refill    atorvastatin (LIPITOR) 20 MG tablet TK 1 T PO  QD      blood sugar diagnostic Strp To check BG 2 times daily, to use with insurance preferred meter 100 each 0    blood-glucose meter kit To check BG 2 times daily, to use with insurance preferred meter 1 each 0    carbamide peroxide (DEBROX) 6.5 % otic solution Place 5 drops into both ears 2 (two) times daily.  0    carisoprodoL (SOMA) 350 MG tablet Take 1 tablet (350 mg total) by mouth 4 (four) times daily as needed for Muscle spasms. 120 tablet 0    carisoprodoL (SOMA) 350 MG tablet Take 1 tablet (350 mg total) by mouth 4 (four) times daily as needed for Muscle spasms. 120 tablet 0    carisoprodoL (SOMA) 350 MG tablet Take 1  tablet (350 mg total) by mouth 4 (four) times daily as needed for Muscle spasms. 120 tablet 0    fexofenadine (ALLEGRA) 180 MG tablet Take 1 tablet (180 mg total) by mouth once daily. 90 tablet 3    HYDROcodone-acetaminophen (NORCO) 7.5-325 mg per tablet Take 1 tablet by mouth every 6 (six) hours as needed for Pain. 120 tablet 0    lancets Misc To check BG 2 times daily, to use with insurance preferred meter 100 each 0    naproxen (NAPROSYN) 500 MG tablet Take 1 tablet (500 mg total) by mouth 2 (two) times daily with meals. 60 tablet 11    ondansetron (ZOFRAN-ODT) 8 MG TbDL Take 1 tablet (8 mg total) by mouth every 6 (six) hours as needed. 20 tablet 1    promethazine (PHENERGAN) 25 MG tablet Take 1 tablet (25 mg total) by mouth every 6 (six) hours as needed for Nausea. 30 tablet 6    TRUEPLUS LANCETS 33 gauge Misc CHECK BLOOD SUGAR BID      mupirocin (BACTROBAN) 2 % ointment Apply topically 3 (three) times daily. 15 g 0     No current facility-administered medications for this visit.        Physical Exam  Vitals signs and nursing note reviewed.   Constitutional:       General: He is not in acute distress.     Appearance: He is well-developed.   HENT:      Head: Normocephalic and atraumatic.   Eyes:      Pupils: Pupils are equal, round, and reactive to light.   Neck:      Musculoskeletal: Normal range of motion and neck supple.   Cardiovascular:      Rate and Rhythm: Normal rate and regular rhythm.   Pulmonary:      Effort: Pulmonary effort is normal.      Breath sounds: Normal breath sounds.   Musculoskeletal: Normal range of motion.   Skin:     General: Skin is warm and dry.      Findings: No rash.             Comments: Fluctuant abscessed sebaceous cyst, + erythema, + tenderness   Neurological:      Mental Status: He is alert and oriented to person, place, and time.   Psychiatric:         Thought Content: Thought content normal.           Consent signed for I&D. Area cleaned with betadine and saline.  Sprayed with pain ease. #11 blade used to make an incision. Thick purulent drainage expressed. Small pocket under skin. No packing inserted. Pt tolerated well. Gauze bandage applied.     Assessment:       1. Infected sebaceous cyst        Plan:   Infected sebaceous cyst    Other orders  -     Discontinue: sulfamethoxazole-trimethoprim 800-160mg (BACTRIM DS) 800-160 mg Tab; Take 1 tablet by mouth 2 (two) times daily. for 10 days  Dispense: 20 tablet; Refill: 0  -     mupirocin (BACTROBAN) 2 % ointment; Apply topically 3 (three) times daily.  Dispense: 15 g; Refill: 0    warm compresses to area    No follow-ups on file.    There are no Patient Instructions on file for this visit.

## 2020-10-27 ENCOUNTER — TELEPHONE (OUTPATIENT)
Dept: FAMILY MEDICINE | Facility: CLINIC | Age: 60
End: 2020-10-27

## 2020-10-27 RX ORDER — CLINDAMYCIN HYDROCHLORIDE 300 MG/1
300 CAPSULE ORAL 2 TIMES DAILY
Qty: 20 CAPSULE | Refills: 0 | Status: SHIPPED | OUTPATIENT
Start: 2020-10-27 | End: 2020-11-06

## 2020-10-27 NOTE — TELEPHONE ENCOUNTER
----- Message from Leann Knowles sent at 10/27/2020 11:08 AM CDT -----  Regarding: medicaiton has broke him out  Contact: pt  Caller is requesting a call back regarding the medication has broke him out.  Please call back at  [phone].  Thanks.

## 2020-10-27 NOTE — TELEPHONE ENCOUNTER
Patient states that he now has hives after taking BACTRIM DS patient informed to STOP taking medication and informed of ER precautions with worsening symotoms. please call an alternative into [facility].

## 2020-12-10 ENCOUNTER — OFFICE VISIT (OUTPATIENT)
Dept: FAMILY MEDICINE | Facility: CLINIC | Age: 60
End: 2020-12-10
Payer: COMMERCIAL

## 2020-12-10 ENCOUNTER — TELEPHONE (OUTPATIENT)
Dept: FAMILY MEDICINE | Facility: CLINIC | Age: 60
End: 2020-12-10

## 2020-12-10 VITALS
SYSTOLIC BLOOD PRESSURE: 139 MMHG | WEIGHT: 182.63 LBS | HEIGHT: 72 IN | BODY MASS INDEX: 24.74 KG/M2 | TEMPERATURE: 98 F | DIASTOLIC BLOOD PRESSURE: 84 MMHG | HEART RATE: 68 BPM

## 2020-12-10 DIAGNOSIS — Z79.899 ENCOUNTER FOR LONG-TERM (CURRENT) USE OF MEDICATIONS: ICD-10-CM

## 2020-12-10 DIAGNOSIS — M54.50 CHRONIC LEFT-SIDED LOW BACK PAIN WITHOUT SCIATICA: ICD-10-CM

## 2020-12-10 DIAGNOSIS — G89.29 CHRONIC LEFT-SIDED LOW BACK PAIN WITHOUT SCIATICA: ICD-10-CM

## 2020-12-10 PROCEDURE — 3075F PR MOST RECENT SYSTOLIC BLOOD PRESS GE 130-139MM HG: ICD-10-PCS | Mod: CPTII,S$GLB,, | Performed by: FAMILY MEDICINE

## 2020-12-10 PROCEDURE — 3079F DIAST BP 80-89 MM HG: CPT | Mod: CPTII,S$GLB,, | Performed by: FAMILY MEDICINE

## 2020-12-10 PROCEDURE — 3008F PR BODY MASS INDEX (BMI) DOCUMENTED: ICD-10-PCS | Mod: CPTII,S$GLB,, | Performed by: FAMILY MEDICINE

## 2020-12-10 PROCEDURE — 99999 PR PBB SHADOW E&M-EST. PATIENT-LVL IV: CPT | Mod: PBBFAC,,, | Performed by: FAMILY MEDICINE

## 2020-12-10 PROCEDURE — 3079F PR MOST RECENT DIASTOLIC BLOOD PRESSURE 80-89 MM HG: ICD-10-PCS | Mod: CPTII,S$GLB,, | Performed by: FAMILY MEDICINE

## 2020-12-10 PROCEDURE — 1126F PR PAIN SEVERITY QUANTIFIED, NO PAIN PRESENT: ICD-10-PCS | Mod: S$GLB,,, | Performed by: FAMILY MEDICINE

## 2020-12-10 PROCEDURE — 99999 PR PBB SHADOW E&M-EST. PATIENT-LVL IV: ICD-10-PCS | Mod: PBBFAC,,, | Performed by: FAMILY MEDICINE

## 2020-12-10 PROCEDURE — 3008F BODY MASS INDEX DOCD: CPT | Mod: CPTII,S$GLB,, | Performed by: FAMILY MEDICINE

## 2020-12-10 PROCEDURE — 3075F SYST BP GE 130 - 139MM HG: CPT | Mod: CPTII,S$GLB,, | Performed by: FAMILY MEDICINE

## 2020-12-10 PROCEDURE — 99213 PR OFFICE/OUTPT VISIT, EST, LEVL III, 20-29 MIN: ICD-10-PCS | Mod: S$GLB,,, | Performed by: FAMILY MEDICINE

## 2020-12-10 PROCEDURE — 99213 OFFICE O/P EST LOW 20 MIN: CPT | Mod: S$GLB,,, | Performed by: FAMILY MEDICINE

## 2020-12-10 PROCEDURE — 1126F AMNT PAIN NOTED NONE PRSNT: CPT | Mod: S$GLB,,, | Performed by: FAMILY MEDICINE

## 2020-12-10 RX ORDER — CARISOPRODOL 350 MG/1
350 TABLET ORAL 4 TIMES DAILY PRN
Qty: 120 TABLET | Refills: 0 | Status: SHIPPED | OUTPATIENT
Start: 2021-01-07 | End: 2021-03-12 | Stop reason: SDUPTHER

## 2020-12-10 RX ORDER — HYDROCODONE BITARTRATE AND ACETAMINOPHEN 7.5; 325 MG/1; MG/1
1 TABLET ORAL EVERY 6 HOURS PRN
Qty: 120 TABLET | Refills: 0 | Status: SHIPPED | OUTPATIENT
Start: 2020-12-10 | End: 2021-03-12 | Stop reason: SDUPTHER

## 2020-12-10 RX ORDER — HYDROCODONE BITARTRATE AND ACETAMINOPHEN 7.5; 325 MG/1; MG/1
1 TABLET ORAL EVERY 6 HOURS PRN
Qty: 120 TABLET | Refills: 0 | Status: SHIPPED | OUTPATIENT
Start: 2021-01-07 | End: 2021-03-12 | Stop reason: SDUPTHER

## 2020-12-10 RX ORDER — HYDROCODONE BITARTRATE AND ACETAMINOPHEN 7.5; 325 MG/1; MG/1
1 TABLET ORAL EVERY 6 HOURS PRN
Qty: 120 TABLET | Refills: 0 | Status: SHIPPED | OUTPATIENT
Start: 2021-02-05 | End: 2021-03-12 | Stop reason: SDUPTHER

## 2020-12-10 RX ORDER — CARISOPRODOL 350 MG/1
350 TABLET ORAL 4 TIMES DAILY PRN
Qty: 120 TABLET | Refills: 0 | Status: SHIPPED | OUTPATIENT
Start: 2020-12-10 | End: 2021-03-12 | Stop reason: SDUPTHER

## 2020-12-10 RX ORDER — CARISOPRODOL 350 MG/1
350 TABLET ORAL 4 TIMES DAILY PRN
Qty: 120 TABLET | Refills: 0 | Status: SHIPPED | OUTPATIENT
Start: 2021-02-05 | End: 2021-03-12 | Stop reason: SDUPTHER

## 2020-12-10 NOTE — PATIENT INSTRUCTIONS
Follow up in about 3 months (around 3/10/2021), or if symptoms worsen or fail to improve, for Med refills, LAB RESULTS.     If no improvement in symptoms or symptoms worsen, please be advised to call MD, follow-up at clinic and/or go to ER if becomes severe.    Jb Chao M.D.        We Offer TELEHEALTH & Same Day Appointments!   Book your Telehealth appointment with me through my nurse or   Clinic appointments on [employer]!    [address] [place] [address]    Office: [phone]   FAX: [phone]    Check out my Facebook Page and Follow Me at: [email or web address]    Check out my website at [employer] by clicking on: [email or web address]    To Schedule appointments online, go to [employer]: [email or web address]

## 2020-12-10 NOTE — PROGRESS NOTES
PLAN:      Problem List Items Addressed This Visit     Left-sided low back pain without sciatica (Chronic)     December 2020:  Refill medications as prescribed.  Check urine toxicology  Previous plan:  Refill pain medication.  Patient is being monitored by .The patient was checked in the [facility] Board of Pharmacy's  Prescription Monitoring Program. There appears to be no incongruities with the patient's verbalized history.            Relevant Medications    carisoprodoL (SOMA) 350 MG tablet (Start on 2/5/2021)    carisoprodoL (SOMA) 350 MG tablet (Start on 1/7/2021)    carisoprodoL (SOMA) 350 MG tablet    HYDROcodone-acetaminophen (NORCO) 7.5-325 mg per tablet (Start on 2/5/2021)    HYDROcodone-acetaminophen (NORCO) 7.5-325 mg per tablet (Start on 1/7/2021)    HYDROcodone-acetaminophen (NORCO) 7.5-325 mg per tablet    Other Relevant Orders    Toxicology Screen, Urine    Encounter for long-term (current) use of medications (Chronic)     Complete history and physical was completed today.  Complete and thorough medication reconciliation was performed.  Discussed risks and benefits of medications.  Advised patient on orders and health maintenance.  We discussed old records and old labs if available.  Will request any records not available through epic.  Continue current medications listed on your summary sheet.           Relevant Medications    carisoprodoL (SOMA) 350 MG tablet (Start on 2/5/2021)    carisoprodoL (SOMA) 350 MG tablet (Start on 1/7/2021)    carisoprodoL (SOMA) 350 MG tablet    HYDROcodone-acetaminophen (NORCO) 7.5-325 mg per tablet (Start on 2/5/2021)    HYDROcodone-acetaminophen (NORCO) 7.5-325 mg per tablet (Start on 1/7/2021)    HYDROcodone-acetaminophen (NORCO) 7.5-325 mg per tablet    Other Relevant Orders    Toxicology Screen, Urine        Future Appointments     Date Provider Specialty Appt Notes    3/12/2021 Jb Chao MD Family Medicine 3 mo f/u    6/9/2021 Jb Chao,  MD Family Medicine 3 mo f/u    9/10/2021 Jb Chao MD Family Medicine 3 mo f/u    12/10/2021 Jb Chao MD Family Medicine 3 mo f/u           Medication List with Changes/Refills   Current Medications    ATORVASTATIN (LIPITOR) 20 MG TABLET    TK 1 T PO  QD    BLOOD SUGAR DIAGNOSTIC STRP    To check BG 2 times daily, to use with insurance preferred meter    BLOOD-GLUCOSE METER KIT    To check BG 2 times daily, to use with insurance preferred meter    CARBAMIDE PEROXIDE (DEBROX) 6.5 % OTIC SOLUTION    Place 5 drops into both ears 2 (two) times daily.    FEXOFENADINE (ALLEGRA) 180 MG TABLET    Take 1 tablet (180 mg total) by mouth once daily.    LANCETS MISC    To check BG 2 times daily, to use with insurance preferred meter    MUPIROCIN (BACTROBAN) 2 % OINTMENT    Apply topically 3 (three) times daily.    NAPROXEN (NAPROSYN) 500 MG TABLET    Take 1 tablet (500 mg total) by mouth 2 (two) times daily with meals.    ONDANSETRON (ZOFRAN-ODT) 8 MG TBDL    Take 1 tablet (8 mg total) by mouth every 6 (six) hours as needed.    PROMETHAZINE (PHENERGAN) 25 MG TABLET    Take 1 tablet (25 mg total) by mouth every 6 (six) hours as needed for Nausea.    TRUEPLUS LANCETS 33 GAUGE MISC    CHECK BLOOD SUGAR BID   Changed and/or Refilled Medications    Modified Medication Previous Medication    CARISOPRODOL (SOMA) 350 MG TABLET carisoprodoL (SOMA) 350 MG tablet       Take 1 tablet (350 mg total) by mouth 4 (four) times daily as needed for Muscle spasms.    Take 1 tablet (350 mg total) by mouth 4 (four) times daily as needed for Muscle spasms.    CARISOPRODOL (SOMA) 350 MG TABLET carisoprodoL (SOMA) 350 MG tablet       Take 1 tablet (350 mg total) by mouth 4 (four) times daily as needed for Muscle spasms.    Take 1 tablet (350 mg total) by mouth 4 (four) times daily as needed for Muscle spasms.    CARISOPRODOL (SOMA) 350 MG TABLET carisoprodoL (SOMA) 350 MG tablet       Take 1 tablet (350 mg total) by mouth 4 (four)  times daily as needed for Muscle spasms.    Take 1 tablet (350 mg total) by mouth 4 (four) times daily as needed for Muscle spasms.    HYDROCODONE-ACETAMINOPHEN (NORCO) 7.5-325 MG PER TABLET HYDROcodone-acetaminophen (NORCO) 7.5-325 mg per tablet       Take 1 tablet by mouth every 6 (six) hours as needed for Pain.    Take 1 tablet by mouth every 6 (six) hours as needed for Pain.    HYDROCODONE-ACETAMINOPHEN (NORCO) 7.5-325 MG PER TABLET HYDROcodone-acetaminophen (NORCO) 7.5-325 mg per tablet       Take 1 tablet by mouth every 6 (six) hours as needed for Pain.    Take 1 tablet by mouth every 6 (six) hours as needed for Pain.    HYDROCODONE-ACETAMINOPHEN (NORCO) 7.5-325 MG PER TABLET HYDROcodone-acetaminophen (NORCO) 7.5-325 mg per tablet       Take 1 tablet by mouth every 6 (six) hours as needed for Pain.    Take 1 tablet by mouth every 6 (six) hours as needed for Pain.       Jb Chao M.D.     ==========================================================================  Subjective:      Patient ID: Ned Toledo Jr. is a 60 y.o. male.  has a past medical history of Diabetes mellitus, type 2, Fatty liver, Hypertension, Penetrating foot wound, and Sciatica.     Chief Complaint: Follow-up, Medication Refill, and Low-back Pain      Problem List Items Addressed This Visit     Left-sided low back pain without sciatica (Chronic)    Overview     Chronic.  Stable on current pain medication regimen.  Patient was being managed by previous PCP.  Prescriptions are being taken over by me.  Patient reports that he has had MRI and injections in the past which did not help.  Patient had a lifting injury many years ago.    Currently stable on hydrocodone 7.5 mg and Soma 350 mg.    He has been to physical therapy for this condition.  Patient was under worker's Comp for this incident.    The patient was checked in the [facility] Board of Pharmacy's  Prescription Monitoring Program. There appears to be no incongruities  with the patient's verbalized history.            Current Assessment & Plan     December 2020:  Refill medications as prescribed.  Check urine toxicology  Previous plan:  Refill pain medication.  Patient is being monitored by .The patient was checked in the [facility] Board of Pharmacy's  Prescription Monitoring Program. There appears to be no incongruities with the patient's verbalized history.            Encounter for long-term (current) use of medications (Chronic)    Overview     Initial HPI March 2020:  CHRONIC. Stable. Compliant with medications for managed conditions. See medication list. No SE reported.   Routine lab analysis is being monitored. Refills were addressed.  ====================================================  JUNE 2020:  Labs are stable.  Refill medication.  CHRONIC. Stable. Compliant with medications for managed conditions. See medication list. No SE reported.   Routine lab analysis is being monitored. Refills were addressed.  ==================================================  SEPTEMBER 2020:  CHRONIC. Stable. Compliant with medications for managed conditions. See medication list. No SE reported.   Routine lab analysis is being monitored. Refills were addressed.  ==================================================  DECEMBER 2020:  Reviewed labs.  CHRONIC. Stable. Compliant with medications for managed conditions. See medication list. No SE reported.   Routine lab analysis is being monitored. Refills were addressed.  Lab Results   Component Value Date    WBC 7.78 03/25/2020    HGB 14.9 03/25/2020    HCT 48.5 03/25/2020     (H) 03/25/2020     03/25/2020         Chemistry        Component Value Date/Time     09/04/2020 0735    K 4.3 09/04/2020 0735     09/04/2020 0735    CO2 24 09/04/2020 0735    BUN 17 09/04/2020 0735    CREATININE 1.0 09/04/2020 0735     (H) 09/04/2020 0735        Component Value Date/Time    CALCIUM 9.2 09/04/2020 0735    ALKPHOS 46 (L)  09/04/2020 0735    AST 18 09/04/2020 0735    ALT 16 09/04/2020 0735    BILITOT 0.9 09/04/2020 0735    ESTGFRAFRICA >60.0 09/04/2020 0735    EGFRNONAA >60.0 09/04/2020 0735          Lab Results   Component Value Date    TSH 1.236 09/04/2020       =================================================         Current Assessment & Plan     Complete history and physical was completed today.  Complete and thorough medication reconciliation was performed.  Discussed risks and benefits of medications.  Advised patient on orders and health maintenance.  We discussed old records and old labs if available.  Will request any records not available through epic.  Continue current medications listed on your summary sheet.                  Past Medical History:  Past Medical History:   Diagnosis Date    Diabetes mellitus, type 2     Fatty liver     Hypertension     Penetrating foot wound     Sciatica      Past Surgical History:   Procedure Laterality Date    HERNIA REPAIR       Review of patient's allergies indicates:   Allergen Reactions    Atorvastatin     Bactrim [sulfamethoxazole-trimethoprim] Hives     Medication List with Changes/Refills   Current Medications    ATORVASTATIN (LIPITOR) 20 MG TABLET    TK 1 T PO  QD    BLOOD SUGAR DIAGNOSTIC STRP    To check BG 2 times daily, to use with insurance preferred meter    BLOOD-GLUCOSE METER KIT    To check BG 2 times daily, to use with insurance preferred meter    CARBAMIDE PEROXIDE (DEBROX) 6.5 % OTIC SOLUTION    Place 5 drops into both ears 2 (two) times daily.    FEXOFENADINE (ALLEGRA) 180 MG TABLET    Take 1 tablet (180 mg total) by mouth once daily.    LANCETS MISC    To check BG 2 times daily, to use with insurance preferred meter    MUPIROCIN (BACTROBAN) 2 % OINTMENT    Apply topically 3 (three) times daily.    NAPROXEN (NAPROSYN) 500 MG TABLET    Take 1 tablet (500 mg total) by mouth 2 (two) times daily with meals.    ONDANSETRON (ZOFRAN-ODT) 8 MG TBDL    Take 1 tablet  (8 mg total) by mouth every 6 (six) hours as needed.    PROMETHAZINE (PHENERGAN) 25 MG TABLET    Take 1 tablet (25 mg total) by mouth every 6 (six) hours as needed for Nausea.    TRUEPLUS LANCETS 33 GAUGE MISC    CHECK BLOOD SUGAR BID   Changed and/or Refilled Medications    Modified Medication Previous Medication    CARISOPRODOL (SOMA) 350 MG TABLET carisoprodoL (SOMA) 350 MG tablet       Take 1 tablet (350 mg total) by mouth 4 (four) times daily as needed for Muscle spasms.    Take 1 tablet (350 mg total) by mouth 4 (four) times daily as needed for Muscle spasms.    CARISOPRODOL (SOMA) 350 MG TABLET carisoprodoL (SOMA) 350 MG tablet       Take 1 tablet (350 mg total) by mouth 4 (four) times daily as needed for Muscle spasms.    Take 1 tablet (350 mg total) by mouth 4 (four) times daily as needed for Muscle spasms.    CARISOPRODOL (SOMA) 350 MG TABLET carisoprodoL (SOMA) 350 MG tablet       Take 1 tablet (350 mg total) by mouth 4 (four) times daily as needed for Muscle spasms.    Take 1 tablet (350 mg total) by mouth 4 (four) times daily as needed for Muscle spasms.    HYDROCODONE-ACETAMINOPHEN (NORCO) 7.5-325 MG PER TABLET HYDROcodone-acetaminophen (NORCO) 7.5-325 mg per tablet       Take 1 tablet by mouth every 6 (six) hours as needed for Pain.    Take 1 tablet by mouth every 6 (six) hours as needed for Pain.    HYDROCODONE-ACETAMINOPHEN (NORCO) 7.5-325 MG PER TABLET HYDROcodone-acetaminophen (NORCO) 7.5-325 mg per tablet       Take 1 tablet by mouth every 6 (six) hours as needed for Pain.    Take 1 tablet by mouth every 6 (six) hours as needed for Pain.    HYDROCODONE-ACETAMINOPHEN (NORCO) 7.5-325 MG PER TABLET HYDROcodone-acetaminophen (NORCO) 7.5-325 mg per tablet       Take 1 tablet by mouth every 6 (six) hours as needed for Pain.    Take 1 tablet by mouth every 6 (six) hours as needed for Pain.      Social History     Tobacco Use    Smoking status: Never Smoker    Smokeless tobacco: Never Used   Substance  Use Topics    Alcohol use: No      History reviewed. No pertinent family history.    I have reviewed the complete PMH, social history, surgical history, allergies and medications.  As well as family history.    Review of Systems   Constitutional: Negative for activity change and fatigue.   HENT: Negative for congestion and sinus pain.    Eyes: Negative for visual disturbance.   Respiratory: Negative for chest tightness and shortness of breath.    Cardiovascular: Negative for palpitations and leg swelling.   Gastrointestinal: Negative for abdominal pain, diarrhea and nausea.   Endocrine: Negative for polyuria.   Genitourinary: Negative for difficulty urinating and frequency.   Musculoskeletal: Positive for arthralgias and back pain. Negative for joint swelling.   Skin: Negative for rash.   Neurological: Negative for dizziness and headaches.   Psychiatric/Behavioral: Negative for agitation. The patient is not nervous/anxious.      Objective:   /84   Pulse 68   Temp 97.6 °F (36.4 °C) (Temporal)   Ht 6' (1.829 m)   Wt 82.8 kg (182 lb 9.6 oz)   BMI 24.77 kg/m²   Physical Exam  Vitals signs and nursing note reviewed.   Constitutional:       General: He is not in acute distress.     Appearance: He is well-developed.   HENT:      Head: Normocephalic and atraumatic.   Eyes:      Pupils: Pupils are equal, round, and reactive to light.   Neck:      Musculoskeletal: Normal range of motion and neck supple.   Cardiovascular:      Rate and Rhythm: Normal rate and regular rhythm.      Heart sounds: Normal heart sounds. No murmur.   Pulmonary:      Effort: Pulmonary effort is normal. No respiratory distress.      Breath sounds: Normal breath sounds. No wheezing.   Musculoskeletal:         General: Tenderness and deformity present.      Lumbar back: He exhibits decreased range of motion, tenderness, bony tenderness, deformity, pain and spasm. He exhibits no swelling, no edema and no laceration.   Skin:     General: Skin  is warm and dry.      Capillary Refill: Capillary refill takes less than 2 seconds.   Neurological:      Mental Status: He is alert and oriented to person, place, and time.      Cranial Nerves: No cranial nerve deficit.   Psychiatric:         Behavior: Behavior normal.         Assessment:     1. Chronic left-sided low back pain without sciatica    2. Encounter for long-term (current) use of medications      MDM:   Moderate complexity.  Moderate risk.  I have Reviewed and summarized old records.  I have performed thorough medication reconciliation today and discussed risk and benefits of each medication.  I have reviewed labs and discussed with patient.  All questions were answered.  I am requesting old records and will review them once they are available.    The patient was checked in the [facility] Board of Pharmacy's  Prescription Monitoring Program. There appears to be no incongruities with the patient's verbalized history.   .    I have signed for the following orders AND/OR meds.  Orders Placed This Encounter   Procedures    Toxicology Screen, Urine     Standing Status:   Future     Standing Expiration Date:   2/11/2022     Order Specific Question:   Specimen Source     Answer:   Urine     Medications Ordered This Encounter   Medications    carisoprodoL (SOMA) 350 MG tablet     Sig: Take 1 tablet (350 mg total) by mouth 4 (four) times daily as needed for Muscle spasms.     Dispense:  120 tablet     Refill:  0    carisoprodoL (SOMA) 350 MG tablet     Sig: Take 1 tablet (350 mg total) by mouth 4 (four) times daily as needed for Muscle spasms.     Dispense:  120 tablet     Refill:  0    carisoprodoL (SOMA) 350 MG tablet     Sig: Take 1 tablet (350 mg total) by mouth 4 (four) times daily as needed for Muscle spasms.     Dispense:  120 tablet     Refill:  0    HYDROcodone-acetaminophen (NORCO) 7.5-325 mg per tablet     Sig: Take 1 tablet by mouth every 6 (six) hours as needed for Pain.     Dispense:   120 tablet     Refill:  0     Quantity prescribed more than 7 day supply? Yes, quantity medically necessary    HYDROcodone-acetaminophen (NORCO) 7.5-325 mg per tablet     Sig: Take 1 tablet by mouth every 6 (six) hours as needed for Pain.     Dispense:  120 tablet     Refill:  0     Quantity prescribed more than 7 day supply? Yes, quantity medically necessary    HYDROcodone-acetaminophen (NORCO) 7.5-325 mg per tablet     Sig: Take 1 tablet by mouth every 6 (six) hours as needed for Pain.     Dispense:  120 tablet     Refill:  0     Quantity prescribed more than 7 day supply? Yes, quantity medically necessary        Follow up in about 3 months (around 3/10/2021), or if symptoms worsen or fail to improve, for Med refills, LAB RESULTS.    If no improvement in symptoms or symptoms worsen, advised to call/follow-up at clinic or go to ER. Patient voiced understanding and all questions/concerns were addressed.     DISCLAIMER: This note was compiled by using a speech recognition dictation system and therefore please be aware that typographical / speech recognition errors can and do occur.  Please contact me if you see any errors specifically.    Jb Chao M.D.       Office: [phone]   [address] [place] [address]  FAX: [phone]

## 2020-12-10 NOTE — Clinical Note
Please check on pain contract that was completed previously.  If unable to be found.  Will need to get pain contract signed soon as possible.  Also please update specially comment on his back specialist who performed injections and get a copy of his previous MRI.

## 2020-12-10 NOTE — TELEPHONE ENCOUNTER
Called and spoke with the patient, patient states that he may have to change pharmacy if [facility] can't get his medicine to him as he needs it.    Called [facility] at [phone] spoke with Tessa who states that there must be a miscommunication and she is going to try and fix the issue.    Called and spoke with the patient, patient was notified of conversation with Tessa and he states that he will call and speak with her.

## 2020-12-10 NOTE — TELEPHONE ENCOUNTER
----- Message from Keila Bueno sent at 12/10/2020 10:04 AM CST -----  Regarding: pain medication  Pt states the pharmacy has to order his pain medication and may not get it for 3 or more days.  Pt is asking office to call [phone].  Pt can be reached at [phone]

## 2020-12-13 NOTE — ASSESSMENT & PLAN NOTE
December 2020:  Refill medications as prescribed.  Check urine toxicology  Previous plan:  Refill pain medication.  Patient is being monitored by .The patient was checked in the [facility] Board of Pharmacy's  Prescription Monitoring Program. There appears to be no incongruities with the patient's verbalized history.

## 2020-12-21 ENCOUNTER — TELEPHONE (OUTPATIENT)
Dept: FAMILY MEDICINE | Facility: CLINIC | Age: 60
End: 2020-12-21

## 2020-12-21 NOTE — TELEPHONE ENCOUNTER
----- Message from Jb Chao MD sent at 12/13/2020  2:16 PM CST -----  Please check on pain contract that was completed previously.  If unable to be found.  Will need to get pain contract signed soon as possible.  Also please update specially comment on his back specialist who performed injections and get a copy of his previous MRI.

## 2020-12-21 NOTE — TELEPHONE ENCOUNTER
Previously signed pain contract not found in media at this time, will need contract resigned at next office visit.

## 2021-03-05 ENCOUNTER — TELEPHONE (OUTPATIENT)
Dept: FAMILY MEDICINE | Facility: CLINIC | Age: 61
End: 2021-03-05

## 2021-03-05 ENCOUNTER — LAB VISIT (OUTPATIENT)
Dept: LAB | Facility: HOSPITAL | Age: 61
End: 2021-03-05
Attending: FAMILY MEDICINE
Payer: COMMERCIAL

## 2021-03-05 DIAGNOSIS — Z13.6 ENCOUNTER FOR LIPID SCREENING FOR CARDIOVASCULAR DISEASE: ICD-10-CM

## 2021-03-05 DIAGNOSIS — Z13.220 ENCOUNTER FOR LIPID SCREENING FOR CARDIOVASCULAR DISEASE: ICD-10-CM

## 2021-03-05 DIAGNOSIS — Z79.899 ENCOUNTER FOR LONG-TERM (CURRENT) USE OF MEDICATIONS: ICD-10-CM

## 2021-03-05 DIAGNOSIS — E11.9 TYPE 2 DIABETES MELLITUS WITHOUT COMPLICATION, WITHOUT LONG-TERM CURRENT USE OF INSULIN: ICD-10-CM

## 2021-03-05 LAB
CHOLEST SERPL-MCNC: 179 MG/DL (ref 120–199)
CHOLEST/HDLC SERPL: 4.7 {RATIO} (ref 2–5)
HDLC SERPL-MCNC: 38 MG/DL (ref 40–75)
HDLC SERPL: 21.2 % (ref 20–50)
LDLC SERPL CALC-MCNC: 120.2 MG/DL (ref 63–159)
NONHDLC SERPL-MCNC: 141 MG/DL
TRIGL SERPL-MCNC: 104 MG/DL (ref 30–150)

## 2021-03-05 PROCEDURE — 83036 HEMOGLOBIN GLYCOSYLATED A1C: CPT | Performed by: FAMILY MEDICINE

## 2021-03-05 PROCEDURE — 36415 COLL VENOUS BLD VENIPUNCTURE: CPT | Mod: PO | Performed by: FAMILY MEDICINE

## 2021-03-05 PROCEDURE — 80061 LIPID PANEL: CPT | Performed by: FAMILY MEDICINE

## 2021-03-06 LAB
ESTIMATED AVG GLUCOSE: 160 MG/DL (ref 68–131)
HBA1C MFR BLD: 7.2 % (ref 4–5.6)

## 2021-03-12 ENCOUNTER — OFFICE VISIT (OUTPATIENT)
Dept: FAMILY MEDICINE | Facility: CLINIC | Age: 61
End: 2021-03-12
Payer: COMMERCIAL

## 2021-03-12 VITALS
TEMPERATURE: 98 F | DIASTOLIC BLOOD PRESSURE: 78 MMHG | BODY MASS INDEX: 25.76 KG/M2 | HEART RATE: 74 BPM | HEIGHT: 72 IN | SYSTOLIC BLOOD PRESSURE: 138 MMHG | WEIGHT: 190.19 LBS

## 2021-03-12 DIAGNOSIS — Z79.899 ENCOUNTER FOR LONG-TERM (CURRENT) USE OF MEDICATIONS: ICD-10-CM

## 2021-03-12 DIAGNOSIS — I15.2 HYPERTENSION ASSOCIATED WITH DIABETES: ICD-10-CM

## 2021-03-12 DIAGNOSIS — G89.29 CHRONIC LEFT-SIDED LOW BACK PAIN WITHOUT SCIATICA: ICD-10-CM

## 2021-03-12 DIAGNOSIS — R21 RASH: Primary | ICD-10-CM

## 2021-03-12 DIAGNOSIS — M54.50 CHRONIC LEFT-SIDED LOW BACK PAIN WITHOUT SCIATICA: ICD-10-CM

## 2021-03-12 DIAGNOSIS — E11.59 HYPERTENSION ASSOCIATED WITH DIABETES: ICD-10-CM

## 2021-03-12 PROBLEM — E11.65 TYPE 2 DIABETES MELLITUS WITH HYPERGLYCEMIA, WITHOUT LONG-TERM CURRENT USE OF INSULIN: Chronic | Status: ACTIVE | Noted: 2019-06-12

## 2021-03-12 PROCEDURE — 3051F HG A1C>EQUAL 7.0%<8.0%: CPT | Mod: CPTII,S$GLB,, | Performed by: FAMILY MEDICINE

## 2021-03-12 PROCEDURE — 3075F PR MOST RECENT SYSTOLIC BLOOD PRESS GE 130-139MM HG: ICD-10-PCS | Mod: CPTII,S$GLB,, | Performed by: FAMILY MEDICINE

## 2021-03-12 PROCEDURE — 3008F PR BODY MASS INDEX (BMI) DOCUMENTED: ICD-10-PCS | Mod: CPTII,S$GLB,, | Performed by: FAMILY MEDICINE

## 2021-03-12 PROCEDURE — 99214 OFFICE O/P EST MOD 30 MIN: CPT | Mod: S$GLB,,, | Performed by: FAMILY MEDICINE

## 2021-03-12 PROCEDURE — 3051F PR MOST RECENT HEMOGLOBIN A1C LEVEL 7.0 - < 8.0%: ICD-10-PCS | Mod: CPTII,S$GLB,, | Performed by: FAMILY MEDICINE

## 2021-03-12 PROCEDURE — 99214 PR OFFICE/OUTPT VISIT, EST, LEVL IV, 30-39 MIN: ICD-10-PCS | Mod: S$GLB,,, | Performed by: FAMILY MEDICINE

## 2021-03-12 PROCEDURE — 99999 PR PBB SHADOW E&M-EST. PATIENT-LVL IV: CPT | Mod: PBBFAC,,, | Performed by: FAMILY MEDICINE

## 2021-03-12 PROCEDURE — 1125F AMNT PAIN NOTED PAIN PRSNT: CPT | Mod: S$GLB,,, | Performed by: FAMILY MEDICINE

## 2021-03-12 PROCEDURE — 99999 PR PBB SHADOW E&M-EST. PATIENT-LVL IV: ICD-10-PCS | Mod: PBBFAC,,, | Performed by: FAMILY MEDICINE

## 2021-03-12 PROCEDURE — 3078F PR MOST RECENT DIASTOLIC BLOOD PRESSURE < 80 MM HG: ICD-10-PCS | Mod: CPTII,S$GLB,, | Performed by: FAMILY MEDICINE

## 2021-03-12 PROCEDURE — 3078F DIAST BP <80 MM HG: CPT | Mod: CPTII,S$GLB,, | Performed by: FAMILY MEDICINE

## 2021-03-12 PROCEDURE — 3008F BODY MASS INDEX DOCD: CPT | Mod: CPTII,S$GLB,, | Performed by: FAMILY MEDICINE

## 2021-03-12 PROCEDURE — 1125F PR PAIN SEVERITY QUANTIFIED, PAIN PRESENT: ICD-10-PCS | Mod: S$GLB,,, | Performed by: FAMILY MEDICINE

## 2021-03-12 PROCEDURE — 3075F SYST BP GE 130 - 139MM HG: CPT | Mod: CPTII,S$GLB,, | Performed by: FAMILY MEDICINE

## 2021-03-12 RX ORDER — CARISOPRODOL 350 MG/1
350 TABLET ORAL 4 TIMES DAILY PRN
Qty: 120 TABLET | Refills: 0 | Status: SHIPPED | OUTPATIENT
Start: 2021-03-12 | End: 2021-06-02 | Stop reason: SDUPTHER

## 2021-03-12 RX ORDER — CARISOPRODOL 350 MG/1
350 TABLET ORAL 4 TIMES DAILY PRN
Qty: 120 TABLET | Refills: 0 | Status: SHIPPED | OUTPATIENT
Start: 2021-04-12 | End: 2021-06-02 | Stop reason: SDUPTHER

## 2021-03-12 RX ORDER — HYDROCODONE BITARTRATE AND ACETAMINOPHEN 7.5; 325 MG/1; MG/1
1 TABLET ORAL EVERY 6 HOURS PRN
Qty: 120 TABLET | Refills: 0 | Status: SHIPPED | OUTPATIENT
Start: 2021-04-11 | End: 2021-05-11

## 2021-03-12 RX ORDER — HYDROCODONE BITARTRATE AND ACETAMINOPHEN 7.5; 325 MG/1; MG/1
1 TABLET ORAL EVERY 6 HOURS PRN
Qty: 120 TABLET | Refills: 0 | Status: SHIPPED | OUTPATIENT
Start: 2021-03-12 | End: 2021-04-11

## 2021-03-12 RX ORDER — CARISOPRODOL 350 MG/1
350 TABLET ORAL 4 TIMES DAILY PRN
Qty: 120 TABLET | Refills: 0 | Status: SHIPPED | OUTPATIENT
Start: 2021-05-12 | End: 2021-06-02 | Stop reason: SDUPTHER

## 2021-03-12 RX ORDER — HYDROCODONE BITARTRATE AND ACETAMINOPHEN 7.5; 325 MG/1; MG/1
1 TABLET ORAL EVERY 6 HOURS PRN
Qty: 120 TABLET | Refills: 0 | Status: SHIPPED | OUTPATIENT
Start: 2021-05-10 | End: 2021-06-02 | Stop reason: SDUPTHER

## 2021-03-12 RX ORDER — KETOCONAZOLE 20 MG/G
CREAM TOPICAL 2 TIMES DAILY
Qty: 60 G | Refills: 6 | Status: SHIPPED | OUTPATIENT
Start: 2021-03-12 | End: 2024-02-09 | Stop reason: SDUPTHER

## 2021-05-05 ENCOUNTER — TELEPHONE (OUTPATIENT)
Dept: FAMILY MEDICINE | Facility: CLINIC | Age: 61
End: 2021-05-05

## 2021-05-06 ENCOUNTER — OFFICE VISIT (OUTPATIENT)
Dept: FAMILY MEDICINE | Facility: CLINIC | Age: 61
End: 2021-05-06
Payer: COMMERCIAL

## 2021-05-06 VITALS
WEIGHT: 188.38 LBS | SYSTOLIC BLOOD PRESSURE: 137 MMHG | HEART RATE: 93 BPM | HEIGHT: 72 IN | TEMPERATURE: 98 F | BODY MASS INDEX: 25.52 KG/M2 | DIASTOLIC BLOOD PRESSURE: 86 MMHG

## 2021-05-06 DIAGNOSIS — J06.9 UPPER RESPIRATORY TRACT INFECTION, UNSPECIFIED TYPE: Primary | ICD-10-CM

## 2021-05-06 PROCEDURE — 99213 PR OFFICE/OUTPT VISIT, EST, LEVL III, 20-29 MIN: ICD-10-PCS | Mod: 25,S$GLB,, | Performed by: FAMILY MEDICINE

## 2021-05-06 PROCEDURE — 3008F BODY MASS INDEX DOCD: CPT | Mod: CPTII,S$GLB,, | Performed by: FAMILY MEDICINE

## 2021-05-06 PROCEDURE — 1126F PR PAIN SEVERITY QUANTIFIED, NO PAIN PRESENT: ICD-10-PCS | Mod: S$GLB,,, | Performed by: FAMILY MEDICINE

## 2021-05-06 PROCEDURE — 96372 PR INJECTION,THERAP/PROPH/DIAG2ST, IM OR SUBCUT: ICD-10-PCS | Mod: S$GLB,,, | Performed by: FAMILY MEDICINE

## 2021-05-06 PROCEDURE — 99999 PR PBB SHADOW E&M-EST. PATIENT-LVL III: CPT | Mod: PBBFAC,,, | Performed by: FAMILY MEDICINE

## 2021-05-06 PROCEDURE — 99213 OFFICE O/P EST LOW 20 MIN: CPT | Mod: 25,S$GLB,, | Performed by: FAMILY MEDICINE

## 2021-05-06 PROCEDURE — 1126F AMNT PAIN NOTED NONE PRSNT: CPT | Mod: S$GLB,,, | Performed by: FAMILY MEDICINE

## 2021-05-06 PROCEDURE — 99999 PR PBB SHADOW E&M-EST. PATIENT-LVL III: ICD-10-PCS | Mod: PBBFAC,,, | Performed by: FAMILY MEDICINE

## 2021-05-06 PROCEDURE — 96372 THER/PROPH/DIAG INJ SC/IM: CPT | Mod: S$GLB,,, | Performed by: FAMILY MEDICINE

## 2021-05-06 PROCEDURE — 3008F PR BODY MASS INDEX (BMI) DOCUMENTED: ICD-10-PCS | Mod: CPTII,S$GLB,, | Performed by: FAMILY MEDICINE

## 2021-05-06 RX ORDER — DEXAMETHASONE SODIUM PHOSPHATE 4 MG/ML
8 INJECTION, SOLUTION INTRA-ARTICULAR; INTRALESIONAL; INTRAMUSCULAR; INTRAVENOUS; SOFT TISSUE ONCE
Status: COMPLETED | OUTPATIENT
Start: 2021-05-06 | End: 2021-05-06

## 2021-05-06 RX ORDER — MONTELUKAST SODIUM 10 MG/1
10 TABLET ORAL NIGHTLY
Qty: 30 TABLET | Refills: 0 | Status: SHIPPED | OUTPATIENT
Start: 2021-05-06 | End: 2021-06-05

## 2021-05-06 RX ADMIN — DEXAMETHASONE SODIUM PHOSPHATE 8 MG: 4 INJECTION, SOLUTION INTRA-ARTICULAR; INTRALESIONAL; INTRAMUSCULAR; INTRAVENOUS; SOFT TISSUE at 09:05

## 2021-05-13 ENCOUNTER — TELEPHONE (OUTPATIENT)
Dept: FAMILY MEDICINE | Facility: CLINIC | Age: 61
End: 2021-05-13

## 2021-06-03 ENCOUNTER — PATIENT OUTREACH (OUTPATIENT)
Dept: ADMINISTRATIVE | Facility: HOSPITAL | Age: 61
End: 2021-06-03

## 2021-06-03 ENCOUNTER — OFFICE VISIT (OUTPATIENT)
Dept: FAMILY MEDICINE | Facility: CLINIC | Age: 61
End: 2021-06-03
Payer: COMMERCIAL

## 2021-06-03 VITALS
TEMPERATURE: 99 F | DIASTOLIC BLOOD PRESSURE: 84 MMHG | HEIGHT: 72 IN | HEART RATE: 68 BPM | SYSTOLIC BLOOD PRESSURE: 138 MMHG | OXYGEN SATURATION: 99 % | BODY MASS INDEX: 25.59 KG/M2 | RESPIRATION RATE: 12 BRPM | WEIGHT: 188.94 LBS

## 2021-06-03 DIAGNOSIS — G89.29 CHRONIC LEFT-SIDED LOW BACK PAIN WITHOUT SCIATICA: Primary | ICD-10-CM

## 2021-06-03 DIAGNOSIS — Z79.899 ENCOUNTER FOR LONG-TERM (CURRENT) USE OF MEDICATIONS: ICD-10-CM

## 2021-06-03 DIAGNOSIS — M54.50 CHRONIC LEFT-SIDED LOW BACK PAIN WITHOUT SCIATICA: Primary | ICD-10-CM

## 2021-06-03 DIAGNOSIS — D75.839 THROMBOCYTOSIS: ICD-10-CM

## 2021-06-03 DIAGNOSIS — M46.1 SACROILIITIS: ICD-10-CM

## 2021-06-03 PROCEDURE — 3008F BODY MASS INDEX DOCD: CPT | Mod: CPTII,S$GLB,, | Performed by: FAMILY MEDICINE

## 2021-06-03 PROCEDURE — 3008F PR BODY MASS INDEX (BMI) DOCUMENTED: ICD-10-PCS | Mod: CPTII,S$GLB,, | Performed by: FAMILY MEDICINE

## 2021-06-03 PROCEDURE — 1125F PR PAIN SEVERITY QUANTIFIED, PAIN PRESENT: ICD-10-PCS | Mod: S$GLB,,, | Performed by: FAMILY MEDICINE

## 2021-06-03 PROCEDURE — 99214 PR OFFICE/OUTPT VISIT, EST, LEVL IV, 30-39 MIN: ICD-10-PCS | Mod: S$GLB,,, | Performed by: FAMILY MEDICINE

## 2021-06-03 PROCEDURE — 1125F AMNT PAIN NOTED PAIN PRSNT: CPT | Mod: S$GLB,,, | Performed by: FAMILY MEDICINE

## 2021-06-03 PROCEDURE — 99214 OFFICE O/P EST MOD 30 MIN: CPT | Mod: S$GLB,,, | Performed by: FAMILY MEDICINE

## 2021-06-03 PROCEDURE — 99999 PR PBB SHADOW E&M-EST. PATIENT-LVL V: ICD-10-PCS | Mod: PBBFAC,,, | Performed by: FAMILY MEDICINE

## 2021-06-03 PROCEDURE — 99999 PR PBB SHADOW E&M-EST. PATIENT-LVL V: CPT | Mod: PBBFAC,,, | Performed by: FAMILY MEDICINE

## 2021-06-03 RX ORDER — HYDROCODONE BITARTRATE AND ACETAMINOPHEN 7.5; 325 MG/1; MG/1
1 TABLET ORAL EVERY 6 HOURS PRN
Qty: 120 TABLET | Refills: 0 | Status: SHIPPED | OUTPATIENT
Start: 2021-07-08 | End: 2021-08-07

## 2021-06-03 RX ORDER — HYDROCODONE BITARTRATE AND ACETAMINOPHEN 7.5; 325 MG/1; MG/1
1 TABLET ORAL EVERY 6 HOURS PRN
Qty: 120 TABLET | Refills: 0 | Status: SHIPPED | OUTPATIENT
Start: 2021-08-06 | End: 2021-09-07 | Stop reason: SDUPTHER

## 2021-06-03 RX ORDER — CARISOPRODOL 350 MG/1
350 TABLET ORAL 4 TIMES DAILY PRN
Qty: 120 TABLET | Refills: 0 | Status: SHIPPED | OUTPATIENT
Start: 2021-07-08 | End: 2021-10-08 | Stop reason: SDUPTHER

## 2021-06-03 RX ORDER — HYDROCODONE BITARTRATE AND ACETAMINOPHEN 7.5; 325 MG/1; MG/1
1 TABLET ORAL EVERY 6 HOURS PRN
Qty: 120 TABLET | Refills: 0 | Status: SHIPPED | OUTPATIENT
Start: 2021-06-09 | End: 2021-07-09

## 2021-06-03 RX ORDER — CARISOPRODOL 350 MG/1
350 TABLET ORAL 4 TIMES DAILY PRN
Qty: 120 TABLET | Refills: 0 | Status: SHIPPED | OUTPATIENT
Start: 2021-08-06 | End: 2021-09-07 | Stop reason: SDUPTHER

## 2021-06-03 RX ORDER — CARISOPRODOL 350 MG/1
350 TABLET ORAL 4 TIMES DAILY PRN
Qty: 120 TABLET | Refills: 0 | Status: SHIPPED | OUTPATIENT
Start: 2021-06-09 | End: 2021-10-08 | Stop reason: SDUPTHER

## 2021-06-09 ENCOUNTER — TELEPHONE (OUTPATIENT)
Dept: FAMILY MEDICINE | Facility: CLINIC | Age: 61
End: 2021-06-09

## 2021-08-16 ENCOUNTER — TELEPHONE (OUTPATIENT)
Dept: ADMINISTRATIVE | Facility: HOSPITAL | Age: 61
End: 2021-08-16

## 2021-08-26 ENCOUNTER — TELEPHONE (OUTPATIENT)
Dept: INTERNAL MEDICINE | Facility: CLINIC | Age: 61
End: 2021-08-26

## 2021-09-07 ENCOUNTER — TELEPHONE (OUTPATIENT)
Dept: LAB | Facility: HOSPITAL | Age: 61
End: 2021-09-07

## 2021-09-07 DIAGNOSIS — M54.50 CHRONIC LEFT-SIDED LOW BACK PAIN WITHOUT SCIATICA: ICD-10-CM

## 2021-09-07 DIAGNOSIS — G89.29 CHRONIC LEFT-SIDED LOW BACK PAIN WITHOUT SCIATICA: ICD-10-CM

## 2021-09-07 DIAGNOSIS — Z79.899 ENCOUNTER FOR LONG-TERM (CURRENT) USE OF MEDICATIONS: ICD-10-CM

## 2021-09-07 RX ORDER — HYDROCODONE BITARTRATE AND ACETAMINOPHEN 7.5; 325 MG/1; MG/1
1 TABLET ORAL EVERY 6 HOURS PRN
Refills: 0 | Status: CANCELLED | OUTPATIENT
Start: 2021-09-07

## 2021-09-07 RX ORDER — CARISOPRODOL 350 MG/1
350 TABLET ORAL 4 TIMES DAILY PRN
Qty: 120 TABLET | Refills: 0 | Status: SHIPPED | OUTPATIENT
Start: 2021-09-07 | End: 2021-10-08 | Stop reason: SDUPTHER

## 2021-09-07 RX ORDER — HYDROCODONE BITARTRATE AND ACETAMINOPHEN 7.5; 325 MG/1; MG/1
1 TABLET ORAL EVERY 6 HOURS PRN
Qty: 120 TABLET | Refills: 0 | Status: SHIPPED | OUTPATIENT
Start: 2021-09-07 | End: 2021-10-08 | Stop reason: SDUPTHER

## 2021-10-08 ENCOUNTER — TELEPHONE (OUTPATIENT)
Dept: FAMILY MEDICINE | Facility: CLINIC | Age: 61
End: 2021-10-08

## 2021-10-08 DIAGNOSIS — Z79.899 ENCOUNTER FOR LONG-TERM (CURRENT) USE OF MEDICATIONS: ICD-10-CM

## 2021-10-08 DIAGNOSIS — M54.50 CHRONIC LEFT-SIDED LOW BACK PAIN WITHOUT SCIATICA: ICD-10-CM

## 2021-10-08 DIAGNOSIS — G89.29 CHRONIC LEFT-SIDED LOW BACK PAIN WITHOUT SCIATICA: ICD-10-CM

## 2021-10-08 RX ORDER — CARISOPRODOL 350 MG/1
350 TABLET ORAL 4 TIMES DAILY PRN
Qty: 120 TABLET | Refills: 0 | Status: SHIPPED | OUTPATIENT
Start: 2021-10-08 | End: 2021-12-10 | Stop reason: SDUPTHER

## 2021-10-08 RX ORDER — CARISOPRODOL 350 MG/1
350 TABLET ORAL 4 TIMES DAILY PRN
Qty: 120 TABLET | Refills: 0 | Status: SHIPPED | OUTPATIENT
Start: 2021-12-08 | End: 2021-12-10 | Stop reason: SDUPTHER

## 2021-10-08 RX ORDER — CARISOPRODOL 350 MG/1
350 TABLET ORAL 4 TIMES DAILY PRN
Qty: 120 TABLET | Refills: 0 | Status: SHIPPED | OUTPATIENT
Start: 2021-11-08 | End: 2021-12-10 | Stop reason: SDUPTHER

## 2021-10-08 RX ORDER — HYDROCODONE BITARTRATE AND ACETAMINOPHEN 7.5; 325 MG/1; MG/1
1 TABLET ORAL EVERY 6 HOURS PRN
Qty: 120 TABLET | Refills: 0 | Status: SHIPPED | OUTPATIENT
Start: 2021-10-08 | End: 2021-11-08 | Stop reason: SDUPTHER

## 2021-10-22 ENCOUNTER — PATIENT OUTREACH (OUTPATIENT)
Dept: ADMINISTRATIVE | Facility: HOSPITAL | Age: 61
End: 2021-10-22
Payer: COMMERCIAL

## 2021-11-08 DIAGNOSIS — Z79.899 ENCOUNTER FOR LONG-TERM (CURRENT) USE OF MEDICATIONS: ICD-10-CM

## 2021-11-08 DIAGNOSIS — M54.50 CHRONIC LEFT-SIDED LOW BACK PAIN WITHOUT SCIATICA: ICD-10-CM

## 2021-11-08 DIAGNOSIS — G89.29 CHRONIC LEFT-SIDED LOW BACK PAIN WITHOUT SCIATICA: ICD-10-CM

## 2021-11-08 RX ORDER — HYDROCODONE BITARTRATE AND ACETAMINOPHEN 7.5; 325 MG/1; MG/1
1 TABLET ORAL EVERY 6 HOURS PRN
Qty: 120 TABLET | Refills: 0 | Status: SHIPPED | OUTPATIENT
Start: 2021-11-08 | End: 2021-12-08

## 2021-11-22 ENCOUNTER — TELEPHONE (OUTPATIENT)
Dept: FAMILY MEDICINE | Facility: CLINIC | Age: 61
End: 2021-11-22
Payer: COMMERCIAL

## 2021-11-23 ENCOUNTER — OFFICE VISIT (OUTPATIENT)
Dept: FAMILY MEDICINE | Facility: CLINIC | Age: 61
End: 2021-11-23
Payer: COMMERCIAL

## 2021-11-23 VITALS
TEMPERATURE: 98 F | HEIGHT: 72 IN | HEART RATE: 79 BPM | BODY MASS INDEX: 25.72 KG/M2 | DIASTOLIC BLOOD PRESSURE: 72 MMHG | SYSTOLIC BLOOD PRESSURE: 133 MMHG | WEIGHT: 189.88 LBS

## 2021-11-23 DIAGNOSIS — M54.41 CHRONIC LEFT-SIDED LOW BACK PAIN WITH RIGHT-SIDED SCIATICA: Primary | ICD-10-CM

## 2021-11-23 DIAGNOSIS — G89.29 CHRONIC LEFT-SIDED LOW BACK PAIN WITH RIGHT-SIDED SCIATICA: Primary | ICD-10-CM

## 2021-11-23 PROCEDURE — 99999 PR PBB SHADOW E&M-EST. PATIENT-LVL III: CPT | Mod: PBBFAC,,, | Performed by: NURSE PRACTITIONER

## 2021-11-23 PROCEDURE — 99213 OFFICE O/P EST LOW 20 MIN: CPT | Mod: 25,S$GLB,, | Performed by: NURSE PRACTITIONER

## 2021-11-23 PROCEDURE — 99213 PR OFFICE/OUTPT VISIT, EST, LEVL III, 20-29 MIN: ICD-10-PCS | Mod: 25,S$GLB,, | Performed by: NURSE PRACTITIONER

## 2021-11-23 PROCEDURE — 99999 PR PBB SHADOW E&M-EST. PATIENT-LVL III: ICD-10-PCS | Mod: PBBFAC,,, | Performed by: NURSE PRACTITIONER

## 2021-11-23 PROCEDURE — 96372 PR INJECTION,THERAP/PROPH/DIAG2ST, IM OR SUBCUT: ICD-10-PCS | Mod: S$GLB,,, | Performed by: NURSE PRACTITIONER

## 2021-11-23 PROCEDURE — 96372 THER/PROPH/DIAG INJ SC/IM: CPT | Mod: S$GLB,,, | Performed by: NURSE PRACTITIONER

## 2021-11-23 RX ORDER — KETOROLAC TROMETHAMINE 30 MG/ML
30 INJECTION, SOLUTION INTRAMUSCULAR; INTRAVENOUS
Status: COMPLETED | OUTPATIENT
Start: 2021-11-23 | End: 2021-11-23

## 2021-11-23 RX ORDER — GABAPENTIN 100 MG/1
100 CAPSULE ORAL 3 TIMES DAILY
Qty: 90 CAPSULE | Refills: 2 | Status: SHIPPED | OUTPATIENT
Start: 2021-11-23 | End: 2022-03-08

## 2021-11-23 RX ADMIN — KETOROLAC TROMETHAMINE 30 MG: 30 INJECTION, SOLUTION INTRAMUSCULAR; INTRAVENOUS at 08:11

## 2021-12-10 ENCOUNTER — TELEPHONE (OUTPATIENT)
Dept: FAMILY MEDICINE | Facility: CLINIC | Age: 61
End: 2021-12-10

## 2021-12-10 ENCOUNTER — OFFICE VISIT (OUTPATIENT)
Dept: FAMILY MEDICINE | Facility: CLINIC | Age: 61
End: 2021-12-10
Payer: COMMERCIAL

## 2021-12-10 VITALS
TEMPERATURE: 98 F | HEIGHT: 72 IN | DIASTOLIC BLOOD PRESSURE: 88 MMHG | BODY MASS INDEX: 25.48 KG/M2 | HEART RATE: 92 BPM | OXYGEN SATURATION: 99 % | WEIGHT: 188.13 LBS | SYSTOLIC BLOOD PRESSURE: 138 MMHG

## 2021-12-10 DIAGNOSIS — M54.42 CHRONIC LEFT-SIDED LOW BACK PAIN WITH LEFT-SIDED SCIATICA: ICD-10-CM

## 2021-12-10 DIAGNOSIS — Z79.899 ENCOUNTER FOR LONG-TERM (CURRENT) USE OF MEDICATIONS: ICD-10-CM

## 2021-12-10 DIAGNOSIS — G89.29 CHRONIC LEFT-SIDED LOW BACK PAIN WITH LEFT-SIDED SCIATICA: ICD-10-CM

## 2021-12-10 DIAGNOSIS — E11.59 HYPERTENSION ASSOCIATED WITH DIABETES: ICD-10-CM

## 2021-12-10 DIAGNOSIS — Z12.5 ENCOUNTER FOR PROSTATE CANCER SCREENING: ICD-10-CM

## 2021-12-10 DIAGNOSIS — M46.1 SACROILIITIS: Primary | ICD-10-CM

## 2021-12-10 DIAGNOSIS — E11.65 TYPE 2 DIABETES MELLITUS WITH HYPERGLYCEMIA, WITHOUT LONG-TERM CURRENT USE OF INSULIN: ICD-10-CM

## 2021-12-10 DIAGNOSIS — I15.2 HYPERTENSION ASSOCIATED WITH DIABETES: ICD-10-CM

## 2021-12-10 PROBLEM — M54.50 CHRONIC LEFT-SIDED LOW BACK PAIN WITHOUT SCIATICA: Status: ACTIVE | Noted: 2021-12-10

## 2021-12-10 PROCEDURE — 99999 PR PBB SHADOW E&M-EST. PATIENT-LVL IV: CPT | Mod: PBBFAC,,, | Performed by: FAMILY MEDICINE

## 2021-12-10 PROCEDURE — 99214 PR OFFICE/OUTPT VISIT, EST, LEVL IV, 30-39 MIN: ICD-10-PCS | Mod: S$GLB,,, | Performed by: FAMILY MEDICINE

## 2021-12-10 PROCEDURE — 99214 OFFICE O/P EST MOD 30 MIN: CPT | Mod: S$GLB,,, | Performed by: FAMILY MEDICINE

## 2021-12-10 PROCEDURE — 99999 PR PBB SHADOW E&M-EST. PATIENT-LVL IV: ICD-10-PCS | Mod: PBBFAC,,, | Performed by: FAMILY MEDICINE

## 2021-12-10 RX ORDER — CARISOPRODOL 350 MG/1
350 TABLET ORAL 4 TIMES DAILY PRN
Qty: 120 TABLET | Refills: 0 | Status: SHIPPED | OUTPATIENT
Start: 2021-12-10 | End: 2022-03-03 | Stop reason: SDUPTHER

## 2021-12-10 RX ORDER — HYDROCODONE BITARTRATE AND ACETAMINOPHEN 7.5; 325 MG/1; MG/1
1 TABLET ORAL EVERY 6 HOURS PRN
Qty: 120 TABLET | Refills: 0 | Status: SHIPPED | OUTPATIENT
Start: 2022-01-08 | End: 2022-03-03 | Stop reason: SDUPTHER

## 2021-12-10 RX ORDER — HYDROCODONE BITARTRATE AND ACETAMINOPHEN 7.5; 325 MG/1; MG/1
1 TABLET ORAL EVERY 6 HOURS PRN
Qty: 120 TABLET | Refills: 0 | Status: SHIPPED | OUTPATIENT
Start: 2022-02-07 | End: 2022-03-03 | Stop reason: SDUPTHER

## 2021-12-10 RX ORDER — HYDROCODONE BITARTRATE AND ACETAMINOPHEN 7.5; 325 MG/1; MG/1
1 TABLET ORAL EVERY 6 HOURS PRN
Qty: 30 TABLET | Refills: 0 | Status: SHIPPED | OUTPATIENT
Start: 2021-12-10 | End: 2021-12-10

## 2021-12-10 RX ORDER — CARISOPRODOL 350 MG/1
350 TABLET ORAL 4 TIMES DAILY PRN
Qty: 120 TABLET | Refills: 0 | Status: SHIPPED | OUTPATIENT
Start: 2022-02-07 | End: 2022-03-03 | Stop reason: SDUPTHER

## 2021-12-10 RX ORDER — HYDROCODONE BITARTRATE AND ACETAMINOPHEN 7.5; 325 MG/1; MG/1
1 TABLET ORAL EVERY 6 HOURS PRN
Qty: 30 TABLET | Refills: 0 | Status: SHIPPED | OUTPATIENT
Start: 2022-01-08 | End: 2021-12-10

## 2021-12-10 RX ORDER — HYDROCODONE BITARTRATE AND ACETAMINOPHEN 7.5; 325 MG/1; MG/1
1 TABLET ORAL EVERY 6 HOURS PRN
Qty: 120 TABLET | Refills: 0 | Status: SHIPPED | OUTPATIENT
Start: 2021-12-10 | End: 2022-03-03 | Stop reason: SDUPTHER

## 2021-12-10 RX ORDER — HYDROCODONE BITARTRATE AND ACETAMINOPHEN 7.5; 325 MG/1; MG/1
1 TABLET ORAL EVERY 6 HOURS PRN
Qty: 30 TABLET | Refills: 0 | Status: SHIPPED | OUTPATIENT
Start: 2022-03-07 | End: 2021-12-10

## 2021-12-10 RX ORDER — CARISOPRODOL 350 MG/1
350 TABLET ORAL 4 TIMES DAILY PRN
Qty: 120 TABLET | Refills: 0 | Status: SHIPPED | OUTPATIENT
Start: 2022-01-08 | End: 2022-03-03 | Stop reason: SDUPTHER

## 2021-12-23 ENCOUNTER — TELEPHONE (OUTPATIENT)
Dept: FAMILY MEDICINE | Facility: CLINIC | Age: 61
End: 2021-12-23
Payer: COMMERCIAL

## 2022-02-16 ENCOUNTER — OFFICE VISIT (OUTPATIENT)
Dept: FAMILY MEDICINE | Facility: CLINIC | Age: 62
End: 2022-02-16
Payer: COMMERCIAL

## 2022-02-16 ENCOUNTER — TELEPHONE (OUTPATIENT)
Dept: FAMILY MEDICINE | Facility: CLINIC | Age: 62
End: 2022-02-16
Payer: COMMERCIAL

## 2022-02-16 VITALS
WEIGHT: 188 LBS | TEMPERATURE: 98 F | HEART RATE: 79 BPM | DIASTOLIC BLOOD PRESSURE: 76 MMHG | SYSTOLIC BLOOD PRESSURE: 132 MMHG | OXYGEN SATURATION: 98 % | HEIGHT: 72 IN | BODY MASS INDEX: 25.47 KG/M2

## 2022-02-16 DIAGNOSIS — H93.8X3 SENSATION OF FULLNESS IN BOTH EARS: ICD-10-CM

## 2022-02-16 DIAGNOSIS — H61.20 IMPACTED CERUMEN, UNSPECIFIED LATERALITY: Primary | ICD-10-CM

## 2022-02-16 PROCEDURE — 1160F PR REVIEW ALL MEDS BY PRESCRIBER/CLIN PHARMACIST DOCUMENTED: ICD-10-PCS | Mod: CPTII,S$GLB,, | Performed by: NURSE PRACTITIONER

## 2022-02-16 PROCEDURE — 99999 PR PBB SHADOW E&M-EST. PATIENT-LVL IV: CPT | Mod: PBBFAC,,, | Performed by: NURSE PRACTITIONER

## 2022-02-16 PROCEDURE — 3075F SYST BP GE 130 - 139MM HG: CPT | Mod: CPTII,S$GLB,, | Performed by: NURSE PRACTITIONER

## 2022-02-16 PROCEDURE — 1160F RVW MEDS BY RX/DR IN RCRD: CPT | Mod: CPTII,S$GLB,, | Performed by: NURSE PRACTITIONER

## 2022-02-16 PROCEDURE — 3078F PR MOST RECENT DIASTOLIC BLOOD PRESSURE < 80 MM HG: ICD-10-PCS | Mod: CPTII,S$GLB,, | Performed by: NURSE PRACTITIONER

## 2022-02-16 PROCEDURE — 3008F BODY MASS INDEX DOCD: CPT | Mod: CPTII,S$GLB,, | Performed by: NURSE PRACTITIONER

## 2022-02-16 PROCEDURE — 3075F PR MOST RECENT SYSTOLIC BLOOD PRESS GE 130-139MM HG: ICD-10-PCS | Mod: CPTII,S$GLB,, | Performed by: NURSE PRACTITIONER

## 2022-02-16 PROCEDURE — 99999 PR PBB SHADOW E&M-EST. PATIENT-LVL IV: ICD-10-PCS | Mod: PBBFAC,,, | Performed by: NURSE PRACTITIONER

## 2022-02-16 PROCEDURE — 99213 PR OFFICE/OUTPT VISIT, EST, LEVL III, 20-29 MIN: ICD-10-PCS | Mod: S$GLB,,, | Performed by: NURSE PRACTITIONER

## 2022-02-16 PROCEDURE — 1159F PR MEDICATION LIST DOCUMENTED IN MEDICAL RECORD: ICD-10-PCS | Mod: CPTII,S$GLB,, | Performed by: NURSE PRACTITIONER

## 2022-02-16 PROCEDURE — 3008F PR BODY MASS INDEX (BMI) DOCUMENTED: ICD-10-PCS | Mod: CPTII,S$GLB,, | Performed by: NURSE PRACTITIONER

## 2022-02-16 PROCEDURE — 1159F MED LIST DOCD IN RCRD: CPT | Mod: CPTII,S$GLB,, | Performed by: NURSE PRACTITIONER

## 2022-02-16 PROCEDURE — 3078F DIAST BP <80 MM HG: CPT | Mod: CPTII,S$GLB,, | Performed by: NURSE PRACTITIONER

## 2022-02-16 PROCEDURE — 99213 OFFICE O/P EST LOW 20 MIN: CPT | Mod: S$GLB,,, | Performed by: NURSE PRACTITIONER

## 2022-02-16 NOTE — TELEPHONE ENCOUNTER
----- Message from Laura Melissa sent at 2/16/2022  7:04 AM CST -----  Regarding: same day appt  Contact: Pt  Type:  Same Day Appointment Request    Caller is requesting a same day appointment.  Caller declined first available appointment listed below.    Name of Caller: Brookshilda Toledo  When is the first available appointment? 02/17/22  Symptoms: ears stopped up  Best Call Back Number: [phone]  Additional Information:

## 2022-02-16 NOTE — PATIENT INSTRUCTIONS
Debrox OTC as directed  Use 1pt hydrogen peroxide/1 pt water to flush ears monthly and PRN  Report to ER immediately if symptoms worsen

## 2022-02-16 NOTE — PROGRESS NOTES
Subjective:       Patient ID: Ned Toledo Jr. is a 61 y.o. male.    Chief Complaint: Ear Fullness    Ear Fullness   There is pain in both ears. This is a new problem. The current episode started in the past 7 days. The problem occurs constantly. The problem has been unchanged. There has been no fever. The patient is experiencing no pain. Pertinent negatives include no abdominal pain, coughing, diarrhea, ear discharge, headaches, hearing loss, neck pain, rash, rhinorrhea, sore throat or vomiting. He has tried nothing for the symptoms. The treatment provided no relief. There is no history of a chronic ear infection, hearing loss or a tympanostomy tube.     Past Medical History:   Diagnosis Date    Diabetes mellitus, type 2     Fatty liver     Hypertension     Penetrating foot wound     Sciatica      Social History     Socioeconomic History    Marital status:    Tobacco Use    Smoking status: Never Smoker    Smokeless tobacco: Never Used   Substance and Sexual Activity    Alcohol use: No    Drug use: Never    Sexual activity: Yes     Partners: Female     Birth control/protection: None     Past Surgical History:   Procedure Laterality Date    HERNIA REPAIR         Review of Systems   Constitutional: Negative.    HENT: Negative for ear discharge, hearing loss, rhinorrhea and sore throat.         Bilateral ear fullness   Eyes: Negative.    Respiratory: Negative.  Negative for cough.    Cardiovascular: Negative.    Gastrointestinal: Negative.  Negative for abdominal pain, diarrhea and vomiting.   Endocrine: Negative.    Genitourinary: Negative.    Musculoskeletal: Negative.  Negative for neck pain.   Integumentary:  Negative for rash. Negative.   Allergic/Immunologic: Negative.    Neurological: Negative.  Negative for headaches.   Psychiatric/Behavioral: Negative.          Objective:      Physical Exam  Vitals and nursing note reviewed.   Constitutional:       Appearance: Normal appearance.   HENT:       Head: Normocephalic.      Right Ear: Hearing, ear canal and external ear normal. There is impacted cerumen.      Left Ear: Hearing, ear canal and external ear normal. There is impacted cerumen.      Ears:      Comments: Cerumen removed per ear wash; TM WNL bilaterally     Nose: Nose normal.      Mouth/Throat:      Mouth: Mucous membranes are moist.      Pharynx: Oropharynx is clear.   Eyes:      Conjunctiva/sclera: Conjunctivae normal.      Pupils: Pupils are equal, round, and reactive to light.   Cardiovascular:      Rate and Rhythm: Normal rate and regular rhythm.      Pulses: Normal pulses.      Heart sounds: Normal heart sounds.   Pulmonary:      Effort: Pulmonary effort is normal.      Breath sounds: Normal breath sounds.   Abdominal:      General: Bowel sounds are normal.      Palpations: Abdomen is soft.   Musculoskeletal:         General: Normal range of motion.      Cervical back: Normal range of motion and neck supple.   Skin:     General: Skin is warm and dry.      Capillary Refill: Capillary refill takes 2 to 3 seconds.   Neurological:      Mental Status: He is alert and oriented to person, place, and time.   Psychiatric:         Mood and Affect: Mood normal.         Behavior: Behavior normal.         Thought Content: Thought content normal.         Judgment: Judgment normal.         Assessment:       Problem List Items Addressed This Visit    None     Visit Diagnoses     Impacted cerumen, unspecified laterality    -  Primary    Relevant Orders    Ear wax removal (Completed)    Sensation of fullness in both ears        Relevant Orders    Ear wax removal (Completed)          Plan:           Ned was seen today for ear fullness.    Diagnoses and all orders for this visit:    Impacted cerumen, unspecified laterality  Sensation of fullness in both ears  -     Ear wax removal  Debrox OTC as directed  Use 1pt hydrogen peroxide/1 pt water to flush ears monthly and PRN  Report to ER immediately if  symptoms worsen

## 2022-02-17 ENCOUNTER — OFFICE VISIT (OUTPATIENT)
Dept: FAMILY MEDICINE | Facility: CLINIC | Age: 62
End: 2022-02-17
Payer: COMMERCIAL

## 2022-02-17 VITALS
HEIGHT: 72 IN | WEIGHT: 197.88 LBS | OXYGEN SATURATION: 98 % | TEMPERATURE: 98 F | BODY MASS INDEX: 26.8 KG/M2 | HEART RATE: 81 BPM

## 2022-02-17 DIAGNOSIS — H61.21 RIGHT EAR IMPACTED CERUMEN: Primary | ICD-10-CM

## 2022-02-17 PROCEDURE — 69209 PR REMOVAL IMPACTED CERUMEN USING IRRIGATION/LAVAGE, UNILATERAL: ICD-10-PCS | Mod: 50,S$GLB,, | Performed by: NURSE PRACTITIONER

## 2022-02-17 PROCEDURE — 99999 PR PBB SHADOW E&M-EST. PATIENT-LVL IV: ICD-10-PCS | Mod: PBBFAC,,, | Performed by: NURSE PRACTITIONER

## 2022-02-17 PROCEDURE — 1159F PR MEDICATION LIST DOCUMENTED IN MEDICAL RECORD: ICD-10-PCS | Mod: CPTII,S$GLB,, | Performed by: NURSE PRACTITIONER

## 2022-02-17 PROCEDURE — 1159F MED LIST DOCD IN RCRD: CPT | Mod: CPTII,S$GLB,, | Performed by: NURSE PRACTITIONER

## 2022-02-17 PROCEDURE — 99999 PR PBB SHADOW E&M-EST. PATIENT-LVL IV: CPT | Mod: PBBFAC,,, | Performed by: NURSE PRACTITIONER

## 2022-02-17 PROCEDURE — 3008F BODY MASS INDEX DOCD: CPT | Mod: CPTII,S$GLB,, | Performed by: NURSE PRACTITIONER

## 2022-02-17 PROCEDURE — 3008F PR BODY MASS INDEX (BMI) DOCUMENTED: ICD-10-PCS | Mod: CPTII,S$GLB,, | Performed by: NURSE PRACTITIONER

## 2022-02-17 PROCEDURE — 99213 PR OFFICE/OUTPT VISIT, EST, LEVL III, 20-29 MIN: ICD-10-PCS | Mod: 25,S$GLB,, | Performed by: NURSE PRACTITIONER

## 2022-02-17 PROCEDURE — 1160F RVW MEDS BY RX/DR IN RCRD: CPT | Mod: CPTII,S$GLB,, | Performed by: NURSE PRACTITIONER

## 2022-02-17 PROCEDURE — 1160F PR REVIEW ALL MEDS BY PRESCRIBER/CLIN PHARMACIST DOCUMENTED: ICD-10-PCS | Mod: CPTII,S$GLB,, | Performed by: NURSE PRACTITIONER

## 2022-02-17 PROCEDURE — 99213 OFFICE O/P EST LOW 20 MIN: CPT | Mod: 25,S$GLB,, | Performed by: NURSE PRACTITIONER

## 2022-02-17 PROCEDURE — 69209 REMOVE IMPACTED EAR WAX UNI: CPT | Mod: 50,S$GLB,, | Performed by: NURSE PRACTITIONER

## 2022-03-03 ENCOUNTER — OFFICE VISIT (OUTPATIENT)
Dept: FAMILY MEDICINE | Facility: CLINIC | Age: 62
End: 2022-03-03
Payer: COMMERCIAL

## 2022-03-03 VITALS
DIASTOLIC BLOOD PRESSURE: 86 MMHG | HEIGHT: 72 IN | WEIGHT: 195.81 LBS | SYSTOLIC BLOOD PRESSURE: 134 MMHG | OXYGEN SATURATION: 97 % | RESPIRATION RATE: 18 BRPM | BODY MASS INDEX: 26.52 KG/M2 | TEMPERATURE: 98 F | HEART RATE: 86 BPM

## 2022-03-03 DIAGNOSIS — G43.909 MIGRAINE WITHOUT STATUS MIGRAINOSUS, NOT INTRACTABLE, UNSPECIFIED MIGRAINE TYPE: ICD-10-CM

## 2022-03-03 DIAGNOSIS — Z79.899 ENCOUNTER FOR LONG-TERM (CURRENT) USE OF MEDICATIONS: ICD-10-CM

## 2022-03-03 DIAGNOSIS — E78.5 HYPERLIPIDEMIA ASSOCIATED WITH TYPE 2 DIABETES MELLITUS: Chronic | ICD-10-CM

## 2022-03-03 DIAGNOSIS — I15.2 HYPERTENSION ASSOCIATED WITH DIABETES: Chronic | ICD-10-CM

## 2022-03-03 DIAGNOSIS — M46.1 SACROILIITIS: Primary | ICD-10-CM

## 2022-03-03 DIAGNOSIS — E11.59 HYPERTENSION ASSOCIATED WITH DIABETES: Chronic | ICD-10-CM

## 2022-03-03 DIAGNOSIS — E11.65 TYPE 2 DIABETES MELLITUS WITH HYPERGLYCEMIA, WITHOUT LONG-TERM CURRENT USE OF INSULIN: Chronic | ICD-10-CM

## 2022-03-03 DIAGNOSIS — E11.69 HYPERLIPIDEMIA ASSOCIATED WITH TYPE 2 DIABETES MELLITUS: Chronic | ICD-10-CM

## 2022-03-03 PROCEDURE — 99214 OFFICE O/P EST MOD 30 MIN: CPT | Mod: S$GLB,,, | Performed by: FAMILY MEDICINE

## 2022-03-03 PROCEDURE — 99999 PR PBB SHADOW E&M-EST. PATIENT-LVL IV: CPT | Mod: PBBFAC,,, | Performed by: FAMILY MEDICINE

## 2022-03-03 PROCEDURE — 3008F BODY MASS INDEX DOCD: CPT | Mod: CPTII,S$GLB,, | Performed by: FAMILY MEDICINE

## 2022-03-03 PROCEDURE — 3051F HG A1C>EQUAL 7.0%<8.0%: CPT | Mod: CPTII,S$GLB,, | Performed by: FAMILY MEDICINE

## 2022-03-03 PROCEDURE — 3079F DIAST BP 80-89 MM HG: CPT | Mod: CPTII,S$GLB,, | Performed by: FAMILY MEDICINE

## 2022-03-03 PROCEDURE — 3075F PR MOST RECENT SYSTOLIC BLOOD PRESS GE 130-139MM HG: ICD-10-PCS | Mod: CPTII,S$GLB,, | Performed by: FAMILY MEDICINE

## 2022-03-03 PROCEDURE — 3079F PR MOST RECENT DIASTOLIC BLOOD PRESSURE 80-89 MM HG: ICD-10-PCS | Mod: CPTII,S$GLB,, | Performed by: FAMILY MEDICINE

## 2022-03-03 PROCEDURE — 99214 PR OFFICE/OUTPT VISIT, EST, LEVL IV, 30-39 MIN: ICD-10-PCS | Mod: S$GLB,,, | Performed by: FAMILY MEDICINE

## 2022-03-03 PROCEDURE — 99999 PR PBB SHADOW E&M-EST. PATIENT-LVL IV: ICD-10-PCS | Mod: PBBFAC,,, | Performed by: FAMILY MEDICINE

## 2022-03-03 PROCEDURE — 3075F SYST BP GE 130 - 139MM HG: CPT | Mod: CPTII,S$GLB,, | Performed by: FAMILY MEDICINE

## 2022-03-03 PROCEDURE — 1160F PR REVIEW ALL MEDS BY PRESCRIBER/CLIN PHARMACIST DOCUMENTED: ICD-10-PCS | Mod: CPTII,S$GLB,, | Performed by: FAMILY MEDICINE

## 2022-03-03 PROCEDURE — 3051F PR MOST RECENT HEMOGLOBIN A1C LEVEL 7.0 - < 8.0%: ICD-10-PCS | Mod: CPTII,S$GLB,, | Performed by: FAMILY MEDICINE

## 2022-03-03 PROCEDURE — 1159F PR MEDICATION LIST DOCUMENTED IN MEDICAL RECORD: ICD-10-PCS | Mod: CPTII,S$GLB,, | Performed by: FAMILY MEDICINE

## 2022-03-03 PROCEDURE — 1159F MED LIST DOCD IN RCRD: CPT | Mod: CPTII,S$GLB,, | Performed by: FAMILY MEDICINE

## 2022-03-03 PROCEDURE — 1160F RVW MEDS BY RX/DR IN RCRD: CPT | Mod: CPTII,S$GLB,, | Performed by: FAMILY MEDICINE

## 2022-03-03 PROCEDURE — 3008F PR BODY MASS INDEX (BMI) DOCUMENTED: ICD-10-PCS | Mod: CPTII,S$GLB,, | Performed by: FAMILY MEDICINE

## 2022-03-03 RX ORDER — HYDROCODONE BITARTRATE AND ACETAMINOPHEN 7.5; 325 MG/1; MG/1
1 TABLET ORAL EVERY 6 HOURS PRN
Qty: 120 TABLET | Refills: 0 | Status: SHIPPED | OUTPATIENT
Start: 2022-04-30 | End: 2022-06-01 | Stop reason: SDUPTHER

## 2022-03-03 RX ORDER — HYDROCODONE BITARTRATE AND ACETAMINOPHEN 7.5; 325 MG/1; MG/1
1 TABLET ORAL EVERY 6 HOURS PRN
Qty: 120 TABLET | Refills: 0 | Status: SHIPPED | OUTPATIENT
Start: 2022-04-01 | End: 2022-06-01 | Stop reason: SDUPTHER

## 2022-03-03 RX ORDER — CARISOPRODOL 350 MG/1
350 TABLET ORAL 4 TIMES DAILY PRN
Qty: 120 TABLET | Refills: 0 | Status: SHIPPED | OUTPATIENT
Start: 2022-03-03 | End: 2022-06-01 | Stop reason: SDUPTHER

## 2022-03-03 RX ORDER — PROMETHAZINE HYDROCHLORIDE 25 MG/1
25 TABLET ORAL EVERY 6 HOURS PRN
Qty: 30 TABLET | Refills: 6 | Status: SHIPPED | OUTPATIENT
Start: 2022-03-03 | End: 2023-05-26 | Stop reason: SDUPTHER

## 2022-03-03 RX ORDER — HYDROCODONE BITARTRATE AND ACETAMINOPHEN 7.5; 325 MG/1; MG/1
1 TABLET ORAL EVERY 6 HOURS PRN
Qty: 120 TABLET | Refills: 0 | Status: SHIPPED | OUTPATIENT
Start: 2022-03-03 | End: 2022-06-01 | Stop reason: SDUPTHER

## 2022-03-03 RX ORDER — CARISOPRODOL 350 MG/1
350 TABLET ORAL 4 TIMES DAILY PRN
Qty: 120 TABLET | Refills: 0 | Status: SHIPPED | OUTPATIENT
Start: 2022-04-30 | End: 2022-06-01 | Stop reason: SDUPTHER

## 2022-03-03 RX ORDER — CARISOPRODOL 350 MG/1
350 TABLET ORAL 4 TIMES DAILY PRN
Qty: 120 TABLET | Refills: 0 | Status: SHIPPED | OUTPATIENT
Start: 2022-04-01 | End: 2022-04-05

## 2022-03-03 NOTE — PATIENT INSTRUCTIONS
Follow up in about 3 months (around 6/3/2022), or if symptoms worsen or fail to improve, for Pain med Refill.     Dear patient,   As a result of recent federal legislation (The Federal Cures Act), you may receive lab or pathology results from your visit in your [facility] account before your physician is able to contact you. Your physician or their representative will relay the results to you with their recommendations at their soonest availability.     If no improvement in symptoms or symptoms worsen, please be advised to call MD, follow-up at clinic and/or go to ER if becomes severe.    Jb Chao M.D.        We Offer TELEHEALTH & Same Day Appointments!   Book your Telehealth appointment with me through my nurse or   Clinic appointments on [employer]!    [address] [place] [address]    Office: [phone]   FAX: [phone]    Check out my Facebook Page [email or web address] Follow Me at: [email or web address]    Check out my website at [employer] by clicking on: [email or web address]    To Schedule appointments online, go to [employer]: [email or web address]

## 2022-03-03 NOTE — ASSESSMENT & PLAN NOTE
Update fasting labs.  Consider Zetia if no improvement.  Would like to lower LDL less than 100 with history of diabetes.Counseled on hyperlipidemia disease course, healthy diet and increased need for exercise.  Please be advised of the risk of cardiovascular disease, increase stroke and heart attack risk with uncontrolled/untreated hyperlipidemia.     Patient voiced understanding and understood the treatment plan. All questions were answered.

## 2022-03-03 NOTE — ASSESSMENT & PLAN NOTE
Refill medications as prescribed.  No abuse suspected. The patient was checked in the [facility] [employer] of [employer]'s  Prescription Monitoring Program. There appears to be no incongruities with the patient's verbalized history.       An opioid pain contract was completed  after having an extensive conversation about chronic opioid use for pain management. We discussed the risks and benefits of opioids.  I discouraged the patient from escalation of opioid use and try to minimize its use to decrease chance of dependence, tolerance, and opioid-based hyperalgesia.  I reviewed the  in great detail and there are no inconsistencies and it is appropriate with patient's history.  There are no signs of aberrant drug behavior.  The opioid risk tool was also completed, low risk.  Pt counseled about the side effects of long term use of opioids including dependence, tolerance, addiction, respiratory depression, somnolence, immune and endocrine dysfunction.  The patient expressed understanding.      If no improvement or worsening.  Referral to physical therapy, update imaging and consider spine/back clinic versus surgical specialist.

## 2022-03-03 NOTE — ASSESSMENT & PLAN NOTE
Blood pressure well controlled with pain control.   Consider ACE-inhibitor with comorbid diabetes.  Patient defers at this time.Counseled on importance of hypertension disease course, I recommend ongoing Education for DASH-diet and exercise.  Counseled on medication regimen importance of treating high blood pressure.  Please be advised of risk of untreated blood pressure as discussed.  Please advised of ER precautions were given for symptoms of hypertensive urgency and emergency.

## 2022-03-03 NOTE — PROGRESS NOTES
PLAN:      Migraines:  Refill promethazine as needed for migraines.  Otherwise controlled with over-the-counter medication.Discussed condition course and signs and symptoms to expect.  Patient advised take anti-inflammatories and or Tylenol for pain or fever.  ER precautions.  Call MD or follow-up to clinic if not improving or worsening symptoms.    Problem List Items Addressed This Visit     Type 2 diabetes mellitus with hyperglycemia, without long-term current use of insulin (Chronic)       Update labs.Patient cannot tolerate statins due to worsening pain.  Patient reports no issues with his blood sugar at home.We will plan to monitor hemoglobin A1c at designated intervals 3 to 6 months.  I recommend ongoing Education for diabetic diet and exercise protocol.  We will continue to monitor for side effects.    Please be advised of symptoms to monitor for and to notify me immediately if persistent or worsening.  Follow up with Ophthalmology/Optometry and Podiatry at least annually.             Hypertension associated with diabetes (Chronic)       Blood pressure well controlled with pain control.   Consider ACE-inhibitor with comorbid diabetes.  Patient defers at this time.Counseled on importance of hypertension disease course, I recommend ongoing Education for DASH-diet and exercise.  Counseled on medication regimen importance of treating high blood pressure.  Please be advised of risk of untreated blood pressure as discussed.  Please advised of ER precautions were given for symptoms of hypertensive urgency and emergency.             Encounter for long-term (current) use of medications (Chronic)     Complete history and physical was completed today.  Complete and thorough medication reconciliation was performed.  Discussed risks and benefits of medications.  Advised patient on orders and health maintenance.  We discussed old records and old labs if available.  Will request any records not available through epic.   Continue current medications listed on your summary sheet.             Relevant Medications    carisoprodoL (SOMA) 350 MG tablet (Start on 4/30/2022)    carisoprodoL (SOMA) 350 MG tablet (Start on 4/1/2022)    carisoprodoL (SOMA) 350 MG tablet    HYDROcodone-acetaminophen (NORCO) 7.5-325 mg per tablet (Start on 4/30/2022)    HYDROcodone-acetaminophen (NORCO) 7.5-325 mg per tablet (Start on 4/1/2022)    HYDROcodone-acetaminophen (NORCO) 7.5-325 mg per tablet    promethazine (PHENERGAN) 25 MG tablet    Other Relevant Orders    Toxicology Screen, Urine    Hyperlipidemia associated with type 2 diabetes mellitus (Chronic)      Update fasting labs.  Consider Zetia if no improvement.  Would like to lower LDL less than 100 with history of diabetes.Counseled on hyperlipidemia disease course, healthy diet and increased need for exercise.  Please be advised of the risk of cardiovascular disease, increase stroke and heart attack risk with uncontrolled/untreated hyperlipidemia.     Patient voiced understanding and understood the treatment plan. All questions were answered.                Sacroiliitis - Primary (Chronic)     Refill medications as prescribed.  No abuse suspected. The patient was checked in the [facility] Board of Pharmacy's  Prescription Monitoring Program. There appears to be no incongruities with the patient's verbalized history.       An opioid pain contract was completed  after having an extensive conversation about chronic opioid use for pain management. We discussed the risks and benefits of opioids.  I discouraged the patient from escalation of opioid use and try to minimize its use to decrease chance of dependence, tolerance, and opioid-based hyperalgesia.  I reviewed the  in great detail and there are no inconsistencies and it is appropriate with patient's history.  There are no signs of aberrant drug behavior.  The opioid risk tool was also completed, low risk.  Pt counseled about the side  effects of long term use of opioids including dependence, tolerance, addiction, respiratory depression, somnolence, immune and endocrine dysfunction.  The patient expressed understanding.      If no improvement or worsening.  Referral to physical therapy, update imaging and consider spine/back clinic versus surgical specialist.           Relevant Medications    carisoprodoL (SOMA) 350 MG tablet (Start on 4/30/2022)    carisoprodoL (SOMA) 350 MG tablet (Start on 4/1/2022)    carisoprodoL (SOMA) 350 MG tablet    HYDROcodone-acetaminophen (NORCO) 7.5-325 mg per tablet (Start on 4/30/2022)    HYDROcodone-acetaminophen (NORCO) 7.5-325 mg per tablet (Start on 4/1/2022)    HYDROcodone-acetaminophen (NORCO) 7.5-325 mg per tablet    Other Relevant Orders    Toxicology Screen, Urine    Migraine without status migrainosus, not intractable    Relevant Medications    promethazine (PHENERGAN) 25 MG tablet        Future Appointments     Date Provider Specialty Appt Notes    6/1/2022 Jb Chao MD Family Medicine 3 month pain med refill    8/31/2022 Jb Chao MD Family Medicine 3 month pain med refill         Medication Management for assessment above:   Medication List with Changes/Refills   Current Medications    FEXOFENADINE (ALLEGRA) 180 MG TABLET    Take 1 tablet (180 mg total) by mouth once daily.    GABAPENTIN (NEURONTIN) 100 MG CAPSULE    Take 1 capsule (100 mg total) by mouth 3 (three) times daily.    KETOCONAZOLE (NIZORAL) 2 % CREAM    Apply topically 2 (two) times daily.    MUPIROCIN (BACTROBAN) 2 % OINTMENT    Apply topically 3 (three) times daily.    NAPROXEN (NAPROSYN) 500 MG TABLET    Take 1 tablet (500 mg total) by mouth 2 (two) times daily with meals.    ONDANSETRON (ZOFRAN-ODT) 8 MG TBDL    Take 1 tablet (8 mg total) by mouth every 6 (six) hours as needed.   Changed and/or Refilled Medications    Modified Medication Previous Medication    CARISOPRODOL (SOMA) 350 MG TABLET carisoprodoL (SOMA) 350  MG tablet       Take 1 tablet (350 mg total) by mouth 4 (four) times daily as needed for Muscle spasms.    Take 1 tablet (350 mg total) by mouth 4 (four) times daily as needed for Muscle spasms.    CARISOPRODOL (SOMA) 350 MG TABLET carisoprodoL (SOMA) 350 MG tablet       Take 1 tablet (350 mg total) by mouth 4 (four) times daily as needed for Muscle spasms.    Take 1 tablet (350 mg total) by mouth 4 (four) times daily as needed for Muscle spasms.    CARISOPRODOL (SOMA) 350 MG TABLET carisoprodoL (SOMA) 350 MG tablet       Take 1 tablet (350 mg total) by mouth 4 (four) times daily as needed for Muscle spasms.    Take 1 tablet (350 mg total) by mouth 4 (four) times daily as needed for Muscle spasms.    HYDROCODONE-ACETAMINOPHEN (NORCO) 7.5-325 MG PER TABLET HYDROcodone-acetaminophen (NORCO) 7.5-325 mg per tablet       Take 1 tablet by mouth every 6 (six) hours as needed for Pain.    Take 1 tablet by mouth every 6 (six) hours as needed for Pain.    HYDROCODONE-ACETAMINOPHEN (NORCO) 7.5-325 MG PER TABLET HYDROcodone-acetaminophen (NORCO) 7.5-325 mg per tablet       Take 1 tablet by mouth every 6 (six) hours as needed for Pain.    Take 1 tablet by mouth every 6 (six) hours as needed for Pain.    HYDROCODONE-ACETAMINOPHEN (NORCO) 7.5-325 MG PER TABLET HYDROcodone-acetaminophen (NORCO) 7.5-325 mg per tablet       Take 1 tablet by mouth every 6 (six) hours as needed for Pain.    Take 1 tablet by mouth every 6 (six) hours as needed for Pain.    PROMETHAZINE (PHENERGAN) 25 MG TABLET promethazine (PHENERGAN) 25 MG tablet       Take 1 tablet (25 mg total) by mouth every 6 (six) hours as needed for Nausea.    Take 1 tablet (25 mg total) by mouth every 6 (six) hours as needed for Nausea.       Jb Chao M.D.  ==========================================================================  Subjective:   Patient ID: Ned Toledo Jr. is a 61 y.o. male.  has a past medical history of Diabetes mellitus, type 2, Fatty liver,  Hypertension, Penetrating foot wound, and Sciatica.   Chief Complaint: Follow-up      Problem List Items Addressed This Visit     Type 2 diabetes mellitus with hyperglycemia, without long-term current use of insulin (Chronic)    Overview     March 2020:  Reviewed Diabetes Management StatusPatient cannot take statin due to side effects.SEPTEMBER 2020:  Diabetes Management StatusStatin: TakingACE/ARB: Not taking  December 2021:  Diabetes Management StatusStatin: Not taking  ACE/ARB: Not taking    March 2022:  Diabetes Management Status    Statin: Not taking  ACE/ARB: Not taking    Screening or Prevention Patient's value Goal Complete/Controlled?   HgA1C Testing and Control   Lab Results   Component Value Date    HGBA1C 7.2 (H) 03/05/2021      Annually/Less than 8% Yes   Lipid profile : 03/05/2021 Annually Yes   LDL control Lab Results   Component Value Date    LDLCALC 120.2 03/05/2021    Annually/Less than 100 mg/dl  No   Nephropathy screening Lab Results   Component Value Date    LABMICR 21.0 09/06/2019     Lab Results   Component Value Date    PROTEINUA Negative 12/31/2018     No results found for: UTPCR   Annually No   Blood pressure BP Readings from Last 1 Encounters:   03/03/22 134/86    Less than 140/90 Yes   Dilated retinal exam : 05/23/2019 Annually No   Foot exam   : 03/25/2020 Annually No                Current Assessment & Plan       Update labs.Patient cannot tolerate statins due to worsening pain.  Patient reports no issues with his blood sugar at home.We will plan to monitor hemoglobin A1c at designated intervals 3 to 6 months.  I recommend ongoing Education for diabetic diet and exercise protocol.  We will continue to monitor for side effects.    Please be advised of symptoms to monitor for and to notify me immediately if persistent or worsening.  Follow up with Ophthalmology/Optometry and Podiatry at least annually.             Hypertension associated with diabetes (Chronic)    Overview     CHRONIC.  STABLE. BP Reviewed.  Currently not on medication . No SE reported.  March 2021:  Initial blood pressure elevated due to patient rushing for appointment.  He reports blood pressure well controlled at home 130/80.  (-) CP, SOB, palpitations, dizziness, lightheadedness, HA, arm numbness, tingling or weakness, syncope.  Creatinine   Date Value Ref Range Status   09/04/2020 1.0 0.5 - 1.4 mg/dL Final              Current Assessment & Plan       Blood pressure well controlled with pain control.   Consider ACE-inhibitor with comorbid diabetes.  Patient defers at this time.Counseled on importance of hypertension disease course, I recommend ongoing Education for DASH-diet and exercise.  Counseled on medication regimen importance of treating high blood pressure.  Please be advised of risk of untreated blood pressure as discussed.  Please advised of ER precautions were given for symptoms of hypertensive urgency and emergency.             Encounter for long-term (current) use of medications (Chronic)    Overview     Initial HPI March 2020:  CHRONIC. Stable. Compliant with medications for managed conditions. See medication list. No SE reported. Routine lab analysis is being monitored. Refills were addressed.JUNE 2020:  Labs are stable.  Refill medication.CHRONIC. Stable. Compliant with medications for managed conditions. See medication list. No SE reported. Routine lab analysis is being monitored. Refills were addressed.SEPTEMBER 2020:  CHRONIC. Stable. Compliant with medications for managed conditions. See medication list. No SE reported. Routine lab analysis is being monitored. Refills were addressed.DECEMBER 2020:  Reviewed labs.  CHRONIC. Stable. Compliant with medications for managed conditions. See medication list. No SE reported. Routine lab analysis is being monitored. Refills were addressed.  March 2021:  Reviewed labs.  June 2021:  Reviewed labs.  December 2021:  Reviewed labs. March 2022: Reviewed labs.  Lab Results    Component Value Date    WBC 7.78 03/25/2020    HGB 14.9 03/25/2020    HCT 48.5 03/25/2020     (H) 03/25/2020     03/25/2020         Chemistry        Component Value Date/Time     09/04/2020 0735    K 4.3 09/04/2020 0735     09/04/2020 0735    CO2 24 09/04/2020 0735    BUN 17 09/04/2020 0735    CREATININE 1.0 09/04/2020 0735     (H) 09/04/2020 0735        Component Value Date/Time    CALCIUM 9.2 09/04/2020 0735    ALKPHOS 46 (L) 09/04/2020 0735    AST 18 09/04/2020 0735    ALT 16 09/04/2020 0735    BILITOT 0.9 09/04/2020 0735    ESTGFRAFRICA >60.0 09/04/2020 0735    EGFRNONAA >60.0 09/04/2020 0735          Lab Results   Component Value Date    TSH 1.236 09/04/2020       =================================================           Current Assessment & Plan     Complete history and physical was completed today.  Complete and thorough medication reconciliation was performed.  Discussed risks and benefits of medications.  Advised patient on orders and health maintenance.  We discussed old records and old labs if available.  Will request any records not available through epic.  Continue current medications listed on your summary sheet.             Hyperlipidemia associated with type 2 diabetes mellitus (Chronic)    Overview     CHRONIC.   LDL above goal with history of diabetes. Lab analysis reviewed.   (-) CP, SOB, abdominal pain, N/V/D, constipation, jaundice, skin changes.  +  Severe Myalgias with statin usage  Lab Results   Component Value Date    CHOL 179 03/05/2021    CHOL 175 09/04/2020    CHOL 165 06/05/2020     Lab Results   Component Value Date    HDL 38 (L) 03/05/2021    HDL 41 09/04/2020    HDL 44 06/05/2020     Lab Results   Component Value Date    LDLCALC 120.2 03/05/2021    LDLCALC 116.8 09/04/2020    LDLCALC 105.4 06/05/2020     Lab Results   Component Value Date    TRIG 104 03/05/2021    TRIG 86 09/04/2020    TRIG 78 06/05/2020     Lab Results   Component Value Date     CHOLHDL 21.2 03/05/2021    CHOLHDL 23.4 09/04/2020    CHOLHDL 26.7 06/05/2020     Lab Results   Component Value Date    TOTALCHOLEST 4.7 03/05/2021    TOTALCHOLEST 4.3 09/04/2020    TOTALCHOLEST 3.8 06/05/2020     Lab Results   Component Value Date    ALT 16 09/04/2020    AST 18 09/04/2020    ALKPHOS 46 (L) 09/04/2020    BILITOT 0.9 09/04/2020     ======================================================  The 10-year ASCVD risk score (Chemo PUGH Jr., et al., 2013) is: 21.8%    Values used to calculate the score:      Age: 61 years      Sex: Male      Is Non- : No      Diabetic: Yes      Tobacco smoker: No      Systolic Blood Pressure: 134 mmHg      Is BP treated: No      HDL Cholesterol: 38 mg/dL      Total Cholesterol: 179 mg/dL             Current Assessment & Plan      Update fasting labs.  Consider Zetia if no improvement.  Would like to lower LDL less than 100 with history of diabetes.Counseled on hyperlipidemia disease course, healthy diet and increased need for exercise.  Please be advised of the risk of cardiovascular disease, increase stroke and heart attack risk with uncontrolled/untreated hyperlipidemia.     Patient voiced understanding and understood the treatment plan. All questions were answered.                Sacroiliitis - Primary (Chronic)    Overview     Chronic.  Stable.  Patient chronic pain medication with hydrocodone 7.5 and Soma 350 milligram.  Patient was previously managed by previous PCP Dr. Jaime Hernández who is retiring.  Patient has been stable on this medication regimen for years.  No side effects reported.    March 2022: Patient reports medication regimen is controlling his symptoms.  No change in HPI.  No new injuries.           Current Assessment & Plan     Refill medications as prescribed.  No abuse suspected. The patient was checked in the [facility] Board of Pharmacy's  Prescription Monitoring Program. There appears to be no incongruities with the patient's  verbalized history.       An opioid pain contract was completed  after having an extensive conversation about chronic opioid use for pain management. We discussed the risks and benefits of opioids.  I discouraged the patient from escalation of opioid use and try to minimize its use to decrease chance of dependence, tolerance, and opioid-based hyperalgesia.  I reviewed the  in great detail and there are no inconsistencies and it is appropriate with patient's history.  There are no signs of aberrant drug behavior.  The opioid risk tool was also completed, low risk.  Pt counseled about the side effects of long term use of opioids including dependence, tolerance, addiction, respiratory depression, somnolence, immune and endocrine dysfunction.  The patient expressed understanding.      If no improvement or worsening.  Referral to physical therapy, update imaging and consider spine/back clinic versus surgical specialist.           Migraine without status migrainosus, not intractable           Review of patient's allergies indicates:   Allergen Reactions    Atorvastatin     Bactrim [sulfamethoxazole-trimethoprim] Hives     Current Outpatient Medications   Medication Instructions    [START ON 4/30/2022] carisoprodoL (SOMA) 350 mg, Oral, 4 times daily PRN    [START ON 4/1/2022] carisoprodoL (SOMA) 350 mg, Oral, 4 times daily PRN    carisoprodoL (SOMA) 350 mg, Oral, 4 times daily PRN    fexofenadine (ALLEGRA) 180 mg, Oral, Daily    gabapentin (NEURONTIN) 100 mg, Oral, 3 times daily    [START ON 4/30/2022] HYDROcodone-acetaminophen (NORCO) 7.5-325 mg per tablet 1 tablet, Oral, Every 6 hours PRN    [START ON 4/1/2022] HYDROcodone-acetaminophen (NORCO) 7.5-325 mg per tablet 1 tablet, Oral, Every 6 hours PRN    HYDROcodone-acetaminophen (NORCO) 7.5-325 mg per tablet 1 tablet, Oral, Every 6 hours PRN    ketoconazole (NIZORAL) 2 % cream Topical (Top), 2 times daily    mupirocin (BACTROBAN) 2 % ointment Topical (Top),  3 times daily    naproxen (NAPROSYN) 500 mg, Oral, 2 times daily with meals    ondansetron (ZOFRAN-ODT) 8 mg, Oral, Every 6 hours PRN    promethazine (PHENERGAN) 25 mg, Oral, Every 6 hours PRN      I have reviewed the PMH, social history, FamilyHx, surgical history, allergies and medications documented / confirmed by the patient at the time of this visit.  Review of Systems   Constitutional: Negative for activity change and fatigue.   HENT: Negative for congestion and sinus pain.    Eyes: Negative for visual disturbance.   Respiratory: Negative for chest tightness and shortness of breath.    Cardiovascular: Negative for palpitations and leg swelling.   Gastrointestinal: Negative for abdominal pain, diarrhea and nausea.   Endocrine: Negative for polyuria.   Genitourinary: Negative for difficulty urinating and frequency.   Musculoskeletal: Positive for arthralgias and back pain. Negative for joint swelling.   Skin: Negative for rash.   Neurological: Negative for dizziness and headaches.   Psychiatric/Behavioral: Negative for agitation. The patient is not nervous/anxious.      Objective:   /86   Pulse 86   Temp 98.2 °F (36.8 °C) (Temporal)   Resp 18   Ht 6' (1.829 m)   Wt 88.8 kg (195 lb 12.8 oz)   SpO2 97%   BMI 26.56 kg/m²   Physical Exam  Vitals and nursing note reviewed.   Constitutional:       General: He is not in acute distress.     Appearance: He is well-developed.   HENT:      Head: Normocephalic and atraumatic.   Eyes:      Pupils: Pupils are equal, round, and reactive to light.   Cardiovascular:      Rate and Rhythm: Normal rate and regular rhythm.      Heart sounds: Normal heart sounds. No murmur heard.  Pulmonary:      Effort: Pulmonary effort is normal. No respiratory distress.      Breath sounds: Normal breath sounds. No wheezing.   Musculoskeletal:         General: Tenderness and deformity present.      Cervical back: Normal range of motion and neck supple.      Lumbar back: Deformity,  spasms, tenderness and bony tenderness present. No swelling, edema or lacerations. Decreased range of motion.   Skin:     General: Skin is warm and dry.      Capillary Refill: Capillary refill takes less than 2 seconds.   Neurological:      Mental Status: He is alert and oriented to person, place, and time.      Cranial Nerves: No cranial nerve deficit.   Psychiatric:         Behavior: Behavior normal.        Patient is wearing a mask which limits physical exam due to COVID restrictions.   Assessment:     1. Sacroiliitis    2. Encounter for long-term (current) use of medications    3. Type 2 diabetes mellitus with hyperglycemia, without long-term current use of insulin    4. Hypertension associated with diabetes    5. Hyperlipidemia associated with type 2 diabetes mellitus    6. Migraine without status migrainosus, not intractable, unspecified migraine type      MDM:   Moderate complexity.  Moderate risk.  Total time: 32 minutes.  This includes total time spent on the encounter, which includes face to face time and non-face to face time preparing to see the patient (eg, review of previous medical records, tests), Obtaining and/or reviewing separately obtained history, documenting clinical information in the electronic or other health record, independently interpreting results (not separately reported)/communicating results to the patient/family/caregiver, and/or care coordination (not separately reported).    I have Reviewed and summarized old records.  I have performed thorough medication reconciliation today and discussed risk and benefits of medications.  I have reviewed labs and discussed with patient.  All questions were answered.  I have reviewed  records.  I have signed for the following orders AND/OR meds.  Orders Placed This Encounter   Procedures    Toxicology Screen, Urine     Standing Status:   Future     Standing Expiration Date:   5/2/2023     Order Specific Question:   Specimen Source     Answer:    Urine     Medications Ordered This Encounter   Medications    carisoprodoL (SOMA) 350 MG tablet     Sig: Take 1 tablet (350 mg total) by mouth 4 (four) times daily as needed for Muscle spasms.     Dispense:  120 tablet     Refill:  0    carisoprodoL (SOMA) 350 MG tablet     Sig: Take 1 tablet (350 mg total) by mouth 4 (four) times daily as needed for Muscle spasms.     Dispense:  120 tablet     Refill:  0    carisoprodoL (SOMA) 350 MG tablet     Sig: Take 1 tablet (350 mg total) by mouth 4 (four) times daily as needed for Muscle spasms.     Dispense:  120 tablet     Refill:  0    HYDROcodone-acetaminophen (NORCO) 7.5-325 mg per tablet     Sig: Take 1 tablet by mouth every 6 (six) hours as needed for Pain.     Dispense:  120 tablet     Refill:  0     Quantity prescribed more than 7 day supply? Yes, quantity medically necessary     Order Specific Question:   I have reviewed the Prescription Drug Monitoring Program (PDMP) database for this patient prior to prescribing the above opioid medication     Answer:   Yes    HYDROcodone-acetaminophen (NORCO) 7.5-325 mg per tablet     Sig: Take 1 tablet by mouth every 6 (six) hours as needed for Pain.     Dispense:  120 tablet     Refill:  0     Quantity prescribed more than 7 day supply? Yes, quantity medically necessary     Order Specific Question:   I have reviewed the Prescription Drug Monitoring Program (PDMP) database for this patient prior to prescribing the above opioid medication     Answer:   Yes    HYDROcodone-acetaminophen (NORCO) 7.5-325 mg per tablet     Sig: Take 1 tablet by mouth every 6 (six) hours as needed for Pain.     Dispense:  120 tablet     Refill:  0     Quantity prescribed more than 7 day supply? Yes, quantity medically necessary     Order Specific Question:   I have reviewed the Prescription Drug Monitoring Program (PDMP) database for this patient prior to prescribing the above opioid medication     Answer:   Yes    promethazine (PHENERGAN) 25  MG tablet     Sig: Take 1 tablet (25 mg total) by mouth every 6 (six) hours as needed for Nausea.     Dispense:  30 tablet     Refill:  6        Follow up in about 3 months (around 6/3/2022), or if symptoms worsen or fail to improve, for Pain med Refill.    If no improvement in symptoms or symptoms worsen, advised to call/follow-up at clinic or go to ER. Patient voiced understanding and all questions/concerns were addressed.   DISCLAIMER: This note was compiled by using a speech recognition dictation system and therefore please be aware that typographical / speech recognition errors can and do occur.  Please contact me if you see any errors specifically.    Jb Chao M.D.       Office: [phone] [id number] [place] [address]  FAX: [phone]

## 2022-03-08 ENCOUNTER — OFFICE VISIT (OUTPATIENT)
Dept: FAMILY MEDICINE | Facility: CLINIC | Age: 62
End: 2022-03-08
Payer: COMMERCIAL

## 2022-03-08 VITALS
HEIGHT: 72 IN | WEIGHT: 198.38 LBS | TEMPERATURE: 98 F | DIASTOLIC BLOOD PRESSURE: 82 MMHG | SYSTOLIC BLOOD PRESSURE: 136 MMHG | HEART RATE: 80 BPM | BODY MASS INDEX: 26.87 KG/M2

## 2022-03-08 DIAGNOSIS — J02.9 PHARYNGITIS, UNSPECIFIED ETIOLOGY: Primary | ICD-10-CM

## 2022-03-08 PROCEDURE — 3075F PR MOST RECENT SYSTOLIC BLOOD PRESS GE 130-139MM HG: ICD-10-PCS | Mod: CPTII,S$GLB,, | Performed by: NURSE PRACTITIONER

## 2022-03-08 PROCEDURE — 1160F RVW MEDS BY RX/DR IN RCRD: CPT | Mod: CPTII,S$GLB,, | Performed by: NURSE PRACTITIONER

## 2022-03-08 PROCEDURE — 99213 PR OFFICE/OUTPT VISIT, EST, LEVL III, 20-29 MIN: ICD-10-PCS | Mod: S$GLB,,, | Performed by: NURSE PRACTITIONER

## 2022-03-08 PROCEDURE — 3008F BODY MASS INDEX DOCD: CPT | Mod: CPTII,S$GLB,, | Performed by: NURSE PRACTITIONER

## 2022-03-08 PROCEDURE — 3075F SYST BP GE 130 - 139MM HG: CPT | Mod: CPTII,S$GLB,, | Performed by: NURSE PRACTITIONER

## 2022-03-08 PROCEDURE — 3079F DIAST BP 80-89 MM HG: CPT | Mod: CPTII,S$GLB,, | Performed by: NURSE PRACTITIONER

## 2022-03-08 PROCEDURE — 99213 OFFICE O/P EST LOW 20 MIN: CPT | Mod: S$GLB,,, | Performed by: NURSE PRACTITIONER

## 2022-03-08 PROCEDURE — 1159F PR MEDICATION LIST DOCUMENTED IN MEDICAL RECORD: ICD-10-PCS | Mod: CPTII,S$GLB,, | Performed by: NURSE PRACTITIONER

## 2022-03-08 PROCEDURE — 3079F PR MOST RECENT DIASTOLIC BLOOD PRESSURE 80-89 MM HG: ICD-10-PCS | Mod: CPTII,S$GLB,, | Performed by: NURSE PRACTITIONER

## 2022-03-08 PROCEDURE — 99999 PR PBB SHADOW E&M-EST. PATIENT-LVL IV: CPT | Mod: PBBFAC,,, | Performed by: NURSE PRACTITIONER

## 2022-03-08 PROCEDURE — 3008F PR BODY MASS INDEX (BMI) DOCUMENTED: ICD-10-PCS | Mod: CPTII,S$GLB,, | Performed by: NURSE PRACTITIONER

## 2022-03-08 PROCEDURE — 1160F PR REVIEW ALL MEDS BY PRESCRIBER/CLIN PHARMACIST DOCUMENTED: ICD-10-PCS | Mod: CPTII,S$GLB,, | Performed by: NURSE PRACTITIONER

## 2022-03-08 PROCEDURE — 1159F MED LIST DOCD IN RCRD: CPT | Mod: CPTII,S$GLB,, | Performed by: NURSE PRACTITIONER

## 2022-03-08 PROCEDURE — 99999 PR PBB SHADOW E&M-EST. PATIENT-LVL IV: ICD-10-PCS | Mod: PBBFAC,,, | Performed by: NURSE PRACTITIONER

## 2022-03-08 RX ORDER — AMOXICILLIN 875 MG/1
875 TABLET, FILM COATED ORAL EVERY 12 HOURS
Qty: 20 TABLET | Refills: 0 | Status: SHIPPED | OUTPATIENT
Start: 2022-03-08 | End: 2022-06-01

## 2022-03-08 NOTE — PROGRESS NOTES
Subjective:       Patient ID: Ned Toledo Jr. is a 61 y.o. male.    Chief Complaint: Sore Throat, Nasal Congestion, and Cough    Sore Throat   This is a new problem. The current episode started in the past 7 days. The problem has been rapidly worsening. The maximum temperature recorded prior to his arrival was 100.4 - 100.9 F. The fever has been present for 1 to 2 days. The pain is moderate. Associated symptoms include congestion and coughing. Pertinent negatives include no abdominal pain, diarrhea, ear pain, headaches, shortness of breath or vomiting. He has tried acetaminophen (chip seltzer plus, allegra) for the symptoms. The treatment provided no relief.       Review of Systems   Constitutional: Negative for fatigue, fever and unexpected weight change.   HENT: Positive for congestion, postnasal drip and sore throat. Negative for ear pain.    Eyes: Negative.  Negative for pain and visual disturbance.   Respiratory: Positive for cough. Negative for shortness of breath.    Cardiovascular: Negative for chest pain and palpitations.   Gastrointestinal: Negative for abdominal pain, diarrhea, nausea and vomiting.   Genitourinary: Negative for dysuria and frequency.   Musculoskeletal: Negative for arthralgias and myalgias.   Skin: Negative for color change and rash.   Neurological: Negative for dizziness and headaches.   Psychiatric/Behavioral: Negative for sleep disturbance. The patient is not nervous/anxious.        Vitals:    03/08/22 0724   BP: 136/82   Pulse: 80   Temp: 98 °F (36.7 °C)       Objective:     Current Outpatient Medications   Medication Sig Dispense Refill    [START ON 4/30/2022] carisoprodoL (SOMA) 350 MG tablet Take 1 tablet (350 mg total) by mouth 4 (four) times daily as needed for Muscle spasms. 120 tablet 0    [START ON 4/1/2022] carisoprodoL (SOMA) 350 MG tablet Take 1 tablet (350 mg total) by mouth 4 (four) times daily as needed for Muscle spasms. 120 tablet 0    carisoprodoL (SOMA) 350 MG  tablet Take 1 tablet (350 mg total) by mouth 4 (four) times daily as needed for Muscle spasms. 120 tablet 0    fexofenadine (ALLEGRA) 180 MG tablet Take 1 tablet (180 mg total) by mouth once daily. 90 tablet 3    [START ON 4/30/2022] HYDROcodone-acetaminophen (NORCO) 7.5-325 mg per tablet Take 1 tablet by mouth every 6 (six) hours as needed for Pain. 120 tablet 0    [START ON 4/1/2022] HYDROcodone-acetaminophen (NORCO) 7.5-325 mg per tablet Take 1 tablet by mouth every 6 (six) hours as needed for Pain. 120 tablet 0    HYDROcodone-acetaminophen (NORCO) 7.5-325 mg per tablet Take 1 tablet by mouth every 6 (six) hours as needed for Pain. 120 tablet 0    ketoconazole (NIZORAL) 2 % cream Apply topically 2 (two) times daily. 60 g 6    mupirocin (BACTROBAN) 2 % ointment Apply topically 3 (three) times daily. 15 g 0    naproxen (NAPROSYN) 500 MG tablet Take 1 tablet (500 mg total) by mouth 2 (two) times daily with meals. 60 tablet 11    ondansetron (ZOFRAN-ODT) 8 MG TbDL Take 1 tablet (8 mg total) by mouth every 6 (six) hours as needed. 20 tablet 1    promethazine (PHENERGAN) 25 MG tablet Take 1 tablet (25 mg total) by mouth every 6 (six) hours as needed for Nausea. 30 tablet 6    amoxicillin (AMOXIL) 875 MG tablet Take 1 tablet (875 mg total) by mouth every 12 (twelve) hours. 20 tablet 0     No current facility-administered medications for this visit.       Physical Exam  Vitals and nursing note reviewed.   Constitutional:       General: He is not in acute distress.     Appearance: He is well-developed.   HENT:      Head: Normocephalic and atraumatic.      Right Ear: Tympanic membrane normal.      Left Ear: Tympanic membrane normal.      Nose: Mucosal edema present.      Mouth/Throat:      Pharynx: Pharyngeal swelling (post nasal mucus) and posterior oropharyngeal erythema present.   Eyes:      Pupils: Pupils are equal, round, and reactive to light.   Cardiovascular:      Rate and Rhythm: Normal rate and regular  rhythm.   Pulmonary:      Effort: Pulmonary effort is normal.      Breath sounds: Normal breath sounds.   Musculoskeletal:         General: Normal range of motion.      Cervical back: Normal range of motion and neck supple.   Lymphadenopathy:      Head:      Right side of head: Tonsillar adenopathy present.      Left side of head: Tonsillar adenopathy present.   Skin:     General: Skin is warm and dry.      Findings: No rash.   Neurological:      Mental Status: He is alert and oriented to person, place, and time.   Psychiatric:         Thought Content: Thought content normal.         Assessment:       1. Pharyngitis, unspecified etiology        Plan:   Pharyngitis, unspecified etiology    Other orders  -     amoxicillin (AMOXIL) 875 MG tablet; Take 1 tablet (875 mg total) by mouth every 12 (twelve) hours.  Dispense: 20 tablet; Refill: 0        No follow-ups on file.    There are no Patient Instructions on file for this visit.

## 2022-03-16 ENCOUNTER — LAB VISIT (OUTPATIENT)
Dept: LAB | Facility: HOSPITAL | Age: 62
End: 2022-03-16
Attending: FAMILY MEDICINE
Payer: COMMERCIAL

## 2022-03-16 DIAGNOSIS — Z13.220 ENCOUNTER FOR LIPID SCREENING FOR CARDIOVASCULAR DISEASE: ICD-10-CM

## 2022-03-16 DIAGNOSIS — Z79.899 ENCOUNTER FOR LONG-TERM (CURRENT) USE OF MEDICATIONS: ICD-10-CM

## 2022-03-16 DIAGNOSIS — Z12.5 ENCOUNTER FOR PROSTATE CANCER SCREENING: ICD-10-CM

## 2022-03-16 DIAGNOSIS — E11.9 TYPE 2 DIABETES MELLITUS WITHOUT COMPLICATION, WITHOUT LONG-TERM CURRENT USE OF INSULIN: ICD-10-CM

## 2022-03-16 DIAGNOSIS — Z13.6 ENCOUNTER FOR LIPID SCREENING FOR CARDIOVASCULAR DISEASE: ICD-10-CM

## 2022-03-16 LAB
ALBUMIN SERPL BCP-MCNC: 4 G/DL (ref 3.5–5.2)
ALP SERPL-CCNC: 50 U/L (ref 55–135)
ALT SERPL W/O P-5'-P-CCNC: 19 U/L (ref 10–44)
ANION GAP SERPL CALC-SCNC: 9 MMOL/L (ref 8–16)
AST SERPL-CCNC: 19 U/L (ref 10–40)
BILIRUB SERPL-MCNC: 0.7 MG/DL (ref 0.1–1)
BUN SERPL-MCNC: 17 MG/DL (ref 8–23)
CALCIUM SERPL-MCNC: 9.5 MG/DL (ref 8.7–10.5)
CHLORIDE SERPL-SCNC: 104 MMOL/L (ref 95–110)
CHOLEST SERPL-MCNC: 182 MG/DL (ref 120–199)
CHOLEST/HDLC SERPL: 4.7 {RATIO} (ref 2–5)
CO2 SERPL-SCNC: 27 MMOL/L (ref 23–29)
COMPLEXED PSA SERPL-MCNC: 1.5 NG/ML (ref 0–4)
CREAT SERPL-MCNC: 1 MG/DL (ref 0.5–1.4)
EST. GFR  (AFRICAN AMERICAN): >60 ML/MIN/1.73 M^2
EST. GFR  (NON AFRICAN AMERICAN): >60 ML/MIN/1.73 M^2
ESTIMATED AVG GLUCOSE: 189 MG/DL (ref 68–131)
GLUCOSE SERPL-MCNC: 143 MG/DL (ref 70–110)
HBA1C MFR BLD: 8.2 % (ref 4–5.6)
HDLC SERPL-MCNC: 39 MG/DL (ref 40–75)
HDLC SERPL: 21.4 % (ref 20–50)
LDLC SERPL CALC-MCNC: 124 MG/DL (ref 63–159)
NONHDLC SERPL-MCNC: 143 MG/DL
POTASSIUM SERPL-SCNC: 4.6 MMOL/L (ref 3.5–5.1)
PROT SERPL-MCNC: 7 G/DL (ref 6–8.4)
SODIUM SERPL-SCNC: 140 MMOL/L (ref 136–145)
TRIGL SERPL-MCNC: 95 MG/DL (ref 30–150)

## 2022-03-16 PROCEDURE — 80061 LIPID PANEL: CPT | Performed by: FAMILY MEDICINE

## 2022-03-16 PROCEDURE — 84153 ASSAY OF PSA TOTAL: CPT | Performed by: FAMILY MEDICINE

## 2022-03-16 PROCEDURE — 36415 COLL VENOUS BLD VENIPUNCTURE: CPT | Mod: PO | Performed by: FAMILY MEDICINE

## 2022-03-16 PROCEDURE — 83036 HEMOGLOBIN GLYCOSYLATED A1C: CPT | Performed by: FAMILY MEDICINE

## 2022-03-16 PROCEDURE — 80053 COMPREHEN METABOLIC PANEL: CPT | Performed by: FAMILY MEDICINE

## 2022-03-18 NOTE — PROGRESS NOTES
1st check to see if patient has seen the results.  If not then  CALL patient with results and Document verification.  Schedule follow-up if needed.  [phone]    PSA screening for prostate cancer is within normal limits.  Repeat annually.    A1c elevated from previous.  Better control of diabetes is needed.  I recommend adding a medication called metformin for better control of blood sugar to prevent complications of diabetes.  Recheck A1c in three months.  Let me know if in agreement with the metformin and I will send to the pharmacy.  Cholesterol is stable from previous. However with diabetes I do recommend starting a low-dose medication to lower the cholesterol to prevent heart attacks and strokes.    Risk score is elevated.  See below.    Metabolic panel shows normal kidney function, stable liver function and electrolytes.  Blood sugar is elevated.  See A1c above.      The 10-year ASCVD risk score ([place]mic[place]le PUGH Jr., et al., 2013) is: 22.2%    Values used to calculate the score:      Age: 61 years      Sex: Male      Is Non- : No      Diabetic: Yes      Tobacco smoker: No      Systolic Blood Pressure: 136 mmHg      Is BP treated: No      HDL Cholesterol: 39 mg/dL      Total Cholesterol: 182 mg/dL

## 2022-03-24 NOTE — TELEPHONE ENCOUNTER
"----- Message from Carla Urban sent at 3/24/2022 11:32 AM CDT -----  "Type:  Patient Call Back    Who Called:RUDDY MCKEON JR. [[id number]]    What is the reqeust in detail:Pt requesting call back to get an prescription for Test strips for sugar. Please advise    Can the clinic reply by [facility]?no    Best Call Back Number:[phone]      Additional Information:Freestyle tet strips            "

## 2022-03-24 NOTE — TELEPHONE ENCOUNTER
No new care gaps identified.  Powered by [employer]. Reference number: [id number].   3/24/2022 11:37:12 AM CDT

## 2022-05-11 DIAGNOSIS — E11.9 TYPE 2 DIABETES MELLITUS WITHOUT COMPLICATION: ICD-10-CM

## 2022-06-01 ENCOUNTER — OFFICE VISIT (OUTPATIENT)
Dept: FAMILY MEDICINE | Facility: CLINIC | Age: 62
End: 2022-06-01
Payer: COMMERCIAL

## 2022-06-01 ENCOUNTER — LAB VISIT (OUTPATIENT)
Dept: LAB | Facility: HOSPITAL | Age: 62
End: 2022-06-01
Attending: FAMILY MEDICINE
Payer: COMMERCIAL

## 2022-06-01 VITALS
BODY MASS INDEX: 25.46 KG/M2 | WEIGHT: 187.94 LBS | TEMPERATURE: 97 F | RESPIRATION RATE: 16 BRPM | HEIGHT: 72 IN | SYSTOLIC BLOOD PRESSURE: 134 MMHG | DIASTOLIC BLOOD PRESSURE: 88 MMHG | HEART RATE: 65 BPM | OXYGEN SATURATION: 97 %

## 2022-06-01 DIAGNOSIS — I15.2 HYPERTENSION ASSOCIATED WITH DIABETES: ICD-10-CM

## 2022-06-01 DIAGNOSIS — Z13.6 ENCOUNTER FOR LIPID SCREENING FOR CARDIOVASCULAR DISEASE: ICD-10-CM

## 2022-06-01 DIAGNOSIS — E11.59 HYPERTENSION ASSOCIATED WITH DIABETES: ICD-10-CM

## 2022-06-01 DIAGNOSIS — E11.65 TYPE 2 DIABETES MELLITUS WITH HYPERGLYCEMIA, WITHOUT LONG-TERM CURRENT USE OF INSULIN: Primary | ICD-10-CM

## 2022-06-01 DIAGNOSIS — M46.1 SACROILIITIS: ICD-10-CM

## 2022-06-01 DIAGNOSIS — E11.65 TYPE 2 DIABETES MELLITUS WITH HYPERGLYCEMIA, WITHOUT LONG-TERM CURRENT USE OF INSULIN: ICD-10-CM

## 2022-06-01 DIAGNOSIS — E11.9 TYPE 2 DIABETES MELLITUS WITHOUT COMPLICATION, WITHOUT LONG-TERM CURRENT USE OF INSULIN: ICD-10-CM

## 2022-06-01 DIAGNOSIS — Z79.899 ENCOUNTER FOR LONG-TERM (CURRENT) USE OF MEDICATIONS: ICD-10-CM

## 2022-06-01 DIAGNOSIS — Z13.220 ENCOUNTER FOR LIPID SCREENING FOR CARDIOVASCULAR DISEASE: ICD-10-CM

## 2022-06-01 LAB
ALBUMIN SERPL BCP-MCNC: 3.8 G/DL (ref 3.5–5.2)
ALP SERPL-CCNC: 48 U/L (ref 55–135)
ALT SERPL W/O P-5'-P-CCNC: 17 U/L (ref 10–44)
ANION GAP SERPL CALC-SCNC: 7 MMOL/L (ref 8–16)
AST SERPL-CCNC: 19 U/L (ref 10–40)
BILIRUB SERPL-MCNC: 0.7 MG/DL (ref 0.1–1)
BUN SERPL-MCNC: 15 MG/DL (ref 8–23)
CALCIUM SERPL-MCNC: 9.3 MG/DL (ref 8.7–10.5)
CHLORIDE SERPL-SCNC: 101 MMOL/L (ref 95–110)
CHOLEST SERPL-MCNC: 174 MG/DL (ref 120–199)
CHOLEST/HDLC SERPL: 4 {RATIO} (ref 2–5)
CO2 SERPL-SCNC: 26 MMOL/L (ref 23–29)
CREAT SERPL-MCNC: 0.8 MG/DL (ref 0.5–1.4)
EST. GFR  (AFRICAN AMERICAN): >60 ML/MIN/1.73 M^2
EST. GFR  (NON AFRICAN AMERICAN): >60 ML/MIN/1.73 M^2
ESTIMATED AVG GLUCOSE: 148 MG/DL (ref 68–131)
GLUCOSE SERPL-MCNC: 127 MG/DL (ref 70–110)
HBA1C MFR BLD: 6.8 % (ref 4–5.6)
HDLC SERPL-MCNC: 44 MG/DL (ref 40–75)
HDLC SERPL: 25.3 % (ref 20–50)
LDLC SERPL CALC-MCNC: 117.6 MG/DL (ref 63–159)
NONHDLC SERPL-MCNC: 130 MG/DL
POTASSIUM SERPL-SCNC: 4.4 MMOL/L (ref 3.5–5.1)
PROT SERPL-MCNC: 7.1 G/DL (ref 6–8.4)
SODIUM SERPL-SCNC: 134 MMOL/L (ref 136–145)
TRIGL SERPL-MCNC: 62 MG/DL (ref 30–150)

## 2022-06-01 PROCEDURE — 3008F PR BODY MASS INDEX (BMI) DOCUMENTED: ICD-10-PCS | Mod: CPTII,S$GLB,, | Performed by: FAMILY MEDICINE

## 2022-06-01 PROCEDURE — 3008F BODY MASS INDEX DOCD: CPT | Mod: CPTII,S$GLB,, | Performed by: FAMILY MEDICINE

## 2022-06-01 PROCEDURE — 1160F PR REVIEW ALL MEDS BY PRESCRIBER/CLIN PHARMACIST DOCUMENTED: ICD-10-PCS | Mod: CPTII,S$GLB,, | Performed by: FAMILY MEDICINE

## 2022-06-01 PROCEDURE — 3075F SYST BP GE 130 - 139MM HG: CPT | Mod: CPTII,S$GLB,, | Performed by: FAMILY MEDICINE

## 2022-06-01 PROCEDURE — 80061 LIPID PANEL: CPT | Performed by: FAMILY MEDICINE

## 2022-06-01 PROCEDURE — 3075F PR MOST RECENT SYSTOLIC BLOOD PRESS GE 130-139MM HG: ICD-10-PCS | Mod: CPTII,S$GLB,, | Performed by: FAMILY MEDICINE

## 2022-06-01 PROCEDURE — 3052F PR MOST RECENT HEMOGLOBIN A1C LEVEL 8.0 - < 9.0%: ICD-10-PCS | Mod: CPTII,S$GLB,, | Performed by: FAMILY MEDICINE

## 2022-06-01 PROCEDURE — 99214 PR OFFICE/OUTPT VISIT, EST, LEVL IV, 30-39 MIN: ICD-10-PCS | Mod: S$GLB,,, | Performed by: FAMILY MEDICINE

## 2022-06-01 PROCEDURE — 99999 PR PBB SHADOW E&M-EST. PATIENT-LVL V: ICD-10-PCS | Mod: PBBFAC,,, | Performed by: FAMILY MEDICINE

## 2022-06-01 PROCEDURE — 83036 HEMOGLOBIN GLYCOSYLATED A1C: CPT | Performed by: FAMILY MEDICINE

## 2022-06-01 PROCEDURE — 36415 COLL VENOUS BLD VENIPUNCTURE: CPT | Mod: PO | Performed by: FAMILY MEDICINE

## 2022-06-01 PROCEDURE — 3052F HG A1C>EQUAL 8.0%<EQUAL 9.0%: CPT | Mod: CPTII,S$GLB,, | Performed by: FAMILY MEDICINE

## 2022-06-01 PROCEDURE — 3079F DIAST BP 80-89 MM HG: CPT | Mod: CPTII,S$GLB,, | Performed by: FAMILY MEDICINE

## 2022-06-01 PROCEDURE — 80053 COMPREHEN METABOLIC PANEL: CPT | Performed by: FAMILY MEDICINE

## 2022-06-01 PROCEDURE — 1160F RVW MEDS BY RX/DR IN RCRD: CPT | Mod: CPTII,S$GLB,, | Performed by: FAMILY MEDICINE

## 2022-06-01 PROCEDURE — 1159F MED LIST DOCD IN RCRD: CPT | Mod: CPTII,S$GLB,, | Performed by: FAMILY MEDICINE

## 2022-06-01 PROCEDURE — 99214 OFFICE O/P EST MOD 30 MIN: CPT | Mod: S$GLB,,, | Performed by: FAMILY MEDICINE

## 2022-06-01 PROCEDURE — 99999 PR PBB SHADOW E&M-EST. PATIENT-LVL V: CPT | Mod: PBBFAC,,, | Performed by: FAMILY MEDICINE

## 2022-06-01 PROCEDURE — 1159F PR MEDICATION LIST DOCUMENTED IN MEDICAL RECORD: ICD-10-PCS | Mod: CPTII,S$GLB,, | Performed by: FAMILY MEDICINE

## 2022-06-01 PROCEDURE — 3079F PR MOST RECENT DIASTOLIC BLOOD PRESSURE 80-89 MM HG: ICD-10-PCS | Mod: CPTII,S$GLB,, | Performed by: FAMILY MEDICINE

## 2022-06-01 RX ORDER — HYDROCODONE BITARTRATE AND ACETAMINOPHEN 7.5; 325 MG/1; MG/1
1 TABLET ORAL EVERY 6 HOURS PRN
Qty: 120 TABLET | Refills: 0 | Status: SHIPPED | OUTPATIENT
Start: 2022-07-08 | End: 2022-08-31 | Stop reason: SDUPTHER

## 2022-06-01 RX ORDER — HYDROCODONE BITARTRATE AND ACETAMINOPHEN 7.5; 325 MG/1; MG/1
1 TABLET ORAL EVERY 6 HOURS PRN
Qty: 120 TABLET | Refills: 0 | Status: SHIPPED | OUTPATIENT
Start: 2022-06-09 | End: 2022-08-31 | Stop reason: SDUPTHER

## 2022-06-01 RX ORDER — CARISOPRODOL 350 MG/1
350 TABLET ORAL 4 TIMES DAILY PRN
Qty: 120 TABLET | Refills: 0 | Status: SHIPPED | OUTPATIENT
Start: 2022-08-06 | End: 2022-08-31 | Stop reason: SDUPTHER

## 2022-06-01 RX ORDER — CARISOPRODOL 350 MG/1
350 TABLET ORAL 4 TIMES DAILY PRN
Qty: 120 TABLET | Refills: 0 | Status: SHIPPED | OUTPATIENT
Start: 2022-06-09 | End: 2022-08-31 | Stop reason: SDUPTHER

## 2022-06-01 RX ORDER — CARISOPRODOL 350 MG/1
350 TABLET ORAL 4 TIMES DAILY PRN
Qty: 120 TABLET | Refills: 0 | Status: SHIPPED | OUTPATIENT
Start: 2022-07-07 | End: 2022-08-31 | Stop reason: SDUPTHER

## 2022-06-01 RX ORDER — HYDROCODONE BITARTRATE AND ACETAMINOPHEN 7.5; 325 MG/1; MG/1
1 TABLET ORAL EVERY 6 HOURS PRN
Qty: 120 TABLET | Refills: 0 | Status: SHIPPED | OUTPATIENT
Start: 2022-08-06 | End: 2022-08-31 | Stop reason: SDUPTHER

## 2022-06-01 NOTE — PROGRESS NOTES
PLAN:      Migraines:  Refill promethazine as needed for migraines.  Otherwise controlled with over-the-counter medication.Discussed condition course and signs and symptoms to expect.  Patient advised take anti-inflammatories and or Tylenol for pain or fever.  ER precautions.  Call MD or follow-up to clinic if not improving or worsening symptoms.    Problem List Items Addressed This Visit     Type 2 diabetes mellitus with hyperglycemia, without long-term current use of insulin - Primary (Chronic)     Update A1c.We will plan to monitor hemoglobin A1c at designated intervals 3 to 6 months.  I recommend ongoing Education for diabetic diet and exercise protocol.  We will continue to monitor for side effects.    Please be advised of symptoms to monitor for and to notify me immediately if persistent or worsening.  Follow up with Ophthalmology/Optometry and Podiatry at least annually.             Relevant Orders    Comprehensive Metabolic Panel    Hypertension associated with diabetes (Chronic)       Blood pressure well controlled with pain control.   Consider ACE-inhibitor with comorbid diabetes.  Patient defers at this time.Counseled on importance of hypertension disease course, I recommend ongoing Education for DASH-diet and exercise.  Counseled on medication regimen importance of treating high blood pressure.  Please be advised of risk of untreated blood pressure as discussed.  Please advised of ER precautions were given for symptoms of hypertensive urgency and emergency.             Relevant Orders    Comprehensive Metabolic Panel    Encounter for long-term (current) use of medications (Chronic)     Complete history and physical was completed today.  Complete and thorough medication reconciliation was performed.  Discussed risks and benefits of medications.  Advised patient on orders and health maintenance.  We discussed old records and old labs if available.  Will request any records not available through epic.   Continue current medications listed on your summary sheet.             Relevant Medications    carisoprodoL (SOMA) 350 MG tablet (Start on 8/6/2022)    carisoprodoL (SOMA) 350 MG tablet (Start on 7/7/2022)    carisoprodoL (SOMA) 350 MG tablet (Start on 6/9/2022)    HYDROcodone-acetaminophen (NORCO) 7.5-325 mg per tablet (Start on 8/6/2022)    HYDROcodone-acetaminophen (NORCO) 7.5-325 mg per tablet (Start on 7/8/2022)    HYDROcodone-acetaminophen (NORCO) 7.5-325 mg per tablet (Start on 6/9/2022)    Other Relevant Orders    Comprehensive Metabolic Panel    Sacroiliitis (Chronic)     Refill medications as prescribed.  No abuse suspected. The patient was checked in the [facility] Board of Pharmacy's  Prescription Monitoring Program. There appears to be no incongruities with the patient's verbalized history.       An opioid pain contract was completed  after having an extensive conversation about chronic opioid use for pain management. We discussed the risks and benefits of opioids.  I discouraged the patient from escalation of opioid use and try to minimize its use to decrease chance of dependence, tolerance, and opioid-based hyperalgesia.  I reviewed the  in great detail and there are no inconsistencies and it is appropriate with patient's history.  There are no signs of aberrant drug behavior.  The opioid risk tool was also completed, low risk.  Pt counseled about the side effects of long term use of opioids including dependence, tolerance, addiction, respiratory depression, somnolence, immune and endocrine dysfunction.  The patient expressed understanding.      If no improvement or worsening.  Referral to physical therapy, update imaging and consider spine/back clinic versus surgical specialist.           Relevant Medications    carisoprodoL (SOMA) 350 MG tablet (Start on 8/6/2022)    carisoprodoL (SOMA) 350 MG tablet (Start on 7/7/2022)    carisoprodoL (SOMA) 350 MG tablet (Start on 6/9/2022)     HYDROcodone-acetaminophen (NORCO) 7.5-325 mg per tablet (Start on 8/6/2022)    HYDROcodone-acetaminophen (NORCO) 7.5-325 mg per tablet (Start on 7/8/2022)    HYDROcodone-acetaminophen (NORCO) 7.5-325 mg per tablet (Start on 6/9/2022)    Other Relevant Orders    Comprehensive Metabolic Panel        Future Appointments     Date Provider Specialty Appt Notes    6/1/2022  Lab     8/31/2022 Jb Chao MD Family Medicine 3 month pain med refill         Medication Management for assessment above:   Medication List with Changes/Refills   Current Medications    BLOOD SUGAR DIAGNOSTIC (BLOOD GLUCOSE TEST) STRP    Check glucose 3 times per day before each meal    FEXOFENADINE (ALLEGRA) 180 MG TABLET    Take 1 tablet (180 mg total) by mouth once daily.    KETOCONAZOLE (NIZORAL) 2 % CREAM    Apply topically 2 (two) times daily.    MUPIROCIN (BACTROBAN) 2 % OINTMENT    Apply topically 3 (three) times daily.    NAPROXEN (NAPROSYN) 500 MG TABLET    Take 1 tablet (500 mg total) by mouth 2 (two) times daily with meals.    ONDANSETRON (ZOFRAN-ODT) 8 MG TBDL    Take 1 tablet (8 mg total) by mouth every 6 (six) hours as needed.    PROMETHAZINE (PHENERGAN) 25 MG TABLET    Take 1 tablet (25 mg total) by mouth every 6 (six) hours as needed for Nausea.   Changed and/or Refilled Medications    Modified Medication Previous Medication    CARISOPRODOL (SOMA) 350 MG TABLET carisoprodoL (SOMA) 350 MG tablet       Take 1 tablet (350 mg total) by mouth 4 (four) times daily as needed for Muscle spasms.    Take 1 tablet (350 mg total) by mouth 4 (four) times daily as needed for Muscle spasms.    CARISOPRODOL (SOMA) 350 MG TABLET carisoprodoL (SOMA) 350 MG tablet       Take 1 tablet (350 mg total) by mouth 4 (four) times daily as needed for Muscle spasms.    Take 1 tablet (350 mg total) by mouth 4 (four) times daily as needed for Muscle spasms.    CARISOPRODOL (SOMA) 350 MG TABLET carisoprodoL (SOMA) 350 MG tablet       Take 1 tablet (350 mg  total) by mouth 4 (four) times daily as needed for Muscle spasms.    TAKE 1 TABLET BY MOUTH FOUR TIMES DAILY AS NEEDED FOR MUSCLE SPASMS    HYDROCODONE-ACETAMINOPHEN (NORCO) 7.5-325 MG PER TABLET HYDROcodone-acetaminophen (NORCO) 7.5-325 mg per tablet       Take 1 tablet by mouth every 6 (six) hours as needed for Pain.    Take 1 tablet by mouth every 6 (six) hours as needed for Pain.    HYDROCODONE-ACETAMINOPHEN (NORCO) 7.5-325 MG PER TABLET HYDROcodone-acetaminophen (NORCO) 7.5-325 mg per tablet       Take 1 tablet by mouth every 6 (six) hours as needed for Pain.    Take 1 tablet by mouth every 6 (six) hours as needed for Pain.    HYDROCODONE-ACETAMINOPHEN (NORCO) 7.5-325 MG PER TABLET HYDROcodone-acetaminophen (NORCO) 7.5-325 mg per tablet       Take 1 tablet by mouth every 6 (six) hours as needed for Pain.    Take 1 tablet by mouth every 6 (six) hours as needed for Pain.   Discontinued Medications    AMOXICILLIN (AMOXIL) 875 MG TABLET    Take 1 tablet (875 mg total) by mouth every 12 (twelve) hours.       Jb Chao M.D.  ==========================================================================  Subjective:   Patient ID: Ned Toledo Jr. is a 61 y.o. male.  has a past medical history of Diabetes mellitus, type 2, Fatty liver, Hypertension, Penetrating foot wound, and Sciatica.   Chief Complaint: Follow-up (Pain med refill)      Problem List Items Addressed This Visit     Type 2 diabetes mellitus with hyperglycemia, without long-term current use of insulin - Primary (Chronic)    Overview     March 2020:  Reviewed Diabetes Management StatusPatient cannot take statin due to side effects.SEPTEMBER 2020:  Diabetes Management StatusStatin: TakingACE/ARB: Not taking  December 2021:  Diabetes Management StatusStatin: Not taking  ACE/ARB: Not taking    March 2022:Diabetes Management StatusStatin: Not takingACE/ARB: Not taking  May 2022:  Patient reports that he has changed his diet significantly and that his  sugar has been much better controlled.  Diabetes Management Status    Statin: Not taking  ACE/ARB: Not taking    Screening or Prevention Patient's value Goal Complete/Controlled?   HgA1C Testing and Control   Lab Results   Component Value Date    HGBA1C 8.2 (H) 03/16/2022      Annually/Less than 8% No   Lipid profile : 03/16/2022 Annually Yes   LDL control Lab Results   Component Value Date    LDLCALC 124.0 03/16/2022    Annually/Less than 100 mg/dl  No   Nephropathy screening Lab Results   Component Value Date    LABMICR 21.0 09/06/2019     Lab Results   Component Value Date    PROTEINUA Negative 12/31/2018     No results found for: UTPCR   Annually No   Blood pressure BP Readings from Last 1 Encounters:   06/01/22 134/88    Less than 140/90 Yes   Dilated retinal exam : 05/23/2019 Annually No   Foot exam   : 03/25/2020 Annually No                Current Assessment & Plan     Update A1c.We will plan to monitor hemoglobin A1c at designated intervals 3 to 6 months.  I recommend ongoing Education for diabetic diet and exercise protocol.  We will continue to monitor for side effects.    Please be advised of symptoms to monitor for and to notify me immediately if persistent or worsening.  Follow up with Ophthalmology/Optometry and Podiatry at least annually.             Hypertension associated with diabetes (Chronic)    Overview     CHRONIC. STABLE. BP Reviewed.  Currently not on medication . No SE reported.  March 2021:  Initial blood pressure elevated due to patient rushing for appointment.  He reports blood pressure well controlled at home 130/80.  May 2022:  Blood pressure remains below goal.  (-) CP, SOB, palpitations, dizziness, lightheadedness, HA, arm numbness, tingling or weakness, syncope.  Creatinine   Date Value Ref Range Status   03/16/2022 1.0 0.5 - 1.4 mg/dL Final              Current Assessment & Plan       Blood pressure well controlled with pain control.   Consider ACE-inhibitor with comorbid diabetes.   Patient defers at this time.Counseled on importance of hypertension disease course, I recommend ongoing Education for DASH-diet and exercise.  Counseled on medication regimen importance of treating high blood pressure.  Please be advised of risk of untreated blood pressure as discussed.  Please advised of ER precautions were given for symptoms of hypertensive urgency and emergency.             Encounter for long-term (current) use of medications (Chronic)    Overview     Initial HPI March 2020:  CHRONIC. Stable. Compliant with medications for managed conditions. See medication list. No SE reported. Routine lab analysis is being monitored. Refills were addressed.JUNE 2020:  Labs are stable.  Refill medication.CHRONIC. Stable. Compliant with medications for managed conditions. See medication list. No SE reported. Routine lab analysis is being monitored. Refills were addressed.SEPTEMBER 2020:  CHRONIC. Stable. Compliant with medications for managed conditions. See medication list. No SE reported. Routine lab analysis is being monitored. Refills were addressed.DECEMBER 2020:  Reviewed labs.  CHRONIC. Stable. Compliant with medications for managed conditions. See medication list. No SE reported. Routine lab analysis is being monitored. Refills were addressed.  March 2021:  Reviewed labs.  June 2021:  Reviewed labs.  December 2021:  Reviewed labs. March 2022: Reviewed labs.  May 2022:  Reviewed labs.  Lab Results   Component Value Date    WBC 7.78 03/25/2020    HGB 14.9 03/25/2020    HCT 48.5 03/25/2020     (H) 03/25/2020     03/25/2020         Chemistry        Component Value Date/Time     03/16/2022 0722    K 4.6 03/16/2022 0722     03/16/2022 0722    CO2 27 03/16/2022 0722    BUN 17 03/16/2022 0722    CREATININE 1.0 03/16/2022 0722     (H) 03/16/2022 0722        Component Value Date/Time    CALCIUM 9.5 03/16/2022 0722    ALKPHOS 50 (L) 03/16/2022 0722    AST 19 03/16/2022 0722    ALT 19  03/16/2022 0722    BILITOT 0.7 03/16/2022 0722    ESTGFRAFRICA >60.0 03/16/2022 0722    EGFRNONAA >60.0 03/16/2022 0722          Lab Results   Component Value Date    TSH 1.236 09/04/2020       =================================================           Current Assessment & Plan     Complete history and physical was completed today.  Complete and thorough medication reconciliation was performed.  Discussed risks and benefits of medications.  Advised patient on orders and health maintenance.  We discussed old records and old labs if available.  Will request any records not available through epic.  Continue current medications listed on your summary sheet.             Sacroiliitis (Chronic)    Overview     Chronic.  Stable.  Patient chronic pain medication with hydrocodone 7.5 and Soma 350 milligram.  Patient was previously managed by previous PCP Dr. Jaime Hernández who is retiring.  Patient has been stable on this medication regimen for years.  No side effects reported.    March 2022: Patient reports medication regimen is controlling his symptoms.  No change in HPI.  No new injuries.  May 2022:  Patient reports he has been very active at work and pain medication regimen is working well.           Current Assessment & Plan     Refill medications as prescribed.  No abuse suspected. The patient was checked in the [facility] Board of Pharmacy's  Prescription Monitoring Program. There appears to be no incongruities with the patient's verbalized history.       An opioid pain contract was completed  after having an extensive conversation about chronic opioid use for pain management. We discussed the risks and benefits of opioids.  I discouraged the patient from escalation of opioid use and try to minimize its use to decrease chance of dependence, tolerance, and opioid-based hyperalgesia.  I reviewed the  in great detail and there are no inconsistencies and it is appropriate with patient's history.  There are no signs  of aberrant drug behavior.  The opioid risk tool was also completed, low risk.  Pt counseled about the side effects of long term use of opioids including dependence, tolerance, addiction, respiratory depression, somnolence, immune and endocrine dysfunction.  The patient expressed understanding.      If no improvement or worsening.  Referral to physical therapy, update imaging and consider spine/back clinic versus surgical specialist.                  Review of patient's allergies indicates:   Allergen Reactions    Atorvastatin     Bactrim [sulfamethoxazole-trimethoprim] Hives     Current Outpatient Medications   Medication Instructions    blood sugar diagnostic (BLOOD GLUCOSE TEST) Strp Check glucose 3 times per day before each meal    [START ON 8/6/2022] carisoprodoL (SOMA) 350 mg, Oral, 4 times daily PRN    [START ON 7/7/2022] carisoprodoL (SOMA) 350 mg, Oral, 4 times daily PRN    [START ON 6/9/2022] carisoprodoL (SOMA) 350 mg, Oral, 4 times daily PRN    fexofenadine (ALLEGRA) 180 mg, Oral, Daily    [START ON 8/6/2022] HYDROcodone-acetaminophen (NORCO) 7.5-325 mg per tablet 1 tablet, Oral, Every 6 hours PRN    [START ON 7/8/2022] HYDROcodone-acetaminophen (NORCO) 7.5-325 mg per tablet 1 tablet, Oral, Every 6 hours PRN    [START ON 6/9/2022] HYDROcodone-acetaminophen (NORCO) 7.5-325 mg per tablet 1 tablet, Oral, Every 6 hours PRN    ketoconazole (NIZORAL) 2 % cream Topical (Top), 2 times daily    mupirocin (BACTROBAN) 2 % ointment Topical (Top), 3 times daily    naproxen (NAPROSYN) 500 mg, Oral, 2 times daily with meals    ondansetron (ZOFRAN-ODT) 8 mg, Oral, Every 6 hours PRN    promethazine (PHENERGAN) 25 mg, Oral, Every 6 hours PRN      I have reviewed the PMH, social history, FamilyHx, surgical history, allergies and medications documented / confirmed by the patient at the time of this visit.  Review of Systems   Constitutional: Negative for activity change and fatigue.   HENT: Negative for  congestion and sinus pain.    Eyes: Negative for visual disturbance.   Respiratory: Negative for chest tightness and shortness of breath.    Cardiovascular: Negative for palpitations and leg swelling.   Gastrointestinal: Negative for abdominal pain, diarrhea and nausea.   Endocrine: Negative for polyuria.   Genitourinary: Negative for difficulty urinating and frequency.   Musculoskeletal: Positive for arthralgias and back pain. Negative for joint swelling.   Skin: Negative for rash.   Neurological: Negative for dizziness and headaches.   Psychiatric/Behavioral: Negative for agitation. The patient is not nervous/anxious.      Objective:   /88   Pulse 65   Temp 97 °F (36.1 °C) (Temporal)   Resp 16   Ht 6' (1.829 m)   Wt 85.2 kg (187 lb 15.1 oz)   SpO2 97%   BMI 25.49 kg/m²   Physical Exam  Vitals and nursing note reviewed.   Constitutional:       General: He is not in acute distress.     Appearance: He is well-developed.   HENT:      Head: Normocephalic and atraumatic.   Eyes:      Pupils: Pupils are equal, round, and reactive to light.   Cardiovascular:      Rate and Rhythm: Normal rate and regular rhythm.      Heart sounds: Normal heart sounds. No murmur heard.  Pulmonary:      Effort: Pulmonary effort is normal. No respiratory distress.      Breath sounds: Normal breath sounds. No wheezing.   Musculoskeletal:         General: Tenderness and deformity present.      Cervical back: Normal range of motion and neck supple.      Lumbar back: Deformity, spasms, tenderness and bony tenderness present. No swelling, edema or lacerations. Decreased range of motion.   Skin:     General: Skin is warm and dry.      Capillary Refill: Capillary refill takes less than 2 seconds.   Neurological:      Mental Status: He is alert and oriented to person, place, and time.      Cranial Nerves: No cranial nerve deficit.   Psychiatric:         Behavior: Behavior normal.        Patient is wearing a mask which limits physical  exam due to COVID restrictions.   Assessment:     1. Type 2 diabetes mellitus with hyperglycemia, without long-term current use of insulin    2. Sacroiliitis    3. Encounter for long-term (current) use of medications    4. Hypertension associated with diabetes      MDM:   Moderate complexity.  Moderate risk.  Total time: 32 minutes.  This includes total time spent on the encounter, which includes face to face time and non-face to face time preparing to see the patient (eg, review of previous medical records, tests), Obtaining and/or reviewing separately obtained history, documenting clinical information in the electronic or other health record, independently interpreting results (not separately reported)/communicating results to the patient/family/caregiver, and/or care coordination (not separately reported).    I have Reviewed and summarized old records.  I have performed thorough medication reconciliation today and discussed risk and benefits of medications.  I have reviewed labs and discussed with patient.  All questions were answered.  I have reviewed  records.  I have signed for the following orders AND/OR meds.  Orders Placed This Encounter   Procedures    Comprehensive Metabolic Panel     Standing Status:   Future     Number of Occurrences:   1     Standing Expiration Date:   7/31/2023     Medications Ordered This Encounter   Medications    carisoprodoL (SOMA) 350 MG tablet     Sig: Take 1 tablet (350 mg total) by mouth 4 (four) times daily as needed for Muscle spasms.     Dispense:  120 tablet     Refill:  0    carisoprodoL (SOMA) 350 MG tablet     Sig: Take 1 tablet (350 mg total) by mouth 4 (four) times daily as needed for Muscle spasms.     Dispense:  120 tablet     Refill:  0    carisoprodoL (SOMA) 350 MG tablet     Sig: Take 1 tablet (350 mg total) by mouth 4 (four) times daily as needed for Muscle spasms.     Dispense:  120 tablet     Refill:  0    HYDROcodone-acetaminophen (NORCO) 7.5-325 mg  per tablet     Sig: Take 1 tablet by mouth every 6 (six) hours as needed for Pain.     Dispense:  120 tablet     Refill:  0     Quantity prescribed more than 7 day supply? Yes, quantity medically necessary     Order Specific Question:   I have reviewed the Prescription Drug Monitoring Program (PDMP) database for this patient prior to prescribing the above opioid medication     Answer:   Yes    HYDROcodone-acetaminophen (NORCO) 7.5-325 mg per tablet     Sig: Take 1 tablet by mouth every 6 (six) hours as needed for Pain.     Dispense:  120 tablet     Refill:  0     Quantity prescribed more than 7 day supply? Yes, quantity medically necessary     Order Specific Question:   I have reviewed the Prescription Drug Monitoring Program (PDMP) database for this patient prior to prescribing the above opioid medication     Answer:   Yes    HYDROcodone-acetaminophen (NORCO) 7.5-325 mg per tablet     Sig: Take 1 tablet by mouth every 6 (six) hours as needed for Pain.     Dispense:  120 tablet     Refill:  0     Quantity prescribed more than 7 day supply? Yes, quantity medically necessary     Order Specific Question:   I have reviewed the Prescription Drug Monitoring Program (PDMP) database for this patient prior to prescribing the above opioid medication     Answer:   Yes        Follow up in about 3 months (around 9/1/2022), or if symptoms worsen or fail to improve, for Pain med Refill.    If no improvement in symptoms or symptoms worsen, advised to call/follow-up at clinic or go to ER. Patient voiced understanding and all questions/concerns were addressed.   DISCLAIMER: This note was compiled by using a speech recognition dictation system and therefore please be aware that typographical / speech recognition errors can and do occur.  Please contact me if you see any errors specifically.    Jb Chao M.D.       Office: [phone]   [address] [place] [address]  FAX: [phone]

## 2022-06-01 NOTE — ASSESSMENT & PLAN NOTE
Refill medications as prescribed.  No abuse suspected. The patient was checked in the [facility] Board of Pharmacy's  Prescription Monitoring Program. There appears to be no incongruities with the patient's verbalized history.       An opioid pain contract was completed  after having an extensive conversation about chronic opioid use for pain management. We discussed the risks and benefits of opioids.  I discouraged the patient from escalation of opioid use and try to minimize its use to decrease chance of dependence, tolerance, and opioid-based hyperalgesia.  I reviewed the  in great detail and there are no inconsistencies and it is appropriate with patient's history.  There are no signs of aberrant drug behavior.  The opioid risk tool was also completed, low risk.  Pt counseled about the side effects of long term use of opioids including dependence, tolerance, addiction, respiratory depression, somnolence, immune and endocrine dysfunction.  The patient expressed understanding.      If no improvement or worsening.  Referral to physical therapy, update imaging and consider spine/back clinic versus surgical specialist.

## 2022-06-01 NOTE — ASSESSMENT & PLAN NOTE
Update A1c.We will plan to monitor hemoglobin A1c at designated intervals 3 to 6 months.  I recommend ongoing Education for diabetic diet and exercise protocol.  We will continue to monitor for side effects.    Please be advised of symptoms to monitor for and to notify me immediately if persistent or worsening.  Follow up with Ophthalmology/Optometry and Podiatry at least annually.

## 2022-06-01 NOTE — PATIENT INSTRUCTIONS
Follow up in about 3 months (around 9/1/2022), or if symptoms worsen or fail to improve, for Pain med Refill.     Dear patient,   As a result of recent federal legislation (The Federal Cures Act), you may receive lab or pathology results from your visit in your [facility] account before your physician is able to contact you. Your physician or their representative will relay the results to you with their recommendations at their soonest availability.     If no improvement in symptoms or symptoms worsen, please be advised to call MD, follow-up at clinic and/or go to ER if becomes severe.    Jb Chao M.D.        We Offer TELEHEALTH & Same Day Appointments!   Book your Telehealth appointment with me through my nurse or   Clinic appointments on [facility]!    [address] [place] [address]    Office: [phone]   FAX: [phone]    Check out my Facebook Page and Follow Me at: [email or web address]    Check out my website at [employer] by clicking on: [email or web address]    To Schedule appointments online, go to [facility]: [email or web address]

## 2022-06-07 ENCOUNTER — TELEPHONE (OUTPATIENT)
Dept: FAMILY MEDICINE | Facility: CLINIC | Age: 62
End: 2022-06-07
Payer: COMMERCIAL

## 2022-06-07 NOTE — TELEPHONE ENCOUNTER
----- Message from Chelle Shaw sent at 6/7/2022 11:59 AM CDT -----  Regarding: returned call  Contact: patient  Patient is returning a call, please call them back at [phone]

## 2022-06-20 ENCOUNTER — OFFICE VISIT (OUTPATIENT)
Dept: FAMILY MEDICINE | Facility: CLINIC | Age: 62
End: 2022-06-20
Payer: COMMERCIAL

## 2022-06-20 VITALS
SYSTOLIC BLOOD PRESSURE: 130 MMHG | OXYGEN SATURATION: 97 % | BODY MASS INDEX: 24.92 KG/M2 | HEART RATE: 68 BPM | HEIGHT: 72 IN | WEIGHT: 184 LBS | DIASTOLIC BLOOD PRESSURE: 80 MMHG

## 2022-06-20 DIAGNOSIS — J06.9 ACUTE UPPER RESPIRATORY INFECTION: Primary | ICD-10-CM

## 2022-06-20 DIAGNOSIS — A49.9 BACTERIAL INFECTION: ICD-10-CM

## 2022-06-20 DIAGNOSIS — R05.9 COUGH: ICD-10-CM

## 2022-06-20 LAB
CTP QC/QA: YES
SARS-COV-2 RDRP RESP QL NAA+PROBE: NEGATIVE

## 2022-06-20 PROCEDURE — 99999 PR PBB SHADOW E&M-EST. PATIENT-LVL V: CPT | Mod: PBBFAC,,, | Performed by: NURSE PRACTITIONER

## 2022-06-20 PROCEDURE — 3044F PR MOST RECENT HEMOGLOBIN A1C LEVEL <7.0%: ICD-10-PCS | Mod: CPTII,S$GLB,, | Performed by: NURSE PRACTITIONER

## 2022-06-20 PROCEDURE — 3075F SYST BP GE 130 - 139MM HG: CPT | Mod: CPTII,S$GLB,, | Performed by: NURSE PRACTITIONER

## 2022-06-20 PROCEDURE — 3044F HG A1C LEVEL LT 7.0%: CPT | Mod: CPTII,S$GLB,, | Performed by: NURSE PRACTITIONER

## 2022-06-20 PROCEDURE — 3075F PR MOST RECENT SYSTOLIC BLOOD PRESS GE 130-139MM HG: ICD-10-PCS | Mod: CPTII,S$GLB,, | Performed by: NURSE PRACTITIONER

## 2022-06-20 PROCEDURE — 1159F MED LIST DOCD IN RCRD: CPT | Mod: CPTII,S$GLB,, | Performed by: NURSE PRACTITIONER

## 2022-06-20 PROCEDURE — 3008F BODY MASS INDEX DOCD: CPT | Mod: CPTII,S$GLB,, | Performed by: NURSE PRACTITIONER

## 2022-06-20 PROCEDURE — 3008F PR BODY MASS INDEX (BMI) DOCUMENTED: ICD-10-PCS | Mod: CPTII,S$GLB,, | Performed by: NURSE PRACTITIONER

## 2022-06-20 PROCEDURE — 99999 PR PBB SHADOW E&M-EST. PATIENT-LVL V: ICD-10-PCS | Mod: PBBFAC,,, | Performed by: NURSE PRACTITIONER

## 2022-06-20 PROCEDURE — 1159F PR MEDICATION LIST DOCUMENTED IN MEDICAL RECORD: ICD-10-PCS | Mod: CPTII,S$GLB,, | Performed by: NURSE PRACTITIONER

## 2022-06-20 PROCEDURE — U0002 COVID-19 LAB TEST NON-CDC: HCPCS | Mod: QW,S$GLB,, | Performed by: NURSE PRACTITIONER

## 2022-06-20 PROCEDURE — 1160F PR REVIEW ALL MEDS BY PRESCRIBER/CLIN PHARMACIST DOCUMENTED: ICD-10-PCS | Mod: CPTII,S$GLB,, | Performed by: NURSE PRACTITIONER

## 2022-06-20 PROCEDURE — 3079F PR MOST RECENT DIASTOLIC BLOOD PRESSURE 80-89 MM HG: ICD-10-PCS | Mod: CPTII,S$GLB,, | Performed by: NURSE PRACTITIONER

## 2022-06-20 PROCEDURE — 1160F RVW MEDS BY RX/DR IN RCRD: CPT | Mod: CPTII,S$GLB,, | Performed by: NURSE PRACTITIONER

## 2022-06-20 PROCEDURE — 99213 PR OFFICE/OUTPT VISIT, EST, LEVL III, 20-29 MIN: ICD-10-PCS | Mod: S$GLB,,, | Performed by: NURSE PRACTITIONER

## 2022-06-20 PROCEDURE — 99213 OFFICE O/P EST LOW 20 MIN: CPT | Mod: S$GLB,,, | Performed by: NURSE PRACTITIONER

## 2022-06-20 PROCEDURE — U0002: ICD-10-PCS | Mod: QW,S$GLB,, | Performed by: NURSE PRACTITIONER

## 2022-06-20 PROCEDURE — 3079F DIAST BP 80-89 MM HG: CPT | Mod: CPTII,S$GLB,, | Performed by: NURSE PRACTITIONER

## 2022-06-20 RX ORDER — AMOXICILLIN AND CLAVULANATE POTASSIUM 875; 125 MG/1; MG/1
1 TABLET, FILM COATED ORAL EVERY 12 HOURS
Qty: 20 TABLET | Refills: 0 | Status: SHIPPED | OUTPATIENT
Start: 2022-06-20 | End: 2022-08-31

## 2022-06-20 NOTE — PROGRESS NOTES
Assessment/Plan:  Problem List Items Addressed This Visit    None     Visit Diagnoses     Acute upper respiratory infection    -  Primary    Cough        Relevant Orders    POCT COVID-19 Rapid Screening (Completed)    Bacterial infection        Relevant Medications    amoxicillin-clavulanate 875-125mg (AUGMENTIN) 875-125 mg per tablet        Covid- negative     1. Start ABX as prescribed.   2. Continue OTC medications for symptomatic management   3. Supportive care- rest, increase hydration with water, OTC Tylenol/Ibuprofen for pain/fever.   4. Follow up with PCP if no improvement in symptoms   5. ER precautions for severe or worsening of symptoms     Follow up in about 3 months (around 9/20/2022), or if symptoms worsen or fail to improve.    Mirian Parekh NP  _____________________________________________________________________________________________________________________________________________________    CC: sinus problem     HPI: Patient is a 61-year-old male who presents in clinic today as an established patient here for sinus problem. This is a new problem. The current episode started x5 days ago. The problem is gradually worsening. There has been no fever but he has had chills. He is experiencing no pain. Associated symptoms include congestion, postnasal drip, headaches, and sinus pressure. Pertinent negatives include no diaphoresis, cough, ear pain, horse voice, neck pain, shortness of breath, sneezing, sore throat or swollen glands. Past treatments include chip seltzer cold and flu. The treatment provided no relief. He has had no known sick contacts. He is fully vaccinated for covid. He reports that he does not like taking steroids due to side effects (insomnia). Reports that he usually does well with amoxil, denies recent ABX.     Past Medical History:  Past Medical History:   Diagnosis Date    Diabetes mellitus, type 2     Fatty liver     Hypertension     Penetrating foot wound     Sciatica       Past Surgical History:   Procedure Laterality Date    HERNIA REPAIR       Review of patient's allergies indicates:   Allergen Reactions    Atorvastatin     Bactrim [sulfamethoxazole-trimethoprim] Hives     Social History     Tobacco Use    Smoking status: Never Smoker    Smokeless tobacco: Never Used   Substance Use Topics    Alcohol use: No    Drug use: Never     History reviewed. No pertinent family history.  Current Outpatient Medications on File Prior to Visit   Medication Sig Dispense Refill    blood sugar diagnostic (BLOOD GLUCOSE TEST) Strp Check glucose 3 times per day before each meal 100 strip prn    [START ON 8/6/2022] carisoprodoL (SOMA) 350 MG tablet Take 1 tablet (350 mg total) by mouth 4 (four) times daily as needed for Muscle spasms. 120 tablet 0    [START ON 7/7/2022] carisoprodoL (SOMA) 350 MG tablet Take 1 tablet (350 mg total) by mouth 4 (four) times daily as needed for Muscle spasms. 120 tablet 0    carisoprodoL (SOMA) 350 MG tablet Take 1 tablet (350 mg total) by mouth 4 (four) times daily as needed for Muscle spasms. 120 tablet 0    [START ON 8/6/2022] HYDROcodone-acetaminophen (NORCO) 7.5-325 mg per tablet Take 1 tablet by mouth every 6 (six) hours as needed for Pain. 120 tablet 0    [START ON 7/8/2022] HYDROcodone-acetaminophen (NORCO) 7.5-325 mg per tablet Take 1 tablet by mouth every 6 (six) hours as needed for Pain. 120 tablet 0    HYDROcodone-acetaminophen (NORCO) 7.5-325 mg per tablet Take 1 tablet by mouth every 6 (six) hours as needed for Pain. 120 tablet 0    mupirocin (BACTROBAN) 2 % ointment Apply topically 3 (three) times daily. 15 g 0    naproxen (NAPROSYN) 500 MG tablet Take 1 tablet (500 mg total) by mouth 2 (two) times daily with meals. 60 tablet 11    ondansetron (ZOFRAN-ODT) 8 MG TbDL Take 1 tablet (8 mg total) by mouth every 6 (six) hours as needed. 20 tablet 1    promethazine (PHENERGAN) 25 MG tablet Take 1 tablet (25 mg total) by mouth every 6 (six)  hours as needed for Nausea. 30 tablet 6    fexofenadine (ALLEGRA) 180 MG tablet Take 1 tablet (180 mg total) by mouth once daily. 90 tablet 3    ketoconazole (NIZORAL) 2 % cream Apply topically 2 (two) times daily. 60 g 6     No current facility-administered medications on file prior to visit.     Review of Systems   Constitutional: Positive for chills. Negative for appetite change, fatigue and fever.   HENT: Positive for congestion, postnasal drip and sinus pressure. Negative for rhinorrhea and sore throat.    Eyes: Negative for visual disturbance.   Respiratory: Negative for cough and shortness of breath.    Cardiovascular: Negative for chest pain, palpitations and leg swelling.   Gastrointestinal: Negative for abdominal pain, diarrhea and vomiting.   Genitourinary: Negative for difficulty urinating, dysuria and hematuria.   Musculoskeletal: Negative for arthralgias and myalgias.   Skin: Negative for rash and wound.   Neurological: Positive for headaches. Negative for dizziness.   Psychiatric/Behavioral: Negative for behavioral problems. The patient is not nervous/anxious.      Vitals:    06/20/22 0832   BP: 130/80   BP Location: Right arm   Pulse: 68   SpO2: 97%   Weight: 83.5 kg (184 lb)   Height: 6' (1.829 m)     Wt Readings from Last 3 Encounters:   06/20/22 83.5 kg (184 lb)   06/01/22 85.2 kg (187 lb 15.1 oz)   03/08/22 90 kg (198 lb 6.4 oz)     Physical Exam  Vitals reviewed.   Constitutional:       General: He is not in acute distress.     Appearance: Normal appearance. He is not ill-appearing.   HENT:      Head: Normocephalic and atraumatic.      Right Ear: Tympanic membrane and external ear normal.      Left Ear: Tympanic membrane and external ear normal.      Nose: Mucosal edema and congestion present.      Mouth/Throat:      Mouth: Mucous membranes are moist.      Pharynx: Posterior oropharyngeal erythema present.   Eyes:      Extraocular Movements: Extraocular movements intact.       Conjunctiva/sclera: Conjunctivae normal.   Cardiovascular:      Rate and Rhythm: Normal rate.      Heart sounds: Normal heart sounds.   Pulmonary:      Effort: Pulmonary effort is normal. No respiratory distress.      Breath sounds: Normal breath sounds.   Abdominal:      General: Abdomen is flat. There is no distension.   Musculoskeletal:         General: Normal range of motion.      Cervical back: Normal range of motion and neck supple.   Lymphadenopathy:      Cervical: Cervical adenopathy (tender) present.   Skin:     General: Skin is warm and dry.      Coloration: Skin is not pale.      Findings: No rash.   Neurological:      Mental Status: He is alert and oriented to person, place, and time. Mental status is at baseline.   Psychiatric:         Mood and Affect: Mood normal.         Speech: Speech normal.         Behavior: Behavior normal. Behavior is cooperative.       Health Maintenance   Topic Date Due    Low Dose Statin  Never done    Eye Exam  05/23/2020    Foot Exam  03/25/2021    Hemoglobin A1c  12/01/2022    PROSTATE-SPECIFIC ANTIGEN  03/16/2023    Lipid Panel  06/01/2023    TETANUS VACCINE  09/03/2031    Hepatitis C Screening  Completed

## 2022-08-01 ENCOUNTER — TELEPHONE (OUTPATIENT)
Dept: FAMILY MEDICINE | Facility: CLINIC | Age: 62
End: 2022-08-01
Payer: COMMERCIAL

## 2022-08-01 RX ORDER — METHYLPREDNISOLONE 4 MG/1
TABLET ORAL
Qty: 21 TABLET | Refills: 0 | Status: SHIPPED | OUTPATIENT
Start: 2022-08-01 | End: 2023-01-17

## 2022-08-01 RX ORDER — AMOXICILLIN 500 MG/1
500 TABLET, FILM COATED ORAL EVERY 12 HOURS
Qty: 20 TABLET | Refills: 0 | Status: SHIPPED | OUTPATIENT
Start: 2022-08-01 | End: 2022-08-11

## 2022-08-01 NOTE — TELEPHONE ENCOUNTER
----- Message from Denaeyajoleen Frank sent at 8/1/2022  1:51 PM CDT -----  Contact: Ned Miranda has called in earlier for an appt, pt is requesting antibiotics to be sent over to the pharmacy regarding his sore throat, fatigue and headache symptoms. Please give him a call back at [phone]

## 2022-08-01 NOTE — TELEPHONE ENCOUNTER
Patient states that he got overheated this weekend and started to feel bad on Saturday.  He states that his throat is really red and has white spots on one side. Patient took home COVID test today and it was negative.No available appointments today, so I scheduled him for tomorrow with Carleen, but he wanted me to ask if you will call him something in.  Patient does not have MyChart, so I could not send Evisit information. Please advise.

## 2022-08-01 NOTE — TELEPHONE ENCOUNTER
----- Message from Kelsey Jennings sent at 8/1/2022 11:10 AM CDT -----  Type:  Same Day Appointment Request    Caller is requesting a same day appointment.  Caller declined first available appointment listed below.    Name of Caller:patient  When is the first available appointment?8/2  Symptoms:sore throat, body ache, fatigue  Best Call Back Number:[phone]  Additional Information:na

## 2022-08-01 NOTE — TELEPHONE ENCOUNTER
I have signed for the following orders AND/OR meds.  Please call the patient and ask the patient to schedule the testing AND/OR inform about any medications that were sent.      No orders of the defined types were placed in this encounter.      Medications Ordered This Encounter   Medications    amoxicillin (AMOXIL) 500 MG Tab     Sig: Take 1 tablet (500 mg total) by mouth every 12 (twelve) hours. for 10 days     Dispense:  20 tablet     Refill:  0    methylPREDNISolone (MEDROL DOSEPACK) 4 mg tablet     Sig: follow package directions     Dispense:  21 tablet     Refill:  0

## 2022-08-15 NOTE — TELEPHONE ENCOUNTER
Patient needs to be seen by urgent care or appointment with us.  He may need x-ray and needs to be looked at  
Patient was outside cleaning out a dog pen and he stepped on something and it went into his foot about a inch.  Patient says that he has had a Tetanus vaccine within the last 5 years.  Patient wanted to know if he needs an antibiotic, there is a hole and the foot is swollen.  Patient can be reached at  [phone].  Please advise.  
Calm

## 2022-08-16 NOTE — PROGRESS NOTES
Mom requested ST referrals be sent to [facility] and [facility]. Rx for referral written and faxed to both locations by me.   Letter requested for school listing diagnoses; this letter has been  by both Dr. Cid and I on 7/21 and 7/23, so sent message to nursing to let mom know so they can have it in advance of meeting with school chosen this Thursday.    PLAN:      Problem List Items Addressed This Visit     Type 2 diabetes mellitus without complication, without long-term current use of insulin (Chronic)     Patient here with a foot wound today.  Diabetes appears to be well controlled.  Will plan to recheck A1c and lipid panel.    Monitor hemoglobin A1c.  Discussed diabetic diet and exercise protocol.  Patient is not currently on any medications and is diet controlled.  Monitor for side effects.  Discussed checking blood glucose.  Discussed symptoms to monitor for and to notify me immediately if persistent or worsening.  Follow up with Ophthalmology/Optometry and Podiatry.           Relevant Orders    Hemoglobin A1c    Wound of foot - Primary     Update tetanus shot today.    Start antibiotics.  Intramuscular Rocephin shot 1 g given in office.  Will start Augmentin orally for 10 days.    X-ray of left foot today.    Discussed condition course and signs and symptoms to expect.  Patient advised take anti-inflammatories for pain or fever.  ER precautions.  Call MD or follow-up to clinic if not improving or worsening symptoms.    Avoid Tylenol due to elevated liver enzymes in 2018.  Update labs.    Patient is on chronic opiate therapy from previous PCP.         Relevant Medications    cefTRIAXone injection 1 g (Completed)    amoxicillin-clavulanate 875-125mg (AUGMENTIN) 875-125 mg per tablet    Other Relevant Orders    CBC Without Differential    X-Ray Foot Complete Left    (In Office Administered) Tdap Vaccine (Completed)    Encounter for long-term (current) use of medications     Patient is overdue for labs.  Patient did have mild elevation in liver enzymes.  Patient advised to avoid excessive amounts of Tylenol including his chronic pain medications which contains Tylenol.  Avoid alcohol as well until further labs are drawn.    Also due for diabetes status with A1c.    Lab orders entered for today.    Complete history and physical was completed today.  Complete and  thorough medication reconciliation was performed.  Discussed risks and benefits of medications.  Advised patient on orders and health maintenance.  We discussed old records and old labs if available.  Will request any records not available through epic.  Continue current medications listed on your summary sheet.           Relevant Orders    CBC Without Differential    TSH    Comprehensive metabolic panel    Hemoglobin A1c    Lipid panel      Other Visit Diagnoses     Encounter for lipid screening for cardiovascular disease        Relevant Orders    Lipid panel        Future Appointments     Date Provider Specialty Appt Notes    3/25/2020  Lab     6/10/2020 Jb Chao MD Family Medicine est care/norco/soma refill    6/25/2020 Jb Chao MD Family Medicine 3 mo f/u dm           Medication List with Changes/Refills   New Medications    AMOXICILLIN-CLAVULANATE 875-125MG (AUGMENTIN) 875-125 MG PER TABLET    Take 1 tablet by mouth 2 (two) times daily. Take with food. for 10 days   Current Medications    BLOOD SUGAR DIAGNOSTIC STRP    To check BG 2 times daily, to use with insurance preferred meter    BLOOD-GLUCOSE METER KIT    To check BG 2 times daily, to use with insurance preferred meter    CARISOPRODOL (SOMA) 350 MG TABLET    Take 1 tablet (350 mg total) by mouth 4 (four) times daily as needed for Muscle spasms.    CARISOPRODOL (SOMA) 350 MG TABLET    Take 1 tablet (350 mg total) by mouth 4 (four) times daily as needed for Muscle spasms.    CARISOPRODOL (SOMA) 350 MG TABLET    Take 1 tablet (350 mg total) by mouth 4 (four) times daily as needed for Muscle spasms.    FEXOFENADINE (ALLEGRA) 180 MG TABLET    Take 1 tablet (180 mg total) by mouth once daily.    HYDROCODONE-ACETAMINOPHEN (NORCO) 7.5-325 MG PER TABLET    Take 1 tablet by mouth every 6 (six) hours as needed for Pain.    HYDROCODONE-ACETAMINOPHEN (NORCO) 7.5-325 MG PER TABLET    Take 1 tablet by mouth 4 (four) times daily.     HYDROCODONE-ACETAMINOPHEN (NORCO) 7.5-325 MG PER TABLET    Take 1 tablet by mouth every 6 (six) hours as needed for Pain.    LANCETS MISC    To check BG 2 times daily, to use with insurance preferred meter    NAPROXEN (NAPROSYN) 500 MG TABLET    Take 1 tablet (500 mg total) by mouth 2 (two) times daily with meals.    ONDANSETRON (ZOFRAN-ODT) 8 MG TBDL    Take 1 tablet (8 mg total) by mouth every 6 (six) hours as needed.    PROMETHAZINE (PHENERGAN) 25 MG TABLET        TRUEPLUS LANCETS 33 GAUGE MISC    CHECK BLOOD SUGAR BID       Jb Chao M.D.     ==========================================================================  Subjective:      Patient ID: Ned Toledo Jr. is a 59 y.o. male.  has no past medical history on file.     Chief Complaint: Puncture Wound (foot); Diabetes; and Establish Care      Problem List Items Addressed This Visit     Type 2 diabetes mellitus without complication, without long-term current use of insulin (Chronic)    Overview     March 2020:   Diabetes Management Status    Statin: Not taking  ACE/ARB: Not taking    Screening or Prevention Patient's value Goal Complete/Controlled?   HgA1C Testing and Control   Lab Results   Component Value Date    HGBA1C 6.2 (H) 09/06/2019      Annually/Less than 8% Yes   Lipid profile : 12/05/2018 Annually No   LDL control Lab Results   Component Value Date    LDLCALC 131.4 12/05/2018    Annually/Less than 100 mg/dl  No   Nephropathy screening Lab Results   Component Value Date    LABMICR 21.0 09/06/2019     Lab Results   Component Value Date    PROTEINUA Negative 12/31/2018    Annually Yes   Blood pressure BP Readings from Last 1 Encounters:   03/25/20 136/86    Less than 140/90 Yes   Dilated retinal exam : 05/23/2019 Annually Yes   Foot exam   Most Recent Foot Exam Date: Not Found Annually No              Current Assessment & Plan     Patient here with a foot wound today.  Diabetes appears to be well controlled.  Will plan to recheck A1c and  lipid panel.    Monitor hemoglobin A1c.  Discussed diabetic diet and exercise protocol.  Patient is not currently on any medications and is diet controlled.  Monitor for side effects.  Discussed checking blood glucose.  Discussed symptoms to monitor for and to notify me immediately if persistent or worsening.  Follow up with Ophthalmology/Optometry and Podiatry.           Wound of foot - Primary    Overview     New problem.  Acute.  Started yesterday.  Patient stepped on something while cleaning out a dog pen and penetrated the skin.  Patient thinks that it was a piece of chain link fence that went through his flip-flop and went into the left foot.  Patient reports pain to walking and palpation.  No significant swelling but does have some redness around the wound.  Patient has not taking anything other than is prescribed medication.     Review of records show tetanus shot in 2010.  Will update today.    Denies allergies to any antibiotics or other medications.    Denies sick contacts or travel.         Current Assessment & Plan     Update tetanus shot today.    Start antibiotics.  Intramuscular Rocephin shot 1 g given in office.  Will start Augmentin orally for 10 days.    X-ray of left foot today.    Discussed condition course and signs and symptoms to expect.  Patient advised take anti-inflammatories for pain or fever.  ER precautions.  Call MD or follow-up to clinic if not improving or worsening symptoms.    Avoid Tylenol due to elevated liver enzymes in 2018.  Update labs.    Patient is on chronic opiate therapy from previous PCP.         Encounter for long-term (current) use of medications    Overview     CHRONIC. Stable. Compliant with medications for managed conditions. See medication list. No SE reported.   Routine lab analysis is being monitored. Refills were addressed.  Lab Results   Component Value Date    WBC 7.09 12/05/2018    HGB 16.0 12/05/2018    HCT 49.2 12/05/2018    MCV 99 (H) 12/05/2018    PLT  407 (H) 12/05/2018         Chemistry        Component Value Date/Time     12/05/2018 0926    K 4.4 12/05/2018 0926     12/05/2018 0926    CO2 28 12/05/2018 0926    BUN 12 12/05/2018 0926    CREATININE 1.1 12/05/2018 0926     (H) 12/05/2018 0926        Component Value Date/Time    CALCIUM 9.7 12/05/2018 0926    ALKPHOS 75 12/05/2018 0926    AST 48 (H) 12/05/2018 0926    ALT 72 (H) 12/05/2018 0926    BILITOT 1.0 12/05/2018 0926    ESTGFRAFRICA >60.0 12/05/2018 0926    EGFRNONAA >60.0 12/05/2018 0926          Lab Results   Component Value Date    TSH 1.634 12/05/2018              Current Assessment & Plan     Patient is overdue for labs.  Patient did have mild elevation in liver enzymes.  Patient advised to avoid excessive amounts of Tylenol including his chronic pain medications which contains Tylenol.  Avoid alcohol as well until further labs are drawn.    Also due for diabetes status with A1c.    Lab orders entered for today.    Complete history and physical was completed today.  Complete and thorough medication reconciliation was performed.  Discussed risks and benefits of medications.  Advised patient on orders and health maintenance.  We discussed old records and old labs if available.  Will request any records not available through epic.  Continue current medications listed on your summary sheet.             Other Visit Diagnoses     Encounter for lipid screening for cardiovascular disease               Past Medical History:  History reviewed. No pertinent past medical history.  Past Surgical History:   Procedure Laterality Date    HERNIA REPAIR       Review of patient's allergies indicates:  No Known Allergies  Medication List with Changes/Refills   New Medications    AMOXICILLIN-CLAVULANATE 875-125MG (AUGMENTIN) 875-125 MG PER TABLET    Take 1 tablet by mouth 2 (two) times daily. Take with food. for 10 days   Current Medications    BLOOD SUGAR DIAGNOSTIC STRP    To check BG 2 times daily,  to use with insurance preferred meter    BLOOD-GLUCOSE METER KIT    To check BG 2 times daily, to use with insurance preferred meter    CARISOPRODOL (SOMA) 350 MG TABLET    Take 1 tablet (350 mg total) by mouth 4 (four) times daily as needed for Muscle spasms.    CARISOPRODOL (SOMA) 350 MG TABLET    Take 1 tablet (350 mg total) by mouth 4 (four) times daily as needed for Muscle spasms.    CARISOPRODOL (SOMA) 350 MG TABLET    Take 1 tablet (350 mg total) by mouth 4 (four) times daily as needed for Muscle spasms.    FEXOFENADINE (ALLEGRA) 180 MG TABLET    Take 1 tablet (180 mg total) by mouth once daily.    HYDROCODONE-ACETAMINOPHEN (NORCO) 7.5-325 MG PER TABLET    Take 1 tablet by mouth every 6 (six) hours as needed for Pain.    HYDROCODONE-ACETAMINOPHEN (NORCO) 7.5-325 MG PER TABLET    Take 1 tablet by mouth 4 (four) times daily.    HYDROCODONE-ACETAMINOPHEN (NORCO) 7.5-325 MG PER TABLET    Take 1 tablet by mouth every 6 (six) hours as needed for Pain.    LANCETS MISC    To check BG 2 times daily, to use with insurance preferred meter    NAPROXEN (NAPROSYN) 500 MG TABLET    Take 1 tablet (500 mg total) by mouth 2 (two) times daily with meals.    ONDANSETRON (ZOFRAN-ODT) 8 MG TBDL    Take 1 tablet (8 mg total) by mouth every 6 (six) hours as needed.    PROMETHAZINE (PHENERGAN) 25 MG TABLET        TRUEPLUS LANCETS 33 GAUGE MISC    CHECK BLOOD SUGAR BID      Social History     Tobacco Use    Smoking status: Never Smoker   Substance Use Topics    Alcohol use: No      History reviewed. No pertinent family history.    I have reviewed the complete PMH, social history, surgical history, allergies and medications.  As well as family history.    Review of Systems   Constitutional: Negative for chills, fatigue, fever and unexpected weight change.   HENT: Negative for ear pain and sore throat.    Eyes: Negative for redness and visual disturbance.   Respiratory: Negative for cough and shortness of breath.    Cardiovascular:  "Negative for chest pain and palpitations.   Gastrointestinal: Negative for nausea and vomiting.   Endocrine: Negative for cold intolerance and heat intolerance.   Genitourinary: Negative for difficulty urinating and hematuria.   Musculoskeletal: Positive for arthralgias and back pain. Negative for myalgias.   Skin: Positive for wound (Left foot). Negative for rash.   Allergic/Immunologic: Negative for environmental allergies and food allergies.   Neurological: Negative for weakness and headaches.   Hematological: Negative for adenopathy. Does not bruise/bleed easily.   Psychiatric/Behavioral: Negative for sleep disturbance. The patient is not nervous/anxious.      Objective:   /86   Pulse 88   Temp 98.1 °F (36.7 °C)   Ht 5' 11" (1.803 m)   Wt 80 kg (176 lb 6.4 oz)   BMI 24.60 kg/m²   Physical Exam   Constitutional: He is oriented to person, place, and time. He appears well-developed and well-nourished. No distress.   HENT:   Head: Normocephalic and atraumatic.   Eyes: Pupils are equal, round, and reactive to light. EOM are normal.   Neck: Normal range of motion. Neck supple.   Cardiovascular: Normal rate, regular rhythm, normal heart sounds and intact distal pulses.   No murmur heard.  Pulses:       Dorsalis pedis pulses are 2+ on the right side, and 2+ on the left side.        Posterior tibial pulses are 2+ on the right side, and 2+ on the left side.   Pulmonary/Chest: Effort normal and breath sounds normal. No respiratory distress. He has no wheezes.   Musculoskeletal: Normal range of motion. He exhibits no edema.        Right foot: There is normal range of motion and no deformity.        Left foot: There is normal range of motion and no deformity.        Feet:    Feet:   Right Foot:   Protective Sensation: 7 sites tested. 6 sites sensed.   Skin Integrity: Positive for callus and dry skin. Negative for ulcer, blister, skin breakdown, erythema or warmth.   Left Foot:   Protective Sensation: 7 sites " tested. 7 sites sensed.   Skin Integrity: Positive for callus and dry skin. Negative for ulcer, blister, skin breakdown, erythema or warmth.   Neurological: He is alert and oriented to person, place, and time. No cranial nerve deficit.   Skin: Skin is warm and dry. Capillary refill takes less than 2 seconds.   Psychiatric: He has a normal mood and affect. His behavior is normal.   Nursing note and vitals reviewed.      Assessment:     1. Wound of foot    2. Type 2 diabetes mellitus without complication, without long-term current use of insulin    3. Encounter for long-term (current) use of medications    4. Encounter for lipid screening for cardiovascular disease      MDM:   New problem.  Moderate complexity.  Moderate risk.  I have Reviewed and summarized old records.  I have performed thorough medication reconciliation today and discussed risk and benefits of each medication.  I have reviewed labs and discussed with patient.  All questions were answered.  I am requesting old records and will review them once they are available.    I have signed for the following orders AND/OR meds.  Orders Placed This Encounter   Procedures    X-Ray Foot Complete Left     Standing Status:   Future     Number of Occurrences:   1     Standing Expiration Date:   3/25/2021     Order Specific Question:   May the Radiologist modify the order per protocol to meet the clinical needs of the patient?     Answer:   Yes    (In Office Administered) Tdap Vaccine    CBC Without Differential     Standing Status:   Standing     Number of Occurrences:   99     Standing Expiration Date:   5/24/2021    TSH     Standing Status:   Standing     Number of Occurrences:   99     Standing Expiration Date:   5/24/2021    Comprehensive metabolic panel     Standing Status:   Standing     Number of Occurrences:   99     Standing Expiration Date:   3/20/2040    Hemoglobin A1c     Standing Status:   Standing     Number of Occurrences:   99     Standing  Expiration Date:   3/20/2040    Lipid panel     Standing Status:   Standing     Number of Occurrences:   99     Standing Expiration Date:   3/20/2040     Medications Ordered This Encounter   Medications    amoxicillin-clavulanate 875-125mg (AUGMENTIN) 875-125 mg per tablet     Sig: Take 1 tablet by mouth 2 (two) times daily. Take with food. for 10 days     Dispense:  20 tablet     Refill:  0    cefTRIAXone injection 1 g        Follow up in about 3 months (around 6/25/2020), or if symptoms worsen or fail to improve, for DM, Med refills, LAB RESULTS.    If no improvement in symptoms or symptoms worsen, advised to call/follow-up at clinic or go to ER. Patient voiced understanding and all questions/concerns were addressed.     DISCLAIMER: This note was compiled by using a speech recognition dictation system and therefore please be aware that typographical / speech recognition errors can and do occur.  Please contact me if you see any errors specifically.    Jb Chao M.D.       Office: [phone] [address] [place] [address]  FAX: [phone]

## 2022-08-31 ENCOUNTER — OFFICE VISIT (OUTPATIENT)
Dept: FAMILY MEDICINE | Facility: CLINIC | Age: 62
End: 2022-08-31
Payer: COMMERCIAL

## 2022-08-31 VITALS
OXYGEN SATURATION: 97 % | WEIGHT: 185.19 LBS | SYSTOLIC BLOOD PRESSURE: 126 MMHG | BODY MASS INDEX: 25.08 KG/M2 | TEMPERATURE: 97 F | HEIGHT: 72 IN | RESPIRATION RATE: 16 BRPM | HEART RATE: 73 BPM | DIASTOLIC BLOOD PRESSURE: 74 MMHG

## 2022-08-31 DIAGNOSIS — E11.65 TYPE 2 DIABETES MELLITUS WITH HYPERGLYCEMIA, WITHOUT LONG-TERM CURRENT USE OF INSULIN: Chronic | ICD-10-CM

## 2022-08-31 DIAGNOSIS — M46.1 SACROILIITIS: Primary | ICD-10-CM

## 2022-08-31 DIAGNOSIS — Z79.899 ENCOUNTER FOR LONG-TERM (CURRENT) USE OF MEDICATIONS: ICD-10-CM

## 2022-08-31 PROCEDURE — 1159F PR MEDICATION LIST DOCUMENTED IN MEDICAL RECORD: ICD-10-PCS | Mod: CPTII,S$GLB,, | Performed by: FAMILY MEDICINE

## 2022-08-31 PROCEDURE — 99999 PR PBB SHADOW E&M-EST. PATIENT-LVL V: ICD-10-PCS | Mod: PBBFAC,,, | Performed by: FAMILY MEDICINE

## 2022-08-31 PROCEDURE — 99214 OFFICE O/P EST MOD 30 MIN: CPT | Mod: S$GLB,,, | Performed by: FAMILY MEDICINE

## 2022-08-31 PROCEDURE — 3044F HG A1C LEVEL LT 7.0%: CPT | Mod: CPTII,S$GLB,, | Performed by: FAMILY MEDICINE

## 2022-08-31 PROCEDURE — 1160F RVW MEDS BY RX/DR IN RCRD: CPT | Mod: CPTII,S$GLB,, | Performed by: FAMILY MEDICINE

## 2022-08-31 PROCEDURE — 99999 PR PBB SHADOW E&M-EST. PATIENT-LVL V: CPT | Mod: PBBFAC,,, | Performed by: FAMILY MEDICINE

## 2022-08-31 PROCEDURE — 3078F DIAST BP <80 MM HG: CPT | Mod: CPTII,S$GLB,, | Performed by: FAMILY MEDICINE

## 2022-08-31 PROCEDURE — 1159F MED LIST DOCD IN RCRD: CPT | Mod: CPTII,S$GLB,, | Performed by: FAMILY MEDICINE

## 2022-08-31 PROCEDURE — 3074F PR MOST RECENT SYSTOLIC BLOOD PRESSURE < 130 MM HG: ICD-10-PCS | Mod: CPTII,S$GLB,, | Performed by: FAMILY MEDICINE

## 2022-08-31 PROCEDURE — 3078F PR MOST RECENT DIASTOLIC BLOOD PRESSURE < 80 MM HG: ICD-10-PCS | Mod: CPTII,S$GLB,, | Performed by: FAMILY MEDICINE

## 2022-08-31 PROCEDURE — 3008F BODY MASS INDEX DOCD: CPT | Mod: CPTII,S$GLB,, | Performed by: FAMILY MEDICINE

## 2022-08-31 PROCEDURE — 3074F SYST BP LT 130 MM HG: CPT | Mod: CPTII,S$GLB,, | Performed by: FAMILY MEDICINE

## 2022-08-31 PROCEDURE — 3008F PR BODY MASS INDEX (BMI) DOCUMENTED: ICD-10-PCS | Mod: CPTII,S$GLB,, | Performed by: FAMILY MEDICINE

## 2022-08-31 PROCEDURE — 1160F PR REVIEW ALL MEDS BY PRESCRIBER/CLIN PHARMACIST DOCUMENTED: ICD-10-PCS | Mod: CPTII,S$GLB,, | Performed by: FAMILY MEDICINE

## 2022-08-31 PROCEDURE — 3044F PR MOST RECENT HEMOGLOBIN A1C LEVEL <7.0%: ICD-10-PCS | Mod: CPTII,S$GLB,, | Performed by: FAMILY MEDICINE

## 2022-08-31 PROCEDURE — 99214 PR OFFICE/OUTPT VISIT, EST, LEVL IV, 30-39 MIN: ICD-10-PCS | Mod: S$GLB,,, | Performed by: FAMILY MEDICINE

## 2022-08-31 RX ORDER — CARISOPRODOL 350 MG/1
350 TABLET ORAL 4 TIMES DAILY PRN
Qty: 120 TABLET | Refills: 0 | Status: SHIPPED | OUTPATIENT
Start: 2022-09-30 | End: 2022-11-30 | Stop reason: SDUPTHER

## 2022-08-31 RX ORDER — HYDROCODONE BITARTRATE AND ACETAMINOPHEN 7.5; 325 MG/1; MG/1
1 TABLET ORAL EVERY 6 HOURS PRN
Qty: 120 TABLET | Refills: 0 | Status: SHIPPED | OUTPATIENT
Start: 2022-08-31 | End: 2022-11-30 | Stop reason: SDUPTHER

## 2022-08-31 RX ORDER — CARISOPRODOL 350 MG/1
350 TABLET ORAL 4 TIMES DAILY PRN
Qty: 120 TABLET | Refills: 0 | Status: SHIPPED | OUTPATIENT
Start: 2022-10-28 | End: 2022-11-30 | Stop reason: SDUPTHER

## 2022-08-31 RX ORDER — HYDROCODONE BITARTRATE AND ACETAMINOPHEN 7.5; 325 MG/1; MG/1
1 TABLET ORAL EVERY 6 HOURS PRN
Qty: 120 TABLET | Refills: 0 | Status: SHIPPED | OUTPATIENT
Start: 2022-09-30 | End: 2022-11-30 | Stop reason: SDUPTHER

## 2022-08-31 RX ORDER — HYDROCODONE BITARTRATE AND ACETAMINOPHEN 7.5; 325 MG/1; MG/1
1 TABLET ORAL EVERY 6 HOURS PRN
Qty: 120 TABLET | Refills: 0 | Status: SHIPPED | OUTPATIENT
Start: 2022-10-28 | End: 2022-11-30 | Stop reason: SDUPTHER

## 2022-08-31 RX ORDER — CARISOPRODOL 350 MG/1
350 TABLET ORAL 4 TIMES DAILY PRN
Qty: 120 TABLET | Refills: 0 | Status: SHIPPED | OUTPATIENT
Start: 2022-08-31 | End: 2022-11-30 | Stop reason: SDUPTHER

## 2022-08-31 NOTE — PATIENT INSTRUCTIONS
Follow up in about 3 months (around 11/30/2022) for pain med refill.     Dear patient,   As a result of recent federal legislation (The Federal Cures Act), you may receive lab or pathology results from your visit in your [facility] account before your physician is able to contact you. Your physician or their representative will relay the results to you with their recommendations at their soonest availability.     If no improvement in symptoms or symptoms worsen, please be advised to call MD, follow-up at clinic and/or go to ER if becomes severe.    Jb Chao M.D.        We Offer TELEHEALTH & Same Day Appointments!   Book your Telehealth appointment with me through my nurse or   Clinic appointments on [employer]!    [address] [place] [address]    Office: [phone]   FAX: [phone]    Check out my Facebook Page and Follow Me at: [email or web address]    Check out my website at [employer] by clicking on: [email or web address]    To Schedule appointments online, go to [email or web address]: [email or web address]

## 2022-08-31 NOTE — ASSESSMENT & PLAN NOTE
Refill medications as prescribed.  No abuse suspected. The patient was checked in the [facility] Board of Pharmacy's  Prescription Monitoring Program. There appears to be no incongruities with the patient's verbalized history.       An opioid pain contract was completed  after having an extensive conversation about chronic opioid use for pain management. We discussed the risks and benefits of opioids.  I discouraged the patient from escalation of opioid use and try to minimize its use to decrease chance of dependence, tolerance, and opioid-based hyperalgesia.  I reviewed the  in great detail and there are no inconsistencies and it is appropriate with patient's history.  There are no signs of aberrant drug behavior.  The opioid risk tool was also completed, low risk.  Pt counseled about the side effects of long term use of opioids including dependence, tolerance, addiction, respiratory depression, somnolence, immune and endocrine dysfunction.  The patient expressed understanding.      If no improvement or worsening.  Referral to physical therapy, update imaging and consider spine/back clinic versus surgical specialist.

## 2022-08-31 NOTE — ASSESSMENT & PLAN NOTE
Continue diet control..We will plan to monitor hemoglobin A1c at designated intervals 3 to 6 months.  I recommend ongoing Education for diabetic diet and exercise protocol.  We will continue to monitor for side effects.    Please be advised of symptoms to monitor for and to notify me immediately if persistent or worsening.  Follow up with Ophthalmology/Optometry and Podiatry at least annually.

## 2022-08-31 NOTE — PROGRESS NOTES
PLAN:      Migraines:  Refill promethazine as needed for migraines.  Otherwise controlled with over-the-counter medication.Discussed condition course and signs and symptoms to expect.  Patient advised take anti-inflammatories and or Tylenol for pain or fever.  ER precautions.  Call MD or follow-up to clinic if not improving or worsening symptoms.    Problem List Items Addressed This Visit       Type 2 diabetes mellitus with hyperglycemia, without long-term current use of insulin (Chronic)     Continue diet control..We will plan to monitor hemoglobin A1c at designated intervals 3 to 6 months.  I recommend ongoing Education for diabetic diet and exercise protocol.  We will continue to monitor for side effects.    Please be advised of symptoms to monitor for and to notify me immediately if persistent or worsening.  Follow up with Ophthalmology/Optometry and Podiatry at least annually.           Encounter for long-term (current) use of medications (Chronic)     Complete history and physical was completed today.  Complete and thorough medication reconciliation was performed.  Discussed risks and benefits of medications.  Advised patient on orders and health maintenance.  We discussed old records and old labs if available.  Will request any records not available through epic.  Continue current medications listed on your summary sheet.           Relevant Medications    carisoprodoL (SOMA) 350 MG tablet (Start on 10/28/2022)    carisoprodoL (SOMA) 350 MG tablet (Start on 9/30/2022)    carisoprodoL (SOMA) 350 MG tablet    HYDROcodone-acetaminophen (NORCO) 7.5-325 mg per tablet (Start on 10/28/2022)    HYDROcodone-acetaminophen (NORCO) 7.5-325 mg per tablet (Start on 9/30/2022)    HYDROcodone-acetaminophen (NORCO) 7.5-325 mg per tablet    Sacroiliitis - Primary (Chronic)     Refill medications as prescribed.  No abuse suspected. The patient was checked in the [facility] Board of Pharmacy's  Prescription Monitoring  Program. There appears to be no incongruities with the patient's verbalized history.       An opioid pain contract was completed  after having an extensive conversation about chronic opioid use for pain management. We discussed the risks and benefits of opioids.  I discouraged the patient from escalation of opioid use and try to minimize its use to decrease chance of dependence, tolerance, and opioid-based hyperalgesia.  I reviewed the  in great detail and there are no inconsistencies and it is appropriate with patient's history.  There are no signs of aberrant drug behavior.  The opioid risk tool was also completed, low risk.  Pt counseled about the side effects of long term use of opioids including dependence, tolerance, addiction, respiratory depression, somnolence, immune and endocrine dysfunction.  The patient expressed understanding.      If no improvement or worsening.  Referral to physical therapy, update imaging and consider spine/back clinic versus surgical specialist.         Relevant Medications    carisoprodoL (SOMA) 350 MG tablet (Start on 10/28/2022)    carisoprodoL (SOMA) 350 MG tablet (Start on 9/30/2022)    carisoprodoL (SOMA) 350 MG tablet    HYDROcodone-acetaminophen (NORCO) 7.5-325 mg per tablet (Start on 10/28/2022)    HYDROcodone-acetaminophen (NORCO) 7.5-325 mg per tablet (Start on 9/30/2022)    HYDROcodone-acetaminophen (NORCO) 7.5-325 mg per tablet     Future Appointments       Date Provider Specialty Appt Notes    11/30/2022 Jb Chao MD Family Medicine 3 month pain med refill           Medication Management for assessment above:   Medication List with Changes/Refills   Current Medications    BLOOD SUGAR DIAGNOSTIC (BLOOD GLUCOSE TEST) STRP    Check glucose 3 times per day before each meal    FEXOFENADINE (ALLEGRA) 180 MG TABLET    Take 1 tablet (180 mg total) by mouth once daily.    KETOCONAZOLE (NIZORAL) 2 % CREAM    Apply topically 2 (two) times daily.    METHYLPREDNISOLONE  (MEDROL DOSEPACK) 4 MG TABLET    follow package directions    MUPIROCIN (BACTROBAN) 2 % OINTMENT    Apply topically 3 (three) times daily.    NAPROXEN (NAPROSYN) 500 MG TABLET    Take 1 tablet (500 mg total) by mouth 2 (two) times daily with meals.    ONDANSETRON (ZOFRAN-ODT) 8 MG TBDL    Take 1 tablet (8 mg total) by mouth every 6 (six) hours as needed.    PROMETHAZINE (PHENERGAN) 25 MG TABLET    Take 1 tablet (25 mg total) by mouth every 6 (six) hours as needed for Nausea.   Changed and/or Refilled Medications    Modified Medication Previous Medication    CARISOPRODOL (SOMA) 350 MG TABLET carisoprodoL (SOMA) 350 MG tablet       Take 1 tablet (350 mg total) by mouth 4 (four) times daily as needed for Muscle spasms.    Take 1 tablet (350 mg total) by mouth 4 (four) times daily as needed for Muscle spasms.    CARISOPRODOL (SOMA) 350 MG TABLET carisoprodoL (SOMA) 350 MG tablet       Take 1 tablet (350 mg total) by mouth 4 (four) times daily as needed for Muscle spasms.    Take 1 tablet (350 mg total) by mouth 4 (four) times daily as needed for Muscle spasms.    CARISOPRODOL (SOMA) 350 MG TABLET carisoprodoL (SOMA) 350 MG tablet       Take 1 tablet (350 mg total) by mouth 4 (four) times daily as needed for Muscle spasms.    Take 1 tablet (350 mg total) by mouth 4 (four) times daily as needed for Muscle spasms.    HYDROCODONE-ACETAMINOPHEN (NORCO) 7.5-325 MG PER TABLET HYDROcodone-acetaminophen (NORCO) 7.5-325 mg per tablet       Take 1 tablet by mouth every 6 (six) hours as needed for Pain.    Take 1 tablet by mouth every 6 (six) hours as needed for Pain.    HYDROCODONE-ACETAMINOPHEN (NORCO) 7.5-325 MG PER TABLET HYDROcodone-acetaminophen (NORCO) 7.5-325 mg per tablet       Take 1 tablet by mouth every 6 (six) hours as needed for Pain.    Take 1 tablet by mouth every 6 (six) hours as needed for Pain.    HYDROCODONE-ACETAMINOPHEN (NORCO) 7.5-325 MG PER TABLET HYDROcodone-acetaminophen (NORCO) 7.5-325 mg per tablet        Take 1 tablet by mouth every 6 (six) hours as needed for Pain.    Take 1 tablet by mouth every 6 (six) hours as needed for Pain.   Discontinued Medications    AMOXICILLIN-CLAVULANATE 875-125MG (AUGMENTIN) 875-125 MG PER TABLET    Take 1 tablet by mouth every 12 (twelve) hours.       Jb Chao M.D.  ==========================================================================  Subjective:   Patient ID: Ned Toledo Jr. is a 61 y.o. male.  has a past medical history of Diabetes mellitus, type 2, Fatty liver, Hypertension, Penetrating foot wound, and Sciatica.   Chief Complaint: Follow-up and Medication Refill      Problem List Items Addressed This Visit       Type 2 diabetes mellitus with hyperglycemia, without long-term current use of insulin (Chronic)    Overview     August 2022: Patient doing well with diet controlled diabetes.  Patient denies any history of thyroid cancer, pancreatitis, MEN syndrome.   March 2020:  Reviewed Diabetes Management StatusPatient cannot take statin due to side effects.SEPTEMBER 2020:  Diabetes Management StatusStatin: TakingACE/ARB: Not taking  December 2021:  Diabetes Management StatusStatin: Not taking  ACE/ARB: Not taking    March 2022:Diabetes Management StatusStatin: Not takingACE/ARB: Not taking  May 2022:  Patient reports that he has changed his diet significantly and that his sugar has been much better controlled.  Diabetes Management StatusStatin: Not takingACE/ARB: Not taking  Diabetes Management Status    Statin: Not taking  ACE/ARB: Not taking    Screening or Prevention Patient's value Goal Complete/Controlled?   HgA1C Testing and Control   Lab Results   Component Value Date    HGBA1C 6.8 (H) 06/01/2022      Annually/Less than 8% Yes   Lipid profile : 06/01/2022 Annually Yes   LDL control Lab Results   Component Value Date    LDLCALC 117.6 06/01/2022    Annually/Less than 100 mg/dl  No   Nephropathy screening Lab Results   Component Value Date    LABMICR 21.0  09/06/2019     Lab Results   Component Value Date    PROTEINUA Negative 12/31/2018     No results found for: UTPCR   Annually No   Blood pressure BP Readings from Last 1 Encounters:   08/31/22 126/74    Less than 140/90 Yes   Dilated retinal exam : 05/23/2019 Annually No   Foot exam   : 03/25/2020 Annually No              Current Assessment & Plan     Continue diet control..We will plan to monitor hemoglobin A1c at designated intervals 3 to 6 months.  I recommend ongoing Education for diabetic diet and exercise protocol.  We will continue to monitor for side effects.    Please be advised of symptoms to monitor for and to notify me immediately if persistent or worsening.  Follow up with Ophthalmology/Optometry and Podiatry at least annually.           Encounter for long-term (current) use of medications (Chronic)    Overview     August 2022: Reviewed labs.  Initial HPI March 2020:  CHRONIC. Stable. Compliant with medications for managed conditions. See medication list. No SE reported. Routine lab analysis is being monitored. Refills were addressed.JUNE 2020:  Labs are stable.  Refill medication.CHRONIC. Stable. Compliant with medications for managed conditions. See medication list. No SE reported. Routine lab analysis is being monitored. Refills were addressed.SEPTEMBER 2020:  CHRONIC. Stable. Compliant with medications for managed conditions. See medication list. No SE reported. Routine lab analysis is being monitored. Refills were addressed.DECEMBER 2020:  Reviewed labs.  CHRONIC. Stable. Compliant with medications for managed conditions. See medication list. No SE reported. Routine lab analysis is being monitored. Refills were addressed.  March 2021:  Reviewed labs.  June 2021:  Reviewed labs.  December 2021:  Reviewed labs. March 2022: Reviewed labs.  May 2022:  Reviewed labs.  Lab Results   Component Value Date    WBC 7.78 03/25/2020    HGB 14.9 03/25/2020    HCT 48.5 03/25/2020     (H) 03/25/2020    PLT  298 03/25/2020         Chemistry        Component Value Date/Time     (L) 06/01/2022 0809    K 4.4 06/01/2022 0809     06/01/2022 0809    CO2 26 06/01/2022 0809    BUN 15 06/01/2022 0809    CREATININE 0.8 06/01/2022 0809     (H) 06/01/2022 0809        Component Value Date/Time    CALCIUM 9.3 06/01/2022 0809    ALKPHOS 48 (L) 06/01/2022 0809    AST 19 06/01/2022 0809    ALT 17 06/01/2022 0809    BILITOT 0.7 06/01/2022 0809    ESTGFRAFRICA >60.0 06/01/2022 0809    EGFRNONAA >60.0 06/01/2022 0809          Lab Results   Component Value Date    TSH 1.236 09/04/2020       =================================================         Current Assessment & Plan     Complete history and physical was completed today.  Complete and thorough medication reconciliation was performed.  Discussed risks and benefits of medications.  Advised patient on orders and health maintenance.  We discussed old records and old labs if available.  Will request any records not available through epic.  Continue current medications listed on your summary sheet.           Sacroiliitis - Primary (Chronic)    Overview     Chronic.  Stable.  Patient chronic pain medication with hydrocodone 7.5 and Soma 350 milligram.  Patient was previously managed by previous PCP Dr. Jaime Hernández who is retiring.  Patient has been stable on this medication regimen for years.  No side effects reported.    March 2022: Patient reports medication regimen is controlling his symptoms.  No change in HPI.  No new injuries.  May 2022:  Patient reports he has been very active at work and pain medication regimen is working well.  August 2022: Patient here for medication refills.  No change in HPI.  Medication continues to work well.           Current Assessment & Plan     Refill medications as prescribed.  No abuse suspected. The patient was checked in the [facility] Board of Pharmacy's  Prescription Monitoring Program. There appears to be no incongruities  with the patient's verbalized history.       An opioid pain contract was completed  after having an extensive conversation about chronic opioid use for pain management. We discussed the risks and benefits of opioids.  I discouraged the patient from escalation of opioid use and try to minimize its use to decrease chance of dependence, tolerance, and opioid-based hyperalgesia.  I reviewed the  in great detail and there are no inconsistencies and it is appropriate with patient's history.  There are no signs of aberrant drug behavior.  The opioid risk tool was also completed, low risk.  Pt counseled about the side effects of long term use of opioids including dependence, tolerance, addiction, respiratory depression, somnolence, immune and endocrine dysfunction.  The patient expressed understanding.      If no improvement or worsening.  Referral to physical therapy, update imaging and consider spine/back clinic versus surgical specialist.             Review of patient's allergies indicates:   Allergen Reactions    Atorvastatin     Bactrim [sulfamethoxazole-trimethoprim] Hives     Current Outpatient Medications   Medication Instructions    blood sugar diagnostic (BLOOD GLUCOSE TEST) Strp Check glucose 3 times per day before each meal    [START ON 10/28/2022] carisoprodoL (SOMA) 350 mg, Oral, 4 times daily PRN    [START ON 9/30/2022] carisoprodoL (SOMA) 350 mg, Oral, 4 times daily PRN    carisoprodoL (SOMA) 350 mg, Oral, 4 times daily PRN    fexofenadine (ALLEGRA) 180 mg, Oral, Daily    [START ON 10/28/2022] HYDROcodone-acetaminophen (NORCO) 7.5-325 mg per tablet 1 tablet, Oral, Every 6 hours PRN    [START ON 9/30/2022] HYDROcodone-acetaminophen (NORCO) 7.5-325 mg per tablet 1 tablet, Oral, Every 6 hours PRN    HYDROcodone-acetaminophen (NORCO) 7.5-325 mg per tablet 1 tablet, Oral, Every 6 hours PRN    ketoconazole (NIZORAL) 2 % cream Topical (Top), 2 times daily    methylPREDNISolone (MEDROL DOSEPACK) 4 mg tablet  follow package directions    mupirocin (BACTROBAN) 2 % ointment Topical (Top), 3 times daily    naproxen (NAPROSYN) 500 mg, Oral, 2 times daily with meals    ondansetron (ZOFRAN-ODT) 8 mg, Oral, Every 6 hours PRN    promethazine (PHENERGAN) 25 mg, Oral, Every 6 hours PRN      I have reviewed the PMH, social history, FamilyHx, surgical history, allergies and medications documented / confirmed by the patient at the time of this visit.  Review of Systems   Constitutional:  Negative for activity change and fatigue.   HENT:  Negative for congestion and sinus pain.    Eyes:  Negative for visual disturbance.   Respiratory:  Negative for chest tightness and shortness of breath.    Cardiovascular:  Negative for palpitations and leg swelling.   Gastrointestinal:  Negative for abdominal pain, diarrhea and nausea.   Endocrine: Negative for polyuria.   Genitourinary:  Negative for difficulty urinating and frequency.   Musculoskeletal:  Positive for arthralgias and back pain. Negative for joint swelling.   Skin:  Negative for rash.   Neurological:  Positive for headaches. Negative for dizziness.   Psychiatric/Behavioral:  Negative for agitation. The patient is not nervous/anxious.    Objective:   /74   Pulse 73   Temp 97.1 °F (36.2 °C) (Temporal)   Resp 16   Ht 6' (1.829 m)   Wt 84 kg (185 lb 3 oz)   SpO2 97%   BMI 25.12 kg/m²   Physical Exam  Vitals and nursing note reviewed.   Constitutional:       General: He is not in acute distress.     Appearance: He is well-developed. He is not diaphoretic.   HENT:      Head: Normocephalic and atraumatic.   Eyes:      Extraocular Movements: Extraocular movements intact.      Pupils: Pupils are equal, round, and reactive to light.   Cardiovascular:      Rate and Rhythm: Normal rate and regular rhythm.      Heart sounds: Normal heart sounds. No murmur heard.  Pulmonary:      Effort: Pulmonary effort is normal. No respiratory distress.      Breath sounds: Normal breath sounds. No  wheezing.   Abdominal:      General: Bowel sounds are normal.      Palpations: Abdomen is soft.   Musculoskeletal:         General: Tenderness and deformity present.      Cervical back: Normal range of motion and neck supple.      Lumbar back: Deformity, spasms, tenderness and bony tenderness present. No swelling, edema or lacerations. Decreased range of motion.   Skin:     General: Skin is warm and dry.      Capillary Refill: Capillary refill takes less than 2 seconds.      Findings: No rash.   Neurological:      General: No focal deficit present.      Mental Status: He is alert and oriented to person, place, and time. Mental status is at baseline.      Cranial Nerves: No cranial nerve deficit.      Motor: No weakness.      Gait: Gait normal.   Psychiatric:         Attention and Perception: He is attentive.         Mood and Affect: Mood normal. Mood is not anxious or depressed. Affect is not labile, blunt, angry or inappropriate.         Speech: He is communicative. Speech is not rapid and pressured, delayed, slurred or tangential.         Behavior: Behavior normal. Behavior is not agitated, slowed, aggressive, withdrawn, hyperactive or combative.         Thought Content: Thought content normal. Thought content is not paranoid or delusional. Thought content does not include homicidal or suicidal ideation. Thought content does not include homicidal or suicidal plan.         Cognition and Memory: Memory is not impaired.         Judgment: Judgment normal. Judgment is not impulsive or inappropriate.      Patient is wearing a mask which limits physical exam due to COVID restrictions.   Assessment:     1. Sacroiliitis    2. Encounter for long-term (current) use of medications    3. Type 2 diabetes mellitus with hyperglycemia, without long-term current use of insulin      MDM:   Moderate complexity.  Moderate risk.  Total time: 32 minutes.  This includes total time spent on the encounter, which includes face to face time  and non-face to face time preparing to see the patient (eg, review of previous medical records, tests), Obtaining and/or reviewing separately obtained history, documenting clinical information in the electronic or other health record, independently interpreting results (not separately reported)/communicating results to the patient/family/caregiver, and/or care coordination (not separately reported).    I have Reviewed and summarized old records.  I have performed thorough medication reconciliation today and discussed risk and benefits of medications.  I have reviewed labs and discussed with patient.  All questions were answered.  I have reviewed  records.  I have signed for the following orders AND/OR meds.  No orders of the defined types were placed in this encounter.    Medications Ordered This Encounter   Medications    carisoprodoL (SOMA) 350 MG tablet     Sig: Take 1 tablet (350 mg total) by mouth 4 (four) times daily as needed for Muscle spasms.     Dispense:  120 tablet     Refill:  0    carisoprodoL (SOMA) 350 MG tablet     Sig: Take 1 tablet (350 mg total) by mouth 4 (four) times daily as needed for Muscle spasms.     Dispense:  120 tablet     Refill:  0    carisoprodoL (SOMA) 350 MG tablet     Sig: Take 1 tablet (350 mg total) by mouth 4 (four) times daily as needed for Muscle spasms.     Dispense:  120 tablet     Refill:  0    HYDROcodone-acetaminophen (NORCO) 7.5-325 mg per tablet     Sig: Take 1 tablet by mouth every 6 (six) hours as needed for Pain.     Dispense:  120 tablet     Refill:  0     Quantity prescribed more than 7 day supply? Yes, quantity medically necessary     Order Specific Question:   I have reviewed the Prescription Drug Monitoring Program (PDMP) database for this patient prior to prescribing the above opioid medication     Answer:   Yes    HYDROcodone-acetaminophen (NORCO) 7.5-325 mg per tablet     Sig: Take 1 tablet by mouth every 6 (six) hours as needed for Pain.     Dispense:   120 tablet     Refill:  0     Quantity prescribed more than 7 day supply? Yes, quantity medically necessary     Order Specific Question:   I have reviewed the Prescription Drug Monitoring Program (PDMP) database for this patient prior to prescribing the above opioid medication     Answer:   Yes    HYDROcodone-acetaminophen (NORCO) 7.5-325 mg per tablet     Sig: Take 1 tablet by mouth every 6 (six) hours as needed for Pain.     Dispense:  120 tablet     Refill:  0     Quantity prescribed more than 7 day supply? Yes, quantity medically necessary     Order Specific Question:   I have reviewed the Prescription Drug Monitoring Program (PDMP) database for this patient prior to prescribing the above opioid medication     Answer:   Yes        Follow up in about 3 months (around 11/30/2022), or if symptoms worsen or fail to improve, for pain med refill.    If no improvement in symptoms or symptoms worsen, advised to call/follow-up at clinic or go to ER. Patient voiced understanding and all questions/concerns were addressed.   DISCLAIMER: This note was compiled by using a speech recognition dictation system and therefore please be aware that typographical / speech recognition errors can and do occur.  Please contact me if you see any errors specifically.    Jb Chao M.D.       Office: [phone]   [address] [place] [address]  FAX: [phone]

## 2022-09-29 ENCOUNTER — PATIENT OUTREACH (OUTPATIENT)
Dept: ADMINISTRATIVE | Facility: HOSPITAL | Age: 62
End: 2022-09-29
Payer: COMMERCIAL

## 2022-09-29 NOTE — LETTER
AUTHORIZATION FOR RELEASE OF   CONFIDENTIAL INFORMATION    Dear [facility],    We are seeing Ned Toledo Jr., date of birth 1960, in the clinic at [facility]. Jb Chao MD is the patient's PCP. Ned Toledo Jr. has an outstanding lab/procedure at the time we reviewed his chart. In order to help keep his health information updated, he has authorized us to request the following medical record(s):        (  )  MAMMOGRAM                                      (  )  COLONOSCOPY      (  )  PAP SMEAR                                          (  )  OUTSIDE LAB RESULTS     (  )  DEXA SCAN                                          (x) DIABETIC EYE EXAM            (  )  FOOT EXAM                                          (  )  ENTIRE RECORD     (  )  OUTSIDE IMMUNIZATIONS                 (  )  _______________         Please fax records to [facility], Brian T Callihan, MD, [phone].     If you have any questions, please contact   Yu CHILDRESS LPN   Care Coordination   [facility]  Phone [phone]      Patient Name: Ned Toledo Jr.  : 1960  N [id number]  Patient Phone #: [phone]

## 2022-09-29 NOTE — PROGRESS NOTES
Working Diabetic Eye Exam Report.    Pt reports eye exam completed 2021 at [facility].  Faxed request for copy of report, [phone].

## 2022-10-12 ENCOUNTER — HOSPITAL ENCOUNTER (OUTPATIENT)
Dept: RADIOLOGY | Facility: HOSPITAL | Age: 62
Discharge: HOME | End: 2022-10-12
Attending: FAMILY MEDICINE
Payer: COMMERCIAL

## 2022-10-12 ENCOUNTER — OFFICE VISIT (OUTPATIENT)
Dept: FAMILY MEDICINE | Facility: CLINIC | Age: 62
End: 2022-10-12
Payer: COMMERCIAL

## 2022-10-12 ENCOUNTER — TELEPHONE (OUTPATIENT)
Dept: FAMILY MEDICINE | Facility: CLINIC | Age: 62
End: 2022-10-12

## 2022-10-12 VITALS
OXYGEN SATURATION: 99 % | HEIGHT: 72 IN | BODY MASS INDEX: 24.79 KG/M2 | DIASTOLIC BLOOD PRESSURE: 87 MMHG | WEIGHT: 183 LBS | HEART RATE: 72 BPM | TEMPERATURE: 98 F | SYSTOLIC BLOOD PRESSURE: 138 MMHG

## 2022-10-12 DIAGNOSIS — J40 BRONCHITIS: ICD-10-CM

## 2022-10-12 DIAGNOSIS — J40 BRONCHITIS: Primary | ICD-10-CM

## 2022-10-12 DIAGNOSIS — Z79.899 ENCOUNTER FOR LONG-TERM (CURRENT) USE OF MEDICATIONS: ICD-10-CM

## 2022-10-12 PROCEDURE — 71046 X-RAY EXAM CHEST 2 VIEWS: CPT | Mod: TC,PO

## 2022-10-12 PROCEDURE — 3044F PR MOST RECENT HEMOGLOBIN A1C LEVEL <7.0%: ICD-10-PCS | Mod: CPTII,S$GLB,, | Performed by: FAMILY MEDICINE

## 2022-10-12 PROCEDURE — 3075F SYST BP GE 130 - 139MM HG: CPT | Mod: CPTII,S$GLB,, | Performed by: FAMILY MEDICINE

## 2022-10-12 PROCEDURE — 1159F PR MEDICATION LIST DOCUMENTED IN MEDICAL RECORD: ICD-10-PCS | Mod: CPTII,S$GLB,, | Performed by: FAMILY MEDICINE

## 2022-10-12 PROCEDURE — 99214 PR OFFICE/OUTPT VISIT, EST, LEVL IV, 30-39 MIN: ICD-10-PCS | Mod: S$GLB,,, | Performed by: FAMILY MEDICINE

## 2022-10-12 PROCEDURE — 3079F DIAST BP 80-89 MM HG: CPT | Mod: CPTII,S$GLB,, | Performed by: FAMILY MEDICINE

## 2022-10-12 PROCEDURE — 99999 PR PBB SHADOW E&M-EST. PATIENT-LVL V: ICD-10-PCS | Mod: PBBFAC,,, | Performed by: FAMILY MEDICINE

## 2022-10-12 PROCEDURE — 1160F RVW MEDS BY RX/DR IN RCRD: CPT | Mod: CPTII,S$GLB,, | Performed by: FAMILY MEDICINE

## 2022-10-12 PROCEDURE — 3075F PR MOST RECENT SYSTOLIC BLOOD PRESS GE 130-139MM HG: ICD-10-PCS | Mod: CPTII,S$GLB,, | Performed by: FAMILY MEDICINE

## 2022-10-12 PROCEDURE — 71046 X-RAY EXAM CHEST 2 VIEWS: CPT | Mod: 26,,, | Performed by: RADIOLOGY

## 2022-10-12 PROCEDURE — 99214 OFFICE O/P EST MOD 30 MIN: CPT | Mod: S$GLB,,, | Performed by: FAMILY MEDICINE

## 2022-10-12 PROCEDURE — 3008F PR BODY MASS INDEX (BMI) DOCUMENTED: ICD-10-PCS | Mod: CPTII,S$GLB,, | Performed by: FAMILY MEDICINE

## 2022-10-12 PROCEDURE — 3008F BODY MASS INDEX DOCD: CPT | Mod: CPTII,S$GLB,, | Performed by: FAMILY MEDICINE

## 2022-10-12 PROCEDURE — 1159F MED LIST DOCD IN RCRD: CPT | Mod: CPTII,S$GLB,, | Performed by: FAMILY MEDICINE

## 2022-10-12 PROCEDURE — 71046 XR CHEST PA AND LATERAL: ICD-10-PCS | Mod: 26,,, | Performed by: RADIOLOGY

## 2022-10-12 PROCEDURE — 1160F PR REVIEW ALL MEDS BY PRESCRIBER/CLIN PHARMACIST DOCUMENTED: ICD-10-PCS | Mod: CPTII,S$GLB,, | Performed by: FAMILY MEDICINE

## 2022-10-12 PROCEDURE — 3044F HG A1C LEVEL LT 7.0%: CPT | Mod: CPTII,S$GLB,, | Performed by: FAMILY MEDICINE

## 2022-10-12 PROCEDURE — 3079F PR MOST RECENT DIASTOLIC BLOOD PRESSURE 80-89 MM HG: ICD-10-PCS | Mod: CPTII,S$GLB,, | Performed by: FAMILY MEDICINE

## 2022-10-12 PROCEDURE — 99999 PR PBB SHADOW E&M-EST. PATIENT-LVL V: CPT | Mod: PBBFAC,,, | Performed by: FAMILY MEDICINE

## 2022-10-12 RX ORDER — BENZONATATE 200 MG/1
200 CAPSULE ORAL 3 TIMES DAILY PRN
Qty: 30 CAPSULE | Refills: 0 | Status: SHIPPED | OUTPATIENT
Start: 2022-10-12 | End: 2022-10-22

## 2022-10-12 RX ORDER — PROMETHAZINE HYDROCHLORIDE AND DEXTROMETHORPHAN HYDROBROMIDE 6.25; 15 MG/5ML; MG/5ML
SYRUP ORAL
COMMUNITY
Start: 2022-10-11 | End: 2023-01-17 | Stop reason: SDUPTHER

## 2022-10-12 RX ORDER — DOXYCYCLINE 100 MG/1
100 CAPSULE ORAL EVERY 12 HOURS
Qty: 20 CAPSULE | Refills: 0 | Status: SHIPPED | OUTPATIENT
Start: 2022-10-12 | End: 2022-11-30

## 2022-10-12 NOTE — PROGRESS NOTES
PLAN:      Problem List Items Addressed This Visit       Encounter for long-term (current) use of medications (Chronic)     Complete history and physical was completed today.  Complete and thorough medication reconciliation was performed.  Discussed risks and benefits of medications.  Advised patient on orders and health maintenance.  We discussed old records and old labs if available.  Will request any records not available through epic.  Continue current medications listed on your summary sheet.           Bronchitis - Primary     Check chest x-ray.  Start doxycycline.  Patient has Medrol Dosepak that he will start also.  Continue cough syrup.  Tessalon Perles for cough.  Follow-up sooner if no improvement.  Discussed condition course and signs and symptoms to expect.  Patient advised take anti-inflammatories and or Tylenol for pain or fever.  ER precautions.  Call MD or follow-up to clinic if not improving or worsening symptoms.           Relevant Medications    doxycycline (VIBRAMYCIN) 100 MG Cap    benzonatate (TESSALON) 200 MG capsule    Other Relevant Orders    X-Ray Chest PA And Lateral (Completed)     Future Appointments       Date Provider Specialty Appt Notes    11/30/2022 Jb Chao MD Family Medicine 3 month pain med refill           Medication Management for assessment above:   Medication List with Changes/Refills   New Medications    BENZONATATE (TESSALON) 200 MG CAPSULE    Take 1 capsule (200 mg total) by mouth 3 (three) times daily as needed for Cough.    DOXYCYCLINE (VIBRAMYCIN) 100 MG CAP    Take 1 capsule (100 mg total) by mouth every 12 (twelve) hours.   Current Medications    BLOOD SUGAR DIAGNOSTIC (BLOOD GLUCOSE TEST) STRP    Check glucose 3 times per day before each meal    CARISOPRODOL (SOMA) 350 MG TABLET    Take 1 tablet (350 mg total) by mouth 4 (four) times daily as needed for Muscle spasms.    CARISOPRODOL (SOMA) 350 MG TABLET    Take 1 tablet (350 mg total) by mouth 4 (four)  times daily as needed for Muscle spasms.    CARISOPRODOL (SOMA) 350 MG TABLET    Take 1 tablet (350 mg total) by mouth 4 (four) times daily as needed for Muscle spasms.    FEXOFENADINE (ALLEGRA) 180 MG TABLET    Take 1 tablet (180 mg total) by mouth once daily.    HYDROCODONE-ACETAMINOPHEN (NORCO) 7.5-325 MG PER TABLET    Take 1 tablet by mouth every 6 (six) hours as needed for Pain.    HYDROCODONE-ACETAMINOPHEN (NORCO) 7.5-325 MG PER TABLET    Take 1 tablet by mouth every 6 (six) hours as needed for Pain.    HYDROCODONE-ACETAMINOPHEN (NORCO) 7.5-325 MG PER TABLET    Take 1 tablet by mouth every 6 (six) hours as needed for Pain.    KETOCONAZOLE (NIZORAL) 2 % CREAM    Apply topically 2 (two) times daily.    METHYLPREDNISOLONE (MEDROL DOSEPACK) 4 MG TABLET    follow package directions    MUPIROCIN (BACTROBAN) 2 % OINTMENT    Apply topically 3 (three) times daily.    NAPROXEN (NAPROSYN) 500 MG TABLET    Take 1 tablet (500 mg total) by mouth 2 (two) times daily with meals.    ONDANSETRON (ZOFRAN-ODT) 8 MG TBDL    Take 1 tablet (8 mg total) by mouth every 6 (six) hours as needed.    PROMETHAZINE (PHENERGAN) 25 MG TABLET    Take 1 tablet (25 mg total) by mouth every 6 (six) hours as needed for Nausea.    PROMETHAZINE-DEXTROMETHORPHAN (PROMETHAZINE-DM) 6.25-15 MG/5 ML SYRP    SMARTSI Teaspoon By Mouth Every 4-6 Hours PRN       Jb Chao M.D.  ==========================================================================  Subjective:   Patient ID: Ned Toledo Jr. is a 61 y.o. male.  has a past medical history of Diabetes mellitus, type 2, Fatty liver, Hypertension, Penetrating foot wound, and Sciatica.   Chief Complaint: Chest Congestion      Problem List Items Addressed This Visit       Encounter for long-term (current) use of medications (Chronic)    Overview     2022: Reviewed labs.  2022: Reviewed labs.  Initial HPI 2020:  CHRONIC. Stable. Compliant with medications for managed  conditions. See medication list. No SE reported. Routine lab analysis is being monitored. Refills were addressed.JUNE 2020:  Labs are stable.  Refill medication.CHRONIC. Stable. Compliant with medications for managed conditions. See medication list. No SE reported. Routine lab analysis is being monitored. Refills were addressed.SEPTEMBER 2020:  CHRONIC. Stable. Compliant with medications for managed conditions. See medication list. No SE reported. Routine lab analysis is being monitored. Refills were addressed.DECEMBER 2020:  Reviewed labs.  CHRONIC. Stable. Compliant with medications for managed conditions. See medication list. No SE reported. Routine lab analysis is being monitored. Refills were addressed.  March 2021:  Reviewed labs.  June 2021:  Reviewed labs.  December 2021:  Reviewed labs. March 2022: Reviewed labs.  May 2022:  Reviewed labs.  Lab Results   Component Value Date    WBC 7.78 03/25/2020    HGB 14.9 03/25/2020    HCT 48.5 03/25/2020     (H) 03/25/2020     03/25/2020       Chemistry        Component Value Date/Time     (L) 06/01/2022 0809    K 4.4 06/01/2022 0809     06/01/2022 0809    CO2 26 06/01/2022 0809    BUN 15 06/01/2022 0809    CREATININE 0.8 06/01/2022 0809     (H) 06/01/2022 0809        Component Value Date/Time    CALCIUM 9.3 06/01/2022 0809    ALKPHOS 48 (L) 06/01/2022 0809    AST 19 06/01/2022 0809    ALT 17 06/01/2022 0809    BILITOT 0.7 06/01/2022 0809    ESTGFRAFRICA >60.0 06/01/2022 0809    EGFRNONAA >60.0 06/01/2022 0809          Lab Results   Component Value Date    TSH 1.236 09/04/2020     =================================================         Current Assessment & Plan     Complete history and physical was completed today.  Complete and thorough medication reconciliation was performed.  Discussed risks and benefits of medications.  Advised patient on orders and health maintenance.  We discussed old records and old labs if available.  Will  request any records not available through epic.  Continue current medications listed on your summary sheet.           Bronchitis - Primary    Overview     Subacute.  Started few weeks ago.  [facility] given amoxicillin and a steroid shot.  Patient took the medication and got better temporarily for his sinuses however he progressed to having a worsening cough and chest congestion.  Denies fever chills is having some chest tenderness with cough.         Current Assessment & Plan     Check chest x-ray.  Start doxycycline.  Patient has Medrol Dosepak that he will start also.  Continue cough syrup.  Tessalon Perles for cough.  Follow-up sooner if no improvement.  Discussed condition course and signs and symptoms to expect.  Patient advised take anti-inflammatories and or Tylenol for pain or fever.  ER precautions.  Call MD or follow-up to clinic if not improving or worsening symptoms.               Review of patient's allergies indicates:   Allergen Reactions    Atorvastatin     Bactrim [sulfamethoxazole-trimethoprim] Hives     Current Outpatient Medications   Medication Instructions    benzonatate (TESSALON) 200 mg, Oral, 3 times daily PRN    blood sugar diagnostic (BLOOD GLUCOSE TEST) Strp Check glucose 3 times per day before each meal    [START ON 10/28/2022] carisoprodoL (SOMA) 350 mg, Oral, 4 times daily PRN    carisoprodoL (SOMA) 350 mg, Oral, 4 times daily PRN    carisoprodoL (SOMA) 350 mg, Oral, 4 times daily PRN    doxycycline (VIBRAMYCIN) 100 mg, Oral, Every 12 hours    fexofenadine (ALLEGRA) 180 mg, Oral, Daily    [START ON 10/28/2022] HYDROcodone-acetaminophen (NORCO) 7.5-325 mg per tablet 1 tablet, Oral, Every 6 hours PRN    HYDROcodone-acetaminophen (NORCO) 7.5-325 mg per tablet 1 tablet, Oral, Every 6 hours PRN    HYDROcodone-acetaminophen (NORCO) 7.5-325 mg per tablet 1 tablet, Oral, Every 6 hours PRN    ketoconazole (NIZORAL) 2 % cream Topical (Top), 2 times daily     methylPREDNISolone (MEDROL DOSEPACK) 4 mg tablet follow package directions    mupirocin (BACTROBAN) 2 % ointment Topical (Top), 3 times daily    naproxen (NAPROSYN) 500 mg, Oral, 2 times daily with meals    ondansetron (ZOFRAN-ODT) 8 mg, Oral, Every 6 hours PRN    promethazine (PHENERGAN) 25 mg, Oral, Every 6 hours PRN    promethazine-dextromethorphan (PROMETHAZINE-DM) 6.25-15 mg/5 mL Syrp SMARTSI Teaspoon By Mouth Every 4-6 Hours PRN      I have reviewed the PMH, social history, FamilyHx, surgical history, allergies and medications documented / confirmed by the patient at the time of this visit.  Review of Systems   Constitutional:  Positive for fatigue. Negative for chills, fever and unexpected weight change.   HENT:  Positive for sinus pressure and sinus pain. Negative for ear pain and sore throat.    Eyes:  Negative for redness and visual disturbance.   Respiratory:  Positive for cough. Negative for shortness of breath.    Cardiovascular:  Positive for chest pain. Negative for palpitations.   Gastrointestinal:  Negative for nausea and vomiting.   Endocrine: Negative for cold intolerance and heat intolerance.   Genitourinary:  Negative for difficulty urinating and hematuria.   Musculoskeletal:  Negative for arthralgias and myalgias.   Skin:  Negative for rash and wound.   Allergic/Immunologic: Negative for environmental allergies and food allergies.   Neurological:  Negative for weakness and headaches.   Hematological:  Negative for adenopathy. Does not bruise/bleed easily.   Psychiatric/Behavioral:  Negative for sleep disturbance. The patient is not nervous/anxious.    Objective:   /87   Pulse 72   Temp 98.2 °F (36.8 °C) (Other (see comments))   Ht 6' (1.829 m)   Wt 83 kg (183 lb)   SpO2 99%   BMI 24.82 kg/m²   Physical Exam  Vitals and nursing note reviewed.   Constitutional:       General: He is not in acute distress.     Appearance: He is well-developed. He is not diaphoretic.   HENT:       Head: Normocephalic and atraumatic.      Right Ear: Tympanic membrane, ear canal and external ear normal. There is no impacted cerumen.      Left Ear: Tympanic membrane, ear canal and external ear normal. There is no impacted cerumen.      Nose: Congestion present. No rhinorrhea.      Mouth/Throat:      Pharynx: Posterior oropharyngeal erythema present.   Eyes:      Extraocular Movements: Extraocular movements intact.      Pupils: Pupils are equal, round, and reactive to light.   Neck:      Vascular: No carotid bruit.   Cardiovascular:      Rate and Rhythm: Normal rate.      Pulses: Normal pulses.   Pulmonary:      Effort: Pulmonary effort is normal. No respiratory distress.      Breath sounds: Normal breath sounds.      Comments: Cough with deep inspiration  Abdominal:      General: Bowel sounds are normal.      Palpations: Abdomen is soft.   Musculoskeletal:         General: Normal range of motion.      Cervical back: Normal range of motion and neck supple.   Lymphadenopathy:      Cervical: No cervical adenopathy.   Skin:     General: Skin is warm and dry.      Capillary Refill: Capillary refill takes less than 2 seconds.      Findings: No rash.   Neurological:      General: No focal deficit present.      Mental Status: He is alert and oriented to person, place, and time.   Psychiatric:         Attention and Perception: He is attentive.         Mood and Affect: Mood normal. Mood is not anxious or depressed. Affect is not labile, blunt, angry or inappropriate.         Speech: He is communicative. Speech is not rapid and pressured, delayed, slurred or tangential.         Behavior: Behavior normal. Behavior is not agitated, slowed, aggressive, withdrawn, hyperactive or combative.         Thought Content: Thought content normal. Thought content is not paranoid or delusional. Thought content does not include homicidal or suicidal ideation. Thought content does not include homicidal or suicidal plan.         Cognition  and Memory: Memory is not impaired.         Judgment: Judgment normal. Judgment is not impulsive or inappropriate.       Assessment:     1. Bronchitis    2. Encounter for long-term (current) use of medications      MDM:   Moderate complexity.  Moderate risk.  Total time: 31 minutes.  This includes total time spent on the encounter, which includes face to face time and non-face to face time preparing to see the patient (eg, review of previous medical records, tests), Obtaining and/or reviewing separately obtained history, documenting clinical information in the electronic or other health record, independently interpreting results (not separately reported)/communicating results to the patient/family/caregiver, and/or care coordination (not separately reported).    I have Reviewed and summarized old records.  I have performed thorough medication reconciliation today and discussed risk and benefits of medications.  I have reviewed labs and discussed with patient.  All questions were answered.  I am requesting old records and will review them once they are available.  Outside urgent care    I have signed for the following orders AND/OR meds.  Orders Placed This Encounter   Procedures    X-Ray Chest PA And Lateral     Standing Status:   Future     Number of Occurrences:   1     Standing Expiration Date:   10/12/2023     Order Specific Question:   May the Radiologist modify the order per protocol to meet the clinical needs of the patient?     Answer:   Yes     Order Specific Question:   Release to patient     Answer:   Immediate     Medications Ordered This Encounter   Medications    benzonatate (TESSALON) 200 MG capsule     Sig: Take 1 capsule (200 mg total) by mouth 3 (three) times daily as needed for Cough.     Dispense:  30 capsule     Refill:  0    doxycycline (VIBRAMYCIN) 100 MG Cap     Sig: Take 1 capsule (100 mg total) by mouth every 12 (twelve) hours.     Dispense:  20 capsule     Refill:  0        Follow up if  symptoms worsen or fail to improve, for follow up.  Future Appointments       Date Provider Specialty Appt Notes    11/30/2022 Jb Chao MD Family Medicine 3 month pain med refill          If no improvement in symptoms or symptoms worsen, advised to call/follow-up at clinic or go to ER. Patient voiced understanding and all questions/concerns were addressed.   DISCLAIMER: This note was compiled by using a speech recognition dictation system and therefore please be aware that typographical / speech recognition errors can and do occur.  Please contact me if you see any errors specifically.    Jb Chao M.D.       Office: [phone] [address] [place] [address]  FAX: [phone]

## 2022-10-12 NOTE — TELEPHONE ENCOUNTER
----- Message from Hany Snow sent at 10/12/2022 10:23 AM CDT -----  Contact: Ned Miranda is returning a call to discuss xray results. Please call him back at [phone].          Thanks  DD

## 2022-10-12 NOTE — ASSESSMENT & PLAN NOTE
Check chest x-ray.  Start doxycycline.  Patient has Medrol Dosepak that he will start also.  Continue cough syrup.  Tessalon Perles for cough.  Follow-up sooner if no improvement.  Discussed condition course and signs and symptoms to expect.  Patient advised take anti-inflammatories and or Tylenol for pain or fever.  ER precautions.  Call MD or follow-up to clinic if not improving or worsening symptoms.

## 2022-10-12 NOTE — PROGRESS NOTES
The patient has a normal CXR.  No pneumonia.  No lesions were found and no fluid was noted.  The heart appears normal.    Please proceed with treatment as discussed at office visit.  Follow-up sooner if no improvement.    Please call patient with these normal results if they are not enrolled with my chart.

## 2022-11-17 ENCOUNTER — PATIENT OUTREACH (OUTPATIENT)
Dept: ADMINISTRATIVE | Facility: HOSPITAL | Age: 62
End: 2022-11-17
Payer: COMMERCIAL

## 2022-11-17 NOTE — PROGRESS NOTES
Attempted to contact patient by phone for a Diabetic Eye Screening , was able to leave voice mail message.

## 2022-11-23 ENCOUNTER — TELEPHONE (OUTPATIENT)
Dept: FAMILY MEDICINE | Facility: CLINIC | Age: 62
End: 2022-11-23
Payer: COMMERCIAL

## 2022-11-23 NOTE — TELEPHONE ENCOUNTER
----- Message from Sonya Leslie sent at 11/23/2022  8:04 AM CST -----  Contact: Ned  Patient is calling to speak with the nurse regarding tested Positive for  the Flu. Explains having chili's, coughing, diarrhea and congestion. Patient will like something call into the pharmacy. Please give patient a call back at [phone] as requested.  Thanks  LR

## 2022-11-23 NOTE — TELEPHONE ENCOUNTER
Spoke to pt. States he went to the urgent care and tested positive for flu urgent care advised buying OTC cough medication and cough drops. Advised on evisit pt. Denied. Phone call ended.

## 2022-11-29 ENCOUNTER — PATIENT OUTREACH (OUTPATIENT)
Dept: ADMINISTRATIVE | Facility: HOSPITAL | Age: 62
End: 2022-11-29
Payer: COMMERCIAL

## 2022-11-30 ENCOUNTER — OFFICE VISIT (OUTPATIENT)
Dept: FAMILY MEDICINE | Facility: CLINIC | Age: 62
End: 2022-11-30
Payer: COMMERCIAL

## 2022-11-30 VITALS
HEART RATE: 80 BPM | DIASTOLIC BLOOD PRESSURE: 81 MMHG | WEIGHT: 190.94 LBS | BODY MASS INDEX: 25.86 KG/M2 | TEMPERATURE: 98 F | SYSTOLIC BLOOD PRESSURE: 138 MMHG | HEIGHT: 72 IN

## 2022-11-30 DIAGNOSIS — Z79.899 ENCOUNTER FOR LONG-TERM (CURRENT) USE OF MEDICATIONS: ICD-10-CM

## 2022-11-30 DIAGNOSIS — M46.1 SACROILIITIS: ICD-10-CM

## 2022-11-30 DIAGNOSIS — J40 BRONCHITIS: ICD-10-CM

## 2022-11-30 PROCEDURE — 99214 PR OFFICE/OUTPT VISIT, EST, LEVL IV, 30-39 MIN: ICD-10-PCS | Mod: S$GLB,,, | Performed by: FAMILY MEDICINE

## 2022-11-30 PROCEDURE — 1159F MED LIST DOCD IN RCRD: CPT | Mod: CPTII,S$GLB,, | Performed by: FAMILY MEDICINE

## 2022-11-30 PROCEDURE — 1160F RVW MEDS BY RX/DR IN RCRD: CPT | Mod: CPTII,S$GLB,, | Performed by: FAMILY MEDICINE

## 2022-11-30 PROCEDURE — 3044F PR MOST RECENT HEMOGLOBIN A1C LEVEL <7.0%: ICD-10-PCS | Mod: CPTII,S$GLB,, | Performed by: FAMILY MEDICINE

## 2022-11-30 PROCEDURE — 3075F SYST BP GE 130 - 139MM HG: CPT | Mod: CPTII,S$GLB,, | Performed by: FAMILY MEDICINE

## 2022-11-30 PROCEDURE — 99999 PR PBB SHADOW E&M-EST. PATIENT-LVL IV: CPT | Mod: PBBFAC,,, | Performed by: FAMILY MEDICINE

## 2022-11-30 PROCEDURE — 99214 OFFICE O/P EST MOD 30 MIN: CPT | Mod: S$GLB,,, | Performed by: FAMILY MEDICINE

## 2022-11-30 PROCEDURE — 3075F PR MOST RECENT SYSTOLIC BLOOD PRESS GE 130-139MM HG: ICD-10-PCS | Mod: CPTII,S$GLB,, | Performed by: FAMILY MEDICINE

## 2022-11-30 PROCEDURE — 1159F PR MEDICATION LIST DOCUMENTED IN MEDICAL RECORD: ICD-10-PCS | Mod: CPTII,S$GLB,, | Performed by: FAMILY MEDICINE

## 2022-11-30 PROCEDURE — 3044F HG A1C LEVEL LT 7.0%: CPT | Mod: CPTII,S$GLB,, | Performed by: FAMILY MEDICINE

## 2022-11-30 PROCEDURE — 99999 PR PBB SHADOW E&M-EST. PATIENT-LVL IV: ICD-10-PCS | Mod: PBBFAC,,, | Performed by: FAMILY MEDICINE

## 2022-11-30 PROCEDURE — 1160F PR REVIEW ALL MEDS BY PRESCRIBER/CLIN PHARMACIST DOCUMENTED: ICD-10-PCS | Mod: CPTII,S$GLB,, | Performed by: FAMILY MEDICINE

## 2022-11-30 PROCEDURE — 3079F DIAST BP 80-89 MM HG: CPT | Mod: CPTII,S$GLB,, | Performed by: FAMILY MEDICINE

## 2022-11-30 PROCEDURE — 3008F PR BODY MASS INDEX (BMI) DOCUMENTED: ICD-10-PCS | Mod: CPTII,S$GLB,, | Performed by: FAMILY MEDICINE

## 2022-11-30 PROCEDURE — 3008F BODY MASS INDEX DOCD: CPT | Mod: CPTII,S$GLB,, | Performed by: FAMILY MEDICINE

## 2022-11-30 PROCEDURE — 3079F PR MOST RECENT DIASTOLIC BLOOD PRESSURE 80-89 MM HG: ICD-10-PCS | Mod: CPTII,S$GLB,, | Performed by: FAMILY MEDICINE

## 2022-11-30 RX ORDER — ALBUTEROL SULFATE 90 UG/1
2 AEROSOL, METERED RESPIRATORY (INHALATION) EVERY 6 HOURS PRN
Qty: 18 G | Refills: 1 | Status: SHIPPED | OUTPATIENT
Start: 2022-11-30 | End: 2023-06-06

## 2022-11-30 RX ORDER — CARISOPRODOL 350 MG/1
350 TABLET ORAL 4 TIMES DAILY PRN
Qty: 120 TABLET | Refills: 0 | Status: SHIPPED | OUTPATIENT
Start: 2023-01-27 | End: 2023-03-02 | Stop reason: SDUPTHER

## 2022-11-30 RX ORDER — HYDROCODONE BITARTRATE AND ACETAMINOPHEN 7.5; 325 MG/1; MG/1
1 TABLET ORAL EVERY 6 HOURS PRN
Qty: 120 TABLET | Refills: 0 | Status: SHIPPED | OUTPATIENT
Start: 2022-12-28 | End: 2023-03-02 | Stop reason: SDUPTHER

## 2022-11-30 RX ORDER — DOXYCYCLINE 100 MG/1
100 CAPSULE ORAL EVERY 12 HOURS
Qty: 20 CAPSULE | Refills: 0 | Status: SHIPPED | OUTPATIENT
Start: 2022-11-30 | End: 2023-01-17

## 2022-11-30 RX ORDER — BENZONATATE 200 MG/1
200 CAPSULE ORAL
COMMUNITY
Start: 2022-11-22 | End: 2023-06-06

## 2022-11-30 RX ORDER — CARISOPRODOL 350 MG/1
350 TABLET ORAL 4 TIMES DAILY PRN
Qty: 120 TABLET | Refills: 0 | Status: SHIPPED | OUTPATIENT
Start: 2022-12-28 | End: 2023-03-02 | Stop reason: SDUPTHER

## 2022-11-30 RX ORDER — PROMETHAZINE HYDROCHLORIDE AND CODEINE PHOSPHATE 6.25; 1 MG/5ML; MG/5ML
5 SOLUTION ORAL EVERY 4 HOURS PRN
Qty: 240 ML | Refills: 0 | Status: SHIPPED | OUTPATIENT
Start: 2022-11-30 | End: 2022-12-10

## 2022-11-30 RX ORDER — CARISOPRODOL 350 MG/1
350 TABLET ORAL 4 TIMES DAILY PRN
Qty: 120 TABLET | Refills: 0 | Status: SHIPPED | OUTPATIENT
Start: 2022-11-30 | End: 2023-03-02 | Stop reason: SDUPTHER

## 2022-11-30 RX ORDER — HYDROCODONE BITARTRATE AND ACETAMINOPHEN 7.5; 325 MG/1; MG/1
1 TABLET ORAL EVERY 6 HOURS PRN
Qty: 120 TABLET | Refills: 0 | Status: SHIPPED | OUTPATIENT
Start: 2022-11-30 | End: 2023-03-02 | Stop reason: SDUPTHER

## 2022-11-30 RX ORDER — HYDROCODONE BITARTRATE AND ACETAMINOPHEN 7.5; 325 MG/1; MG/1
1 TABLET ORAL EVERY 6 HOURS PRN
Qty: 120 TABLET | Refills: 0 | Status: SHIPPED | OUTPATIENT
Start: 2023-01-27 | End: 2023-03-02 | Stop reason: SDUPTHER

## 2022-11-30 NOTE — PATIENT INSTRUCTIONS
Follow up in about 3 months (around 2/28/2023), or if symptoms worsen or fail to improve, for Med refills.     Dear patient,   As a result of recent federal legislation (The Federal Cures Act), you may receive lab or pathology results from your visit in your [facility] account before your physician is able to contact you. Your physician or their representative will relay the results to you with their recommendations at their soonest availability.     If no improvement in symptoms or symptoms worsen, please be advised to call MD, follow-up at clinic and/or go to ER if becomes severe.    Jb Chao M.D.        We Offer TELEHEALTH & Same Day Appointments!   Book your Telehealth appointment with me through my nurse or   Clinic appointments on [email or web address]!    [address] [place] [address]    Office: [phone]   FAX: [phone]    Check out my Facebook Page and Follow Me at: [email or web address]    Check out my website at [email or web address] by clicking on: [email or web address]    To Schedule appointments [email or web address] go to [email or web address] [email or web address]

## 2022-11-30 NOTE — PROGRESS NOTES
PLAN:      Problem List Items Addressed This Visit       Encounter for long-term (current) use of medications (Chronic)     Complete history and physical was completed today.  Complete and thorough medication reconciliation was performed.  Discussed risks and benefits of medications.  Advised patient on orders and health maintenance.  We discussed old records and old labs if available.  Will request any records not available through epic.  Continue current medications listed on your summary sheet.           Relevant Medications    HYDROcodone-acetaminophen (NORCO) 7.5-325 mg per tablet    HYDROcodone-acetaminophen (NORCO) 7.5-325 mg per tablet (Start on 1/27/2023)    HYDROcodone-acetaminophen (NORCO) 7.5-325 mg per tablet (Start on 12/28/2022)    carisoprodoL (SOMA) 350 MG tablet    carisoprodoL (SOMA) 350 MG tablet (Start on 1/27/2023)    carisoprodoL (SOMA) 350 MG tablet (Start on 12/28/2022)    Sacroiliitis (Chronic)     Refill medications as prescribed.  No abuse suspected. The patient was checked in the [facility] Board of Pharmacy's  Prescription Monitoring Program. There appears to be no incongruities with the patient's verbalized history.       An opioid pain contract was completed  after having an extensive conversation about chronic opioid use for pain management. We discussed the risks and benefits of opioids.  I discouraged the patient from escalation of opioid use and try to minimize its use to decrease chance of dependence, tolerance, and opioid-based hyperalgesia.  I reviewed the  in great detail and there are no inconsistencies and it is appropriate with patient's history.  There are no signs of aberrant drug behavior.  The opioid risk tool was also completed, low risk.  Pt counseled about the side effects of long term use of opioids including dependence, tolerance, addiction, respiratory depression, somnolence, immune and endocrine dysfunction.  The patient expressed understanding.      If  no improvement or worsening.  Referral to physical therapy, update imaging and consider spine/back clinic versus surgical specialist.         Relevant Medications    HYDROcodone-acetaminophen (NORCO) 7.5-325 mg per tablet    HYDROcodone-acetaminophen (NORCO) 7.5-325 mg per tablet (Start on 1/27/2023)    HYDROcodone-acetaminophen (NORCO) 7.5-325 mg per tablet (Start on 12/28/2022)    carisoprodoL (SOMA) 350 MG tablet    carisoprodoL (SOMA) 350 MG tablet (Start on 1/27/2023)    carisoprodoL (SOMA) 350 MG tablet (Start on 12/28/2022)    Bronchitis     Restart doxycycline for 10 days.  Start albuterol and cough syrup.  Discussed condition course and signs and symptoms to expect.  Patient advised take anti-inflammatories and or Tylenol for pain or fever.  ER precautions.  Call MD or follow-up to clinic if not improving or worsening symptoms.           Relevant Medications    albuterol (PROVENTIL/VENTOLIN HFA) 90 mcg/actuation inhaler    doxycycline (VIBRAMYCIN) 100 MG Cap    promethazine-codeine 6.25-10 mg/5 ml (PHENERGAN WITH CODEINE) 6.25-10 mg/5 mL syrup     Future Appointments       Date Provider Specialty Appt Notes    3/2/2023 Jb Chao MD Family Medicine 3 mth f/u/pain med refill             Medication Management for assessment above:   Medication List with Changes/Refills   New Medications    ALBUTEROL (PROVENTIL/VENTOLIN HFA) 90 MCG/ACTUATION INHALER    Inhale 2 puffs into the lungs every 6 (six) hours as needed for Wheezing.    PROMETHAZINE-CODEINE 6.25-10 MG/5 ML (PHENERGAN WITH CODEINE) 6.25-10 MG/5 ML SYRUP    Take 5 mLs by mouth every 4 (four) hours as needed for Cough.   Current Medications    BENZONATATE (TESSALON) 200 MG CAPSULE    Take 200 mg by mouth.    BLOOD SUGAR DIAGNOSTIC (BLOOD GLUCOSE TEST) STRP    Check glucose 3 times per day before each meal    FEXOFENADINE (ALLEGRA) 180 MG TABLET    Take 1 tablet (180 mg total) by mouth once daily.    KETOCONAZOLE (NIZORAL) 2 % CREAM    Apply  topically 2 (two) times daily.    METHYLPREDNISOLONE (MEDROL DOSEPACK) 4 MG TABLET    follow package directions    MUPIROCIN (BACTROBAN) 2 % OINTMENT    Apply topically 3 (three) times daily.    NAPROXEN (NAPROSYN) 500 MG TABLET    Take 1 tablet (500 mg total) by mouth 2 (two) times daily with meals.    ONDANSETRON (ZOFRAN-ODT) 8 MG TBDL    Take 1 tablet (8 mg total) by mouth every 6 (six) hours as needed.    PROMETHAZINE (PHENERGAN) 25 MG TABLET    Take 1 tablet (25 mg total) by mouth every 6 (six) hours as needed for Nausea.    PROMETHAZINE-DEXTROMETHORPHAN (PROMETHAZINE-DM) 6.25-15 MG/5 ML SYRP    SMARTSI Teaspoon By Mouth Every 4-6 Hours PRN   Changed and/or Refilled Medications    Modified Medication Previous Medication    CARISOPRODOL (SOMA) 350 MG TABLET carisoprodoL (SOMA) 350 MG tablet       Take 1 tablet (350 mg total) by mouth 4 (four) times daily as needed for Muscle spasms.    Take 1 tablet (350 mg total) by mouth 4 (four) times daily as needed for Muscle spasms.    CARISOPRODOL (SOMA) 350 MG TABLET carisoprodoL (SOMA) 350 MG tablet       Take 1 tablet (350 mg total) by mouth 4 (four) times daily as needed for Muscle spasms.    Take 1 tablet (350 mg total) by mouth 4 (four) times daily as needed for Muscle spasms.    CARISOPRODOL (SOMA) 350 MG TABLET carisoprodoL (SOMA) 350 MG tablet       Take 1 tablet (350 mg total) by mouth 4 (four) times daily as needed for Muscle spasms.    Take 1 tablet (350 mg total) by mouth 4 (four) times daily as needed for Muscle spasms.    DOXYCYCLINE (VIBRAMYCIN) 100 MG CAP doxycycline (VIBRAMYCIN) 100 MG Cap       Take 1 capsule (100 mg total) by mouth every 12 (twelve) hours.    Take 1 capsule (100 mg total) by mouth every 12 (twelve) hours.    HYDROCODONE-ACETAMINOPHEN (NORCO) 7.5-325 MG PER TABLET HYDROcodone-acetaminophen (NORCO) 7.5-325 mg per tablet       Take 1 tablet by mouth every 6 (six) hours as needed for Pain.    Take 1 tablet by mouth every 6 (six) hours  as needed for Pain.    HYDROCODONE-ACETAMINOPHEN (NORCO) 7.5-325 MG PER TABLET HYDROcodone-acetaminophen (NORCO) 7.5-325 mg per tablet       Take 1 tablet by mouth every 6 (six) hours as needed for Pain.    Take 1 tablet by mouth every 6 (six) hours as needed for Pain.    HYDROCODONE-ACETAMINOPHEN (NORCO) 7.5-325 MG PER TABLET HYDROcodone-acetaminophen (NORCO) 7.5-325 mg per tablet       Take 1 tablet by mouth every 6 (six) hours as needed for Pain.    Take 1 tablet by mouth every 6 (six) hours as needed for Pain.       Jb Chao M.D.  ==========================================================================  Subjective:   Patient ID: Ned Toledo Jr. is a 62 y.o. male.  has a past medical history of Diabetes mellitus, type 2, Fatty liver, Hypertension, Penetrating foot wound, and Sciatica.   Chief Complaint: Medication Refill      Problem List Items Addressed This Visit       Encounter for long-term (current) use of medications (Chronic)    Overview     November 2022: Reviewed labs.  October 2022: Reviewed labs.  August 2022: Reviewed labs.  Initial HPI March 2020:  CHRONIC. Stable. Compliant with medications for managed conditions. See medication list. No SE reported. Routine lab analysis is being monitored. Refills were addressed.JUNE 2020:  Labs are stable.  Refill medication.CHRONIC. Stable. Compliant with medications for managed conditions. See medication list. No SE reported. Routine lab analysis is being monitored. Refills were addressed.SEPTEMBER 2020:  CHRONIC. Stable. Compliant with medications for managed conditions. See medication list. No SE reported. Routine lab analysis is being monitored. Refills were addressed.DECEMBER 2020:  Reviewed labs.  CHRONIC. Stable. Compliant with medications for managed conditions. See medication list. No SE reported. Routine lab analysis is being monitored. Refills were addressed.  March 2021:  Reviewed labs.  June 2021:  Reviewed labs.  December 2021:   Reviewed labs. March 2022: Reviewed labs.  May 2022:  Reviewed labs.  Lab Results   Component Value Date    WBC 7.78 03/25/2020    HGB 14.9 03/25/2020    HCT 48.5 03/25/2020     (H) 03/25/2020     03/25/2020       Chemistry        Component Value Date/Time     (L) 06/01/2022 0809    K 4.4 06/01/2022 0809     06/01/2022 0809    CO2 26 06/01/2022 0809    BUN 15 06/01/2022 0809    CREATININE 0.8 06/01/2022 0809     (H) 06/01/2022 0809        Component Value Date/Time    CALCIUM 9.3 06/01/2022 0809    ALKPHOS 48 (L) 06/01/2022 0809    AST 19 06/01/2022 0809    ALT 17 06/01/2022 0809    BILITOT 0.7 06/01/2022 0809    ESTGFRAFRICA >60.0 06/01/2022 0809    EGFRNONAA >60.0 06/01/2022 0809          Lab Results   Component Value Date    TSH 1.236 09/04/2020     =================================================         Current Assessment & Plan     Complete history and physical was completed today.  Complete and thorough medication reconciliation was performed.  Discussed risks and benefits of medications.  Advised patient on orders and health maintenance.  We discussed old records and old labs if available.  Will request any records not available through epic.  Continue current medications listed on your summary sheet.           Sacroiliitis (Chronic)    Overview     Chronic.  November 2022:  Patient here for medication refill.  Stable.  Patient chronic pain medication with hydrocodone 7.5 and Soma 350 milligram.  Patient was previously managed by previous PCP Dr. Jaime Hernández who is retiring.  Patient has been stable on this medication regimen for years.  No side effects reported.    March 2022: Patient reports medication regimen is controlling his symptoms.  No change in HPI.  No new injuries.  May 2022:  Patient reports he has been very active at work and pain medication regimen is working well.  August 2022: Patient here for medication refills.  No change in HPI.  Medication continues to  work well.           Current Assessment & Plan     Refill medications as prescribed.  No abuse suspected. The patient was checked in the [facility] Board [facility] Pharmacy's  Prescription Monitoring Program. There appears to be no incongruities with the patient's verbalized history.       An opioid pain contract was completed  after having an extensive conversation about chronic opioid use for pain management. We discussed the risks and benefits of opioids.  I discouraged the patient from escalation of opioid use and try to minimize its use to decrease chance of dependence, tolerance, and opioid-based hyperalgesia.  I reviewed the  in great detail and there are no inconsistencies and it is appropriate with patient's history.  There are no signs of aberrant drug behavior.  The opioid risk tool was also completed, low risk.  Pt counseled about the side effects of long term use of opioids including dependence, tolerance, addiction, respiratory depression, somnolence, immune and endocrine dysfunction.  The patient expressed understanding.      If no improvement or worsening.  Referral to physical therapy, update imaging and consider spine/back clinic versus surgical specialist.         Bronchitis    Overview     November 2022: Patient continues to have persistent cough and recently had an episode of influenza.    Subacute.  Started few weeks ago.  [facility] and [facility] given amoxicillin and a steroid shot.  Patient took the medication and got better temporarily for his sinuses however he progressed to having a worsening cough and chest congestion.  Denies fever chills is having some chest tenderness with cough.         Current Assessment & Plan     Restart doxycycline for 10 days.  Start albuterol and cough syrup.  Discussed condition course and signs and symptoms to expect.  Patient advised take anti-inflammatories and or Tylenol for pain or fever.  ER precautions.  Call MD or follow-up to  clinic if not improving or worsening symptoms.               Review of patient's allergies indicates:   Allergen Reactions    Atorvastatin     Bactrim [sulfamethoxazole-trimethoprim] Hives     Current Outpatient Medications   Medication Instructions    albuterol (PROVENTIL/VENTOLIN HFA) 90 mcg/actuation inhaler 2 puffs, Inhalation, Every 6 hours PRN    benzonatate (TESSALON) 200 mg, Oral    blood sugar diagnostic (BLOOD GLUCOSE TEST) Strp Check glucose 3 times per day before each meal    carisoprodoL (SOMA) 350 mg, Oral, 4 times daily PRN    [START ON 2023] carisoprodoL (SOMA) 350 mg, Oral, 4 times daily PRN    [START ON 2022] carisoprodoL (SOMA) 350 mg, Oral, 4 times daily PRN    doxycycline (VIBRAMYCIN) 100 mg, Oral, Every 12 hours    fexofenadine (ALLEGRA) 180 mg, Oral, Daily    HYDROcodone-acetaminophen (NORCO) 7.5-325 mg per tablet 1 tablet, Oral, Every 6 hours PRN    [START ON 2023] HYDROcodone-acetaminophen (NORCO) 7.5-325 mg per tablet 1 tablet, Oral, Every 6 hours PRN    [START ON 2022] HYDROcodone-acetaminophen (NORCO) 7.5-325 mg per tablet 1 tablet, Oral, Every 6 hours PRN    ketoconazole (NIZORAL) 2 % cream Topical (Top), 2 times daily    methylPREDNISolone (MEDROL DOSEPACK) 4 mg tablet follow package directions    mupirocin (BACTROBAN) 2 % ointment Topical (Top), 3 times daily    naproxen (NAPROSYN) 500 mg, Oral, 2 times daily with meals    ondansetron (ZOFRAN-ODT) 8 mg, Oral, Every 6 hours PRN    promethazine (PHENERGAN) 25 mg, Oral, Every 6 hours PRN    promethazine-codeine 6.25-10 mg/5 ml (PHENERGAN WITH CODEINE) 6.25-10 mg/5 mL syrup 5 mLs, Oral, Every 4 hours PRN    promethazine-dextromethorphan (PROMETHAZINE-DM) 6.25-15 mg/5 mL Syrp SMARTSI Teaspoon By Mouth Every 4-6 Hours PRN      I have reviewed the PMH, social history, FamilyHx, surgical history, allergies and medications documented / confirmed by the patient at the time of this visit.  Review of Systems    Constitutional:  Positive for fatigue. Negative for chills, fever and unexpected weight change.   HENT:  Positive for sinus pressure and sinus pain. Negative for ear pain and sore throat.    Eyes:  Negative for redness and visual disturbance.   Respiratory:  Positive for cough. Negative for shortness of breath.    Cardiovascular:  Positive for chest pain. Negative for palpitations.   Gastrointestinal:  Negative for nausea and vomiting.   Endocrine: Negative for cold intolerance and heat intolerance.   Genitourinary:  Negative for difficulty urinating and hematuria.   Musculoskeletal:  Negative for arthralgias and myalgias.   Skin:  Negative for rash and wound.   Allergic/Immunologic: Negative for environmental allergies and food allergies.   Neurological:  Negative for weakness and headaches.   Hematological:  Negative for adenopathy. Does not bruise/bleed easily.   Psychiatric/Behavioral:  Negative for sleep disturbance. The patient is not nervous/anxious.    Objective:   /81   Pulse 80   Temp 98.2 °F (36.8 °C) (Oral)   Ht 6' (1.829 m)   Wt 86.6 kg (190 lb 14.7 oz)   BMI 25.89 kg/m²   Physical Exam  Vitals and nursing note reviewed.   Constitutional:       General: He is not in acute distress.     Appearance: He is well-developed. He is not diaphoretic.   HENT:      Head: Normocephalic and atraumatic.      Right Ear: Tympanic membrane, ear canal and external ear normal. There is no impacted cerumen.      Left Ear: Tympanic membrane, ear canal and external ear normal. There is no impacted cerumen.      Nose: Congestion present. No rhinorrhea.      Mouth/Throat:      Pharynx: Posterior oropharyngeal erythema present.   Eyes:      Extraocular Movements: Extraocular movements intact.      Pupils: Pupils are equal, round, and reactive to light.   Neck:      Vascular: No carotid bruit.   Cardiovascular:      Rate and Rhythm: Normal rate.      Pulses: Normal pulses.   Pulmonary:      Effort: Pulmonary effort  is normal. No respiratory distress.      Breath sounds: Normal breath sounds.      Comments: Cough with deep inspiration  Abdominal:      General: Bowel sounds are normal.      Palpations: Abdomen is soft.   Musculoskeletal:         General: Normal range of motion.      Cervical back: Normal range of motion and neck supple.   Lymphadenopathy:      Cervical: No cervical adenopathy.   Skin:     General: Skin is warm and dry.      Capillary Refill: Capillary refill takes less than 2 seconds.      Findings: No rash.   Neurological:      General: No focal deficit present.      Mental Status: He is alert and oriented to person, place, and time.   Psychiatric:         Attention and Perception: He is attentive.         Mood and Affect: Mood normal. Mood is not anxious or depressed. Affect is not labile, blunt, angry or inappropriate.         Speech: He is communicative. Speech is not rapid and pressured, delayed, slurred or tangential.         Behavior: Behavior normal. Behavior is not agitated, slowed, aggressive, withdrawn, hyperactive or combative.         Thought Content: Thought content normal. Thought content is not paranoid or delusional. Thought content does not include homicidal or suicidal ideation. Thought content does not include homicidal or suicidal plan.         Cognition and Memory: Memory is not impaired.         Judgment: Judgment normal. Judgment is not impulsive or inappropriate.       Assessment:     1. Encounter for long-term (current) use of medications    2. Sacroiliitis    3. Bronchitis      MDM:   Moderate complexity.  Moderate risk.  Total time: 32 minutes.  This includes total time spent on the encounter, which includes face to face time and non-face to face time preparing to see the patient (eg, review of previous medical records, tests), Obtaining and/or reviewing separately obtained history, documenting clinical information in the electronic or other health record, independently interpreting  results (not separately reported)/communicating results to the patient/family/caregiver, and/or care coordination (not separately reported).    I have Reviewed and summarized old records.  I have performed thorough medication reconciliation today and discussed risk and benefits of medications.  I have reviewed labs and discussed with patient.  All questions were answered.  I am requesting old records and will review them once they are available.      I have signed for the following orders AND/OR meds.  No orders of the defined types were placed in this encounter.    Medications Ordered This Encounter   Medications    albuterol (PROVENTIL/VENTOLIN HFA) 90 mcg/actuation inhaler     Sig: Inhale 2 puffs into the lungs every 6 (six) hours as needed for Wheezing.     Dispense:  18 g     Refill:  1    carisoprodoL (SOMA) 350 MG tablet     Sig: Take 1 tablet (350 mg total) by mouth 4 (four) times daily as needed for Muscle spasms.     Dispense:  120 tablet     Refill:  0    carisoprodoL (SOMA) 350 MG tablet     Sig: Take 1 tablet (350 mg total) by mouth 4 (four) times daily as needed for Muscle spasms.     Dispense:  120 tablet     Refill:  0    carisoprodoL (SOMA) 350 MG tablet     Sig: Take 1 tablet (350 mg total) by mouth 4 (four) times daily as needed for Muscle spasms.     Dispense:  120 tablet     Refill:  0    doxycycline (VIBRAMYCIN) 100 MG Cap     Sig: Take 1 capsule (100 mg total) by mouth every 12 (twelve) hours.     Dispense:  20 capsule     Refill:  0    HYDROcodone-acetaminophen (NORCO) 7.5-325 mg per tablet     Sig: Take 1 tablet by mouth every 6 (six) hours as needed for Pain.     Dispense:  120 tablet     Refill:  0     Quantity prescribed more than 7 day supply? Yes, quantity medically necessary     Order Specific Question:   I have reviewed the Prescription Drug Monitoring Program (PDMP) database for this patient prior to prescribing the above opioid medication     Answer:   Yes     HYDROcodone-acetaminophen (NORCO) 7.5-325 mg per tablet     Sig: Take 1 tablet by mouth every 6 (six) hours as needed for Pain.     Dispense:  120 tablet     Refill:  0     Quantity prescribed more than 7 day supply? Yes, quantity medically necessary     Order Specific Question:   I have reviewed the Prescription Drug Monitoring Program (PDMP) database for this patient prior to prescribing the above opioid medication     Answer:   Yes    HYDROcodone-acetaminophen (NORCO) 7.5-325 mg per tablet     Sig: Take 1 tablet by mouth every 6 (six) hours as needed for Pain.     Dispense:  120 tablet     Refill:  0     Quantity prescribed more than 7 day supply? Yes, quantity medically necessary     Order Specific Question:   I have reviewed the Prescription Drug Monitoring Program (PDMP) database for this patient prior to prescribing the above opioid medication     Answer:   Yes    promethazine-codeine 6.25-10 mg/5 ml (PHENERGAN WITH CODEINE) 6.25-10 mg/5 mL syrup     Sig: Take 5 mLs by mouth every 4 (four) hours as needed for Cough.     Dispense:  240 mL     Refill:  0     Order Specific Question:   I have reviewed the Prescription Drug Monitoring Program (PDMP) database for this patient prior to prescribing the above opioid medication     Answer:   Yes        Follow up in about 3 months (around 2/28/2023), or if symptoms worsen or fail to improve, for Med refills.  Future Appointments       Date Provider Specialty Appt Notes    3/2/2023 Jb Chao MD Family Medicine 3 mth f/u/pain med refill            If no improvement in symptoms or symptoms worsen, advised to call/follow-up at clinic or go to ER. Patient voiced understanding and all questions/concerns were addressed.   DISCLAIMER: This note was compiled by using a speech recognition dictation system and therefore please be aware that typographical / speech recognition errors can and do occur.  Please contact me if you see any errors specifically.    Jb ROTHMAN  NICHOLAS Chao.       Office: [phone] [id number] [place] [address]  FAX: [phone]

## 2022-11-30 NOTE — ASSESSMENT & PLAN NOTE
Restart doxycycline for 10 days.  Start albuterol and cough syrup.  Discussed condition course and signs and symptoms to expect.  Patient advised take anti-inflammatories and or Tylenol for pain or fever.  ER precautions.  Call MD or follow-up to clinic if not improving or worsening symptoms.

## 2022-11-30 NOTE — ASSESSMENT & PLAN NOTE
Refill medications as prescribed.  No abuse suspected. The patient was checked in the [facility] Board of Pharmacy's  Prescription Monitoring Program. There appears to be no incongruities with the patient's verbalized history.       An opioid pain contract was completed  after having an extensive conversation about chronic opioid use for pain management. We discussed the risks and benefits of opioids.  I discouraged the patient from escalation of opioid use and try to minimize its use to decrease chance of dependence, tolerance, and opioid-based hyperalgesia.  I reviewed the  in great detail and there are no inconsistencies and it is appropriate with patient's history.  There are no signs of aberrant drug behavior.  The opioid risk tool was also completed, low risk.  Pt counseled about the side effects of long term use of opioids including dependence, tolerance, addiction, respiratory depression, somnolence, immune and endocrine dysfunction.  The patient expressed understanding.      If no improvement or worsening.  Referral to physical therapy, update imaging and consider spine/back clinic versus surgical specialist.

## 2022-12-16 ENCOUNTER — OFFICE VISIT (OUTPATIENT)
Dept: FAMILY MEDICINE | Facility: CLINIC | Age: 62
End: 2022-12-16
Payer: COMMERCIAL

## 2022-12-16 VITALS
HEIGHT: 72 IN | BODY MASS INDEX: 26.28 KG/M2 | SYSTOLIC BLOOD PRESSURE: 118 MMHG | HEART RATE: 71 BPM | DIASTOLIC BLOOD PRESSURE: 74 MMHG | WEIGHT: 194 LBS | OXYGEN SATURATION: 97 %

## 2022-12-16 DIAGNOSIS — H61.21 RIGHT EAR IMPACTED CERUMEN: Primary | ICD-10-CM

## 2022-12-16 PROCEDURE — 3074F SYST BP LT 130 MM HG: CPT | Mod: CPTII,S$GLB,, | Performed by: NURSE PRACTITIONER

## 2022-12-16 PROCEDURE — 3044F PR MOST RECENT HEMOGLOBIN A1C LEVEL <7.0%: ICD-10-PCS | Mod: CPTII,S$GLB,, | Performed by: NURSE PRACTITIONER

## 2022-12-16 PROCEDURE — 3008F PR BODY MASS INDEX (BMI) DOCUMENTED: ICD-10-PCS | Mod: CPTII,S$GLB,, | Performed by: NURSE PRACTITIONER

## 2022-12-16 PROCEDURE — 99213 OFFICE O/P EST LOW 20 MIN: CPT | Mod: 25,S$GLB,, | Performed by: NURSE PRACTITIONER

## 2022-12-16 PROCEDURE — 3078F DIAST BP <80 MM HG: CPT | Mod: CPTII,S$GLB,, | Performed by: NURSE PRACTITIONER

## 2022-12-16 PROCEDURE — 69210 PR REMOVAL IMPACTED CERUMEN REQUIRING INSTRUMENTATION, UNILATERAL: ICD-10-PCS | Mod: S$GLB,,, | Performed by: NURSE PRACTITIONER

## 2022-12-16 PROCEDURE — 1160F PR REVIEW ALL MEDS BY PRESCRIBER/CLIN PHARMACIST DOCUMENTED: ICD-10-PCS | Mod: CPTII,S$GLB,, | Performed by: NURSE PRACTITIONER

## 2022-12-16 PROCEDURE — 3074F PR MOST RECENT SYSTOLIC BLOOD PRESSURE < 130 MM HG: ICD-10-PCS | Mod: CPTII,S$GLB,, | Performed by: NURSE PRACTITIONER

## 2022-12-16 PROCEDURE — 99999 PR PBB SHADOW E&M-EST. PATIENT-LVL V: CPT | Mod: PBBFAC,,, | Performed by: NURSE PRACTITIONER

## 2022-12-16 PROCEDURE — 3044F HG A1C LEVEL LT 7.0%: CPT | Mod: CPTII,S$GLB,, | Performed by: NURSE PRACTITIONER

## 2022-12-16 PROCEDURE — 1160F RVW MEDS BY RX/DR IN RCRD: CPT | Mod: CPTII,S$GLB,, | Performed by: NURSE PRACTITIONER

## 2022-12-16 PROCEDURE — 99213 PR OFFICE/OUTPT VISIT, EST, LEVL III, 20-29 MIN: ICD-10-PCS | Mod: 25,S$GLB,, | Performed by: NURSE PRACTITIONER

## 2022-12-16 PROCEDURE — 1159F MED LIST DOCD IN RCRD: CPT | Mod: CPTII,S$GLB,, | Performed by: NURSE PRACTITIONER

## 2022-12-16 PROCEDURE — 99999 PR PBB SHADOW E&M-EST. PATIENT-LVL V: ICD-10-PCS | Mod: PBBFAC,,, | Performed by: NURSE PRACTITIONER

## 2022-12-16 PROCEDURE — 1159F PR MEDICATION LIST DOCUMENTED IN MEDICAL RECORD: ICD-10-PCS | Mod: CPTII,S$GLB,, | Performed by: NURSE PRACTITIONER

## 2022-12-16 PROCEDURE — 69210 REMOVE IMPACTED EAR WAX UNI: CPT | Mod: S$GLB,,, | Performed by: NURSE PRACTITIONER

## 2022-12-16 PROCEDURE — 3078F PR MOST RECENT DIASTOLIC BLOOD PRESSURE < 80 MM HG: ICD-10-PCS | Mod: CPTII,S$GLB,, | Performed by: NURSE PRACTITIONER

## 2022-12-16 PROCEDURE — 3008F BODY MASS INDEX DOCD: CPT | Mod: CPTII,S$GLB,, | Performed by: NURSE PRACTITIONER

## 2022-12-16 NOTE — PROGRESS NOTES
Assessment/Plan:  Problem List Items Addressed This Visit    None  Visit Diagnoses       Right ear impacted cerumen    -  Primary          Cerumen removed manually with curette and sterile irrigation; tolerated well.   With history of recurrent cerumen impaction, consider referral to ENT   Follow up if symptoms worsen or fail to improve.  ER precautions for severe or worsening symptoms.     Mirian Parekh, NP  _____________________________________________________________________________________________________________________________________________________    CC: otalgia    Otalgia   There is pain in right ears. This is a new problem. The current episode started in the past 7 days. The problem occurs constantly. The problem has been unchanged. There has been no fever. The pain is mild. Described as fullness. Pertinent negatives include no abdominal pain, coughing, diarrhea, headaches, rash, sore throat or vomiting. Associated symptoms comments: Plugged sensation to ears. Treatments tried: nothing.     Past Medical History:  Past Medical History:   Diagnosis Date    Diabetes mellitus, type 2     Fatty liver     Hypertension     Penetrating foot wound     Sciatica      Past Surgical History:   Procedure Laterality Date    HERNIA REPAIR       Review of patient's allergies indicates:   Allergen Reactions    Atorvastatin     Bactrim [sulfamethoxazole-trimethoprim] Hives     Social History     Tobacco Use    Smoking status: Never     Passive exposure: Never    Smokeless tobacco: Never   Substance Use Topics    Alcohol use: No    Drug use: Never     History reviewed. No pertinent family history.  Current Outpatient Medications on File Prior to Visit   Medication Sig Dispense Refill    albuterol (PROVENTIL/VENTOLIN HFA) 90 mcg/actuation inhaler Inhale 2 puffs into the lungs every 6 (six) hours as needed for Wheezing. 18 g 1    benzonatate (TESSALON) 200 MG capsule Take 200 mg by mouth.      blood sugar diagnostic (BLOOD  GLUCOSE TEST) Strp Check glucose 3 times per day before each meal 100 strip prn    carisoprodoL (SOMA) 350 MG tablet Take 1 tablet (350 mg total) by mouth 4 (four) times daily as needed for Muscle spasms. 120 tablet 0    [START ON 1/27/2023] carisoprodoL (SOMA) 350 MG tablet Take 1 tablet (350 mg total) by mouth 4 (four) times daily as needed for Muscle spasms. 120 tablet 0    [START ON 12/28/2022] carisoprodoL (SOMA) 350 MG tablet Take 1 tablet (350 mg total) by mouth 4 (four) times daily as needed for Muscle spasms. 120 tablet 0    HYDROcodone-acetaminophen (NORCO) 7.5-325 mg per tablet Take 1 tablet by mouth every 6 (six) hours as needed for Pain. 120 tablet 0    mupirocin (BACTROBAN) 2 % ointment Apply topically 3 (three) times daily. 15 g 0    naproxen (NAPROSYN) 500 MG tablet Take 1 tablet (500 mg total) by mouth 2 (two) times daily with meals. 60 tablet 11    ondansetron (ZOFRAN-ODT) 8 MG TbDL Take 1 tablet (8 mg total) by mouth every 6 (six) hours as needed. 20 tablet 1    promethazine (PHENERGAN) 25 MG tablet Take 1 tablet (25 mg total) by mouth every 6 (six) hours as needed for Nausea. 30 tablet 6    doxycycline (VIBRAMYCIN) 100 MG Cap Take 1 capsule (100 mg total) by mouth every 12 (twelve) hours. (Patient not taking: Reported on 12/16/2022) 20 capsule 0    fexofenadine (ALLEGRA) 180 MG tablet Take 1 tablet (180 mg total) by mouth once daily. 90 tablet 3    [START ON 1/27/2023] HYDROcodone-acetaminophen (NORCO) 7.5-325 mg per tablet Take 1 tablet by mouth every 6 (six) hours as needed for Pain. (Patient not taking: Reported on 12/16/2022) 120 tablet 0    [START ON 12/28/2022] HYDROcodone-acetaminophen (NORCO) 7.5-325 mg per tablet Take 1 tablet by mouth every 6 (six) hours as needed for Pain. (Patient not taking: Reported on 12/16/2022) 120 tablet 0    ketoconazole (NIZORAL) 2 % cream Apply topically 2 (two) times daily. 60 g 6    methylPREDNISolone (MEDROL DOSEPACK) 4 mg tablet follow package directions  (Patient not taking: Reported on 2022) 21 tablet 0    promethazine-dextromethorphan (PROMETHAZINE-DM) 6.25-15 mg/5 mL Syrp SMARTSI Teaspoon By Mouth Every 4-6 Hours PRN       No current facility-administered medications on file prior to visit.     Review of Systems   Constitutional:  Negative for fatigue, fever and unexpected weight change.   HENT:  Positive for ear pain (ear fullness). Negative for sore throat.    Eyes: Negative.  Negative for pain and visual disturbance.   Respiratory:  Negative for cough and shortness of breath.    Cardiovascular:  Negative for chest pain and palpitations.   Gastrointestinal:  Negative for abdominal pain, diarrhea, nausea and vomiting.   Genitourinary:  Negative for dysuria and frequency.   Musculoskeletal:  Negative for arthralgias and myalgias.   Skin:  Negative for color change and rash.   Neurological:  Negative for dizziness and headaches.   Psychiatric/Behavioral:  Negative for sleep disturbance. The patient is not nervous/anxious.      Vitals:    22 0739   BP: 118/74   BP Location: Right arm   Pulse: 71   SpO2: 97%   Weight: 88 kg (194 lb 0.1 oz)   Height: 6' (1.829 m)     Wt Readings from Last 3 Encounters:   22 88 kg (194 lb 0.1 oz)   22 86.6 kg (190 lb 14.7 oz)   10/12/22 83 kg (183 lb)     Physical Exam  Vitals and nursing note reviewed.   Constitutional:       General: He is not in acute distress.     Appearance: Normal appearance. He is well-developed. He is not ill-appearing.   HENT:      Head: Normocephalic and atraumatic.      Right Ear: External ear normal. There is impacted cerumen.      Left Ear: External ear normal. There is no impacted cerumen.   Eyes:      Extraocular Movements: Extraocular movements intact.      Conjunctiva/sclera: Conjunctivae normal.      Pupils: Pupils are equal, round, and reactive to light.   Cardiovascular:      Rate and Rhythm: Normal rate and regular rhythm.      Heart sounds: Normal heart sounds.    Pulmonary:      Effort: Pulmonary effort is normal. No respiratory distress.      Breath sounds: Normal breath sounds.   Abdominal:      General: Abdomen is flat. There is no distension.   Musculoskeletal:         General: Normal range of motion.      Cervical back: Normal range of motion and neck supple.   Skin:     General: Skin is warm and dry.      Coloration: Skin is not pale.      Findings: No rash.   Neurological:      Mental Status: He is alert and oriented to person, place, and time. Mental status is at baseline.   Psychiatric:         Mood and Affect: Mood normal.         Speech: Speech normal.         Behavior: Behavior normal. Behavior is cooperative.         Thought Content: Thought content normal.     Health Maintenance   Topic Date Due    Eye Exam  05/23/2020    Hemoglobin A1c  12/01/2022    Low Dose Statin  11/30/2023 (Originally 10/24/1980)    PROSTATE-SPECIFIC ANTIGEN  03/16/2023    Lipid Panel  06/01/2023    Foot Exam  11/30/2023    TETANUS VACCINE  09/03/2031    Hepatitis C Screening  Completed

## 2022-12-20 DIAGNOSIS — Z12.31 OTHER SCREENING MAMMOGRAM: ICD-10-CM

## 2023-01-04 ENCOUNTER — PATIENT OUTREACH (OUTPATIENT)
Dept: ADMINISTRATIVE | Facility: HOSPITAL | Age: 63
End: 2023-01-04
Payer: COMMERCIAL

## 2023-01-04 DIAGNOSIS — E11.65 TYPE 2 DIABETES MELLITUS WITH HYPERGLYCEMIA, WITHOUT LONG-TERM CURRENT USE OF INSULIN: Primary | Chronic | ICD-10-CM

## 2023-01-04 NOTE — PROGRESS NOTES
Diabetic Eye Exam Report Jan 2023: Spoke with patient about overdue Eye Exam, Pt declines saying he will go to have it done but not now because he is not having any problems. Pt educated on reason for yearly checks. Also scheduled & linked DM labs to upcoming PCP visit.

## 2023-01-17 ENCOUNTER — OFFICE VISIT (OUTPATIENT)
Dept: FAMILY MEDICINE | Facility: CLINIC | Age: 63
End: 2023-01-17
Payer: COMMERCIAL

## 2023-01-17 VITALS
DIASTOLIC BLOOD PRESSURE: 82 MMHG | HEART RATE: 88 BPM | HEIGHT: 72 IN | BODY MASS INDEX: 25.98 KG/M2 | SYSTOLIC BLOOD PRESSURE: 138 MMHG | OXYGEN SATURATION: 99 % | WEIGHT: 191.81 LBS

## 2023-01-17 DIAGNOSIS — A49.9 BACTERIAL INFECTION: ICD-10-CM

## 2023-01-17 DIAGNOSIS — R09.81 NASAL CONGESTION: ICD-10-CM

## 2023-01-17 DIAGNOSIS — R05.1 ACUTE COUGH: ICD-10-CM

## 2023-01-17 DIAGNOSIS — J06.9 ACUTE UPPER RESPIRATORY INFECTION: Primary | ICD-10-CM

## 2023-01-17 LAB
CTP QC/QA: YES
POC MOLECULAR INFLUENZA A AGN: NEGATIVE
POC MOLECULAR INFLUENZA B AGN: NEGATIVE

## 2023-01-17 PROCEDURE — 99213 PR OFFICE/OUTPT VISIT, EST, LEVL III, 20-29 MIN: ICD-10-PCS | Mod: S$GLB,,, | Performed by: NURSE PRACTITIONER

## 2023-01-17 PROCEDURE — 87502 POCT INFLUENZA A/B MOLECULAR: ICD-10-PCS | Mod: QW,S$GLB,, | Performed by: NURSE PRACTITIONER

## 2023-01-17 PROCEDURE — 3079F PR MOST RECENT DIASTOLIC BLOOD PRESSURE 80-89 MM HG: ICD-10-PCS | Mod: CPTII,S$GLB,, | Performed by: NURSE PRACTITIONER

## 2023-01-17 PROCEDURE — 1159F MED LIST DOCD IN RCRD: CPT | Mod: CPTII,S$GLB,, | Performed by: NURSE PRACTITIONER

## 2023-01-17 PROCEDURE — 3079F DIAST BP 80-89 MM HG: CPT | Mod: CPTII,S$GLB,, | Performed by: NURSE PRACTITIONER

## 2023-01-17 PROCEDURE — 3008F BODY MASS INDEX DOCD: CPT | Mod: CPTII,S$GLB,, | Performed by: NURSE PRACTITIONER

## 2023-01-17 PROCEDURE — 1159F PR MEDICATION LIST DOCUMENTED IN MEDICAL RECORD: ICD-10-PCS | Mod: CPTII,S$GLB,, | Performed by: NURSE PRACTITIONER

## 2023-01-17 PROCEDURE — 99213 OFFICE O/P EST LOW 20 MIN: CPT | Mod: S$GLB,,, | Performed by: NURSE PRACTITIONER

## 2023-01-17 PROCEDURE — 99999 PR PBB SHADOW E&M-EST. PATIENT-LVL IV: CPT | Mod: PBBFAC,,, | Performed by: NURSE PRACTITIONER

## 2023-01-17 PROCEDURE — 1160F PR REVIEW ALL MEDS BY PRESCRIBER/CLIN PHARMACIST DOCUMENTED: ICD-10-PCS | Mod: CPTII,S$GLB,, | Performed by: NURSE PRACTITIONER

## 2023-01-17 PROCEDURE — 99999 PR PBB SHADOW E&M-EST. PATIENT-LVL IV: ICD-10-PCS | Mod: PBBFAC,,, | Performed by: NURSE PRACTITIONER

## 2023-01-17 PROCEDURE — 1160F RVW MEDS BY RX/DR IN RCRD: CPT | Mod: CPTII,S$GLB,, | Performed by: NURSE PRACTITIONER

## 2023-01-17 PROCEDURE — 3075F PR MOST RECENT SYSTOLIC BLOOD PRESS GE 130-139MM HG: ICD-10-PCS | Mod: CPTII,S$GLB,, | Performed by: NURSE PRACTITIONER

## 2023-01-17 PROCEDURE — 87502 INFLUENZA DNA AMP PROBE: CPT | Mod: QW,S$GLB,, | Performed by: NURSE PRACTITIONER

## 2023-01-17 PROCEDURE — 3075F SYST BP GE 130 - 139MM HG: CPT | Mod: CPTII,S$GLB,, | Performed by: NURSE PRACTITIONER

## 2023-01-17 PROCEDURE — 3008F PR BODY MASS INDEX (BMI) DOCUMENTED: ICD-10-PCS | Mod: CPTII,S$GLB,, | Performed by: NURSE PRACTITIONER

## 2023-01-17 RX ORDER — METHYLPREDNISOLONE 4 MG/1
TABLET ORAL
Qty: 21 TABLET | Refills: 0 | Status: SHIPPED | OUTPATIENT
Start: 2023-01-17 | End: 2023-03-02

## 2023-01-17 RX ORDER — PROMETHAZINE HYDROCHLORIDE AND DEXTROMETHORPHAN HYDROBROMIDE 6.25; 15 MG/5ML; MG/5ML
SYRUP ORAL
Qty: 180 ML | Refills: 0 | Status: SHIPPED | OUTPATIENT
Start: 2023-01-17 | End: 2023-03-02

## 2023-01-17 RX ORDER — AZITHROMYCIN 250 MG/1
TABLET, FILM COATED ORAL
Qty: 6 TABLET | Refills: 0 | Status: SHIPPED | OUTPATIENT
Start: 2023-01-17 | End: 2023-01-17 | Stop reason: ALTCHOICE

## 2023-01-17 RX ORDER — AMOXICILLIN AND CLAVULANATE POTASSIUM 875; 125 MG/1; MG/1
1 TABLET, FILM COATED ORAL EVERY 12 HOURS
Qty: 20 TABLET | Refills: 0 | Status: SHIPPED | OUTPATIENT
Start: 2023-01-17 | End: 2023-03-02

## 2023-01-17 RX ORDER — MINERAL OIL
180 ENEMA (ML) RECTAL DAILY
Qty: 90 TABLET | Refills: 3 | Status: SHIPPED | OUTPATIENT
Start: 2023-01-17 | End: 2023-05-26 | Stop reason: SDUPTHER

## 2023-01-17 NOTE — PROGRESS NOTES
Assessment/Plan:  Problem List Items Addressed This Visit    None  Visit Diagnoses       Acute upper respiratory infection    -  Primary    Relevant Medications    methylPREDNISolone (MEDROL DOSEPACK) 4 mg tablet    Nasal congestion        Relevant Medications    fexofenadine (ALLEGRA) 180 MG tablet    methylPREDNISolone (MEDROL DOSEPACK) 4 mg tablet    Other Relevant Orders    POCT Influenza A/B Molecular (Completed)    Acute cough        Relevant Medications    promethazine-dextromethorphan (PROMETHAZINE-DM) 6.25-15 mg/5 mL Syrp    Bacterial infection        Relevant Medications    amoxicillin-clavulanate 875-125mg (AUGMENTIN) 875-125 mg per tablet          Covid- negative per home test (out of stock in clinic today)  Flu- negative  Start steroids and ABX as prescribed  Trial of promethazine- DM for cough   Supportive care- rest, increase hydration with water, OTC Tylenol/Ibuprofen for pain/fever.  Follow up if symptoms worsen or fail to improve.  ER precautions for severe or worsening symptoms.     Mirian Parekh NP  _____________________________________________________________________________________________________________________________________________________    CC: sinus problem     HPI: Patient is a 62-year-old in clinic today as an established patient here for sinus problem. This is a new problem. The current episode started >5 days ago. The problem is gradually worsening. He believes that he has had fever. He has had chills and sweats but has not checked with thermometer. He is experiencing no pain. Associated symptoms include congestion, postnasal, sore throat, cough- productive, headaches, and sinus pressure. Pertinent negatives include no ear pain, hoarse voice, neck pain, shortness of breath, sneezing, or swollen glands. Past treatments include Mitzy seltzer cold and flu . The treatment provided no relief. He has had no known sick contacts. He is fully vaccinated for covid. He reports doing an at  home covid test yesterday which was negative.     Past Medical History:  Past Medical History:   Diagnosis Date    Diabetes mellitus, type 2     Fatty liver     Hypertension     Penetrating foot wound     Sciatica      Past Surgical History:   Procedure Laterality Date    HERNIA REPAIR       Review of patient's allergies indicates:   Allergen Reactions    Atorvastatin     Bactrim [sulfamethoxazole-trimethoprim] Hives     Social History     Tobacco Use    Smoking status: Never     Passive exposure: Never    Smokeless tobacco: Never   Substance Use Topics    Alcohol use: No    Drug use: Never     History reviewed. No pertinent family history.  Current Outpatient Medications on File Prior to Visit   Medication Sig Dispense Refill    albuterol (PROVENTIL/VENTOLIN HFA) 90 mcg/actuation inhaler Inhale 2 puffs into the lungs every 6 (six) hours as needed for Wheezing. 18 g 1    benzonatate (TESSALON) 200 MG capsule Take 200 mg by mouth.      blood sugar diagnostic (BLOOD GLUCOSE TEST) Strp Check glucose 3 times per day before each meal 100 strip prn    carisoprodoL (SOMA) 350 MG tablet Take 1 tablet (350 mg total) by mouth 4 (four) times daily as needed for Muscle spasms. 120 tablet 0    [START ON 1/27/2023] carisoprodoL (SOMA) 350 MG tablet Take 1 tablet (350 mg total) by mouth 4 (four) times daily as needed for Muscle spasms. 120 tablet 0    carisoprodoL (SOMA) 350 MG tablet Take 1 tablet (350 mg total) by mouth 4 (four) times daily as needed for Muscle spasms. 120 tablet 0    HYDROcodone-acetaminophen (NORCO) 7.5-325 mg per tablet Take 1 tablet by mouth every 6 (six) hours as needed for Pain. 120 tablet 0    [START ON 1/27/2023] HYDROcodone-acetaminophen (NORCO) 7.5-325 mg per tablet Take 1 tablet by mouth every 6 (six) hours as needed for Pain. 120 tablet 0    HYDROcodone-acetaminophen (NORCO) 7.5-325 mg per tablet Take 1 tablet by mouth every 6 (six) hours as needed for Pain. 120 tablet 0    ketoconazole (NIZORAL) 2 %  cream Apply topically 2 (two) times daily. 60 g 6    mupirocin (BACTROBAN) 2 % ointment Apply topically 3 (three) times daily. 15 g 0    naproxen (NAPROSYN) 500 MG tablet Take 1 tablet (500 mg total) by mouth 2 (two) times daily with meals. 60 tablet 11    ondansetron (ZOFRAN-ODT) 8 MG TbDL Take 1 tablet (8 mg total) by mouth every 6 (six) hours as needed. 20 tablet 1    promethazine (PHENERGAN) 25 MG tablet Take 1 tablet (25 mg total) by mouth every 6 (six) hours as needed for Nausea. 30 tablet 6    [DISCONTINUED] doxycycline (VIBRAMYCIN) 100 MG Cap Take 1 capsule (100 mg total) by mouth every 12 (twelve) hours. 20 capsule 0    [DISCONTINUED] fexofenadine (ALLEGRA) 180 MG tablet Take 1 tablet (180 mg total) by mouth once daily. 90 tablet 3    [DISCONTINUED] methylPREDNISolone (MEDROL DOSEPACK) 4 mg tablet follow package directions 21 tablet 0    [DISCONTINUED] promethazine-dextromethorphan (PROMETHAZINE-DM) 6.25-15 mg/5 mL Syrp SMARTSI Teaspoon By Mouth Every 4-6 Hours PRN       No current facility-administered medications on file prior to visit.     Review of Systems   Constitutional:  Negative for appetite change, chills, fatigue and fever.   HENT:  Positive for congestion, postnasal drip and sore throat. Negative for rhinorrhea.    Eyes:  Negative for visual disturbance.   Respiratory:  Positive for cough. Negative for shortness of breath.    Cardiovascular:  Negative for chest pain, palpitations and leg swelling.   Gastrointestinal:  Negative for abdominal pain, diarrhea and vomiting.   Genitourinary:  Negative for difficulty urinating, dysuria and hematuria.   Musculoskeletal:  Negative for arthralgias and myalgias.   Skin:  Negative for rash and wound.   Neurological:  Positive for headaches. Negative for dizziness.   Psychiatric/Behavioral:  Negative for behavioral problems. The patient is not nervous/anxious.      Vitals:    23 1330   BP: 138/82   BP Location: Right arm   Pulse: 88   SpO2: 99%    Weight: 87 kg (191 lb 12.8 oz)   Height: 6' (1.829 m)     Wt Readings from Last 3 Encounters:   01/17/23 87 kg (191 lb 12.8 oz)   12/16/22 88 kg (194 lb 0.1 oz)   11/30/22 86.6 kg (190 lb 14.7 oz)     Physical Exam  Vitals reviewed.   Constitutional:       General: He is not in acute distress.     Appearance: Normal appearance. He is not ill-appearing.   HENT:      Head: Normocephalic and atraumatic.      Right Ear: Tympanic membrane, ear canal and external ear normal.      Left Ear: Tympanic membrane, ear canal and external ear normal.      Nose: Congestion present.      Mouth/Throat:      Mouth: Mucous membranes are moist.      Pharynx: Posterior oropharyngeal erythema present.   Eyes:      Extraocular Movements: Extraocular movements intact.      Conjunctiva/sclera: Conjunctivae normal.   Cardiovascular:      Rate and Rhythm: Normal rate.      Heart sounds: Normal heart sounds.   Pulmonary:      Effort: Pulmonary effort is normal. No respiratory distress.      Breath sounds: Normal breath sounds.   Abdominal:      General: Bowel sounds are normal.      Palpations: Abdomen is soft.   Musculoskeletal:         General: Normal range of motion.      Cervical back: Normal range of motion.   Skin:     General: Skin is warm and dry.      Coloration: Skin is not pale.      Findings: No rash.   Neurological:      Mental Status: He is alert and oriented to person, place, and time. Mental status is at baseline.   Psychiatric:         Mood and Affect: Mood normal.         Speech: Speech normal.         Behavior: Behavior normal. Behavior is cooperative.     Health Maintenance   Topic Date Due    Eye Exam  05/23/2020    Hemoglobin A1c  12/01/2022    Low Dose Statin  11/30/2023 (Originally 10/24/1980)    PROSTATE-SPECIFIC ANTIGEN  03/16/2023    Lipid Panel  06/01/2023    Foot Exam  11/30/2023    TETANUS VACCINE  09/03/2031    Hepatitis C Screening  Completed

## 2023-02-16 ENCOUNTER — PATIENT OUTREACH (OUTPATIENT)
Dept: ADMINISTRATIVE | Facility: HOSPITAL | Age: 63
End: 2023-02-16
Payer: COMMERCIAL

## 2023-02-27 ENCOUNTER — PATIENT OUTREACH (OUTPATIENT)
Dept: ADMINISTRATIVE | Facility: HOSPITAL | Age: 63
End: 2023-02-27
Payer: COMMERCIAL

## 2023-03-02 ENCOUNTER — OFFICE VISIT (OUTPATIENT)
Dept: FAMILY MEDICINE | Facility: CLINIC | Age: 63
End: 2023-03-02
Payer: COMMERCIAL

## 2023-03-02 ENCOUNTER — LAB VISIT (OUTPATIENT)
Dept: LAB | Facility: HOSPITAL | Age: 63
End: 2023-03-02
Attending: FAMILY MEDICINE
Payer: COMMERCIAL

## 2023-03-02 VITALS
WEIGHT: 190 LBS | HEIGHT: 72 IN | TEMPERATURE: 98 F | HEART RATE: 73 BPM | RESPIRATION RATE: 16 BRPM | SYSTOLIC BLOOD PRESSURE: 138 MMHG | DIASTOLIC BLOOD PRESSURE: 82 MMHG | BODY MASS INDEX: 25.73 KG/M2 | OXYGEN SATURATION: 98 %

## 2023-03-02 DIAGNOSIS — M46.1 SACROILIITIS: Primary | ICD-10-CM

## 2023-03-02 DIAGNOSIS — E11.65 TYPE 2 DIABETES MELLITUS WITH HYPERGLYCEMIA, WITHOUT LONG-TERM CURRENT USE OF INSULIN: ICD-10-CM

## 2023-03-02 DIAGNOSIS — Z79.899 ENCOUNTER FOR LONG-TERM (CURRENT) USE OF MEDICATIONS: ICD-10-CM

## 2023-03-02 DIAGNOSIS — E11.9 TYPE 2 DIABETES MELLITUS WITHOUT COMPLICATION: ICD-10-CM

## 2023-03-02 DIAGNOSIS — F19.20 COMBINED OPIOID WITH NON-OPIOID DRUG DEPENDENCE, CONTINUOUS: ICD-10-CM

## 2023-03-02 DIAGNOSIS — F11.20 COMBINED OPIOID WITH NON-OPIOID DRUG DEPENDENCE, CONTINUOUS: ICD-10-CM

## 2023-03-02 LAB
ALBUMIN/CREAT UR: 12.3 UG/MG (ref 0–30)
CREAT UR-MCNC: 122 MG/DL (ref 23–375)
MICROALBUMIN UR DL<=1MG/L-MCNC: 15 UG/ML

## 2023-03-02 PROCEDURE — 3079F DIAST BP 80-89 MM HG: CPT | Mod: CPTII,S$GLB,, | Performed by: FAMILY MEDICINE

## 2023-03-02 PROCEDURE — 1159F PR MEDICATION LIST DOCUMENTED IN MEDICAL RECORD: ICD-10-PCS | Mod: CPTII,S$GLB,, | Performed by: FAMILY MEDICINE

## 2023-03-02 PROCEDURE — 3075F PR MOST RECENT SYSTOLIC BLOOD PRESS GE 130-139MM HG: ICD-10-PCS | Mod: CPTII,S$GLB,, | Performed by: FAMILY MEDICINE

## 2023-03-02 PROCEDURE — 1160F PR REVIEW ALL MEDS BY PRESCRIBER/CLIN PHARMACIST DOCUMENTED: ICD-10-PCS | Mod: CPTII,S$GLB,, | Performed by: FAMILY MEDICINE

## 2023-03-02 PROCEDURE — 3008F BODY MASS INDEX DOCD: CPT | Mod: CPTII,S$GLB,, | Performed by: FAMILY MEDICINE

## 2023-03-02 PROCEDURE — 1159F MED LIST DOCD IN RCRD: CPT | Mod: CPTII,S$GLB,, | Performed by: FAMILY MEDICINE

## 2023-03-02 PROCEDURE — 3079F PR MOST RECENT DIASTOLIC BLOOD PRESSURE 80-89 MM HG: ICD-10-PCS | Mod: CPTII,S$GLB,, | Performed by: FAMILY MEDICINE

## 2023-03-02 PROCEDURE — 82570 ASSAY OF URINE CREATININE: CPT | Performed by: FAMILY MEDICINE

## 2023-03-02 PROCEDURE — 3075F SYST BP GE 130 - 139MM HG: CPT | Mod: CPTII,S$GLB,, | Performed by: FAMILY MEDICINE

## 2023-03-02 PROCEDURE — 99999 PR PBB SHADOW E&M-EST. PATIENT-LVL V: ICD-10-PCS | Mod: PBBFAC,,, | Performed by: FAMILY MEDICINE

## 2023-03-02 PROCEDURE — 99214 PR OFFICE/OUTPT VISIT, EST, LEVL IV, 30-39 MIN: ICD-10-PCS | Mod: S$GLB,,, | Performed by: FAMILY MEDICINE

## 2023-03-02 PROCEDURE — 99214 OFFICE O/P EST MOD 30 MIN: CPT | Mod: S$GLB,,, | Performed by: FAMILY MEDICINE

## 2023-03-02 PROCEDURE — 99999 PR PBB SHADOW E&M-EST. PATIENT-LVL V: CPT | Mod: PBBFAC,,, | Performed by: FAMILY MEDICINE

## 2023-03-02 PROCEDURE — 1160F RVW MEDS BY RX/DR IN RCRD: CPT | Mod: CPTII,S$GLB,, | Performed by: FAMILY MEDICINE

## 2023-03-02 PROCEDURE — 3008F PR BODY MASS INDEX (BMI) DOCUMENTED: ICD-10-PCS | Mod: CPTII,S$GLB,, | Performed by: FAMILY MEDICINE

## 2023-03-02 RX ORDER — CARISOPRODOL 350 MG/1
350 TABLET ORAL 4 TIMES DAILY PRN
Qty: 120 TABLET | Refills: 0 | Status: SHIPPED | OUTPATIENT
Start: 2023-03-31 | End: 2023-05-26 | Stop reason: SDUPTHER

## 2023-03-02 RX ORDER — HYDROCODONE BITARTRATE AND ACETAMINOPHEN 7.5; 325 MG/1; MG/1
1 TABLET ORAL EVERY 6 HOURS PRN
Qty: 120 TABLET | Refills: 0 | Status: SHIPPED | OUTPATIENT
Start: 2023-03-02 | End: 2023-05-26 | Stop reason: SDUPTHER

## 2023-03-02 RX ORDER — CARISOPRODOL 350 MG/1
350 TABLET ORAL 4 TIMES DAILY PRN
Qty: 120 TABLET | Refills: 0 | Status: SHIPPED | OUTPATIENT
Start: 2023-04-30 | End: 2023-05-26 | Stop reason: SDUPTHER

## 2023-03-02 RX ORDER — HYDROCODONE BITARTRATE AND ACETAMINOPHEN 7.5; 325 MG/1; MG/1
1 TABLET ORAL EVERY 6 HOURS PRN
Qty: 120 TABLET | Refills: 0 | Status: SHIPPED | OUTPATIENT
Start: 2023-04-02 | End: 2023-05-26 | Stop reason: SDUPTHER

## 2023-03-02 RX ORDER — CARISOPRODOL 350 MG/1
350 TABLET ORAL 4 TIMES DAILY PRN
Qty: 120 TABLET | Refills: 0 | Status: SHIPPED | OUTPATIENT
Start: 2023-03-02 | End: 2023-05-26 | Stop reason: SDUPTHER

## 2023-03-02 RX ORDER — HYDROCODONE BITARTRATE AND ACETAMINOPHEN 7.5; 325 MG/1; MG/1
1 TABLET ORAL EVERY 6 HOURS PRN
Qty: 120 TABLET | Refills: 0 | Status: SHIPPED | OUTPATIENT
Start: 2023-05-01 | End: 2023-05-26 | Stop reason: SDUPTHER

## 2023-03-02 RX ORDER — ONDANSETRON 8 MG/1
8 TABLET, ORALLY DISINTEGRATING ORAL EVERY 6 HOURS PRN
Qty: 30 TABLET | Refills: 1 | Status: SHIPPED | OUTPATIENT
Start: 2023-03-02 | End: 2023-08-22 | Stop reason: SDUPTHER

## 2023-03-02 NOTE — PATIENT INSTRUCTIONS
Follow up in about 3 months (around 6/2/2023), or if symptoms worsen or fail to improve, for Med refills.     Dear patient,   As a result of recent federal legislation (The Federal Cures Act), you may receive lab or pathology results from your visit in your [facility] account before your physician is able to contact you. Your physician or their representative will relay the results to you with their recommendations at their soonest availability.     If no improvement in symptoms or symptoms worsen, please be advised to call MD, follow-up at clinic and/or go to ER if becomes severe.    Jb Chao M.D.        We Offer TELEHEALTH & Same Day Appointments!   Book your Telehealth appointment with me through my nurse or   Clinic appointments on [facility] [address]!    [id number] [place] [address]    Office: [phone]   FAX: [phone]    Check out my Facebook Page and Follow Me at: [email or web address]    Check out my website at [facility] [email or web address] clicking on: [email or web address]    To Schedule appointments online, go to [facility] [address]: [email or web address]

## 2023-03-02 NOTE — ASSESSMENT & PLAN NOTE
Refill medications as prescribed.  No abuse suspected. The patient was checked in the [facility] Board of Pharmacy's  Prescription Monitoring Program. There appears to be no incongruities with the patient's verbalized history.       An opioid pain contract was completed  after having an extensive conversation about chronic opioid use for pain management. We discussed the risks and benefits of opioids.  I discouraged the patient from escalation of opioid use and try to minimize its use to decrease chance of dependence, tolerance, and opioid-based hyperalgesia.  I reviewed the  in great detail and there are no inconsistencies and it is appropriate with patient's history.  There are no signs of aberrant drug behavior.  The opioid risk tool was also completed, low risk.  Pt counseled about the side effects of long term use of opioids including dependence, tolerance, addiction, respiratory depression, somnolence, immune and endocrine dysfunction.  The patient expressed understanding.      If no improvement or worsening.  Referral to physical therapy, update imaging and consider spine/back clinic versus surgical specialist.

## 2023-03-02 NOTE — ASSESSMENT & PLAN NOTE
Update labs and urine.We will plan to monitor hemoglobin A1c at designated intervals 3 to 6 months.  I recommend ongoing Education for diabetic diet and exercise protocol.  We will continue to monitor for side effects.    Please be advised of symptoms to monitor for and to notify me immediately if persistent or worsening.  Follow up with Ophthalmology/Optometry and Podiatry at least annually.

## 2023-03-02 NOTE — PROGRESS NOTES
PLAN:      Problem List Items Addressed This Visit       Type 2 diabetes mellitus with hyperglycemia, without long-term current use of insulin (Chronic)     Update labs and urine.We will plan to monitor hemoglobin A1c at designated intervals 3 to 6 months.  I recommend ongoing Education for diabetic diet and exercise protocol.  We will continue to monitor for side effects.    Please be advised of symptoms to monitor for and to notify me immediately if persistent or worsening.  Follow up with Ophthalmology/Optometry and Podiatry at least annually.           Encounter for long-term (current) use of medications (Chronic)     Complete history and physical was completed today.  Complete and thorough medication reconciliation was performed.  Discussed risks and benefits of medications.  Advised patient on orders and health maintenance.  We discussed old records and old labs if available.  Will request any records not available through epic.  Continue current medications listed on your summary sheet.           Relevant Medications    carisoprodoL (SOMA) 350 MG tablet    carisoprodoL (SOMA) 350 MG tablet (Start on 4/30/2023)    carisoprodoL (SOMA) 350 MG tablet (Start on 3/31/2023)    HYDROcodone-acetaminophen (NORCO) 7.5-325 mg per tablet (Start on 5/1/2023)    HYDROcodone-acetaminophen (NORCO) 7.5-325 mg per tablet (Start on 4/2/2023)    HYDROcodone-acetaminophen (NORCO) 7.5-325 mg per tablet    ondansetron (ZOFRAN-ODT) 8 MG TbDL    Sacroiliitis - Primary (Chronic)     Refill medications as prescribed.  No abuse suspected. The patient was checked in the [facility] Board of Pharmacy's  Prescription Monitoring Program. There appears to be no incongruities with the patient's verbalized history.       An opioid pain contract was completed  after having an extensive conversation about chronic opioid use for pain management. We discussed the risks and benefits of opioids.  I discouraged the patient from escalation of  opioid use and try to minimize its use to decrease chance of dependence, tolerance, and opioid-based hyperalgesia.  I reviewed the  in great detail and there are no inconsistencies and it is appropriate with patient's history.  There are no signs of aberrant drug behavior.  The opioid risk tool was also completed, low risk.  Pt counseled about the side effects of long term use of opioids including dependence, tolerance, addiction, respiratory depression, somnolence, immune and endocrine dysfunction.  The patient expressed understanding.      If no improvement or worsening.  Referral to physical therapy, update imaging and consider spine/back clinic versus surgical specialist.         Relevant Medications    carisoprodoL (SOMA) 350 MG tablet    carisoprodoL (SOMA) 350 MG tablet (Start on 4/30/2023)    carisoprodoL (SOMA) 350 MG tablet (Start on 3/31/2023)    HYDROcodone-acetaminophen (NORCO) 7.5-325 mg per tablet (Start on 5/1/2023)    HYDROcodone-acetaminophen (NORCO) 7.5-325 mg per tablet (Start on 4/2/2023)    HYDROcodone-acetaminophen (NORCO) 7.5-325 mg per tablet    Combined opioid with non-opioid drug dependence, continuous     Refill medications every three months.   reviewed.          Future Appointments       Date Provider Specialty Appt Notes    3/2/2023  Lab DM urine     3/2/2023  Lab DM LAbs     6/13/2023 Jb Chao MD Family Medicine              Medication Management for assessment above:   Medication List with Changes/Refills   Current Medications    ALBUTEROL (PROVENTIL/VENTOLIN HFA) 90 MCG/ACTUATION INHALER    Inhale 2 puffs into the lungs every 6 (six) hours as needed for Wheezing.    BENZONATATE (TESSALON) 200 MG CAPSULE    Take 200 mg by mouth.    BLOOD SUGAR DIAGNOSTIC (BLOOD GLUCOSE TEST) STRP    Check glucose 3 times per day before each meal    FEXOFENADINE (ALLEGRA) 180 MG TABLET    Take 1 tablet (180 mg total) by mouth once daily.    KETOCONAZOLE (NIZORAL) 2 % CREAM    Apply  topically 2 (two) times daily.    MUPIROCIN (BACTROBAN) 2 % OINTMENT    Apply topically 3 (three) times daily.    NAPROXEN (NAPROSYN) 500 MG TABLET    Take 1 tablet (500 mg total) by mouth 2 (two) times daily with meals.    PROMETHAZINE (PHENERGAN) 25 MG TABLET    Take 1 tablet (25 mg total) by mouth every 6 (six) hours as needed for Nausea.   Changed and/or Refilled Medications    Modified Medication Previous Medication    CARISOPRODOL (SOMA) 350 MG TABLET carisoprodoL (SOMA) 350 MG tablet       Take 1 tablet (350 mg total) by mouth 4 (four) times daily as needed for Muscle spasms.    Take 1 tablet (350 mg total) by mouth 4 (four) times daily as needed for Muscle spasms.    CARISOPRODOL (SOMA) 350 MG TABLET carisoprodoL (SOMA) 350 MG tablet       Take 1 tablet (350 mg total) by mouth 4 (four) times daily as needed for Muscle spasms.    Take 1 tablet (350 mg total) by mouth 4 (four) times daily as needed for Muscle spasms.    CARISOPRODOL (SOMA) 350 MG TABLET carisoprodoL (SOMA) 350 MG tablet       Take 1 tablet (350 mg total) by mouth 4 (four) times daily as needed for Muscle spasms.    Take 1 tablet (350 mg total) by mouth 4 (four) times daily as needed for Muscle spasms.    HYDROCODONE-ACETAMINOPHEN (NORCO) 7.5-325 MG PER TABLET HYDROcodone-acetaminophen (NORCO) 7.5-325 mg per tablet       Take 1 tablet by mouth every 6 (six) hours as needed for Pain.    Take 1 tablet by mouth every 6 (six) hours as needed for Pain.    HYDROCODONE-ACETAMINOPHEN (NORCO) 7.5-325 MG PER TABLET HYDROcodone-acetaminophen (NORCO) 7.5-325 mg per tablet       Take 1 tablet by mouth every 6 (six) hours as needed for Pain.    Take 1 tablet by mouth every 6 (six) hours as needed for Pain.    HYDROCODONE-ACETAMINOPHEN (NORCO) 7.5-325 MG PER TABLET HYDROcodone-acetaminophen (NORCO) 7.5-325 mg per tablet       Take 1 tablet by mouth every 6 (six) hours as needed for Pain.    Take 1 tablet by mouth every 6 (six) hours as needed for Pain.     ONDANSETRON (ZOFRAN-ODT) 8 MG TBDL ondansetron (ZOFRAN-ODT) 8 MG TbDL       Take 1 tablet (8 mg total) by mouth every 6 (six) hours as needed (nausea).    Take 1 tablet (8 mg total) by mouth every 6 (six) hours as needed.   Discontinued Medications    AMOXICILLIN-CLAVULANATE 875-125MG (AUGMENTIN) 875-125 MG PER TABLET    Take 1 tablet by mouth every 12 (twelve) hours.    METHYLPREDNISOLONE (MEDROL DOSEPACK) 4 MG TABLET    follow package directions    PROMETHAZINE-DEXTROMETHORPHAN (PROMETHAZINE-DM) 6.25-15 MG/5 ML SYRP    SMARTSI Teaspoon By Mouth Every 4-6 Hours PRN       Jb Chao M.D.  ==========================================================================  Subjective:   Patient ID: Ned Toledo Jr. is a 62 y.o. male.  has a past medical history of Diabetes mellitus, type 2, Fatty liver, Hypertension, Penetrating foot wound, and Sciatica.   Chief Complaint: Medication Refill        Problem List Items Addressed This Visit       Type 2 diabetes mellitus with hyperglycemia, without long-term current use of insulin (Chronic)    Overview     2023:  Patient here for labs and urine for diabetes monitoring.  Patient reports no issues with blood sugar at this time.  2022: Patient doing well with diet controlled diabetes.  Patient denies any history of thyroid cancer, pancreatitis, MEN syndrome.   2020:  Reviewed Diabetes Management StatusPatient cannot take statin due to side effects.2020:  Diabetes Management StatusStatin: TakingACE/ARB: Not taking  2021:  Diabetes Management StatusStatin: Not taking  ACE/ARB: Not taking    2022:Diabetes Management StatusStatin: Not takingACE/ARB: Not taking  May 2022:  Patient reports that he has changed his diet significantly and that his sugar has been much better controlled.  Diabetes Management StatusStatin: Not takingACE/ARB: Not taking  Diabetes Management Status  Diabetes Management Status    Statin: Not  taking  ACE/ARB: Not taking    Screening or Prevention Patient's value Goal Complete/Controlled?   HgA1C Testing and Control   Lab Results   Component Value Date    HGBA1C 6.8 (H) 06/01/2022      Annually/Less than 8% Yes   Lipid profile : 06/01/2022 Annually Yes   LDL control Lab Results   Component Value Date    LDLCALC 117.6 06/01/2022    Annually/Less than 100 mg/dl  No   Nephropathy screening Lab Results   Component Value Date    LABMICR 21.0 09/06/2019     Lab Results   Component Value Date    PROTEINUA Negative 12/31/2018     No results found for: UTPCR   Annually No   Blood pressure BP Readings from Last 1 Encounters:   03/02/23 (!) 148/87    Less than 140/90 No   Dilated retinal exam : 05/23/2019 Annually No   Foot exam   : 11/30/2022 Annually Yes          Current Assessment & Plan     Update labs and urine.We will plan to monitor hemoglobin A1c at designated intervals 3 to 6 months.  I recommend ongoing Education for diabetic diet and exercise protocol.  We will continue to monitor for side effects.    Please be advised of symptoms to monitor for and to notify me immediately if persistent or worsening.  Follow up with Ophthalmology/Optometry and Podiatry at least annually.           Encounter for long-term (current) use of medications (Chronic)    Overview     March 2023:  Reviewed labs November 2022: Reviewed labs.  October 2022: Reviewed labs.  August 2022: Reviewed labs.  Initial HPI March 2020:  CHRONIC. Stable. Compliant with medications for managed conditions. See medication list. No SE reported. Routine lab analysis is being monitored. Refills were addressed.JUNE 2020:  Labs are stable.  Refill medication.CHRONIC. Stable. Compliant with medications for managed conditions. See medication list. No SE reported. Routine lab analysis is being monitored. Refills were addressed.SEPTEMBER 2020:  CHRONIC. Stable. Compliant with medications for managed conditions. See medication list. No SE reported. Routine  lab analysis is being monitored. Refills were addressed.DECEMBER 2020:  Reviewed labs.  CHRONIC. Stable. Compliant with medications for managed conditions. See medication list. No SE reported. Routine lab analysis is being monitored. Refills were addressed.  March 2021:  Reviewed labs.  June 2021:  Reviewed labs.  December 2021:  Reviewed labs. March 2022: Reviewed labs.  May 2022:  Reviewed labs.  Lab Results   Component Value Date    WBC 7.78 03/25/2020    HGB 14.9 03/25/2020    HCT 48.5 03/25/2020     (H) 03/25/2020     03/25/2020       Chemistry        Component Value Date/Time     (L) 06/01/2022 0809    K 4.4 06/01/2022 0809     06/01/2022 0809    CO2 26 06/01/2022 0809    BUN 15 06/01/2022 0809    CREATININE 0.8 06/01/2022 0809     (H) 06/01/2022 0809        Component Value Date/Time    CALCIUM 9.3 06/01/2022 0809    ALKPHOS 48 (L) 06/01/2022 0809    AST 19 06/01/2022 0809    ALT 17 06/01/2022 0809    BILITOT 0.7 06/01/2022 0809    ESTGFRAFRICA >60.0 06/01/2022 0809    EGFRNONAA >60.0 06/01/2022 0809          Lab Results   Component Value Date    TSH 1.236 09/04/2020     =================================================         Current Assessment & Plan     Complete history and physical was completed today.  Complete and thorough medication reconciliation was performed.  Discussed risks and benefits of medications.  Advised patient on orders and health maintenance.  We discussed old records and old labs if available.  Will request any records not available through epic.  Continue current medications listed on your summary sheet.           Sacroiliitis - Primary (Chronic)    Overview     Chronic.  March 2023:  No new injuries or falls.  Stable on current medication regimen.  November 2022:  Patient here for medication refill.  Stable.  Patient chronic pain medication with hydrocodone 7.5 and Soma 350 milligram.  Patient was previously managed by previous PCP Dr. Jaime Hernández who  is retiring.  Patient has been stable on this medication regimen for years.  No side effects reported.    March 2022: Patient reports medication regimen is controlling his symptoms.  No change in HPI.  No new injuries.  May 2022:  Patient reports he has been very active at work and pain medication regimen is working well.  August 2022: Patient here for medication refills.  No change in HPI.  Medication continues to work well.           Current Assessment & Plan     Refill medications as prescribed.  No abuse suspected. The patient was checked in the [facility] Board of Pharmacy's  Prescription Monitoring Program. There appears to be no incongruities with the patient's verbalized history.       An opioid pain contract was completed  after having an extensive conversation about chronic opioid use for pain management. We discussed the risks and benefits of opioids.  I discouraged the patient from escalation of opioid use and try to minimize its use to decrease chance of dependence, tolerance, and opioid-based hyperalgesia.  I reviewed the  in great detail and there are no inconsistencies and it is appropriate with patient's history.  There are no signs of aberrant drug behavior.  The opioid risk tool was also completed, low risk.  Pt counseled about the side effects of long term use of opioids including dependence, tolerance, addiction, respiratory depression, somnolence, immune and endocrine dysfunction.  The patient expressed understanding.      If no improvement or worsening.  Referral to physical therapy, update imaging and consider spine/back clinic versus surgical specialist.         Combined opioid with non-opioid drug dependence, continuous    Overview     Chronic.  Stable.  Patient is maintained on [place] and Soma for his chronic back and joint pains.  Patient has failed joint injections in the past.  Patient is not interested in surgery.  Patient has transition care from previous PCP Dr. Jaime Hernández  who was managing these prescriptions.  I will continue to fill these to avoid withdrawal.    No abuse suspected.The patient was checked in the [facility] Board of Pharmacy's  Prescription Monitoring Program. There appears to be no incongruities with the patient's verbalized history.            Current Assessment & Plan     Refill medications every three months.   reviewed.             Review of patient's allergies indicates:   Allergen Reactions    Atorvastatin     Bactrim [sulfamethoxazole-trimethoprim] Hives     Current Outpatient Medications   Medication Instructions    albuterol (PROVENTIL/VENTOLIN HFA) 90 mcg/actuation inhaler 2 puffs, Inhalation, Every 6 hours PRN    benzonatate (TESSALON) 200 mg, Oral    blood sugar diagnostic (BLOOD GLUCOSE TEST) Strp Check glucose 3 times per day before each meal    carisoprodoL (SOMA) 350 mg, Oral, 4 times daily PRN    [START ON 4/30/2023] carisoprodoL (SOMA) 350 mg, Oral, 4 times daily PRN    [START ON 3/31/2023] carisoprodoL (SOMA) 350 mg, Oral, 4 times daily PRN    fexofenadine (ALLEGRA) 180 mg, Oral, Daily    [START ON 5/1/2023] HYDROcodone-acetaminophen (NORCO) 7.5-325 mg per tablet 1 tablet, Oral, Every 6 hours PRN    [START ON 4/2/2023] HYDROcodone-acetaminophen (NORCO) 7.5-325 mg per tablet 1 tablet, Oral, Every 6 hours PRN    HYDROcodone-acetaminophen (NORCO) 7.5-325 mg per tablet 1 tablet, Oral, Every 6 hours PRN    ketoconazole (NIZORAL) 2 % cream Topical (Top), 2 times daily    mupirocin (BACTROBAN) 2 % ointment Topical (Top), 3 times daily    naproxen (NAPROSYN) 500 mg, Oral, 2 times daily with meals    ondansetron (ZOFRAN-ODT) 8 mg, Oral, Every 6 hours PRN    promethazine (PHENERGAN) 25 mg, Oral, Every 6 hours PRN      I have reviewed the PMH, social history, FamilyHx, surgical history, allergies and medications documented / confirmed by the patient at the time of this visit.  Review of Systems   Constitutional:  Negative for chills, fatigue, fever  and unexpected weight change.   HENT:  Negative for ear pain, sinus pressure, sinus pain and sore throat.    Eyes:  Negative for redness and visual disturbance.   Respiratory:  Negative for cough and shortness of breath.    Cardiovascular:  Negative for chest pain and palpitations.   Gastrointestinal:  Negative for nausea and vomiting.   Endocrine: Negative for cold intolerance and heat intolerance.   Genitourinary:  Negative for difficulty urinating and hematuria.   Musculoskeletal:  Positive for arthralgias and back pain. Negative for myalgias.   Skin:  Negative for rash and wound.   Allergic/Immunologic: Negative for environmental allergies and food allergies.   Neurological:  Negative for weakness and headaches.   Hematological:  Negative for adenopathy. Does not bruise/bleed easily.   Psychiatric/Behavioral:  Negative for sleep disturbance. The patient is not nervous/anxious.    Objective:   /82   Pulse 73   Temp 98.2 °F (36.8 °C)   Resp 16   Ht 6' (1.829 m)   Wt 86.2 kg (190 lb)   SpO2 98%   BMI 25.77 kg/m²   Physical Exam  Vitals and nursing note reviewed.   Constitutional:       General: He is not in acute distress.     Appearance: He is well-developed. He is not diaphoretic.   HENT:      Head: Normocephalic and atraumatic.      Right Ear: Tympanic membrane, ear canal and external ear normal. There is no impacted cerumen.      Left Ear: Tympanic membrane, ear canal and external ear normal. There is no impacted cerumen.      Nose: No congestion or rhinorrhea.      Mouth/Throat:      Pharynx: No posterior oropharyngeal erythema.   Eyes:      Extraocular Movements: Extraocular movements intact.      Pupils: Pupils are equal, round, and reactive to light.   Neck:      Vascular: No carotid bruit.   Cardiovascular:      Rate and Rhythm: Normal rate.      Pulses: Normal pulses.   Pulmonary:      Effort: Pulmonary effort is normal. No respiratory distress.      Breath sounds: Normal breath sounds.    Abdominal:      General: Bowel sounds are normal.      Palpations: Abdomen is soft.   Musculoskeletal:         General: Tenderness and deformity present. Normal range of motion.      Cervical back: Normal range of motion and neck supple.   Lymphadenopathy:      Cervical: No cervical adenopathy.   Skin:     General: Skin is warm and dry.      Capillary Refill: Capillary refill takes less than 2 seconds.      Findings: No rash.   Neurological:      General: No focal deficit present.      Mental Status: He is alert and oriented to person, place, and time.   Psychiatric:         Attention and Perception: He is attentive.         Mood and Affect: Mood normal. Mood is not anxious or depressed. Affect is not labile, blunt, angry or inappropriate.         Speech: He is communicative. Speech is not rapid and pressured, delayed, slurred or tangential.         Behavior: Behavior normal. Behavior is not agitated, slowed, aggressive, withdrawn, hyperactive or combative.         Thought Content: Thought content normal. Thought content is not paranoid or delusional. Thought content does not include homicidal or suicidal ideation. Thought content does not include homicidal or suicidal plan.         Cognition and Memory: Memory is not impaired.         Judgment: Judgment normal. Judgment is not impulsive or inappropriate.       Assessment:     1. Sacroiliitis    2. Encounter for long-term (current) use of medications    3. Type 2 diabetes mellitus with hyperglycemia, without long-term current use of insulin    4. Combined opioid with non-opioid drug dependence, continuous      MDM:   Moderate complexity.  Moderate risk.  Total time: 32 minutes.  This includes total time spent on the encounter, which includes face to face time and non-face to face time preparing to see the patient (eg, review of previous medical records, tests), Obtaining and/or reviewing separately obtained history, documenting clinical information in the  electronic or other health record, independently interpreting results (not separately reported)/communicating results to the patient/family/caregiver, and/or care coordination (not separately reported).    I have Reviewed and summarized old records.  I have performed thorough medication reconciliation today and discussed risk and benefits of medications.  I have reviewed labs and discussed with patient.  All questions were answered.  I am requesting old records and will review them once they are available.      I have signed for the following orders AND/OR meds.  No orders of the defined types were placed in this encounter.      Medications Ordered This Encounter   Medications    carisoprodoL (SOMA) 350 MG tablet     Sig: Take 1 tablet (350 mg total) by mouth 4 (four) times daily as needed for Muscle spasms.     Dispense:  120 tablet     Refill:  0    carisoprodoL (SOMA) 350 MG tablet     Sig: Take 1 tablet (350 mg total) by mouth 4 (four) times daily as needed for Muscle spasms.     Dispense:  120 tablet     Refill:  0    carisoprodoL (SOMA) 350 MG tablet     Sig: Take 1 tablet (350 mg total) by mouth 4 (four) times daily as needed for Muscle spasms.     Dispense:  120 tablet     Refill:  0    HYDROcodone-acetaminophen (NORCO) 7.5-325 mg per tablet     Sig: Take 1 tablet by mouth every 6 (six) hours as needed for Pain.     Dispense:  120 tablet     Refill:  0     Quantity prescribed more than 7 day supply? Yes, quantity medically necessary     Order Specific Question:   I have reviewed the Prescription Drug Monitoring Program (PDMP) database for this patient prior to prescribing the above opioid medication     Answer:   Yes    HYDROcodone-acetaminophen (NORCO) 7.5-325 mg per tablet     Sig: Take 1 tablet by mouth every 6 (six) hours as needed for Pain.     Dispense:  120 tablet     Refill:  0     Quantity prescribed more than 7 day supply? Yes, quantity medically necessary     Order Specific Question:   I have  reviewed the Prescription Drug Monitoring Program (PDMP) database for this patient prior to prescribing the above opioid medication     Answer:   Yes    HYDROcodone-acetaminophen (NORCO) 7.5-325 mg per tablet     Sig: Take 1 tablet by mouth every 6 (six) hours as needed for Pain.     Dispense:  120 tablet     Refill:  0     Quantity prescribed more than 7 day supply? Yes, quantity medically necessary     Order Specific Question:   I have reviewed the Prescription Drug Monitoring Program (PDMP) database for this patient prior to prescribing the above opioid medication     Answer:   Yes    ondansetron (ZOFRAN-ODT) 8 MG TbDL     Sig: Take 1 tablet (8 mg total) by mouth every 6 (six) hours as needed (nausea).     Dispense:  30 tablet     Refill:  1          Follow up in about 3 months (around 6/2/2023), or if symptoms worsen or fail to improve, for Med refills.  Future Appointments       Date Provider Specialty Appt Notes    3/2/2023  Lab DM urine     3/2/2023  Lab DM LAbs     6/13/2023 Jb Chao MD Family Medicine             If no improvement in symptoms or symptoms worsen, advised to call/follow-up at clinic or go to ER. Patient voiced understanding and all questions/concerns were addressed.   DISCLAIMER: This note was compiled by using a speech recognition dictation system and therefore please be aware that typographical / speech recognition errors can and do occur.  Please contact me if you see any errors specifically.    Jb Chao M.D.       Office: [phone]   [id number] [place] [address]  FAX: [phone]

## 2023-03-15 ENCOUNTER — PATIENT OUTREACH (OUTPATIENT)
Dept: ADMINISTRATIVE | Facility: HOSPITAL | Age: 63
End: 2023-03-15
Payer: COMMERCIAL

## 2023-05-17 ENCOUNTER — TELEPHONE (OUTPATIENT)
Dept: FAMILY MEDICINE | Facility: CLINIC | Age: 63
End: 2023-05-17
Payer: COMMERCIAL

## 2023-05-17 NOTE — TELEPHONE ENCOUNTER
----- Message from Shea Barrientos sent at 5/17/2023  4:51 PM CDT -----  Contact: Ned Marino is calling to speak to the nurse to have that appointment that's scheduled for 06/13 rescheduled due to being out of medication. Please give him a call back at [phone]    Thanks  LJ

## 2023-05-26 ENCOUNTER — OFFICE VISIT (OUTPATIENT)
Dept: FAMILY MEDICINE | Facility: CLINIC | Age: 63
End: 2023-05-26
Payer: COMMERCIAL

## 2023-05-26 VITALS
WEIGHT: 193.63 LBS | HEIGHT: 72 IN | SYSTOLIC BLOOD PRESSURE: 138 MMHG | DIASTOLIC BLOOD PRESSURE: 88 MMHG | OXYGEN SATURATION: 99 % | HEART RATE: 65 BPM | BODY MASS INDEX: 26.23 KG/M2

## 2023-05-26 DIAGNOSIS — M46.1 SACROILIITIS: Primary | ICD-10-CM

## 2023-05-26 DIAGNOSIS — R09.81 NASAL CONGESTION: ICD-10-CM

## 2023-05-26 DIAGNOSIS — G43.909 MIGRAINE WITHOUT STATUS MIGRAINOSUS, NOT INTRACTABLE, UNSPECIFIED MIGRAINE TYPE: ICD-10-CM

## 2023-05-26 DIAGNOSIS — Z79.899 ENCOUNTER FOR LONG-TERM (CURRENT) USE OF MEDICATIONS: ICD-10-CM

## 2023-05-26 PROCEDURE — 1160F RVW MEDS BY RX/DR IN RCRD: CPT | Mod: CPTII,S$GLB,, | Performed by: FAMILY MEDICINE

## 2023-05-26 PROCEDURE — 3079F DIAST BP 80-89 MM HG: CPT | Mod: CPTII,S$GLB,, | Performed by: FAMILY MEDICINE

## 2023-05-26 PROCEDURE — 3008F PR BODY MASS INDEX (BMI) DOCUMENTED: ICD-10-PCS | Mod: CPTII,S$GLB,, | Performed by: FAMILY MEDICINE

## 2023-05-26 PROCEDURE — 3051F PR MOST RECENT HEMOGLOBIN A1C LEVEL 7.0 - < 8.0%: ICD-10-PCS | Mod: CPTII,S$GLB,, | Performed by: FAMILY MEDICINE

## 2023-05-26 PROCEDURE — 3066F NEPHROPATHY DOC TX: CPT | Mod: CPTII,S$GLB,, | Performed by: FAMILY MEDICINE

## 2023-05-26 PROCEDURE — 3061F NEG MICROALBUMINURIA REV: CPT | Mod: CPTII,S$GLB,, | Performed by: FAMILY MEDICINE

## 2023-05-26 PROCEDURE — 3061F PR NEG MICROALBUMINURIA RESULT DOCUMENTED/REVIEW: ICD-10-PCS | Mod: CPTII,S$GLB,, | Performed by: FAMILY MEDICINE

## 2023-05-26 PROCEDURE — 99214 PR OFFICE/OUTPT VISIT, EST, LEVL IV, 30-39 MIN: ICD-10-PCS | Mod: S$GLB,,, | Performed by: FAMILY MEDICINE

## 2023-05-26 PROCEDURE — 3075F SYST BP GE 130 - 139MM HG: CPT | Mod: CPTII,S$GLB,, | Performed by: FAMILY MEDICINE

## 2023-05-26 PROCEDURE — 3075F PR MOST RECENT SYSTOLIC BLOOD PRESS GE 130-139MM HG: ICD-10-PCS | Mod: CPTII,S$GLB,, | Performed by: FAMILY MEDICINE

## 2023-05-26 PROCEDURE — 99214 OFFICE O/P EST MOD 30 MIN: CPT | Mod: S$GLB,,, | Performed by: FAMILY MEDICINE

## 2023-05-26 PROCEDURE — 3008F BODY MASS INDEX DOCD: CPT | Mod: CPTII,S$GLB,, | Performed by: FAMILY MEDICINE

## 2023-05-26 PROCEDURE — 1159F PR MEDICATION LIST DOCUMENTED IN MEDICAL RECORD: ICD-10-PCS | Mod: CPTII,S$GLB,, | Performed by: FAMILY MEDICINE

## 2023-05-26 PROCEDURE — 3066F PR DOCUMENTATION OF TREATMENT FOR NEPHROPATHY: ICD-10-PCS | Mod: CPTII,S$GLB,, | Performed by: FAMILY MEDICINE

## 2023-05-26 PROCEDURE — 99999 PR PBB SHADOW E&M-EST. PATIENT-LVL IV: ICD-10-PCS | Mod: PBBFAC,,, | Performed by: FAMILY MEDICINE

## 2023-05-26 PROCEDURE — 3079F PR MOST RECENT DIASTOLIC BLOOD PRESSURE 80-89 MM HG: ICD-10-PCS | Mod: CPTII,S$GLB,, | Performed by: FAMILY MEDICINE

## 2023-05-26 PROCEDURE — 1160F PR REVIEW ALL MEDS BY PRESCRIBER/CLIN PHARMACIST DOCUMENTED: ICD-10-PCS | Mod: CPTII,S$GLB,, | Performed by: FAMILY MEDICINE

## 2023-05-26 PROCEDURE — 3051F HG A1C>EQUAL 7.0%<8.0%: CPT | Mod: CPTII,S$GLB,, | Performed by: FAMILY MEDICINE

## 2023-05-26 PROCEDURE — 1159F MED LIST DOCD IN RCRD: CPT | Mod: CPTII,S$GLB,, | Performed by: FAMILY MEDICINE

## 2023-05-26 PROCEDURE — 99999 PR PBB SHADOW E&M-EST. PATIENT-LVL IV: CPT | Mod: PBBFAC,,, | Performed by: FAMILY MEDICINE

## 2023-05-26 RX ORDER — CARISOPRODOL 350 MG/1
350 TABLET ORAL 4 TIMES DAILY PRN
Qty: 120 TABLET | Refills: 0 | Status: SHIPPED | OUTPATIENT
Start: 2023-06-24 | End: 2023-08-22 | Stop reason: SDUPTHER

## 2023-05-26 RX ORDER — HYDROCODONE BITARTRATE AND ACETAMINOPHEN 7.5; 325 MG/1; MG/1
1 TABLET ORAL EVERY 6 HOURS PRN
Qty: 120 TABLET | Refills: 0 | Status: SHIPPED | OUTPATIENT
Start: 2023-05-26 | End: 2023-08-22 | Stop reason: SDUPTHER

## 2023-05-26 RX ORDER — METHYLPREDNISOLONE 4 MG/1
TABLET ORAL
Qty: 21 TABLET | Refills: 0 | Status: SHIPPED | OUTPATIENT
Start: 2023-05-26 | End: 2023-06-06 | Stop reason: SDUPTHER

## 2023-05-26 RX ORDER — MINERAL OIL
180 ENEMA (ML) RECTAL DAILY
Qty: 90 TABLET | Refills: 3 | Status: SHIPPED | OUTPATIENT
Start: 2023-05-26 | End: 2023-09-26 | Stop reason: SDUPTHER

## 2023-05-26 RX ORDER — HYDROCODONE BITARTRATE AND ACETAMINOPHEN 7.5; 325 MG/1; MG/1
1 TABLET ORAL EVERY 6 HOURS PRN
Qty: 120 TABLET | Refills: 0 | Status: SHIPPED | OUTPATIENT
Start: 2023-07-22 | End: 2023-08-22 | Stop reason: SDUPTHER

## 2023-05-26 RX ORDER — PROMETHAZINE HYDROCHLORIDE 25 MG/1
25 TABLET ORAL EVERY 6 HOURS PRN
Qty: 30 TABLET | Refills: 6 | Status: SHIPPED | OUTPATIENT
Start: 2023-05-26 | End: 2023-09-26 | Stop reason: SDUPTHER

## 2023-05-26 RX ORDER — HYDROCODONE BITARTRATE AND ACETAMINOPHEN 7.5; 325 MG/1; MG/1
1 TABLET ORAL EVERY 6 HOURS PRN
Qty: 120 TABLET | Refills: 0 | Status: SHIPPED | OUTPATIENT
Start: 2023-06-24 | End: 2023-08-22 | Stop reason: SDUPTHER

## 2023-05-26 RX ORDER — CARISOPRODOL 350 MG/1
350 TABLET ORAL 4 TIMES DAILY PRN
Qty: 120 TABLET | Refills: 0 | Status: SHIPPED | OUTPATIENT
Start: 2023-07-22 | End: 2023-08-22 | Stop reason: SDUPTHER

## 2023-05-26 RX ORDER — CARISOPRODOL 350 MG/1
350 TABLET ORAL 4 TIMES DAILY PRN
Qty: 120 TABLET | Refills: 0 | Status: SHIPPED | OUTPATIENT
Start: 2023-05-26 | End: 2023-08-22 | Stop reason: SDUPTHER

## 2023-05-26 NOTE — PROGRESS NOTES
PLAN:      Problem List Items Addressed This Visit       Encounter for long-term (current) use of medications (Chronic)     Complete history and physical was completed today.  Complete and thorough medication reconciliation was performed.  Discussed risks and benefits of medications.  Advised patient on orders and health maintenance.  We discussed old records and old labs if available.  Will request any records not available through epic.  Continue current medications listed on your summary sheet.             Relevant Medications    HYDROcodone-acetaminophen (NORCO) 7.5-325 mg per tablet    HYDROcodone-acetaminophen (NORCO) 7.5-325 mg per tablet (Start on 7/22/2023)    HYDROcodone-acetaminophen (NORCO) 7.5-325 mg per tablet (Start on 6/24/2023)    carisoprodoL (SOMA) 350 MG tablet    carisoprodoL (SOMA) 350 MG tablet (Start on 7/22/2023)    carisoprodoL (SOMA) 350 MG tablet (Start on 6/24/2023)    promethazine (PHENERGAN) 25 MG tablet    Other Relevant Orders    Toxicology Screen, Urine    Sacroiliitis - Primary (Chronic)     Refill medications as prescribed.  No abuse suspected. The patient was checked in the [facility] Board of Pharmacy's  Prescription Monitoring Program. There appears to be no incongruities with the patient's verbalized history.       An opioid pain contract was completed  after having an extensive conversation about chronic opioid use for pain management. We discussed the risks and benefits of opioids.  I discouraged the patient from escalation of opioid use and try to minimize its use to decrease chance of dependence, tolerance, and opioid-based hyperalgesia.  I reviewed the  in great detail and there are no inconsistencies and it is appropriate with patient's history.  There are no signs of aberrant drug behavior.  The opioid risk tool was also completed, low risk.  Pt counseled about the side effects of long term use of opioids including dependence, tolerance, addiction, respiratory  depression, somnolence, immune and endocrine dysfunction.  The patient expressed understanding.      If no improvement or worsening.  Referral to physical therapy, update imaging and consider spine/back clinic versus surgical specialist.           Relevant Medications    HYDROcodone-acetaminophen (NORCO) 7.5-325 mg per tablet    HYDROcodone-acetaminophen (NORCO) 7.5-325 mg per tablet (Start on 7/22/2023)    HYDROcodone-acetaminophen (NORCO) 7.5-325 mg per tablet (Start on 6/24/2023)    carisoprodoL (SOMA) 350 MG tablet    carisoprodoL (SOMA) 350 MG tablet (Start on 7/22/2023)    carisoprodoL (SOMA) 350 MG tablet (Start on 6/24/2023)    Other Relevant Orders    Toxicology Screen, Urine    Migraine without status migrainosus, not intractable     Refill Phenergan.  Migraine precautions.           Relevant Medications    promethazine (PHENERGAN) 25 MG tablet     Other Visit Diagnoses       Nasal congestion        Relevant Medications    fexofenadine (ALLEGRA) 180 MG tablet        Discussed condition course and signs and symptoms to expect.  Patient advised take anti-inflammatories and or Tylenol for pain or fever.  ER precautions.  Call MD or follow-up to clinic if not improving or worsening symptoms.    Future Appointments       Date Provider Specialty Appt Notes    8/22/2023 Jb Chao MD Family Medicine pain med refill scheduled per Dr Chao               Medication Management for assessment above:   Medication List with Changes/Refills   New Medications    METHYLPREDNISOLONE (MEDROL DOSEPACK) 4 MG TABLET    follow package directions   Current Medications    ALBUTEROL (PROVENTIL/VENTOLIN HFA) 90 MCG/ACTUATION INHALER    Inhale 2 puffs into the lungs every 6 (six) hours as needed for Wheezing.    BENZONATATE (TESSALON) 200 MG CAPSULE    Take 200 mg by mouth.    BLOOD SUGAR DIAGNOSTIC (BLOOD GLUCOSE TEST) STRP    Check glucose 3 times per day before each meal    KETOCONAZOLE (NIZORAL) 2 % CREAM    Apply  topically 2 (two) times daily.    MUPIROCIN (BACTROBAN) 2 % OINTMENT    Apply topically 3 (three) times daily.    NAPROXEN (NAPROSYN) 500 MG TABLET    Take 1 tablet (500 mg total) by mouth 2 (two) times daily with meals.    ONDANSETRON (ZOFRAN-ODT) 8 MG TBDL    Take 1 tablet (8 mg total) by mouth every 6 (six) hours as needed (nausea).   Changed and/or Refilled Medications    Modified Medication Previous Medication    CARISOPRODOL (SOMA) 350 MG TABLET carisoprodoL (SOMA) 350 MG tablet       Take 1 tablet (350 mg total) by mouth 4 (four) times daily as needed for Muscle spasms.    Take 1 tablet (350 mg total) by mouth 4 (four) times daily as needed for Muscle spasms.    CARISOPRODOL (SOMA) 350 MG TABLET carisoprodoL (SOMA) 350 MG tablet       Take 1 tablet (350 mg total) by mouth 4 (four) times daily as needed for Muscle spasms.    Take 1 tablet (350 mg total) by mouth 4 (four) times daily as needed for Muscle spasms.    CARISOPRODOL (SOMA) 350 MG TABLET carisoprodoL (SOMA) 350 MG tablet       Take 1 tablet (350 mg total) by mouth 4 (four) times daily as needed for Muscle spasms.    Take 1 tablet (350 mg total) by mouth 4 (four) times daily as needed for Muscle spasms.    FEXOFENADINE (ALLEGRA) 180 MG TABLET fexofenadine (ALLEGRA) 180 MG tablet       Take 1 tablet (180 mg total) by mouth once daily.    Take 1 tablet (180 mg total) by mouth once daily.    HYDROCODONE-ACETAMINOPHEN (NORCO) 7.5-325 MG PER TABLET HYDROcodone-acetaminophen (NORCO) 7.5-325 mg per tablet       Take 1 tablet by mouth every 6 (six) hours as needed for Pain.    Take 1 tablet by mouth every 6 (six) hours as needed for Pain.    HYDROCODONE-ACETAMINOPHEN (NORCO) 7.5-325 MG PER TABLET HYDROcodone-acetaminophen (NORCO) 7.5-325 mg per tablet       Take 1 tablet by mouth every 6 (six) hours as needed for Pain.    Take 1 tablet by mouth every 6 (six) hours as needed for Pain.    HYDROCODONE-ACETAMINOPHEN (NORCO) 7.5-325 MG PER TABLET  HYDROcodone-acetaminophen (NORCO) 7.5-325 mg per tablet       Take 1 tablet by mouth every 6 (six) hours as needed for Pain.    Take 1 tablet by mouth every 6 (six) hours as needed for Pain.    PROMETHAZINE (PHENERGAN) 25 MG TABLET promethazine (PHENERGAN) 25 MG tablet       Take 1 tablet (25 mg total) by mouth every 6 (six) hours as needed for Nausea.    Take 1 tablet (25 mg total) by mouth every 6 (six) hours as needed for Nausea.       bJ Chao M.D.  ==========================================================================  Subjective:   Patient ID: Ned Toledo Jr. is a 62 y.o. male.  has a past medical history of Diabetes mellitus, type 2, Fatty liver, Hypertension, Penetrating foot wound, and Sciatica.   Chief Complaint: Medication Refill        Problem List Items Addressed This Visit       Encounter for long-term (current) use of medications (Chronic)    Overview     May 2023: Reviewed labs.  March 2023:  Reviewed labs November 2022: Reviewed labs.  October 2022: Reviewed labs.  August 2022: Reviewed labs.  Initial HPI March 2020:  CHRONIC. Stable. Compliant with medications for managed conditions. See medication list. No SE reported. Routine lab analysis is being monitored. Refills were addressed.JUNE 2020:  Labs are stable.  Refill medication.CHRONIC. Stable. Compliant with medications for managed conditions. See medication list. No SE reported. Routine lab analysis is being monitored. Refills were addressed.SEPTEMBER 2020:  CHRONIC. Stable. Compliant with medications for managed conditions. See medication list. No SE reported. Routine lab analysis is being monitored. Refills were addressed.DECEMBER 2020:  Reviewed labs.  CHRONIC. Stable. Compliant with medications for managed conditions. See medication list. No SE reported. Routine lab analysis is being monitored. Refills were addressed.  March 2021:  Reviewed labs.  June 2021:  Reviewed labs.  December 2021:  Reviewed labs. March 2022:  Reviewed labs.  May 2022:  Reviewed labs.  Lab Results   Component Value Date    WBC 7.78 03/25/2020    HGB 14.9 03/25/2020    HCT 48.5 03/25/2020     (H) 03/25/2020     03/25/2020       Chemistry        Component Value Date/Time     03/02/2023 0741    K 4.8 03/02/2023 0741     03/02/2023 0741    CO2 25 03/02/2023 0741    BUN 21 03/02/2023 0741    CREATININE 1.1 03/02/2023 0741     (H) 03/02/2023 0741        Component Value Date/Time    CALCIUM 9.5 03/02/2023 0741    ALKPHOS 49 (L) 03/02/2023 0741    AST 18 03/02/2023 0741    ALT 16 03/02/2023 0741    BILITOT 0.7 03/02/2023 0741    ESTGFRAFRICA >60.0 06/01/2022 0809    EGFRNONAA >60.0 06/01/2022 0809          Lab Results   Component Value Date    TSH 1.236 09/04/2020     =================================================         Current Assessment & Plan     Complete history and physical was completed today.  Complete and thorough medication reconciliation was performed.  Discussed risks and benefits of medications.  Advised patient on orders and health maintenance.  We discussed old records and old labs if available.  Will request any records not available through epic.  Continue current medications listed on your summary sheet.             Sacroiliitis - Primary (Chronic)    Overview     Chronic.  May 2023:  Noted injuries or falls.  Patient here for medication refills.  March 2023:  No new injuries or falls.  Stable on current medication regimen.  November 2022:  Patient here for medication refill.  Stable.  Patient chronic pain medication with hydrocodone 7.5 and Soma 350 milligram.  Patient was previously managed by previous PCP Dr. Jaime Hernández who is retiring.  Patient has been stable on this medication regimen for years.  No side effects reported.    March 2022: Patient reports medication regimen is controlling his symptoms.  No change in HPI.  No new injuries.  May 2022:  Patient reports he has been very active at work and  pain medication regimen is working well.  August 2022: Patient here for medication refills.  No change in HPI.  Medication continues to work well.             Current Assessment & Plan     Refill medications as prescribed.  No abuse suspected. The patient was checked in the [facility] Board of Pharmacy's  Prescription Monitoring Program. There appears to be no incongruities with the patient's verbalized history.       An opioid pain contract was completed  after having an extensive conversation about chronic opioid use for pain management. We discussed the risks and benefits of opioids.  I discouraged the patient from escalation of opioid use and try to minimize its use to decrease chance of dependence, tolerance, and opioid-based hyperalgesia.  I reviewed the  in great detail and there are no inconsistencies and it is appropriate with patient's history.  There are no signs of aberrant drug behavior.  The opioid risk tool was also completed, low risk.  Pt counseled about the side effects of long term use of opioids including dependence, tolerance, addiction, respiratory depression, somnolence, immune and endocrine dysfunction.  The patient expressed understanding.      If no improvement or worsening.  Referral to physical therapy, update imaging and consider spine/back clinic versus surgical specialist.           Migraine without status migrainosus, not intractable    Overview     Chronic.  Intermittent control.  Patient gets nauseated with migraines.  Requesting refill on Phenergan.           Current Assessment & Plan     Refill Phenergan.  Migraine precautions.            Other Visit Diagnoses       Nasal congestion                 Review of patient's allergies indicates:   Allergen Reactions    Atorvastatin     Bactrim [sulfamethoxazole-trimethoprim] Hives     Current Outpatient Medications   Medication Instructions    albuterol (PROVENTIL/VENTOLIN HFA) 90 mcg/actuation inhaler 2 puffs, Inhalation, Every  6 hours PRN    benzonatate (TESSALON) 200 mg, Oral    blood sugar diagnostic (BLOOD GLUCOSE TEST) Strp Check glucose 3 times per day before each meal    carisoprodoL (SOMA) 350 mg, Oral, 4 times daily PRN    [START ON 7/22/2023] carisoprodoL (SOMA) 350 mg, Oral, 4 times daily PRN    [START ON 6/24/2023] carisoprodoL (SOMA) 350 mg, Oral, 4 times daily PRN    fexofenadine (ALLEGRA) 180 mg, Oral, Daily    HYDROcodone-acetaminophen (NORCO) 7.5-325 mg per tablet 1 tablet, Oral, Every 6 hours PRN    [START ON 7/22/2023] HYDROcodone-acetaminophen (NORCO) 7.5-325 mg per tablet 1 tablet, Oral, Every 6 hours PRN    [START ON 6/24/2023] HYDROcodone-acetaminophen (NORCO) 7.5-325 mg per tablet 1 tablet, Oral, Every 6 hours PRN    ketoconazole (NIZORAL) 2 % cream Topical (Top), 2 times daily    methylPREDNISolone (MEDROL DOSEPACK) 4 mg tablet follow package directions    mupirocin (BACTROBAN) 2 % ointment Topical (Top), 3 times daily    naproxen (NAPROSYN) 500 mg, Oral, 2 times daily with meals    ondansetron (ZOFRAN-ODT) 8 mg, Oral, Every 6 hours PRN    promethazine (PHENERGAN) 25 mg, Oral, Every 6 hours PRN      I have reviewed the PMH, social history, FamilyHx, surgical history, allergies and medications documented / confirmed by the patient at the time of this visit.  Review of Systems   Constitutional:  Negative for chills, fatigue, fever and unexpected weight change.   HENT:  Negative for ear pain, sinus pressure, sinus pain and sore throat.    Eyes:  Negative for redness and visual disturbance.   Respiratory:  Negative for cough and shortness of breath.    Cardiovascular:  Negative for chest pain and palpitations.   Gastrointestinal:  Negative for nausea and vomiting.   Endocrine: Negative for cold intolerance and heat intolerance.   Genitourinary:  Negative for difficulty urinating and hematuria.   Musculoskeletal:  Positive for arthralgias and back pain. Negative for myalgias.   Skin:  Negative for rash and wound.    Allergic/Immunologic: Negative for environmental allergies and food allergies.   Neurological:  Negative for weakness and headaches.   Hematological:  Negative for adenopathy. Does not bruise/bleed easily.   Psychiatric/Behavioral:  Negative for sleep disturbance. The patient is not nervous/anxious.    Objective:   /88   Pulse 65   Ht 6' (1.829 m)   Wt 87.8 kg (193 lb 9.6 oz)   SpO2 99%   BMI 26.26 kg/m²   Physical Exam  Vitals and nursing note reviewed.   Constitutional:       General: He is not in acute distress.     Appearance: He is well-developed. He is not diaphoretic.   HENT:      Head: Normocephalic and atraumatic.      Right Ear: Tympanic membrane, ear canal and external ear normal. There is no impacted cerumen.      Left Ear: Tympanic membrane, ear canal and external ear normal. There is no impacted cerumen.      Nose: No congestion or rhinorrhea.      Mouth/Throat:      Pharynx: No posterior oropharyngeal erythema.   Eyes:      Extraocular Movements: Extraocular movements intact.      Pupils: Pupils are equal, round, and reactive to light.   Neck:      Vascular: No carotid bruit.   Cardiovascular:      Rate and Rhythm: Normal rate.      Pulses: Normal pulses.   Pulmonary:      Effort: Pulmonary effort is normal. No respiratory distress.      Breath sounds: Normal breath sounds.   Abdominal:      General: Bowel sounds are normal.      Palpations: Abdomen is soft.   Musculoskeletal:         General: Tenderness and deformity present. Normal range of motion.      Cervical back: Normal range of motion and neck supple.   Lymphadenopathy:      Cervical: No cervical adenopathy.   Skin:     General: Skin is warm and dry.      Capillary Refill: Capillary refill takes less than 2 seconds.      Findings: No rash.   Neurological:      General: No focal deficit present.      Mental Status: He is alert and oriented to person, place, and time.   Psychiatric:         Attention and Perception: He is attentive.          Mood and Affect: Mood normal. Mood is not anxious or depressed. Affect is not labile, blunt, angry or inappropriate.         Speech: He is communicative. Speech is not rapid and pressured, delayed, slurred or tangential.         Behavior: Behavior normal. Behavior is not agitated, slowed, aggressive, withdrawn, hyperactive or combative.         Thought Content: Thought content normal. Thought content is not paranoid or delusional. Thought content does not include homicidal or suicidal ideation. Thought content does not include homicidal or suicidal plan.         Cognition and Memory: Memory is not impaired.         Judgment: Judgment normal. Judgment is not impulsive or inappropriate.       Assessment:     1. Sacroiliitis    2. Encounter for long-term (current) use of medications    3. Migraine without status migrainosus, not intractable, unspecified migraine type    4. Nasal congestion      MDM:   Moderate complexity.  Moderate risk.  Total time: 32 minutes.  This includes total time spent on the encounter, which includes face to face time and non-face to face time preparing to see the patient (eg, review of previous medical records, tests), Obtaining and/or reviewing separately obtained history, documenting clinical information in the electronic or other health record, independently interpreting results (not separately reported)/communicating results to the patient/family/caregiver, and/or care coordination (not separately reported).    I have Reviewed and summarized old records.  I have performed thorough medication reconciliation today and discussed risk and benefits of medications.  I have reviewed labs and discussed with patient.  All questions were answered.  I am requesting old records and will review them once they are available.  The patient was checked in the [facility] Board of Pharmacy's  Prescription Monitoring Program. There appears to be no incongruities with the patient's verbalized  history.       I have signed for the following orders AND/OR meds.  Orders Placed This Encounter   Procedures    Toxicology Screen, Urine     Standing Status:   Future     Standing Expiration Date:   7/24/2024     Order Specific Question:   Specimen Source     Answer:   Urine       Medications Ordered This Encounter   Medications    carisoprodoL (SOMA) 350 MG tablet     Sig: Take 1 tablet (350 mg total) by mouth 4 (four) times daily as needed for Muscle spasms.     Dispense:  120 tablet     Refill:  0    carisoprodoL (SOMA) 350 MG tablet     Sig: Take 1 tablet (350 mg total) by mouth 4 (four) times daily as needed for Muscle spasms.     Dispense:  120 tablet     Refill:  0    carisoprodoL (SOMA) 350 MG tablet     Sig: Take 1 tablet (350 mg total) by mouth 4 (four) times daily as needed for Muscle spasms.     Dispense:  120 tablet     Refill:  0    fexofenadine (ALLEGRA) 180 MG tablet     Sig: Take 1 tablet (180 mg total) by mouth once daily.     Dispense:  90 tablet     Refill:  3    HYDROcodone-acetaminophen (NORCO) 7.5-325 mg per tablet     Sig: Take 1 tablet by mouth every 6 (six) hours as needed for Pain.     Dispense:  120 tablet     Refill:  0     Quantity prescribed more than 7 day supply? Yes, quantity medically necessary     Order Specific Question:   I have reviewed the Prescription Drug Monitoring Program (PDMP) database for this patient prior to prescribing the above opioid medication     Answer:   Yes    HYDROcodone-acetaminophen (NORCO) 7.5-325 mg per tablet     Sig: Take 1 tablet by mouth every 6 (six) hours as needed for Pain.     Dispense:  120 tablet     Refill:  0     Quantity prescribed more than 7 day supply? Yes, quantity medically necessary     Order Specific Question:   I have reviewed the Prescription Drug Monitoring Program (PDMP) database for this patient prior to prescribing the above opioid medication     Answer:   Yes    HYDROcodone-acetaminophen (NORCO) 7.5-325 mg per tablet     Sig:  Take 1 tablet by mouth every 6 (six) hours as needed for Pain.     Dispense:  120 tablet     Refill:  0     Quantity prescribed more than 7 day supply? Yes, quantity medically necessary     Order Specific Question:   I have reviewed the Prescription Drug Monitoring Program (PDMP) database for this patient prior to prescribing the above opioid medication     Answer:   Yes    methylPREDNISolone (MEDROL DOSEPACK) 4 mg tablet     Sig: follow package directions     Dispense:  21 tablet     Refill:  0    promethazine (PHENERGAN) 25 MG tablet     Sig: Take 1 tablet (25 mg total) by mouth every 6 (six) hours as needed for Nausea.     Dispense:  30 tablet     Refill:  6          Follow up in about 3 months (around 8/26/2023), or if symptoms worsen or fail to improve, for Med refills.  Future Appointments       Date Provider Specialty Appt Notes    8/22/2023 Jb Chao MD Family Medicine pain med refill scheduled per Dr Chao              If no improvement in symptoms or symptoms worsen, advised to call/follow-up at clinic or go to ER. Patient voiced understanding and all questions/concerns were addressed.   DISCLAIMER: This note was compiled by using a speech recognition dictation system and therefore please be aware that typographical / speech recognition errors can and do occur.  Please contact me if you see any errors specifically.    Jb Chao M.D.       Office: [phone] [address] [place] [address]  FAX: [phone]

## 2023-05-26 NOTE — PATIENT INSTRUCTIONS
Follow up in about 3 months (around 8/26/2023), or if symptoms worsen or fail to improve, for Med refills.     Dear patient,   As a result of recent federal legislation (The Federal Cures Act), you may receive lab or pathology results from your visit in your [facility] account before your physician is able to contact you. Your physician or their representative will relay the results to you with their recommendations at their soonest availability.     If no improvement in symptoms or symptoms worsen, please be advised to call MD, follow-up at clinic and/or go to ER if becomes severe.    Jb Chao M.D.        We Offer TELEHEALTH & Same Day Appointments!   Book your Telehealth appointment with me through my nurse or   Clinic appointments on [facility]!    [address] [place] [address]    Office: [phone]   FAX: [phone]    Check out my Facebook Page and Follow Me at: [email or web address]    Check out my website at [facility] by clicking on: [email or web address]    To Schedule appointments online, go to [facility]: [email or web address]

## 2023-05-26 NOTE — ASSESSMENT & PLAN NOTE
Refill medications as prescribed.  No abuse suspected. The patient was checked in the [facility] Board of Pharmacy's  Prescription Monitoring Program. There appears to be no incongruities with the patient's verbalized history.       An opioid pain contract was completed  after having an extensive conversation about chronic opioid use for pain management. We discussed the risks and benefits of opioids.  I discouraged the patient from escalation of opioid use and try to minimize its use to decrease chance of dependence, tolerance, and opioid-based hyperalgesia.  I reviewed the  in great detail and there are no inconsistencies and it is appropriate with patient's history.  There are no signs of aberrant drug behavior.  The opioid risk tool was also completed, low risk.  Pt counseled about the side effects of long term use of opioids including dependence, tolerance, addiction, respiratory depression, somnolence, immune and endocrine dysfunction.  The patient expressed understanding.      If no improvement or worsening.  Referral to physical therapy, update imaging and consider spine/back clinic versus surgical specialist.

## 2023-05-29 ENCOUNTER — PATIENT OUTREACH (OUTPATIENT)
Dept: ADMINISTRATIVE | Facility: HOSPITAL | Age: 63
End: 2023-05-29
Payer: COMMERCIAL

## 2023-06-06 ENCOUNTER — OFFICE VISIT (OUTPATIENT)
Dept: FAMILY MEDICINE | Facility: CLINIC | Age: 63
End: 2023-06-06
Payer: COMMERCIAL

## 2023-06-06 VITALS
DIASTOLIC BLOOD PRESSURE: 84 MMHG | BODY MASS INDEX: 25.95 KG/M2 | HEART RATE: 77 BPM | WEIGHT: 191.63 LBS | SYSTOLIC BLOOD PRESSURE: 132 MMHG | HEIGHT: 72 IN

## 2023-06-06 DIAGNOSIS — Z79.899 ENCOUNTER FOR LONG-TERM (CURRENT) USE OF MEDICATIONS: ICD-10-CM

## 2023-06-06 DIAGNOSIS — A49.9 BACTERIAL INFECTION: Primary | ICD-10-CM

## 2023-06-06 PROCEDURE — 99999 PR PBB SHADOW E&M-EST. PATIENT-LVL IV: ICD-10-PCS | Mod: PBBFAC,,, | Performed by: FAMILY MEDICINE

## 2023-06-06 PROCEDURE — 99213 PR OFFICE/OUTPT VISIT, EST, LEVL III, 20-29 MIN: ICD-10-PCS | Mod: S$GLB,,, | Performed by: FAMILY MEDICINE

## 2023-06-06 PROCEDURE — 3075F SYST BP GE 130 - 139MM HG: CPT | Mod: CPTII,S$GLB,, | Performed by: FAMILY MEDICINE

## 2023-06-06 PROCEDURE — 1160F RVW MEDS BY RX/DR IN RCRD: CPT | Mod: CPTII,S$GLB,, | Performed by: FAMILY MEDICINE

## 2023-06-06 PROCEDURE — 3008F BODY MASS INDEX DOCD: CPT | Mod: CPTII,S$GLB,, | Performed by: FAMILY MEDICINE

## 2023-06-06 PROCEDURE — 3079F DIAST BP 80-89 MM HG: CPT | Mod: CPTII,S$GLB,, | Performed by: FAMILY MEDICINE

## 2023-06-06 PROCEDURE — 3061F PR NEG MICROALBUMINURIA RESULT DOCUMENTED/REVIEW: ICD-10-PCS | Mod: CPTII,S$GLB,, | Performed by: FAMILY MEDICINE

## 2023-06-06 PROCEDURE — 1159F PR MEDICATION LIST DOCUMENTED IN MEDICAL RECORD: ICD-10-PCS | Mod: CPTII,S$GLB,, | Performed by: FAMILY MEDICINE

## 2023-06-06 PROCEDURE — 3051F PR MOST RECENT HEMOGLOBIN A1C LEVEL 7.0 - < 8.0%: ICD-10-PCS | Mod: CPTII,S$GLB,, | Performed by: FAMILY MEDICINE

## 2023-06-06 PROCEDURE — 3066F NEPHROPATHY DOC TX: CPT | Mod: CPTII,S$GLB,, | Performed by: FAMILY MEDICINE

## 2023-06-06 PROCEDURE — 3008F PR BODY MASS INDEX (BMI) DOCUMENTED: ICD-10-PCS | Mod: CPTII,S$GLB,, | Performed by: FAMILY MEDICINE

## 2023-06-06 PROCEDURE — 99999 PR PBB SHADOW E&M-EST. PATIENT-LVL IV: CPT | Mod: PBBFAC,,, | Performed by: FAMILY MEDICINE

## 2023-06-06 PROCEDURE — 1159F MED LIST DOCD IN RCRD: CPT | Mod: CPTII,S$GLB,, | Performed by: FAMILY MEDICINE

## 2023-06-06 PROCEDURE — 1160F PR REVIEW ALL MEDS BY PRESCRIBER/CLIN PHARMACIST DOCUMENTED: ICD-10-PCS | Mod: CPTII,S$GLB,, | Performed by: FAMILY MEDICINE

## 2023-06-06 PROCEDURE — 3051F HG A1C>EQUAL 7.0%<8.0%: CPT | Mod: CPTII,S$GLB,, | Performed by: FAMILY MEDICINE

## 2023-06-06 PROCEDURE — 3066F PR DOCUMENTATION OF TREATMENT FOR NEPHROPATHY: ICD-10-PCS | Mod: CPTII,S$GLB,, | Performed by: FAMILY MEDICINE

## 2023-06-06 PROCEDURE — 3075F PR MOST RECENT SYSTOLIC BLOOD PRESS GE 130-139MM HG: ICD-10-PCS | Mod: CPTII,S$GLB,, | Performed by: FAMILY MEDICINE

## 2023-06-06 PROCEDURE — 3079F PR MOST RECENT DIASTOLIC BLOOD PRESSURE 80-89 MM HG: ICD-10-PCS | Mod: CPTII,S$GLB,, | Performed by: FAMILY MEDICINE

## 2023-06-06 PROCEDURE — 99213 OFFICE O/P EST LOW 20 MIN: CPT | Mod: S$GLB,,, | Performed by: FAMILY MEDICINE

## 2023-06-06 PROCEDURE — 3061F NEG MICROALBUMINURIA REV: CPT | Mod: CPTII,S$GLB,, | Performed by: FAMILY MEDICINE

## 2023-06-06 RX ORDER — AZITHROMYCIN 250 MG/1
TABLET, FILM COATED ORAL
Qty: 6 TABLET | Refills: 0 | Status: SHIPPED | OUTPATIENT
Start: 2023-06-06 | End: 2023-06-11

## 2023-06-06 RX ORDER — METHYLPREDNISOLONE 4 MG/1
TABLET ORAL
Qty: 21 TABLET | Refills: 0 | Status: SHIPPED | OUTPATIENT
Start: 2023-06-06 | End: 2023-11-24 | Stop reason: SDUPTHER

## 2023-06-06 NOTE — PATIENT INSTRUCTIONS
Follow up if symptoms worsen or fail to improve.     Dear patient,   As a result of recent federal legislation (The Federal Cures Act), you may receive lab or pathology results from your visit in your [facility] account before your physician is able to contact you. Your physician or their representative will relay the results to you with their recommendations at their soonest availability.     If no improvement in symptoms or symptoms worsen, please be advised to call MD, follow-up at clinic and/or go to ER if becomes severe.    Jb Chao M.D.        We Offer TELEHEALTH & Same Day Appointments!   Book your Telehealth appointment with me through my nurse or   Clinic appointments on [employer]!    [address] [place] [address]    Office: [phone]   FAX: [phone]    Check out my Facebook Page and Follow Me at: [email or web address]    Check out my website at [employer] by clicking on: [email or web address]    To Schedule appointments online, go to [email or web address]: [email or web address]

## 2023-06-06 NOTE — PROGRESS NOTES
==========================================================================  Subjective/Assessment/ Plan:      Patient ID: Ned Toledo Jr. is a 62 y.o. male.  has a past medical history of Diabetes mellitus, type 2, Fatty liver, Hypertension, Penetrating foot wound, and Sciatica.   Chief Complaint: Nasal Congestion and Cough  Acute.  Started over the last few days.  Associated subjective fever, cough congestion worsening despite over-the-counter medication.  No recent steroids or antibiotics per patient.  Problem List Items Addressed This Visit       Encounter for long-term (current) use of medications (Chronic)    Overview     May 2023: Reviewed labs.  March 2023:  Reviewed labs November 2022: Reviewed labs.  October 2022: Reviewed labs.  August 2022: Reviewed labs.  Initial HPI March 2020:  CHRONIC. Stable. Compliant with medications for managed conditions. See medication list. No SE reported. Routine lab analysis is being monitored. Refills were addressed.JUNE 2020:  Labs are stable.  Refill medication.CHRONIC. Stable. Compliant with medications for managed conditions. See medication list. No SE reported. Routine lab analysis is being monitored. Refills were addressed.SEPTEMBER 2020:  CHRONIC. Stable. Compliant with medications for managed conditions. See medication list. No SE reported. Routine lab analysis is being monitored. Refills were addressed.DECEMBER 2020:  Reviewed labs.  CHRONIC. Stable. Compliant with medications for managed conditions. See medication list. No SE reported. Routine lab analysis is being monitored. Refills were addressed.  March 2021:  Reviewed labs.  June 2021:  Reviewed labs.  December 2021:  Reviewed labs. March 2022: Reviewed labs.  May 2022:  Reviewed labs.  Lab Results   Component Value Date    WBC 7.78 03/25/2020    HGB 14.9 03/25/2020    HCT 48.5 03/25/2020     (H) 03/25/2020     03/25/2020       Chemistry        Component Value Date/Time     03/02/2023  0741    K 4.8 03/02/2023 0741     03/02/2023 0741    CO2 25 03/02/2023 0741    BUN 21 03/02/2023 0741    CREATININE 1.1 03/02/2023 0741     (H) 03/02/2023 0741        Component Value Date/Time    CALCIUM 9.5 03/02/2023 0741    ALKPHOS 49 (L) 03/02/2023 0741    AST 18 03/02/2023 0741    ALT 16 03/02/2023 0741    BILITOT 0.7 03/02/2023 0741    ESTGFRAFRICA >60.0 06/01/2022 0809    EGFRNONAA >60.0 06/01/2022 0809          Lab Results   Component Value Date    TSH 1.236 09/04/2020     =================================================         Bacterial infection - Primary      I have reviewed the complete PMH, Family History, social history, surgical history, allergies and medications per Epic record at the time of this visit.  Review of Systems   Constitutional:  Negative for chills, fatigue, fever and unexpected weight change.   HENT:  Positive for congestion and sore throat. Negative for ear pain and sneezing.    Eyes:  Negative for redness and visual disturbance.   Respiratory:  Positive for cough. Negative for shortness of breath and wheezing.    Cardiovascular:  Negative for chest pain and palpitations.   Gastrointestinal:  Negative for nausea and vomiting.   Endocrine: Negative for cold intolerance and heat intolerance.   Genitourinary:  Negative for difficulty urinating and hematuria.   Musculoskeletal:  Negative for arthralgias and myalgias.   Skin:  Negative for rash and wound.   Allergic/Immunologic: Negative for environmental allergies and food allergies.   Neurological:  Negative for weakness and headaches.   Hematological:  Negative for adenopathy. Does not bruise/bleed easily.   Psychiatric/Behavioral:  Negative for sleep disturbance. The patient is not nervous/anxious.   see HPI otherwise negative  Objective   /84   Pulse 77   Ht 6' (1.829 m)   Wt 86.9 kg (191 lb 9.6 oz)   BMI 25.99 kg/m²   Physical Exam  Vitals and nursing note reviewed.   Constitutional:       General: He is not  in acute distress.     Appearance: He is well-developed. He is not diaphoretic.   HENT:      Head: Normocephalic and atraumatic.      Right Ear: Hearing, ear canal and external ear normal. A middle ear effusion is present. Tympanic membrane is bulging. Tympanic membrane is not injected or erythematous.      Left Ear: Hearing, ear canal and external ear normal. A middle ear effusion is present. Tympanic membrane is bulging. Tympanic membrane is not injected or erythematous.      Nose: Mucosal edema present. No rhinorrhea.      Right Sinus: Maxillary sinus tenderness and frontal sinus tenderness present.      Left Sinus: Maxillary sinus tenderness and frontal sinus tenderness present.      Mouth/Throat:      Mouth: Mucous membranes are moist. Mucous membranes are not pale.      Pharynx: Uvula midline. Posterior oropharyngeal erythema present. No oropharyngeal exudate.      Tonsils: No tonsillar exudate or tonsillar abscesses.   Eyes:      Extraocular Movements: Extraocular movements intact.      Pupils: Pupils are equal, round, and reactive to light.   Cardiovascular:      Rate and Rhythm: Normal rate.      Pulses: Normal pulses.   Pulmonary:      Effort: Pulmonary effort is normal. No respiratory distress.      Breath sounds: Normal breath sounds.   Musculoskeletal:         General: Normal range of motion.      Cervical back: Normal range of motion and neck supple.   Skin:     General: Skin is warm and dry.      Capillary Refill: Capillary refill takes less than 2 seconds.      Findings: No rash.   Neurological:      General: No focal deficit present.      Mental Status: He is alert and oriented to person, place, and time.   Psychiatric:         Attention and Perception: He is attentive.         Mood and Affect: Mood normal. Mood is not anxious or depressed. Affect is not labile, blunt, angry or inappropriate.         Speech: He is communicative. Speech is not rapid and pressured, delayed, slurred or tangential.          Behavior: Behavior normal. Behavior is not agitated, slowed, aggressive, withdrawn, hyperactive or combative.         Thought Content: Thought content normal. Thought content is not paranoid or delusional. Thought content does not include homicidal or suicidal ideation. Thought content does not include homicidal or suicidal plan.         Cognition and Memory: Memory is not impaired.         Judgment: Judgment normal. Judgment is not impulsive or inappropriate.     Assessment:     1. Bacterial infection    2. Encounter for long-term (current) use of medications      I have signed for the following orders AND/OR meds.  No orders of the defined types were placed in this encounter.  Discussed condition course and signs and symptoms to expect.  Patient advised take anti-inflammatories and or Tylenol for pain or fever.  ER precautions.  Call MD or follow-up to clinic if not improving or worsening symptoms.    Medications Ordered This Encounter   Medications    azithromycin (Z-NOEL) 250 MG tablet     Sig: Take 2 tablets by mouth on day 1; Take 1 tablet by mouth on days 2-5     Dispense:  6 tablet     Refill:  0    methylPREDNISolone (MEDROL DOSEPACK) 4 mg tablet     Sig: follow package directions     Dispense:  21 tablet     Refill:  0      Medication List with Changes/Refills   New Medications    AZITHROMYCIN (Z-NOEL) 250 MG TABLET    Take 2 tablets by mouth on day 1; Take 1 tablet by mouth on days 2-5   Current Medications    BLOOD SUGAR DIAGNOSTIC (BLOOD GLUCOSE TEST) STRP    Check glucose 3 times per day before each meal    CARISOPRODOL (SOMA) 350 MG TABLET    Take 1 tablet (350 mg total) by mouth 4 (four) times daily as needed for Muscle spasms.    CARISOPRODOL (SOMA) 350 MG TABLET    Take 1 tablet (350 mg total) by mouth 4 (four) times daily as needed for Muscle spasms.    CARISOPRODOL (SOMA) 350 MG TABLET    Take 1 tablet (350 mg total) by mouth 4 (four) times daily as needed for Muscle spasms.    FEXOFENADINE  (ALLEGRA) 180 MG TABLET    Take 1 tablet (180 mg total) by mouth once daily.    HYDROCODONE-ACETAMINOPHEN (NORCO) 7.5-325 MG PER TABLET    Take 1 tablet by mouth every 6 (six) hours as needed for Pain.    HYDROCODONE-ACETAMINOPHEN (NORCO) 7.5-325 MG PER TABLET    Take 1 tablet by mouth every 6 (six) hours as needed for Pain.    HYDROCODONE-ACETAMINOPHEN (NORCO) 7.5-325 MG PER TABLET    Take 1 tablet by mouth every 6 (six) hours as needed for Pain.    KETOCONAZOLE (NIZORAL) 2 % CREAM    Apply topically 2 (two) times daily.    MUPIROCIN (BACTROBAN) 2 % OINTMENT    Apply topically 3 (three) times daily.    NAPROXEN (NAPROSYN) 500 MG TABLET    Take 1 tablet (500 mg total) by mouth 2 (two) times daily with meals.    ONDANSETRON (ZOFRAN-ODT) 8 MG TBDL    Take 1 tablet (8 mg total) by mouth every 6 (six) hours as needed (nausea).    PROMETHAZINE (PHENERGAN) 25 MG TABLET    Take 1 tablet (25 mg total) by mouth every 6 (six) hours as needed for Nausea.   Changed and/or Refilled Medications    Modified Medication Previous Medication    METHYLPREDNISOLONE (MEDROL DOSEPACK) 4 MG TABLET methylPREDNISolone (MEDROL DOSEPACK) 4 mg tablet       follow package directions    follow package directions   Discontinued Medications    ALBUTEROL (PROVENTIL/VENTOLIN HFA) 90 MCG/ACTUATION INHALER    Inhale 2 puffs into the lungs every 6 (six) hours as needed for Wheezing.    BENZONATATE (TESSALON) 200 MG CAPSULE    Take 200 mg by mouth.     Follow up if symptoms worsen or fail to improve.  Future Appointments       Date Provider Specialty Appt Notes    6/6/2023 Jb Chao MD Family Medicine pt coming for 11//congestion, cough    8/22/2023 Jb Chao MD Family Medicine pain med refill scheduled per Dr Chao            If no improvement in symptoms or symptoms worsen, advised to call/follow-up at clinic or go to ER. Patient voiced understanding and all questions/concerns were addressed.   DISCLAIMER: This note was compiled by  using a speech recognition dictation system and therefore please be aware that typographical / speech recognition errors can and do occur.  Please contact me if you see any errors specifically.  Jb Chao M.D.

## 2023-08-22 ENCOUNTER — OFFICE VISIT (OUTPATIENT)
Dept: FAMILY MEDICINE | Facility: CLINIC | Age: 63
End: 2023-08-22
Payer: COMMERCIAL

## 2023-08-22 VITALS
OXYGEN SATURATION: 99 % | HEART RATE: 86 BPM | HEIGHT: 72 IN | SYSTOLIC BLOOD PRESSURE: 138 MMHG | WEIGHT: 192 LBS | DIASTOLIC BLOOD PRESSURE: 80 MMHG | BODY MASS INDEX: 26.01 KG/M2

## 2023-08-22 DIAGNOSIS — E11.65 TYPE 2 DIABETES MELLITUS WITH HYPERGLYCEMIA, WITHOUT LONG-TERM CURRENT USE OF INSULIN: ICD-10-CM

## 2023-08-22 DIAGNOSIS — M46.1 SACROILIITIS: Primary | ICD-10-CM

## 2023-08-22 DIAGNOSIS — R11.0 NAUSEA: ICD-10-CM

## 2023-08-22 DIAGNOSIS — Z79.899 ENCOUNTER FOR LONG-TERM (CURRENT) USE OF MEDICATIONS: ICD-10-CM

## 2023-08-22 PROCEDURE — 3008F BODY MASS INDEX DOCD: CPT | Mod: CPTII,S$GLB,, | Performed by: FAMILY MEDICINE

## 2023-08-22 PROCEDURE — 3075F PR MOST RECENT SYSTOLIC BLOOD PRESS GE 130-139MM HG: ICD-10-PCS | Mod: CPTII,S$GLB,, | Performed by: FAMILY MEDICINE

## 2023-08-22 PROCEDURE — 3008F PR BODY MASS INDEX (BMI) DOCUMENTED: ICD-10-PCS | Mod: CPTII,S$GLB,, | Performed by: FAMILY MEDICINE

## 2023-08-22 PROCEDURE — 99999 PR PBB SHADOW E&M-EST. PATIENT-LVL IV: CPT | Mod: PBBFAC,,, | Performed by: FAMILY MEDICINE

## 2023-08-22 PROCEDURE — 1159F MED LIST DOCD IN RCRD: CPT | Mod: CPTII,S$GLB,, | Performed by: FAMILY MEDICINE

## 2023-08-22 PROCEDURE — 1160F RVW MEDS BY RX/DR IN RCRD: CPT | Mod: CPTII,S$GLB,, | Performed by: FAMILY MEDICINE

## 2023-08-22 PROCEDURE — 3066F NEPHROPATHY DOC TX: CPT | Mod: CPTII,S$GLB,, | Performed by: FAMILY MEDICINE

## 2023-08-22 PROCEDURE — 99999 PR PBB SHADOW E&M-EST. PATIENT-LVL IV: ICD-10-PCS | Mod: PBBFAC,,, | Performed by: FAMILY MEDICINE

## 2023-08-22 PROCEDURE — 3061F PR NEG MICROALBUMINURIA RESULT DOCUMENTED/REVIEW: ICD-10-PCS | Mod: CPTII,S$GLB,, | Performed by: FAMILY MEDICINE

## 2023-08-22 PROCEDURE — 1159F PR MEDICATION LIST DOCUMENTED IN MEDICAL RECORD: ICD-10-PCS | Mod: CPTII,S$GLB,, | Performed by: FAMILY MEDICINE

## 2023-08-22 PROCEDURE — 3066F PR DOCUMENTATION OF TREATMENT FOR NEPHROPATHY: ICD-10-PCS | Mod: CPTII,S$GLB,, | Performed by: FAMILY MEDICINE

## 2023-08-22 PROCEDURE — 1160F PR REVIEW ALL MEDS BY PRESCRIBER/CLIN PHARMACIST DOCUMENTED: ICD-10-PCS | Mod: CPTII,S$GLB,, | Performed by: FAMILY MEDICINE

## 2023-08-22 PROCEDURE — 3051F HG A1C>EQUAL 7.0%<8.0%: CPT | Mod: CPTII,S$GLB,, | Performed by: FAMILY MEDICINE

## 2023-08-22 PROCEDURE — 3079F DIAST BP 80-89 MM HG: CPT | Mod: CPTII,S$GLB,, | Performed by: FAMILY MEDICINE

## 2023-08-22 PROCEDURE — 3075F SYST BP GE 130 - 139MM HG: CPT | Mod: CPTII,S$GLB,, | Performed by: FAMILY MEDICINE

## 2023-08-22 PROCEDURE — 99214 PR OFFICE/OUTPT VISIT, EST, LEVL IV, 30-39 MIN: ICD-10-PCS | Mod: S$GLB,,, | Performed by: FAMILY MEDICINE

## 2023-08-22 PROCEDURE — 99214 OFFICE O/P EST MOD 30 MIN: CPT | Mod: S$GLB,,, | Performed by: FAMILY MEDICINE

## 2023-08-22 PROCEDURE — 3051F PR MOST RECENT HEMOGLOBIN A1C LEVEL 7.0 - < 8.0%: ICD-10-PCS | Mod: CPTII,S$GLB,, | Performed by: FAMILY MEDICINE

## 2023-08-22 PROCEDURE — 3079F PR MOST RECENT DIASTOLIC BLOOD PRESSURE 80-89 MM HG: ICD-10-PCS | Mod: CPTII,S$GLB,, | Performed by: FAMILY MEDICINE

## 2023-08-22 PROCEDURE — 3061F NEG MICROALBUMINURIA REV: CPT | Mod: CPTII,S$GLB,, | Performed by: FAMILY MEDICINE

## 2023-08-22 RX ORDER — CARISOPRODOL 350 MG/1
350 TABLET ORAL 4 TIMES DAILY PRN
Qty: 120 TABLET | Refills: 0 | Status: SHIPPED | OUTPATIENT
Start: 2023-10-19 | End: 2023-10-23 | Stop reason: SDUPTHER

## 2023-08-22 RX ORDER — HYDROCODONE BITARTRATE AND ACETAMINOPHEN 7.5; 325 MG/1; MG/1
1 TABLET ORAL EVERY 6 HOURS PRN
Qty: 120 TABLET | Refills: 0 | Status: SHIPPED | OUTPATIENT
Start: 2023-09-21 | End: 2023-09-21 | Stop reason: SDUPTHER

## 2023-08-22 RX ORDER — HYDROCODONE BITARTRATE AND ACETAMINOPHEN 7.5; 325 MG/1; MG/1
1 TABLET ORAL EVERY 6 HOURS PRN
Qty: 120 TABLET | Refills: 0 | Status: SHIPPED | OUTPATIENT
Start: 2023-08-22 | End: 2023-09-25 | Stop reason: SDUPTHER

## 2023-08-22 RX ORDER — ONDANSETRON 8 MG/1
8 TABLET, ORALLY DISINTEGRATING ORAL EVERY 6 HOURS PRN
Qty: 30 TABLET | Refills: 1 | Status: SHIPPED | OUTPATIENT
Start: 2023-08-22 | End: 2023-09-26 | Stop reason: SDUPTHER

## 2023-08-22 RX ORDER — CARISOPRODOL 350 MG/1
350 TABLET ORAL 4 TIMES DAILY PRN
Qty: 120 TABLET | Refills: 0 | Status: SHIPPED | OUTPATIENT
Start: 2023-08-22 | End: 2023-09-21 | Stop reason: SDUPTHER

## 2023-08-22 RX ORDER — CARISOPRODOL 350 MG/1
350 TABLET ORAL 4 TIMES DAILY PRN
Qty: 120 TABLET | Refills: 0 | Status: SHIPPED | OUTPATIENT
Start: 2023-09-21 | End: 2023-09-21 | Stop reason: SDUPTHER

## 2023-08-22 RX ORDER — HYDROCODONE BITARTRATE AND ACETAMINOPHEN 7.5; 325 MG/1; MG/1
1 TABLET ORAL EVERY 6 HOURS PRN
Qty: 120 TABLET | Refills: 0 | Status: SHIPPED | OUTPATIENT
Start: 2023-10-19 | End: 2023-09-21 | Stop reason: SDUPTHER

## 2023-08-22 NOTE — ASSESSMENT & PLAN NOTE
Update labs prior to next visit.Complete history and physical was completed today.  Complete and thorough medication reconciliation was performed.  Discussed risks and benefits of medications.  Advised patient on orders and health maintenance.  We discussed old records and old labs if available.  Will request any records not available through epic.  Continue current medications listed on your summary sheet.

## 2023-08-22 NOTE — ASSESSMENT & PLAN NOTE
Refill medications as prescribed.  No abuse suspected. The patient was checked in the [facility] Board of Pharmacy's  Prescription Monitoring Program. There appears to be no incongruities with the patient's verbalized history.       An opioid pain contract was completed  after having an extensive conversation about chronic opioid use for pain management. We discussed the risks and benefits of opioids.  I discouraged the patient from escalation of opioid use and try to minimize its use to decrease chance of dependence, tolerance, and opioid-based hyperalgesia.  I reviewed the  in great detail and there are no inconsistencies and it is appropriate with patient's history.  There are no signs of aberrant drug behavior.  The opioid risk tool was also completed, low risk.  Pt counseled about the side effects of long term use of opioids including dependence, tolerance, addiction, respiratory depression, somnolence, immune and endocrine dysfunction.  The patient expressed understanding.      If no improvement or worsening.  Referral to physical therapy, update imaging and consider spine/back clinic versus surgical specialist.

## 2023-08-22 NOTE — PROGRESS NOTES
PLAN:      Problem List Items Addressed This Visit       Type 2 diabetes mellitus with hyperglycemia, without long-term current use of insulin (Chronic)     Update labs and urine.We will plan to monitor hemoglobin A1c at designated intervals 3 to 6 months.  I recommend ongoing Education for diabetic diet and exercise protocol.  We will continue to monitor for side effects.    Please be advised of symptoms to monitor for and to notify me immediately if persistent or worsening.  Follow up with Ophthalmology/Optometry and Podiatry at least annually.           Encounter for long-term (current) use of medications (Chronic)     Update labs prior to next visit.Complete history and physical was completed today.  Complete and thorough medication reconciliation was performed.  Discussed risks and benefits of medications.  Advised patient on orders and health maintenance.  We discussed old records and old labs if available.  Will request any records not available through epic.  Continue current medications listed on your summary sheet.           Relevant Medications    carisoprodoL (SOMA) 350 MG tablet    carisoprodoL (SOMA) 350 MG tablet (Start on 10/19/2023)    carisoprodoL (SOMA) 350 MG tablet (Start on 9/21/2023)    HYDROcodone-acetaminophen (NORCO) 7.5-325 mg per tablet (Start on 10/19/2023)    HYDROcodone-acetaminophen (NORCO) 7.5-325 mg per tablet (Start on 9/21/2023)    HYDROcodone-acetaminophen (NORCO) 7.5-325 mg per tablet    ondansetron (ZOFRAN-ODT) 8 MG TbDL    Sacroiliitis - Primary (Chronic)     Refill medications as prescribed.  No abuse suspected. The patient was checked in the [facility]acy's  Prescription Monitoring Program. There appears to be no incongruities with the patient's verbalized history.       An opioid pain contract was completed  after having an extensive conversation about chronic opioid use for pain management. We discussed the risks and benefits of opioids.  I discouraged  the patient from escalation of opioid use and try to minimize its use to decrease chance of dependence, tolerance, and opioid-based hyperalgesia.  I reviewed the  in great detail and there are no inconsistencies and it is appropriate with patient's history.  There are no signs of aberrant drug behavior.  The opioid risk tool was also completed, low risk.  Pt counseled about the side effects of long term use of opioids including dependence, tolerance, addiction, respiratory depression, somnolence, immune and endocrine dysfunction.  The patient expressed understanding.      If no improvement or worsening.  Referral to physical therapy, update imaging and consider spine/back clinic versus surgical specialist.         Relevant Medications    carisoprodoL (SOMA) 350 MG tablet    carisoprodoL (SOMA) 350 MG tablet (Start on 10/19/2023)    carisoprodoL (SOMA) 350 MG tablet (Start on 9/21/2023)    HYDROcodone-acetaminophen (NORCO) 7.5-325 mg per tablet (Start on 10/19/2023)    HYDROcodone-acetaminophen (NORCO) 7.5-325 mg per tablet (Start on 9/21/2023)    HYDROcodone-acetaminophen (NORCO) 7.5-325 mg per tablet    Nausea     Continue Zofran.  Follow-up with GI if no improvement in symptoms.  ER precautions for severe symptoms.  Discussed condition course and signs and symptoms to expect.  Patient advised take anti-inflammatories and or Tylenol for pain or fever.  ER precautions.  Call MD or follow-up to clinic if not improving or worsening symptoms.          Discussed condition course and signs and symptoms to expect.  Patient advised take anti-inflammatories and or Tylenol for pain or fever.  ER precautions.  Call MD or follow-up to clinic if not improving or worsening symptoms.    Future Appointments       Date Provider Specialty Appt Notes    11/16/2023 Jb Chao MD Family Medicine 3mfollow up                 Medication Management for assessment above:   Medication List with Changes/Refills   Current  Medications    BLOOD SUGAR DIAGNOSTIC (BLOOD GLUCOSE TEST) STRP    Check glucose 3 times per day before each meal    FEXOFENADINE (ALLEGRA) 180 MG TABLET    Take 1 tablet (180 mg total) by mouth once daily.    KETOCONAZOLE (NIZORAL) 2 % CREAM    Apply topically 2 (two) times daily.    METHYLPREDNISOLONE (MEDROL DOSEPACK) 4 MG TABLET    follow package directions    MUPIROCIN (BACTROBAN) 2 % OINTMENT    Apply topically 3 (three) times daily.    NAPROXEN (NAPROSYN) 500 MG TABLET    Take 1 tablet (500 mg total) by mouth 2 (two) times daily with meals.    PROMETHAZINE (PHENERGAN) 25 MG TABLET    Take 1 tablet (25 mg total) by mouth every 6 (six) hours as needed for Nausea.   Changed and/or Refilled Medications    Modified Medication Previous Medication    CARISOPRODOL (SOMA) 350 MG TABLET carisoprodoL (SOMA) 350 MG tablet       Take 1 tablet (350 mg total) by mouth 4 (four) times daily as needed for Muscle spasms.    Take 1 tablet (350 mg total) by mouth 4 (four) times daily as needed for Muscle spasms.    CARISOPRODOL (SOMA) 350 MG TABLET carisoprodoL (SOMA) 350 MG tablet       Take 1 tablet (350 mg total) by mouth 4 (four) times daily as needed for Muscle spasms.    Take 1 tablet (350 mg total) by mouth 4 (four) times daily as needed for Muscle spasms.    CARISOPRODOL (SOMA) 350 MG TABLET carisoprodoL (SOMA) 350 MG tablet       Take 1 tablet (350 mg total) by mouth 4 (four) times daily as needed for Muscle spasms.    Take 1 tablet (350 mg total) by mouth 4 (four) times daily as needed for Muscle spasms.    HYDROCODONE-ACETAMINOPHEN (NORCO) 7.5-325 MG PER TABLET HYDROcodone-acetaminophen (NORCO) 7.5-325 mg per tablet       Take 1 tablet by mouth every 6 (six) hours as needed for Pain.    Take 1 tablet by mouth every 6 (six) hours as needed for Pain.    HYDROCODONE-ACETAMINOPHEN (NORCO) 7.5-325 MG PER TABLET HYDROcodone-acetaminophen (NORCO) 7.5-325 mg per tablet       Take 1 tablet by mouth every 6 (six) hours as needed  "for Pain.    Take 1 tablet by mouth every 6 (six) hours as needed for Pain.    HYDROCODONE-ACETAMINOPHEN (NORCO) 7.5-325 MG PER TABLET HYDROcodone-acetaminophen (NORCO) 7.5-325 mg per tablet       Take 1 tablet by mouth every 6 (six) hours as needed for Pain.    Take 1 tablet by mouth every 6 (six) hours as needed for Pain.    ONDANSETRON (ZOFRAN-ODT) 8 MG TBDL ondansetron (ZOFRAN-ODT) 8 MG TbDL       Take 1 tablet (8 mg total) by mouth every 6 (six) hours as needed (nausea).    Take 1 tablet (8 mg total) by mouth every 6 (six) hours as needed (nausea).       Jb Chao M.D.  ==========================================================================  Subjective:   Patient ID: Ned Toledo Jr. is a 62 y.o. male.  has a past medical history of Diabetes mellitus, type 2, Fatty liver, Hypertension, Penetrating foot wound, and Sciatica.   Chief Complaint: Medication Refill (Pain meds)        Problem List Items Addressed This Visit       Type 2 diabetes mellitus with hyperglycemia, without long-term current use of insulin (Chronic)    Overview     August 2023:  Diabetes Management Status    Statin: Not taking  ACE/ARB: Not taking    Screening or Prevention Patient's value Goal Complete/Controlled?   HgA1C Testing and Control   Lab Results   Component Value Date    HGBA1C 7.0 (H) 03/02/2023      Annually/Less than 8% Yes   Lipid profile : 03/02/2023 Annually Yes   LDL control Lab Results   Component Value Date    LDLCALC 117.4 03/02/2023    Annually/Less than 100 mg/dl  No   Nephropathy screening Lab Results   Component Value Date    LABMICR 15.0 03/02/2023     Lab Results   Component Value Date    PROTEINUA Negative 12/31/2018     No results found for: "UTPCR"   Annually Yes   Blood pressure BP Readings from Last 1 Encounters:   08/22/23 138/80    Less than 140/90 Yes   Dilated retinal exam : 05/23/2019 Annually No   Foot exam   : 11/30/2022 Annually Yes    March 2023:  Patient here for labs and urine for " diabetes monitoring.  Patient reports no issues with blood sugar at this time.  August 2022: Patient doing well with diet controlled diabetes.  Patient denies any history of thyroid cancer, pancreatitis, MEN syndrome.   March 2020:  Reviewed Diabetes Management StatusPatient cannot take statin due to side effects.SEPTEMBER 2020:  Diabetes Management StatusStatin: TakingACE/ARB: Not takingDecember 2021:  Diabetes Management StatusStatin: Not takingACE/ARB: Not takingMarch 2022:Diabetes Management StatusStatin: Not takingACE/ARB: Not takingMay 2022:  Patient reports that he has changed his diet significantly and that his sugar has been much better controlled.         Current Assessment & Plan     Update labs and urine.We will plan to monitor hemoglobin A1c at designated intervals 3 to 6 months.  I recommend ongoing Education for diabetic diet and exercise protocol.  We will continue to monitor for side effects.    Please be advised of symptoms to monitor for and to notify me immediately if persistent or worsening.  Follow up with Ophthalmology/Optometry and Podiatry at least annually.           Encounter for long-term (current) use of medications (Chronic)    Overview     August 2023 reviewed labs.  May 2023: Reviewed labs.  March 2023:  Reviewed labs November 2022: Reviewed labs.  October 2022: Reviewed labs.  August 2022: Reviewed labs.  Initial HPI March 2020:  CHRONIC. Stable. Compliant with medications for managed conditions. See medication list. No SE reported. Routine lab analysis is being monitored. Refills were addressed.JUNE 2020:  Labs are stable.  Refill medication.CHRONIC. Stable. Compliant with medications for managed conditions. See medication list. No SE reported. Routine lab analysis is being monitored. Refills were addressed.SEPTEMBER 2020:  CHRONIC. Stable. Compliant with medications for managed conditions. See medication list. No SE reported. Routine lab analysis is being monitored. Refills were  addressed.DECEMBER 2020:  Reviewed labs.  CHRONIC. Stable. Compliant with medications for managed conditions. See medication list. No SE reported. Routine lab analysis is being monitored. Refills were addressed.  March 2021:  Reviewed labs.  June 2021:  Reviewed labs.  December 2021:  Reviewed labs. March 2022: Reviewed labs.  May 2022:  Reviewed labs.  Lab Results   Component Value Date    WBC 7.78 03/25/2020    HGB 14.9 03/25/2020    HCT 48.5 03/25/2020     (H) 03/25/2020     03/25/2020       Chemistry        Component Value Date/Time     03/02/2023 0741    K 4.8 03/02/2023 0741     03/02/2023 0741    CO2 25 03/02/2023 0741    BUN 21 03/02/2023 0741    CREATININE 1.1 03/02/2023 0741     (H) 03/02/2023 0741        Component Value Date/Time    CALCIUM 9.5 03/02/2023 0741    ALKPHOS 49 (L) 03/02/2023 0741    AST 18 03/02/2023 0741    ALT 16 03/02/2023 0741    BILITOT 0.7 03/02/2023 0741    ESTGFRAFRICA >60.0 06/01/2022 0809    EGFRNONAA >60.0 06/01/2022 0809          Lab Results   Component Value Date    TSH 1.236 09/04/2020          Current Assessment & Plan     Update labs prior to next visit.Complete history and physical was completed today.  Complete and thorough medication reconciliation was performed.  Discussed risks and benefits of medications.  Advised patient on orders and health maintenance.  We discussed old records and old labs if available.  Will request any records not available through epic.  Continue current medications listed on your summary sheet.           Sacroiliitis - Primary (Chronic)    Overview     Chronic.  August 2023:   Patient needing refills.  No new issues.   May 2023:  Noted injuries or falls.  Patient here for medication refills.  March 2023:  No new injuries or falls.  Stable on current medication regimen.  November 2022:  Patient here for medication refill.  Stable.  Patient chronic pain medication with hydrocodone 7.5 and Soma 350 milligram.   Patient was previously managed by previous PCP Dr. Jaime Hernández who is retiring.  Patient has been stable on this medication regimen for years.  No side effects reported.    March 2022: Patient reports medication regimen is controlling his symptoms.  No change in HPI.  No new injuries.  May 2022:  Patient reports he has been very active at work and pain medication regimen is working well.  August 2022: Patient here for medication refills.  No change in HPI.  Medication continues to work well.           Current Assessment & Plan     Refill medications as prescribed.  No abuse suspected. The patient was checked in the [place] Board [employer]'s  Prescription Monitoring Program. There appears to be no incongruities with the patient's verbalized history.       An opioid pain contract was completed  after having an extensive conversation about chronic opioid use for pain management. We discussed the risks and benefits of opioids.  I discouraged the patient from escalation of opioid use and try to minimize its use to decrease chance of dependence, tolerance, and opioid-based hyperalgesia.  I reviewed the  in great detail and there are no inconsistencies and it is appropriate with patient's history.  There are no signs of aberrant drug behavior.  The opioid risk tool was also completed, low risk.  Pt counseled about the side effects of long term use of opioids including dependence, tolerance, addiction, respiratory depression, somnolence, immune and endocrine dysfunction.  The patient expressed understanding.      If no improvement or worsening.  Referral to physical therapy, update imaging and consider spine/back clinic versus surgical specialist.         Nausea    Overview     Chronic.  Recurrent.  Intermittent control.  Patient uses Zofran as needed.  Requesting refill.         Current Assessment & Plan     Continue Zofran.  Follow-up with GI if no improvement in symptoms.  ER precautions for severe  symptoms.  Discussed condition course and signs and symptoms to expect.  Patient advised take anti-inflammatories and or Tylenol for pain or fever.  ER precautions.  Call MD or follow-up to clinic if not improving or worsening symptoms.               Review of patient's allergies indicates:   Allergen Reactions    Atorvastatin     Bactrim [sulfamethoxazole-trimethoprim] Hives     Current Outpatient Medications   Medication Instructions    blood sugar diagnostic (BLOOD GLUCOSE TEST) Strp Check glucose 3 times per day before each meal    carisoprodoL (SOMA) 350 mg, Oral, 4 times daily PRN    [START ON 10/19/2023] carisoprodoL (SOMA) 350 mg, Oral, 4 times daily PRN    [START ON 9/21/2023] carisoprodoL (SOMA) 350 mg, Oral, 4 times daily PRN    fexofenadine (ALLEGRA) 180 mg, Oral, Daily    [START ON 10/19/2023] HYDROcodone-acetaminophen (NORCO) 7.5-325 mg per tablet 1 tablet, Oral, Every 6 hours PRN    [START ON 9/21/2023] HYDROcodone-acetaminophen (NORCO) 7.5-325 mg per tablet 1 tablet, Oral, Every 6 hours PRN    HYDROcodone-acetaminophen (NORCO) 7.5-325 mg per tablet 1 tablet, Oral, Every 6 hours PRN    ketoconazole (NIZORAL) 2 % cream Topical (Top), 2 times daily    methylPREDNISolone (MEDROL DOSEPACK) 4 mg tablet follow package directions    mupirocin (BACTROBAN) 2 % ointment Topical (Top), 3 times daily    naproxen (NAPROSYN) 500 mg, Oral, 2 times daily with meals    ondansetron (ZOFRAN-ODT) 8 mg, Oral, Every 6 hours PRN    promethazine (PHENERGAN) 25 mg, Oral, Every 6 hours PRN      I have reviewed the PMH, social history, FamilyHx, surgical history, allergies and medications documented / confirmed by the patient at the time of this visit.  Review of Systems   Constitutional:  Negative for chills, fatigue, fever and unexpected weight change.   HENT:  Negative for ear pain, sinus pressure, sinus pain and sore throat.    Eyes:  Negative for redness and visual disturbance.   Respiratory:  Negative for cough and  shortness of breath.    Cardiovascular:  Negative for chest pain and palpitations.   Gastrointestinal:  Negative for nausea and vomiting.   Endocrine: Negative for cold intolerance and heat intolerance.   Genitourinary:  Negative for difficulty urinating and hematuria.   Musculoskeletal:  Positive for arthralgias and back pain. Negative for myalgias.   Skin:  Negative for rash and wound.   Allergic/Immunologic: Negative for environmental allergies and food allergies.   Neurological:  Negative for weakness and headaches.   Hematological:  Negative for adenopathy. Does not bruise/bleed easily.   Psychiatric/Behavioral:  Negative for sleep disturbance. The patient is not nervous/anxious.      Objective:   /80   Pulse 86   Ht 6' (1.829 m)   Wt 87.1 kg (192 lb)   SpO2 99%   BMI 26.04 kg/m²   Physical Exam  Vitals and nursing note reviewed.   Constitutional:       General: He is not in acute distress.     Appearance: He is well-developed. He is not diaphoretic.   HENT:      Head: Normocephalic and atraumatic.      Right Ear: Tympanic membrane, ear canal and external ear normal. There is no impacted cerumen.      Left Ear: Tympanic membrane, ear canal and external ear normal. There is no impacted cerumen.      Nose: No congestion or rhinorrhea.      Mouth/Throat:      Pharynx: No posterior oropharyngeal erythema.   Eyes:      Extraocular Movements: Extraocular movements intact.      Pupils: Pupils are equal, round, and reactive to light.   Neck:      Vascular: No carotid bruit.   Cardiovascular:      Rate and Rhythm: Normal rate.      Pulses: Normal pulses.   Pulmonary:      Effort: Pulmonary effort is normal. No respiratory distress.      Breath sounds: Normal breath sounds.   Abdominal:      General: Bowel sounds are normal.      Palpations: Abdomen is soft.   Musculoskeletal:         General: Tenderness and deformity present. Normal range of motion.      Cervical back: Normal range of motion and neck supple.    Lymphadenopathy:      Cervical: No cervical adenopathy.   Skin:     General: Skin is warm and dry.      Capillary Refill: Capillary refill takes less than 2 seconds.      Findings: No rash.   Neurological:      General: No focal deficit present.      Mental Status: He is alert and oriented to person, place, and time.   Psychiatric:         Attention and Perception: He is attentive.         Mood and Affect: Mood normal. Mood is not anxious or depressed. Affect is not labile, blunt, angry or inappropriate.         Speech: He is communicative. Speech is not rapid and pressured, delayed, slurred or tangential.         Behavior: Behavior normal. Behavior is not agitated, slowed, aggressive, withdrawn, hyperactive or combative.         Thought Content: Thought content normal. Thought content is not paranoid or delusional. Thought content does not include homicidal or suicidal ideation. Thought content does not include homicidal or suicidal plan.         Cognition and Memory: Memory is not impaired.         Judgment: Judgment normal. Judgment is not impulsive or inappropriate.         Assessment:     1. Sacroiliitis    2. Encounter for long-term (current) use of medications    3. Type 2 diabetes mellitus with hyperglycemia, without long-term current use of insulin    4. Nausea      MDM:   Moderate complexity.  Moderate risk.  Total time: 32 minutes.  This includes total time spent on the encounter, which includes face to face time and non-face to face time preparing to see the patient (eg, review of previous medical records, tests), Obtaining and/or reviewing separately obtained history, documenting clinical information in the electronic or other health record, independently interpreting results (not separately reported)/communicating results to the patient/family/caregiver, and/or care coordination (not separately reported).    I have Reviewed and summarized old records.  I have performed thorough medication  reconciliation today and discussed risk and benefits of medications.  I have reviewed labs and discussed with patient.  All questions were answered.  I am requesting old records and will review them once they are available.  The patient was checked in the [facility] Board of Pharmacy's  Prescription Monitoring Program. There appears to be no incongruities with the patient's verbalized history.       I have signed for the following orders AND/OR meds.  No orders of the defined types were placed in this encounter.      Medications Ordered This Encounter   Medications    carisoprodoL (SOMA) 350 MG tablet     Sig: Take 1 tablet (350 mg total) by mouth 4 (four) times daily as needed for Muscle spasms.     Dispense:  120 tablet     Refill:  0    carisoprodoL (SOMA) 350 MG tablet     Sig: Take 1 tablet (350 mg total) by mouth 4 (four) times daily as needed for Muscle spasms.     Dispense:  120 tablet     Refill:  0    carisoprodoL (SOMA) 350 MG tablet     Sig: Take 1 tablet (350 mg total) by mouth 4 (four) times daily as needed for Muscle spasms.     Dispense:  120 tablet     Refill:  0    HYDROcodone-acetaminophen (NORCO) 7.5-325 mg per tablet     Sig: Take 1 tablet by mouth every 6 (six) hours as needed for Pain.     Dispense:  120 tablet     Refill:  0     Quantity prescribed more than 7 day supply? Yes, quantity medically necessary     Order Specific Question:   I have reviewed the Prescription Drug Monitoring Program (PDMP) database for this patient prior to prescribing the above opioid medication     Answer:   Yes    HYDROcodone-acetaminophen (NORCO) 7.5-325 mg per tablet     Sig: Take 1 tablet by mouth every 6 (six) hours as needed for Pain.     Dispense:  120 tablet     Refill:  0     Quantity prescribed more than 7 day supply? Yes, quantity medically necessary     Order Specific Question:   I have reviewed the Prescription Drug Monitoring Program (PDMP) database for this patient prior to prescribing the above  opioid medication     Answer:   Yes    HYDROcodone-acetaminophen (NORCO) 7.5-325 mg per tablet     Sig: Take 1 tablet by mouth every 6 (six) hours as needed for Pain.     Dispense:  120 tablet     Refill:  0     Quantity prescribed more than 7 day supply? Yes, quantity medically necessary     Order Specific Question:   I have reviewed the Prescription Drug Monitoring Program (PDMP) database for this patient prior to prescribing the above opioid medication     Answer:   Yes    ondansetron (ZOFRAN-ODT) 8 MG TbDL     Sig: Take 1 tablet (8 mg total) by mouth every 6 (six) hours as needed (nausea).     Dispense:  30 tablet     Refill:  1          Follow up in about 3 months (around 11/22/2023), or if symptoms worsen or fail to improve, for Med refills.  Future Appointments       Date Provider Specialty Appt Notes    11/16/2023 Jb Chao MD Family Medicine 3mfollow up                If no improvement in symptoms or symptoms worsen, advised to call/follow-up at clinic or go to ER. Patient voiced understanding and all questions/concerns were addressed.   DISCLAIMER: This note was compiled by using a speech recognition dictation system and therefore please be aware that typographical / speech recognition errors can and do occur.  Please contact me if you see any errors specifically.    Jb Chao M.D.       Office: [phone] [address] [place] [address]  FAX: [phone]

## 2023-08-22 NOTE — ASSESSMENT & PLAN NOTE
Continue Zofran.  Follow-up with GI if no improvement in symptoms.  ER precautions for severe symptoms.  Discussed condition course and signs and symptoms to expect.  Patient advised take anti-inflammatories and or Tylenol for pain or fever.  ER precautions.  Call MD or follow-up to clinic if not improving or worsening symptoms.     02-Oct-2019 20:00

## 2023-09-21 ENCOUNTER — TELEPHONE (OUTPATIENT)
Dept: FAMILY MEDICINE | Facility: CLINIC | Age: 63
End: 2023-09-21
Payer: COMMERCIAL

## 2023-09-21 DIAGNOSIS — M46.1 SACROILIITIS: ICD-10-CM

## 2023-09-21 DIAGNOSIS — Z79.899 ENCOUNTER FOR LONG-TERM (CURRENT) USE OF MEDICATIONS: ICD-10-CM

## 2023-09-21 RX ORDER — CARISOPRODOL 350 MG/1
350 TABLET ORAL 4 TIMES DAILY PRN
Qty: 120 TABLET | Refills: 0 | Status: SHIPPED | OUTPATIENT
Start: 2023-09-21 | End: 2023-10-23 | Stop reason: SDUPTHER

## 2023-09-21 RX ORDER — HYDROCODONE BITARTRATE AND ACETAMINOPHEN 7.5; 325 MG/1; MG/1
1 TABLET ORAL EVERY 6 HOURS PRN
Qty: 120 TABLET | Refills: 0 | Status: SHIPPED | OUTPATIENT
Start: 2023-09-21 | End: 2023-09-25 | Stop reason: SDUPTHER

## 2023-09-21 RX ORDER — HYDROCODONE BITARTRATE AND ACETAMINOPHEN 7.5; 325 MG/1; MG/1
1 TABLET ORAL EVERY 6 HOURS PRN
Qty: 120 TABLET | Refills: 0 | Status: SHIPPED | OUTPATIENT
Start: 2023-10-19 | End: 2023-09-25 | Stop reason: SDUPTHER

## 2023-09-21 RX ORDER — CARISOPRODOL 350 MG/1
350 TABLET ORAL 4 TIMES DAILY PRN
Qty: 120 TABLET | Refills: 0 | Status: SHIPPED | OUTPATIENT
Start: 2023-10-20 | End: 2023-09-26 | Stop reason: SDUPTHER

## 2023-09-21 NOTE — TELEPHONE ENCOUNTER
----- Message from Cassandra Mott sent at 9/21/2023 10:04 AM CDT -----  Contact: Ned Miranda is calling in regards to the doctor call to confirm to transfer all his medications to [facility].please call back at [phone]      [facility]  [email or web address]  [address] [place] [address] · ~[address]  [phone]        Thanks  DEVIKA

## 2023-09-21 NOTE — TELEPHONE ENCOUNTER
----- Message from Dominga Haddad LPN sent at 9/21/2023  2:50 PM CDT -----  Contact: self [phone]  I spoke with Sim at [facility]  he can no longer fill  this medicine for him. Cut backs of some sort. Patient asked if you can send to [facility] in [place]. I changed the pharmacy in his chart already so we should be good to resend. Sim said he did not fill the one from Tuesday 10-19 or today.   ----- Message -----  From: Heidi Bull  Sent: 9/21/2023   2:34 PM CDT  To: Zhanna Muhammad Staff    Patient called in this afternoon concerning his refill for HYDROcodone-acetaminophen (NORCO) 7.5-325 mg per tablet carisoprodoL (SOMA) 350 MG tablet that has not been sent it. Please call back [phone]. [id number] - [place], [place] - [address] [place] ([place])  [address] [address]  Phone: [phone] Fax: [phone]

## 2023-09-21 NOTE — TELEPHONE ENCOUNTER
No care due was identified.  [facility] Embedded Care Due Messages. Reference number: [id number].   9/21/2023 12:15:31 PM CDT

## 2023-09-21 NOTE — TELEPHONE ENCOUNTER
----- Message from Dalila Gonzalez sent at 9/21/2023  9:43 AM CDT -----  Name of Who is Calling:RUDDY MCKEON JR. [[id number]]        What is the request in detail: Pt would like a call back from the office in regards to medication HYDROcodone-acetaminophen (NORCO) 7.5-325 mg per tablet being switched to another pharmacy to be filled. Please advise       Can the clinic reply by [facility]:NO         What Number to Call Back if not in [facility]:.Telephone Information:  Mobile          [phone]

## 2023-09-21 NOTE — TELEPHONE ENCOUNTER
I have signed for the following orders AND/OR meds.  Please call the patient and ask the patient to schedule the testing AND/OR inform about any medications that were sent.      No orders of the defined types were placed in this encounter.      Medications Ordered This Encounter   Medications    carisoprodoL (SOMA) 350 MG tablet     Sig: Take 1 tablet (350 mg total) by mouth 4 (four) times daily as needed for Muscle spasms.     Dispense:  120 tablet     Refill:  0    carisoprodoL (SOMA) 350 MG tablet     Sig: Take 1 tablet (350 mg total) by mouth 4 (four) times daily as needed for Muscle spasms.     Dispense:  120 tablet     Refill:  0    HYDROcodone-acetaminophen (NORCO) 7.5-325 mg per tablet     Sig: Take 1 tablet by mouth every 6 (six) hours as needed for Pain.     Dispense:  120 tablet     Refill:  0     Quantity prescribed more than 7 day supply? Yes, quantity medically necessary     Order Specific Question:   I have reviewed the Prescription Drug Monitoring Program (PDMP) database for this patient prior to prescribing the above opioid medication     Answer:   Yes    HYDROcodone-acetaminophen (NORCO) 7.5-325 mg per tablet     Sig: Take 1 tablet by mouth every 6 (six) hours as needed for Pain.     Dispense:  120 tablet     Refill:  0     Quantity prescribed more than 7 day supply? Yes, quantity medically necessary     Order Specific Question:   I have reviewed the Prescription Drug Monitoring Program (PDMP) database for this patient prior to prescribing the above opioid medication     Answer:   Yes

## 2023-09-21 NOTE — TELEPHONE ENCOUNTER
Patient needs this medication sent to [facility] pharmacy in [place]. I see you just sent it but it went to [facility]. I changed the pharmacy

## 2023-09-25 ENCOUNTER — TELEPHONE (OUTPATIENT)
Dept: FAMILY MEDICINE | Facility: CLINIC | Age: 63
End: 2023-09-25
Payer: COMMERCIAL

## 2023-09-25 DIAGNOSIS — M46.1 SACROILIITIS: ICD-10-CM

## 2023-09-25 DIAGNOSIS — Z79.899 ENCOUNTER FOR LONG-TERM (CURRENT) USE OF MEDICATIONS: ICD-10-CM

## 2023-09-25 RX ORDER — HYDROCODONE BITARTRATE AND ACETAMINOPHEN 7.5; 325 MG/1; MG/1
1 TABLET ORAL EVERY 6 HOURS PRN
Qty: 120 TABLET | Refills: 0 | Status: SHIPPED | OUTPATIENT
Start: 2023-09-25 | End: 2023-09-26 | Stop reason: SDUPTHER

## 2023-09-25 RX ORDER — HYDROCODONE BITARTRATE AND ACETAMINOPHEN 7.5; 325 MG/1; MG/1
1 TABLET ORAL EVERY 6 HOURS PRN
Qty: 120 TABLET | Refills: 0 | Status: SHIPPED | OUTPATIENT
Start: 2023-10-19 | End: 2023-09-25 | Stop reason: SDUPTHER

## 2023-09-25 RX ORDER — HYDROCODONE BITARTRATE AND ACETAMINOPHEN 7.5; 325 MG/1; MG/1
1 TABLET ORAL EVERY 6 HOURS PRN
Qty: 120 TABLET | Refills: 0 | Status: SHIPPED | OUTPATIENT
Start: 2023-10-19 | End: 2023-09-26 | Stop reason: SDUPTHER

## 2023-09-25 RX ORDER — HYDROCODONE BITARTRATE AND ACETAMINOPHEN 7.5; 325 MG/1; MG/1
1 TABLET ORAL EVERY 6 HOURS PRN
Qty: 120 TABLET | Refills: 0 | Status: SHIPPED | OUTPATIENT
Start: 2023-11-23 | End: 2023-09-26 | Stop reason: SDUPTHER

## 2023-09-25 NOTE — TELEPHONE ENCOUNTER
----- Message from Crystal Frank sent at 9/25/2023 11:35 AM CDT -----  Contact: Ned  Type:  Patient Requesting Call    Who Called:Ned  Regarding?:HYDROcodone-acetaminophen (NORCO) 7.5-325 mg per tablet & all of the medications on his chart needs to be sent to a different pharmacy  Pharmacy?:[facility] Address: [facility] [place] [address] Phone: [phone]  Would the patient rather a call back or a response via [facility]? Call back  Best Call Back Number:[phone]  Additional Information: [facility] in [place] states that they cannot get the prescription for NORCO after they said they did so he has another pharmacy that it can be sent to and would like all of his medications sent to [facility] and he confirmed with the pharmacy that they have the medication              Thanks  OLIVER

## 2023-09-25 NOTE — TELEPHONE ENCOUNTER
----- Message from Crystal Frank sent at 9/25/2023 11:35 AM CDT -----  Contact: Ned  Type:  Patient Requesting Call    Who Called:Ned  Regarding?:HYDROcodone-acetaminophen (NORCO) 7.5-325 mg per tablet & all of the medications on his chart needs to be sent to a different pharmacy  Pharmacy?:[facility] Address: [facility] [place] [address] Phone: [phone]  Would the patient rather a call back or a response via [facility]? Call back  Best Call Back Number:[phone]  Additional Information: [facility] in [place] states that they cannot get the prescription for NORCO after they said they did so he has another pharmacy that it can be sent to and would like all of his medications sent to [facility] and he confirmed with the pharmacy that they have the medication              Thanks  OLIVER

## 2023-09-25 NOTE — TELEPHONE ENCOUNTER
No care due was identified.  [facility] Embedded Care Due Messages. Reference number: [id number].   9/25/2023 4:32:40 PM CDT

## 2023-09-25 NOTE — TELEPHONE ENCOUNTER
No care due was identified.  [facility] Embedded Care Due Messages. Reference number: [id number].   9/25/2023 1:07:25 PM CDT

## 2023-09-25 NOTE — TELEPHONE ENCOUNTER
----- Message from Kristy Parekh sent at 9/25/2023  4:25 PM CDT -----  Contact: RUDDY MCKEON JR. [[id number]]  ..Type:  Patient Requesting Call    Who Called: RUDDY MCKEON JR. [[id number]]  Does the patient know what this is regarding?: Sept Records sent to pharmacy    Would the patient rather a call back or a response via [facility]? Call  Best Call Back Number: .[phone]   Additional Information:    .[facility] - [address] [address]  Phone: [phone] Fax: [phone]             Med rec updated and complete  Allergies reviewed  Pt had RX bottles of her PERCOCET 5-325MG and DOCUSATE 100MG.  Returned RX bottles back to pt.

## 2023-09-26 ENCOUNTER — TELEPHONE (OUTPATIENT)
Dept: FAMILY MEDICINE | Facility: CLINIC | Age: 63
End: 2023-09-26
Payer: COMMERCIAL

## 2023-09-26 DIAGNOSIS — G43.909 MIGRAINE WITHOUT STATUS MIGRAINOSUS, NOT INTRACTABLE, UNSPECIFIED MIGRAINE TYPE: ICD-10-CM

## 2023-09-26 DIAGNOSIS — Z79.899 ENCOUNTER FOR LONG-TERM (CURRENT) USE OF MEDICATIONS: ICD-10-CM

## 2023-09-26 DIAGNOSIS — M46.1 SACROILIITIS: ICD-10-CM

## 2023-09-26 DIAGNOSIS — R09.81 NASAL CONGESTION: ICD-10-CM

## 2023-09-26 NOTE — TELEPHONE ENCOUNTER
----- Message from Dominga Martinez sent at 9/26/2023  8:37 AM CDT -----  Name of Who is Calling:Patient           What is the request in detail:Pharmacy has script but would like to discuss medication list. Patient is requesting the entire chart sent to [facility] as he was told pain medication can not be filled without it.           Can the clinic reply by [facility]:no           What Number to Call Back if not in [facility]: [phone]

## 2023-09-26 NOTE — TELEPHONE ENCOUNTER
No care due was identified.  [facility] Embedded Care Due Messages. Reference number: [id number].   9/26/2023 4:57:37 PM CDT

## 2023-09-26 NOTE — TELEPHONE ENCOUNTER
----- Message from Ria Mcintyre MA sent at 9/26/2023  4:31 PM CDT -----  Patient needs the nurse to send all his prescription send over to his pharmacy, please call pt back at [phone] (home) [phone] (work)      [facility] [place] - [address] [address]  Phone: [phone] Fax: [phone]

## 2023-09-27 ENCOUNTER — TELEPHONE (OUTPATIENT)
Dept: FAMILY MEDICINE | Facility: CLINIC | Age: 63
End: 2023-09-27
Payer: COMMERCIAL

## 2023-09-27 RX ORDER — HYDROCODONE BITARTRATE AND ACETAMINOPHEN 7.5; 325 MG/1; MG/1
1 TABLET ORAL EVERY 6 HOURS PRN
Qty: 120 TABLET | Refills: 0 | Status: SHIPPED | OUTPATIENT
Start: 2023-11-23 | End: 2023-10-23 | Stop reason: SDUPTHER

## 2023-09-27 RX ORDER — MINERAL OIL
180 ENEMA (ML) RECTAL DAILY
Qty: 90 TABLET | Refills: 3 | Status: SHIPPED | OUTPATIENT
Start: 2023-09-27 | End: 2023-11-13 | Stop reason: SDUPTHER

## 2023-09-27 RX ORDER — PROMETHAZINE HYDROCHLORIDE 25 MG/1
25 TABLET ORAL EVERY 6 HOURS PRN
Qty: 30 TABLET | Refills: 6 | Status: SHIPPED | OUTPATIENT
Start: 2023-09-27 | End: 2024-02-09 | Stop reason: SDUPTHER

## 2023-09-27 RX ORDER — HYDROCODONE BITARTRATE AND ACETAMINOPHEN 7.5; 325 MG/1; MG/1
1 TABLET ORAL EVERY 6 HOURS PRN
Qty: 120 TABLET | Refills: 0 | Status: SHIPPED | OUTPATIENT
Start: 2023-09-27 | End: 2023-10-23 | Stop reason: SDUPTHER

## 2023-09-27 RX ORDER — CARISOPRODOL 350 MG/1
350 TABLET ORAL 4 TIMES DAILY PRN
Qty: 120 TABLET | Refills: 0 | Status: SHIPPED | OUTPATIENT
Start: 2023-10-20 | End: 2023-10-23 | Stop reason: SDUPTHER

## 2023-09-27 RX ORDER — NAPROXEN 500 MG/1
500 TABLET ORAL 2 TIMES DAILY WITH MEALS
Qty: 60 TABLET | Refills: 11 | Status: SHIPPED | OUTPATIENT
Start: 2023-09-27

## 2023-09-27 RX ORDER — HYDROCODONE BITARTRATE AND ACETAMINOPHEN 7.5; 325 MG/1; MG/1
1 TABLET ORAL EVERY 6 HOURS PRN
Qty: 120 TABLET | Refills: 0 | Status: SHIPPED | OUTPATIENT
Start: 2023-10-19 | End: 2023-10-23 | Stop reason: SDUPTHER

## 2023-09-27 RX ORDER — ONDANSETRON 8 MG/1
8 TABLET, ORALLY DISINTEGRATING ORAL EVERY 6 HOURS PRN
Qty: 30 TABLET | Refills: 1 | Status: SHIPPED | OUTPATIENT
Start: 2023-09-27 | End: 2024-02-09 | Stop reason: SDUPTHER

## 2023-09-27 NOTE — TELEPHONE ENCOUNTER
----- Message from Elvie Frankel MA sent at 9/27/2023 12:00 PM CDT -----  Regarding: RE: Patient Retuning Missed Call  Contact: Ned  Yes all his meds were refilled  ----- Message -----  From: Naun Freed LPN  Sent: 9/27/2023   9:49 AM CDT  To: Elvie Frankel MA  Subject: FW: Patient Retuning Missed Call                 Girl was this the pt that wanted all his med refill?    ----- Message -----  From: Erma Graves  Sent: 9/27/2023   9:47 AM CDT  To: Zhanna Muhammad Staff  Subject: Patient Retuning Missed Call                     .Type:  Patient Returning Call    Who Called: Ned  Who Left Message for Patient:Naun Freed LPN     Does the patient know what this is regarding?:his message     Would the patient rather a call back or a response via [facility]? Call   Best Call Back Number: [phone] (home) [phone] (work)   Additional Information: is returning the missed call from yesterday and would liek to speak to you today please.

## 2023-10-02 NOTE — PATIENT INSTRUCTIONS
Follow up if symptoms worsen or fail to improve, for follow up.     Dear patient,   As a result of recent federal legislation (The Federal Cures Act), you may receive lab or pathology results from your visit in your [facility] account before your physician is able to contact you. Your physician or their representative will relay the results to you with their recommendations at their soonest availability.     If no improvement in symptoms or symptoms worsen, please be advised to call MD, follow-up at clinic and/or go to ER if becomes severe.    Jb Chao M.D.        We Offer TELEHEALTH & Same Day Appointments!   Book your Telehealth appointment with me through my nurse or   Clinic appointments on [employer]!    [address] [place] [address]    Office: [phone]   FAX: [phone]    Check out my Facebook Page and Follow Me at: [email or web address]    Check out my website at [employer] by clicking on: [email or web address]    To Schedule appointments online, go to [employer]: [email or web address]     Negative

## 2023-10-23 ENCOUNTER — TELEPHONE (OUTPATIENT)
Dept: FAMILY MEDICINE | Facility: CLINIC | Age: 63
End: 2023-10-23
Payer: COMMERCIAL

## 2023-10-23 DIAGNOSIS — Z79.899 ENCOUNTER FOR LONG-TERM (CURRENT) USE OF MEDICATIONS: ICD-10-CM

## 2023-10-23 DIAGNOSIS — M46.1 SACROILIITIS: ICD-10-CM

## 2023-10-23 RX ORDER — CARISOPRODOL 350 MG/1
350 TABLET ORAL 4 TIMES DAILY PRN
Qty: 120 TABLET | Refills: 0 | Status: SHIPPED | OUTPATIENT
Start: 2023-10-23 | End: 2023-11-08 | Stop reason: SDUPTHER

## 2023-10-23 RX ORDER — HYDROCODONE BITARTRATE AND ACETAMINOPHEN 7.5; 325 MG/1; MG/1
1 TABLET ORAL EVERY 6 HOURS PRN
Qty: 120 TABLET | Refills: 0 | Status: SHIPPED | OUTPATIENT
Start: 2023-10-23 | End: 2023-11-08 | Stop reason: SDUPTHER

## 2023-10-23 RX ORDER — HYDROCODONE BITARTRATE AND ACETAMINOPHEN 7.5; 325 MG/1; MG/1
1 TABLET ORAL EVERY 6 HOURS PRN
Qty: 120 TABLET | Refills: 0 | Status: SHIPPED | OUTPATIENT
Start: 2023-11-23 | End: 2023-11-08 | Stop reason: SDUPTHER

## 2023-10-23 NOTE — TELEPHONE ENCOUNTER
----- Message from Sarah Beth Vang sent at 10/23/2023  3:49 PM CDT -----  Regarding: pharmacy call back  Name of Who is Calling:radha taylor           What is the request in detail: Pharmacy needs a call back concerning some question         Can the clinic reply by [facility]: no           What Number to Call Back if not in [facility]:       [facility] [place] - [address] [address]  Phone: [phone] Fax: [phone]

## 2023-10-23 NOTE — TELEPHONE ENCOUNTER
Care Due:                  Date            Visit Type   Department     Provider  --------------------------------------------------------------------------------                                EP -                              PRIMARY      [facility]  Last Visit: 08-      CARE ([facility])   MEDICINE       Jb Chao                              EP -                              PRIMARY      [facility]  Next Visit: 11-      CARE ([facility])   MEDICINE       Jb Chao                                                            Last  Test          Frequency    Reason                     Performed    Due Date  --------------------------------------------------------------------------------    CBC.........  12 months..  naproxen.................  Not Found    Overdue    [employer] Embedded Care Due Messages. Reference number: [id number].   10/23/2023 11:53:56 AM CDT

## 2023-10-23 NOTE — TELEPHONE ENCOUNTER
----- Message from Teo Lovell MA sent at 10/23/2023 11:35 AM CDT -----  Requesting an RX refill or new RX.    RX name and strength: Hydrocodone, CarisoprodoL and Glucose Strips    Is this a refill or new RX: Refill    Is this a 30 day or 90 day RX: N/A    Pharmacy name and phone:     [facility] - [address] [address]  Phone: [phone] Fax: [phone]

## 2023-11-08 DIAGNOSIS — Z79.899 ENCOUNTER FOR LONG-TERM (CURRENT) USE OF MEDICATIONS: ICD-10-CM

## 2023-11-08 DIAGNOSIS — M46.1 SACROILIITIS: ICD-10-CM

## 2023-11-08 RX ORDER — HYDROCODONE BITARTRATE AND ACETAMINOPHEN 7.5; 325 MG/1; MG/1
1 TABLET ORAL EVERY 6 HOURS PRN
Qty: 120 TABLET | Refills: 0 | Status: SHIPPED | OUTPATIENT
Start: 2023-11-23 | End: 2023-11-09 | Stop reason: SDUPTHER

## 2023-11-08 RX ORDER — HYDROCODONE BITARTRATE AND ACETAMINOPHEN 7.5; 325 MG/1; MG/1
1 TABLET ORAL EVERY 6 HOURS PRN
Qty: 120 TABLET | Refills: 0 | Status: SHIPPED | OUTPATIENT
Start: 2023-11-08 | End: 2023-11-09 | Stop reason: SDUPTHER

## 2023-11-08 RX ORDER — CARISOPRODOL 350 MG/1
350 TABLET ORAL 4 TIMES DAILY PRN
Qty: 120 TABLET | Refills: 0 | Status: SHIPPED | OUTPATIENT
Start: 2023-11-08 | End: 2023-11-09 | Stop reason: SDUPTHER

## 2023-11-08 NOTE — TELEPHONE ENCOUNTER
----- Message from Taty Quiroz sent at 11/8/2023  9:08 AM CST -----  Patient is requesting all of his medications be moved to [facility] in [place]. He is requesting the pain meds be called in asap. His local pharmacy didn't have it but [facility] does. Call back at [phone]

## 2023-11-08 NOTE — TELEPHONE ENCOUNTER
No care due was identified.  [facility] Embedded Care Due Messages. Reference number: [id number].   11/08/2023 9:22:14 AM CST

## 2023-11-08 NOTE — TELEPHONE ENCOUNTER
Patient never got rx filled because pharmacy didn't have the meds now he wants all meds to go to [facility]

## 2023-11-09 ENCOUNTER — TELEPHONE (OUTPATIENT)
Dept: FAMILY MEDICINE | Facility: CLINIC | Age: 63
End: 2023-11-09
Payer: COMMERCIAL

## 2023-11-09 DIAGNOSIS — M46.1 SACROILIITIS: ICD-10-CM

## 2023-11-09 DIAGNOSIS — Z79.899 ENCOUNTER FOR LONG-TERM (CURRENT) USE OF MEDICATIONS: ICD-10-CM

## 2023-11-09 RX ORDER — CARISOPRODOL 350 MG/1
350 TABLET ORAL 4 TIMES DAILY PRN
Qty: 120 TABLET | Refills: 0 | Status: SHIPPED | OUTPATIENT
Start: 2023-11-09 | End: 2023-11-13 | Stop reason: SDUPTHER

## 2023-11-09 RX ORDER — HYDROCODONE BITARTRATE AND ACETAMINOPHEN 7.5; 325 MG/1; MG/1
1 TABLET ORAL EVERY 6 HOURS PRN
Qty: 120 TABLET | Refills: 0 | Status: SHIPPED | OUTPATIENT
Start: 2023-11-23 | End: 2023-11-13 | Stop reason: SDUPTHER

## 2023-11-09 RX ORDER — HYDROCODONE BITARTRATE AND ACETAMINOPHEN 7.5; 325 MG/1; MG/1
1 TABLET ORAL EVERY 6 HOURS PRN
Qty: 120 TABLET | Refills: 0 | Status: SHIPPED | OUTPATIENT
Start: 2023-11-09 | End: 2023-11-13 | Stop reason: SDUPTHER

## 2023-11-09 NOTE — TELEPHONE ENCOUNTER
Pharmacy changed. Previous prescriptions sent to [facility] [address]

## 2023-11-09 NOTE — TELEPHONE ENCOUNTER
----- Message from Heidi Bull sent at 11/9/2023  7:12 AM CST -----  Contact: self [phone]  Patient called in this morning stating his refills for #HYDROcodone-acetaminophen (NORCO) 7.5-325 mg per tablet/carisoprodoL (SOMA) 350 MG tablet were sent to the wrong pharmacy and send a new prescription to the Pharmacy below. Please call back [phone]          [id number] [place] - [address] ([place])  [address] [address]  Phone: [phone] Fax: [phone]

## 2023-11-09 NOTE — TELEPHONE ENCOUNTER
I have signed for the following orders AND/OR meds.  Please call the patient and ask the patient to schedule the testing AND/OR inform about any medications that were sent.      No orders of the defined types were placed in this encounter.      Medications Ordered This Encounter   Medications    carisoprodoL (SOMA) 350 MG tablet     Sig: Take 1 tablet (350 mg total) by mouth 4 (four) times daily as needed for Muscle spasms.     Dispense:  120 tablet     Refill:  0    carisoprodoL (SOMA) 350 MG tablet     Sig: Take 1 tablet (350 mg total) by mouth 4 (four) times daily as needed for Muscle spasms.     Dispense:  120 tablet     Refill:  0    carisoprodoL (SOMA) 350 MG tablet     Sig: Take 1 tablet (350 mg total) by mouth 4 (four) times daily as needed for Muscle spasms.     Dispense:  120 tablet     Refill:  0    HYDROcodone-acetaminophen (NORCO) 7.5-325 mg per tablet     Sig: Take 1 tablet by mouth every 6 (six) hours as needed for Pain.     Dispense:  120 tablet     Refill:  0     Quantity prescribed more than 7 day supply? Yes, quantity medically necessary     Order Specific Question:   I have reviewed the Prescription Drug Monitoring Program (PDMP) database for this patient prior to prescribing the above opioid medication     Answer:   Yes    HYDROcodone-acetaminophen (NORCO) 7.5-325 mg per tablet     Sig: Take 1 tablet by mouth every 6 (six) hours as needed for Pain.     Dispense:  120 tablet     Refill:  0     Quantity prescribed more than 7 day supply? Yes, quantity medically necessary     Order Specific Question:   I have reviewed the Prescription Drug Monitoring Program (PDMP) database for this patient prior to prescribing the above opioid medication     Answer:   Yes    HYDROcodone-acetaminophen (NORCO) 7.5-325 mg per tablet     Sig: Take 1 tablet by mouth every 6 (six) hours as needed for Pain.     Dispense:  120 tablet     Refill:  0     Quantity prescribed more than 7 day supply? Yes, quantity medically  necessary     Order Specific Question:   I have reviewed the Prescription Drug Monitoring Program (PDMP) database for this patient prior to prescribing the above opioid medication     Answer:   Yes

## 2023-11-13 ENCOUNTER — OFFICE VISIT (OUTPATIENT)
Dept: FAMILY MEDICINE | Facility: CLINIC | Age: 63
End: 2023-11-13
Payer: COMMERCIAL

## 2023-11-13 VITALS
OXYGEN SATURATION: 96 % | HEART RATE: 83 BPM | BODY MASS INDEX: 26.22 KG/M2 | SYSTOLIC BLOOD PRESSURE: 136 MMHG | DIASTOLIC BLOOD PRESSURE: 82 MMHG | WEIGHT: 193.31 LBS

## 2023-11-13 DIAGNOSIS — J30.2 SEASONAL ALLERGIES: ICD-10-CM

## 2023-11-13 DIAGNOSIS — E11.65 TYPE 2 DIABETES MELLITUS WITH HYPERGLYCEMIA, WITHOUT LONG-TERM CURRENT USE OF INSULIN: ICD-10-CM

## 2023-11-13 DIAGNOSIS — M46.1 SACROILIITIS: Primary | ICD-10-CM

## 2023-11-13 DIAGNOSIS — Z79.899 ENCOUNTER FOR LONG-TERM (CURRENT) USE OF MEDICATIONS: ICD-10-CM

## 2023-11-13 PROCEDURE — 99999 PR PBB SHADOW E&M-EST. PATIENT-LVL IV: CPT | Mod: PBBFAC,,, | Performed by: FAMILY MEDICINE

## 2023-11-13 PROCEDURE — 3066F PR DOCUMENTATION OF TREATMENT FOR NEPHROPATHY: ICD-10-PCS | Mod: CPTII,S$GLB,, | Performed by: FAMILY MEDICINE

## 2023-11-13 PROCEDURE — 3061F PR NEG MICROALBUMINURIA RESULT DOCUMENTED/REVIEW: ICD-10-PCS | Mod: CPTII,S$GLB,, | Performed by: FAMILY MEDICINE

## 2023-11-13 PROCEDURE — 1159F PR MEDICATION LIST DOCUMENTED IN MEDICAL RECORD: ICD-10-PCS | Mod: CPTII,S$GLB,, | Performed by: FAMILY MEDICINE

## 2023-11-13 PROCEDURE — 1160F PR REVIEW ALL MEDS BY PRESCRIBER/CLIN PHARMACIST DOCUMENTED: ICD-10-PCS | Mod: CPTII,S$GLB,, | Performed by: FAMILY MEDICINE

## 2023-11-13 PROCEDURE — 99214 OFFICE O/P EST MOD 30 MIN: CPT | Mod: S$GLB,,, | Performed by: FAMILY MEDICINE

## 2023-11-13 PROCEDURE — 3075F PR MOST RECENT SYSTOLIC BLOOD PRESS GE 130-139MM HG: ICD-10-PCS | Mod: CPTII,S$GLB,, | Performed by: FAMILY MEDICINE

## 2023-11-13 PROCEDURE — 3051F HG A1C>EQUAL 7.0%<8.0%: CPT | Mod: CPTII,S$GLB,, | Performed by: FAMILY MEDICINE

## 2023-11-13 PROCEDURE — 3075F SYST BP GE 130 - 139MM HG: CPT | Mod: CPTII,S$GLB,, | Performed by: FAMILY MEDICINE

## 2023-11-13 PROCEDURE — 3008F PR BODY MASS INDEX (BMI) DOCUMENTED: ICD-10-PCS | Mod: CPTII,S$GLB,, | Performed by: FAMILY MEDICINE

## 2023-11-13 PROCEDURE — 3051F PR MOST RECENT HEMOGLOBIN A1C LEVEL 7.0 - < 8.0%: ICD-10-PCS | Mod: CPTII,S$GLB,, | Performed by: FAMILY MEDICINE

## 2023-11-13 PROCEDURE — 99214 PR OFFICE/OUTPT VISIT, EST, LEVL IV, 30-39 MIN: ICD-10-PCS | Mod: S$GLB,,, | Performed by: FAMILY MEDICINE

## 2023-11-13 PROCEDURE — 1160F RVW MEDS BY RX/DR IN RCRD: CPT | Mod: CPTII,S$GLB,, | Performed by: FAMILY MEDICINE

## 2023-11-13 PROCEDURE — 3066F NEPHROPATHY DOC TX: CPT | Mod: CPTII,S$GLB,, | Performed by: FAMILY MEDICINE

## 2023-11-13 PROCEDURE — 3079F PR MOST RECENT DIASTOLIC BLOOD PRESSURE 80-89 MM HG: ICD-10-PCS | Mod: CPTII,S$GLB,, | Performed by: FAMILY MEDICINE

## 2023-11-13 PROCEDURE — 3008F BODY MASS INDEX DOCD: CPT | Mod: CPTII,S$GLB,, | Performed by: FAMILY MEDICINE

## 2023-11-13 PROCEDURE — 1159F MED LIST DOCD IN RCRD: CPT | Mod: CPTII,S$GLB,, | Performed by: FAMILY MEDICINE

## 2023-11-13 PROCEDURE — 3079F DIAST BP 80-89 MM HG: CPT | Mod: CPTII,S$GLB,, | Performed by: FAMILY MEDICINE

## 2023-11-13 PROCEDURE — 3061F NEG MICROALBUMINURIA REV: CPT | Mod: CPTII,S$GLB,, | Performed by: FAMILY MEDICINE

## 2023-11-13 PROCEDURE — 99999 PR PBB SHADOW E&M-EST. PATIENT-LVL IV: ICD-10-PCS | Mod: PBBFAC,,, | Performed by: FAMILY MEDICINE

## 2023-11-13 RX ORDER — HYDROCODONE BITARTRATE AND ACETAMINOPHEN 7.5; 325 MG/1; MG/1
1 TABLET ORAL EVERY 6 HOURS PRN
Qty: 120 TABLET | Refills: 0 | Status: SHIPPED | OUTPATIENT
Start: 2024-01-05 | End: 2024-02-09 | Stop reason: SDUPTHER

## 2023-11-13 RX ORDER — CARISOPRODOL 350 MG/1
350 TABLET ORAL 4 TIMES DAILY PRN
Qty: 120 TABLET | Refills: 0 | Status: SHIPPED | OUTPATIENT
Start: 2023-12-07 | End: 2024-02-09 | Stop reason: SDUPTHER

## 2023-11-13 RX ORDER — MINERAL OIL
180 ENEMA (ML) RECTAL DAILY
Qty: 90 TABLET | Refills: 3 | Status: SHIPPED | OUTPATIENT
Start: 2023-11-13 | End: 2024-11-12

## 2023-11-13 RX ORDER — CARISOPRODOL 350 MG/1
350 TABLET ORAL 4 TIMES DAILY PRN
Qty: 120 TABLET | Refills: 0 | Status: SHIPPED | OUTPATIENT
Start: 2024-01-05 | End: 2024-02-09 | Stop reason: SDUPTHER

## 2023-11-13 RX ORDER — CARISOPRODOL 350 MG/1
350 TABLET ORAL 4 TIMES DAILY PRN
Qty: 120 TABLET | Refills: 0 | Status: SHIPPED | OUTPATIENT
Start: 2024-02-02 | End: 2024-02-09 | Stop reason: SDUPTHER

## 2023-11-13 RX ORDER — HYDROCODONE BITARTRATE AND ACETAMINOPHEN 7.5; 325 MG/1; MG/1
1 TABLET ORAL EVERY 6 HOURS PRN
Qty: 120 TABLET | Refills: 0 | Status: SHIPPED | OUTPATIENT
Start: 2024-02-02 | End: 2024-02-09 | Stop reason: SDUPTHER

## 2023-11-13 RX ORDER — HYDROCODONE BITARTRATE AND ACETAMINOPHEN 7.5; 325 MG/1; MG/1
1 TABLET ORAL EVERY 6 HOURS PRN
Qty: 120 TABLET | Refills: 0 | Status: SHIPPED | OUTPATIENT
Start: 2023-12-07 | End: 2024-02-09 | Stop reason: SDUPTHER

## 2023-11-13 NOTE — PATIENT INSTRUCTIONS
Follow up in about 3 months (around 2/13/2024), or if symptoms worsen or fail to improve, for Med refills.     Dear patient,   As a result of recent federal legislation (The Federal Cures Act), you may receive lab or pathology results from your visit in your [facility] account before your physician is able to contact you. Your physician or their representative will relay the results to you with their recommendations at their soonest availability.     If no improvement in symptoms or symptoms worsen, please be advised to call MD, follow-up at clinic and/or go to ER if becomes severe.    Jb Chao M.D.        We Offer TELEHEALTH & Same Day Appointments!   Book your Telehealth appointment with me through my nurse or   Clinic appointments on [employer]!    [address] [place] [address]    Office: [phone]   FAX: [phone]    Check out my Facebook Page and Follow Me at: [email or web address]    Check out my website at [employer] by clicking on: [email or web address]    To Schedule appointments online, go to [employer]: [email or web address]

## 2023-11-13 NOTE — ASSESSMENT & PLAN NOTE
Refill medications as prescribed.  No abuse suspected. The patient was checked in the [facility] Board of Pharmacy's  Prescription Monitoring Program. There appears to be no incongruities with the patient's verbalized history.       An opioid pain contract was completed  after having an extensive conversation about chronic opioid use for pain management. We discussed the risks and benefits of opioids.  I discouraged the patient from escalation of opioid use and try to minimize its use to decrease chance of dependence, tolerance, and opioid-based hyperalgesia.  I reviewed the  in great detail and there are no inconsistencies and it is appropriate with patient's history.  There are no signs of aberrant drug behavior.  The opioid risk tool was also completed, low risk.  Pt counseled about the side effects of long term use of opioids including dependence, tolerance, addiction, respiratory depression, somnolence, immune and endocrine dysfunction.  The patient expressed understanding.      If no improvement or worsening.  Referral to physical therapy, update imaging and consider spine/back clinic versus surgical specialist.

## 2023-11-13 NOTE — ASSESSMENT & PLAN NOTE
Avoid triggers.  Continue Allegra.Discussed condition course and signs and symptoms to expect.  Patient advised take anti-inflammatories and or Tylenol for pain or fever.  ER precautions.  Call MD or follow-up to clinic if not improving or worsening symptoms.

## 2023-11-13 NOTE — PROGRESS NOTES
PLAN:      Problem List Items Addressed This Visit       Type 2 diabetes mellitus with hyperglycemia, without long-term current use of insulin (Chronic)     Update labs and urine.We will plan to monitor hemoglobin A1c at designated intervals 3 to 6 months.  I recommend ongoing Education for diabetic diet and exercise protocol.  We will continue to monitor for side effects.    Please be advised of symptoms to monitor for and to notify me immediately if persistent or worsening.  Follow up with Ophthalmology/Optometry and Podiatry at least annually.           Relevant Orders    CBC Without Differential    Comprehensive Metabolic Panel    TSH    Hemoglobin A1C    Lipid Panel    Urinalysis, Reflex to Urine Culture Urine, Clean Catch    Encounter for long-term (current) use of medications (Chronic)     Complete history and physical was completed today.  Complete and thorough medication reconciliation was performed.  Discussed risks and benefits of medications.  Advised patient on orders and health maintenance.  We discussed old records and old labs if available.  Will request any records not available through epic.  Continue current medications listed on your summary sheet.           Relevant Medications    carisoprodoL (SOMA) 350 MG tablet (Start on 12/7/2023)    carisoprodoL (SOMA) 350 MG tablet (Start on 1/5/2024)    carisoprodoL (SOMA) 350 MG tablet (Start on 2/2/2024)    HYDROcodone-acetaminophen (NORCO) 7.5-325 mg per tablet (Start on 12/7/2023)    HYDROcodone-acetaminophen (NORCO) 7.5-325 mg per tablet (Start on 1/5/2024)    HYDROcodone-acetaminophen (NORCO) 7.5-325 mg per tablet (Start on 2/2/2024)    Other Relevant Orders    CBC Without Differential    Comprehensive Metabolic Panel    TSH    Hemoglobin A1C    Lipid Panel    Urinalysis, Reflex to Urine Culture Urine, Clean Catch    Sacroiliitis - Primary (Chronic)     Refill medications as prescribed.  No abuse suspected. The patient was checked in the [place]  [facility] Board of [employer]acy's  Prescription Monitoring Program. There appears to be no incongruities with the patient's verbalized history.       An opioid pain contract was completed  after having an extensive conversation about chronic opioid use for pain management. We discussed the risks and benefits of opioids.  I discouraged the patient from escalation of opioid use and try to minimize its use to decrease chance of dependence, tolerance, and opioid-based hyperalgesia.  I reviewed the  in great detail and there are no inconsistencies and it is appropriate with patient's history.  There are no signs of aberrant drug behavior.  The opioid risk tool was also completed, low risk.  Pt counseled about the side effects of long term use of opioids including dependence, tolerance, addiction, respiratory depression, somnolence, immune and endocrine dysfunction.  The patient expressed understanding.      If no improvement or worsening.  Referral to physical therapy, update imaging and consider spine/back clinic versus surgical specialist.         Relevant Medications    carisoprodoL (SOMA) 350 MG tablet (Start on 12/7/2023)    carisoprodoL (SOMA) 350 MG tablet (Start on 1/5/2024)    carisoprodoL (SOMA) 350 MG tablet (Start on 2/2/2024)    HYDROcodone-acetaminophen (NORCO) 7.5-325 mg per tablet (Start on 12/7/2023)    HYDROcodone-acetaminophen (NORCO) 7.5-325 mg per tablet (Start on 1/5/2024)    HYDROcodone-acetaminophen (NORCO) 7.5-325 mg per tablet (Start on 2/2/2024)    Other Relevant Orders    CBC Without Differential    Comprehensive Metabolic Panel    TSH    Hemoglobin A1C    Lipid Panel    Urinalysis, Reflex to Urine Culture Urine, Clean Catch    Toxicology Screen, Urine    Seasonal allergies     Avoid triggers.  Continue Allegra.Discussed condition course and signs and symptoms to expect.  Patient advised take anti-inflammatories and or Tylenol for pain or fever.  ER precautions.  Call MD or follow-up to clinic if  not improving or worsening symptoms.           Relevant Medications    fexofenadine (ALLEGRA) 180 MG tablet    Other Relevant Orders    CBC Without Differential    Comprehensive Metabolic Panel    TSH    Hemoglobin A1C    Lipid Panel    Urinalysis, Reflex to Urine Culture Urine, Clean Catch   Discussed condition course and signs and symptoms to expect.  Patient advised take anti-inflammatories and or Tylenol for pain or fever.  ER precautions.  Call MD or follow-up to clinic if not improving or worsening symptoms.    Future Appointments       Date Provider Specialty Appt Notes    2/13/2024 Jb Chao MD Family Medicine 3 month                 Medication Management for assessment above:   Medication List with Changes/Refills   Current Medications    BLOOD SUGAR DIAGNOSTIC (BLOOD GLUCOSE TEST) STRP    Check glucose 3 times per day before each meal    KETOCONAZOLE (NIZORAL) 2 % CREAM    Apply topically 2 (two) times daily.    METHYLPREDNISOLONE (MEDROL DOSEPACK) 4 MG TABLET    follow package directions    MUPIROCIN (BACTROBAN) 2 % OINTMENT    Apply topically 3 (three) times daily.    NAPROXEN (NAPROSYN) 500 MG TABLET    Take 1 tablet (500 mg total) by mouth 2 (two) times daily with meals.    ONDANSETRON (ZOFRAN-ODT) 8 MG TBDL    Take 1 tablet (8 mg total) by mouth every 6 (six) hours as needed (nausea).    PROMETHAZINE (PHENERGAN) 25 MG TABLET    Take 1 tablet (25 mg total) by mouth every 6 (six) hours as needed for Nausea.   Changed and/or Refilled Medications    Modified Medication Previous Medication    CARISOPRODOL (SOMA) 350 MG TABLET carisoprodoL (SOMA) 350 MG tablet       Take 1 tablet (350 mg total) by mouth 4 (four) times daily as needed for Muscle spasms.    Take 1 tablet (350 mg total) by mouth 4 (four) times daily as needed for Muscle spasms.    CARISOPRODOL (SOMA) 350 MG TABLET carisoprodoL (SOMA) 350 MG tablet       Take 1 tablet (350 mg total) by mouth 4 (four) times daily as needed for Muscle  spasms.    Take 1 tablet (350 mg total) by mouth 4 (four) times daily as needed for Muscle spasms.    CARISOPRODOL (SOMA) 350 MG TABLET carisoprodoL (SOMA) 350 MG tablet       Take 1 tablet (350 mg total) by mouth 4 (four) times daily as needed for Muscle spasms.    Take 1 tablet (350 mg total) by mouth 4 (four) times daily as needed for Muscle spasms.    FEXOFENADINE (ALLEGRA) 180 MG TABLET fexofenadine (ALLEGRA) 180 MG tablet       Take 1 tablet (180 mg total) by mouth once daily.    Take 1 tablet (180 mg total) by mouth once daily.    HYDROCODONE-ACETAMINOPHEN (NORCO) 7.5-325 MG PER TABLET HYDROcodone-acetaminophen (NORCO) 7.5-325 mg per tablet       Take 1 tablet by mouth every 6 (six) hours as needed for Pain.    Take 1 tablet by mouth every 6 (six) hours as needed for Pain.    HYDROCODONE-ACETAMINOPHEN (NORCO) 7.5-325 MG PER TABLET HYDROcodone-acetaminophen (NORCO) 7.5-325 mg per tablet       Take 1 tablet by mouth every 6 (six) hours as needed for Pain.    Take 1 tablet by mouth every 6 (six) hours as needed for Pain.    HYDROCODONE-ACETAMINOPHEN (NORCO) 7.5-325 MG PER TABLET HYDROcodone-acetaminophen (NORCO) 7.5-325 mg per tablet       Take 1 tablet by mouth every 6 (six) hours as needed for Pain.    Take 1 tablet by mouth every 6 (six) hours as needed for Pain.       Jb Chao M.D.  ==========================================================================  Subjective:   Patient ID: Ned Toledo Jr. is a 63 y.o. male.  has a past medical history of Diabetes mellitus, type 2, Fatty liver, Hypertension, Penetrating foot wound, and Sciatica.   Chief Complaint: Follow-up        Problem List Items Addressed This Visit       Type 2 diabetes mellitus with hyperglycemia, without long-term current use of insulin (Chronic)    Overview     November 2023:  Patient diet control with lifestyle modification diet and exercise.  August 2023:   March 2023:  Patient here for labs and urine for diabetes monitoring.  " Patient reports no issues with blood sugar at this time.  August 2022: Patient doing well with diet controlled diabetes.  Patient denies any history of thyroid cancer, pancreatitis, MEN syndrome.   March 2020:  Reviewed Diabetes Management StatusPatient cannot take statin due to side effects.SEPTEMBER 2020:  Diabetes Management StatusStatin: TakingACE/ARB: Not takingDecember 2021:  Diabetes Management StatusStatin: Not takingACE/ARB: Not takingMarch 2022:Diabetes Management StatusStatin: Not takingACE/ARB: Not takingMay 2022:  Patient reports that he has changed his diet significantly and that his sugar has been much better controlled.  Diabetes Management Status    Statin: Not taking  ACE/ARB: Not taking    Screening or Prevention Patient's value Goal Complete/Controlled?   HgA1C Testing and Control   Lab Results   Component Value Date    HGBA1C 7.0 (H) 03/02/2023      Annually/Less than 8% Yes   Lipid profile : 03/02/2023 Annually Yes   LDL control Lab Results   Component Value Date    LDLCALC 117.4 03/02/2023    Annually/Less than 100 mg/dl  No   Nephropathy screening Lab Results   Component Value Date    LABMICR 15.0 03/02/2023     Lab Results   Component Value Date    PROTEINUA Negative 12/31/2018     No results found for: "UTPCR"   Annually Yes   Blood pressure BP Readings from Last 1 Encounters:   11/13/23 136/82    Less than 140/90 Yes   Dilated retinal exam : 05/23/2019 Annually No   Foot exam   : 11/30/2022 Annually Yes            Current Assessment & Plan     Update labs and urine.We will plan to monitor hemoglobin A1c at designated intervals 3 to 6 months.  I recommend ongoing Education for diabetic diet and exercise protocol.  We will continue to monitor for side effects.    Please be advised of symptoms to monitor for and to notify me immediately if persistent or worsening.  Follow up with Ophthalmology/Optometry and Podiatry at least annually.           Encounter for long-term (current) use of " medications (Chronic)    Overview     November 2023: Reviewed labs.  August 2023 reviewed labs.  May 2023: Reviewed labs.  March 2023:  Reviewed labs November 2022: Reviewed labs.  October 2022: Reviewed labs.  August 2022: Reviewed labs.  Initial HPI March 2020:  CHRONIC. Stable. Compliant with medications for managed conditions. See medication list. No SE reported. Routine lab analysis is being monitored. Refills were addressed.JUNE 2020:  Labs are stable.  Refill medication.CHRONIC. Stable. Compliant with medications for managed conditions. See medication list. No SE reported. Routine lab analysis is being monitored. Refills were addressed.SEPTEMBER 2020:  CHRONIC. Stable. Compliant with medications for managed conditions. See medication list. No SE reported. Routine lab analysis is being monitored. Refills were addressed.DECEMBER 2020:  Reviewed labs.  CHRONIC. Stable. Compliant with medications for managed conditions. See medication list. No SE reported. Routine lab analysis is being monitored. Refills were addressed.  March 2021:  Reviewed labs.  June 2021:  Reviewed labs.  December 2021:  Reviewed labs. March 2022: Reviewed labs.  May 2022:  Reviewed labs.  Lab Results   Component Value Date    WBC 7.78 03/25/2020    HGB 14.9 03/25/2020    HCT 48.5 03/25/2020     (H) 03/25/2020     03/25/2020       Chemistry        Component Value Date/Time     03/02/2023 0741    K 4.8 03/02/2023 0741     03/02/2023 0741    CO2 25 03/02/2023 0741    BUN 21 03/02/2023 0741    CREATININE 1.1 03/02/2023 0741     (H) 03/02/2023 0741        Component Value Date/Time    CALCIUM 9.5 03/02/2023 0741    ALKPHOS 49 (L) 03/02/2023 0741    AST 18 03/02/2023 0741    ALT 16 03/02/2023 0741    BILITOT 0.7 03/02/2023 0741    ESTGFRAFRICA >60.0 06/01/2022 0809    EGFRNONAA >60.0 06/01/2022 0809        Lab Results   Component Value Date    TSH 1.236 09/04/2020          Current Assessment & Plan     Complete  history and physical was completed today.  Complete and thorough medication reconciliation was performed.  Discussed risks and benefits of medications.  Advised patient on orders and health maintenance.  We discussed old records and old labs if available.  Will request any records not available through epic.  Continue current medications listed on your summary sheet.           Sacroiliitis - Primary (Chronic)    Overview     November 2023: Patient here for medication refills.  Patient has had some difficulty with changing pharmacies lately.  Chronic.  August 2023:   Patient needing refills.  No new issues.   May 2023:  Noted injuries or falls.  Patient here for medication refills.  March 2023:  No new injuries or falls.  Stable on current medication regimen.  November 2022:  Patient here for medication refill.  Stable.  Patient chronic pain medication with hydrocodone 7.5 and Soma 350 milligram.  Patient was previously managed by previous PCP Dr. Jaime Hernández who is retiring.  Patient has been stable on this medication regimen for years.  No side effects reported.    March 2022: Patient reports medication regimen is controlling his symptoms.  No change in HPI.  No new injuries.  May 2022:  Patient reports he has been very active at work and pain medication regimen is working well.  August 2022: Patient here for medication refills.  No change in HPI.  Medication continues to work well.           Current Assessment & Plan     Refill medications as prescribed.  No abuse suspected. The patient was [place] in the [place] [employer]'s  Prescription Monitoring Program. There appears to be no incongruities with the patient's verbalized history.       An opioid pain contract was completed  after having an extensive conversation about chronic opioid use for pain management. We discussed the risks and benefits of opioids.  I discouraged the patient from escalation of opioid use and try to minimize its use to  decrease chance of dependence, tolerance, and opioid-based hyperalgesia.  I reviewed the  in great detail and there are no inconsistencies and it is appropriate with patient's history.  There are no signs of aberrant drug behavior.  The opioid risk tool was also completed, low risk.  Pt counseled about the side effects of long term use of opioids including dependence, tolerance, addiction, respiratory depression, somnolence, immune and endocrine dysfunction.  The patient expressed understanding.      If no improvement or worsening.  Referral to physical therapy, update imaging and consider spine/back clinic versus surgical specialist.         Seasonal allergies    Overview     Chronic.  Stable.  Patient on Allegra.  Reports compliance.  No side effects reported.  Symptoms are controlled.         Current Assessment & Plan     Avoid triggers.  Continue Allegra.Discussed condition course and signs and symptoms to expect.  Patient advised take anti-inflammatories and or Tylenol for pain or fever.  ER precautions.  Call MD or follow-up to clinic if not improving or worsening symptoms.               Review of patient's allergies indicates:   Allergen Reactions    Atorvastatin     Bactrim [sulfamethoxazole-trimethoprim] Hives     Current Outpatient Medications   Medication Instructions    blood sugar diagnostic (BLOOD GLUCOSE TEST) Strp Check glucose 3 times per day before each meal    [START ON 12/7/2023] carisoprodoL (SOMA) 350 mg, Oral, 4 times daily PRN    [START ON 1/5/2024] carisoprodoL (SOMA) 350 mg, Oral, 4 times daily PRN    [START ON 2/2/2024] carisoprodoL (SOMA) 350 mg, Oral, 4 times daily PRN    fexofenadine (ALLEGRA) 180 mg, Oral, Daily    [START ON 12/7/2023] HYDROcodone-acetaminophen (NORCO) 7.5-325 mg per tablet 1 tablet, Oral, Every 6 hours PRN    [START ON 1/5/2024] HYDROcodone-acetaminophen (NORCO) 7.5-325 mg per tablet 1 tablet, Oral, Every 6 hours PRN    [START ON 2/2/2024] HYDROcodone-acetaminophen  (NORCO) 7.5-325 mg per tablet 1 tablet, Oral, Every 6 hours PRN    ketoconazole (NIZORAL) 2 % cream Topical (Top), 2 times daily    methylPREDNISolone (MEDROL DOSEPACK) 4 mg tablet follow package directions    mupirocin (BACTROBAN) 2 % ointment Topical (Top), 3 times daily    naproxen (NAPROSYN) 500 mg, Oral, 2 times daily with meals    ondansetron (ZOFRAN-ODT) 8 mg, Oral, Every 6 hours PRN    promethazine (PHENERGAN) 25 mg, Oral, Every 6 hours PRN      I have reviewed the PMH, social history, FamilyHx, surgical history, allergies and medications documented / confirmed by the patient at the time of this visit.  Review of Systems   Constitutional:  Negative for chills, fatigue, fever and unexpected weight change.   HENT:  Negative for ear pain, sinus pressure, sinus pain and sore throat.    Eyes:  Negative for redness and visual disturbance.   Respiratory:  Negative for cough and shortness of breath.    Cardiovascular:  Negative for chest pain and palpitations.   Gastrointestinal:  Negative for nausea and vomiting.   Endocrine: Negative for cold intolerance and heat intolerance.   Genitourinary:  Negative for difficulty urinating and hematuria.   Musculoskeletal:  Positive for arthralgias and back pain. Negative for myalgias.   Skin:  Negative for rash and wound.   Allergic/Immunologic: Negative for environmental allergies and food allergies.   Neurological:  Negative for weakness and headaches.   Hematological:  Negative for adenopathy. Does not bruise/bleed easily.   Psychiatric/Behavioral:  Negative for sleep disturbance. The patient is not nervous/anxious.      Objective:   /82   Pulse 83   Wt 87.7 kg (193 lb 4.8 oz)   SpO2 96%   BMI 26.22 kg/m²   Physical Exam  Vitals and nursing note reviewed.   Constitutional:       General: He is not in acute distress.     Appearance: He is well-developed. He is not diaphoretic.   HENT:      Head: Normocephalic and atraumatic.      Right Ear: Tympanic membrane, ear  canal and external ear normal. There is no impacted cerumen.      Left Ear: Tympanic membrane, ear canal and external ear normal. There is no impacted cerumen.      Nose: No congestion or rhinorrhea.      Mouth/Throat:      Pharynx: No posterior oropharyngeal erythema.   Eyes:      Extraocular Movements: Extraocular movements intact.      Pupils: Pupils are equal, round, and reactive to light.   Neck:      Vascular: No carotid bruit.   Cardiovascular:      Rate and Rhythm: Normal rate.      Pulses: Normal pulses.   Pulmonary:      Effort: Pulmonary effort is normal. No respiratory distress.      Breath sounds: Normal breath sounds.   Abdominal:      General: Bowel sounds are normal.      Palpations: Abdomen is soft.   Musculoskeletal:         General: Tenderness and deformity present. Normal range of motion.      Cervical back: Normal range of motion and neck supple.   Lymphadenopathy:      Cervical: No cervical adenopathy.   Skin:     General: Skin is warm and dry.      Capillary Refill: Capillary refill takes less than 2 seconds.      Findings: No rash.   Neurological:      General: No focal deficit present.      Mental Status: He is alert and oriented to person, place, and time.   Psychiatric:         Attention and Perception: He is attentive.         Mood and Affect: Mood normal. Mood is not anxious or depressed. Affect is not labile, blunt, angry or inappropriate.         Speech: He is communicative. Speech is not rapid and pressured, delayed, slurred or tangential.         Behavior: Behavior normal. Behavior is not agitated, slowed, aggressive, withdrawn, hyperactive or combative.         Thought Content: Thought content normal. Thought content is not paranoid or delusional. Thought content does not include homicidal or suicidal ideation. Thought content does not include homicidal or suicidal plan.         Cognition and Memory: Memory is not impaired.         Judgment: Judgment normal. Judgment is not  impulsive or inappropriate.         Assessment:     1. Sacroiliitis    2. Encounter for long-term (current) use of medications    3. Seasonal allergies    4. Type 2 diabetes mellitus with hyperglycemia, without long-term current use of insulin      MDM:   Moderate complexity.  Moderate risk.  Total time: 32 minutes.  This includes total time spent on the encounter, which includes face to face time and non-face to face time preparing to see the patient (eg, review of previous medical records, tests), Obtaining and/or reviewing separately obtained history, documenting clinical information in the electronic or other health record, independently interpreting results (not separately reported)/communicating results to the patient/family/caregiver, and/or care coordination (not separately reported).    I have Reviewed and summarized old records.  I have performed thorough medication reconciliation today and discussed risk and benefits of medications.  I have reviewed labs and discussed with patient.  All questions were answered.  I am requesting old records and will review them once they are available.  The patient was checked in the [facility] Board of Pharmacy's  Prescription Monitoring Program. There appears to be no incongruities with the patient's verbalized history.       I have signed for the following orders AND/OR meds.  Orders Placed This Encounter   Procedures    CBC Without Differential     Standing Status:   Future     Standing Expiration Date:   1/11/2025    Comprehensive Metabolic Panel     Standing Status:   Future     Standing Expiration Date:   1/11/2025    TSH     Standing Status:   Future     Standing Expiration Date:   1/11/2025    Hemoglobin A1C     Standing Status:   Future     Standing Expiration Date:   1/11/2025    Lipid Panel     Standing Status:   Future     Standing Expiration Date:   1/11/2025    Urinalysis, Reflex to Urine Culture Urine, Clean Catch     Standing Status:   Future      Standing Expiration Date:   5/13/2025     Order Specific Question:   Preferred Collection Type     Answer:   Urine, Clean Catch     Order Specific Question:   Specimen Source     Answer:   Urine    Toxicology Screen, Urine     Standing Status:   Future     Standing Expiration Date:   1/11/2025     Order Specific Question:   Specimen Source     Answer:   Urine       Medications Ordered This Encounter   Medications    carisoprodoL (SOMA) 350 MG tablet     Sig: Take 1 tablet (350 mg total) by mouth 4 (four) times daily as needed for Muscle spasms.     Dispense:  120 tablet     Refill:  0    carisoprodoL (SOMA) 350 MG tablet     Sig: Take 1 tablet (350 mg total) by mouth 4 (four) times daily as needed for Muscle spasms.     Dispense:  120 tablet     Refill:  0    carisoprodoL (SOMA) 350 MG tablet     Sig: Take 1 tablet (350 mg total) by mouth 4 (four) times daily as needed for Muscle spasms.     Dispense:  120 tablet     Refill:  0    fexofenadine (ALLEGRA) 180 MG tablet     Sig: Take 1 tablet (180 mg total) by mouth once daily.     Dispense:  90 tablet     Refill:  3    HYDROcodone-acetaminophen (NORCO) 7.5-325 mg per tablet     Sig: Take 1 tablet by mouth every 6 (six) hours as needed for Pain.     Dispense:  120 tablet     Refill:  0     Quantity prescribed more than 7 day supply? Yes, quantity medically necessary     Order Specific Question:   I have reviewed the Prescription Drug Monitoring Program (PDMP) database for this patient prior to prescribing the above opioid medication     Answer:   Yes    HYDROcodone-acetaminophen (NORCO) 7.5-325 mg per tablet     Sig: Take 1 tablet by mouth every 6 (six) hours as needed for Pain.     Dispense:  120 tablet     Refill:  0     Quantity prescribed more than 7 day supply? Yes, quantity medically necessary     Order Specific Question:   I have reviewed the Prescription Drug Monitoring Program (PDMP) database for this patient prior to prescribing the above opioid medication      Answer:   Yes    HYDROcodone-acetaminophen (NORCO) 7.5-325 mg per tablet     Sig: Take 1 tablet by mouth every 6 (six) hours as needed for Pain.     Dispense:  120 tablet     Refill:  0     Quantity prescribed more than 7 day supply? Yes, quantity medically necessary     Order Specific Question:   I have reviewed the Prescription Drug Monitoring Program (PDMP) database for this patient prior to prescribing the above opioid medication     Answer:   Yes          Follow up in about 3 months (around 2/13/2024), or if symptoms worsen or fail to improve, for Med refills.  Future Appointments       Date Provider Specialty Appt Notes    2/13/2024 Jb Chao MD Family Medicine 3 month                If no improvement in symptoms or symptoms worsen, advised to call/follow-up at clinic or go to ER. Patient voiced understanding and all questions/concerns were addressed.   DISCLAIMER: This note was compiled by using a speech recognition dictation system and therefore please be aware that typographical / speech recognition errors can and do occur.  Please contact me if you see any errors specifically.    Jb Chao M.D.       Office: [phone]   [address] [place] [address]  FAX: [phone]

## 2023-11-24 ENCOUNTER — TELEPHONE (OUTPATIENT)
Dept: FAMILY MEDICINE | Facility: CLINIC | Age: 63
End: 2023-11-24
Payer: COMMERCIAL

## 2023-11-24 DIAGNOSIS — A49.9 BACTERIAL INFECTION: Primary | ICD-10-CM

## 2023-11-24 RX ORDER — PROMETHAZINE HYDROCHLORIDE AND DEXTROMETHORPHAN HYDROBROMIDE 6.25; 15 MG/5ML; MG/5ML
5 SYRUP ORAL EVERY 4 HOURS PRN
Qty: 120 ML | Refills: 0 | Status: SHIPPED | OUTPATIENT
Start: 2023-11-24 | End: 2023-12-04

## 2023-11-24 RX ORDER — METHYLPREDNISOLONE 4 MG/1
TABLET ORAL
Qty: 21 TABLET | Refills: 0 | Status: SHIPPED | OUTPATIENT
Start: 2023-11-24 | End: 2024-02-09

## 2023-11-24 RX ORDER — DOXYCYCLINE 100 MG/1
100 CAPSULE ORAL EVERY 12 HOURS
Qty: 20 CAPSULE | Refills: 0 | Status: SHIPPED | OUTPATIENT
Start: 2023-11-24 | End: 2024-02-09

## 2023-11-24 NOTE — TELEPHONE ENCOUNTER
----- Message from [facility] sent at 11/24/2023  7:38 AM CST -----  .Type:  Same Day Appointment Request     Caller is requesting a same day appointment       Caller declined first available appointment 11/27/2023      Best Call Back Number:  [phone]    Additional Information: FEVER CHILLS COUGH

## 2023-11-24 NOTE — TELEPHONE ENCOUNTER
Out of town with family and all were sick, including babies.  Just did home covid test, negative.  C/o chills, cough, for 2 days,  did not check temp, using OTC cough med and chip seltzer +, not much help.  Requesting antibiotic or please advise.  Advised UC for worsening, ER for distress.  Does not have mychart

## 2023-11-24 NOTE — TELEPHONE ENCOUNTER
Sounds like could be the flu or RSV or other virus.  I do not recommend antibiotics at this time.  Check to see when the patient recently had steroids.  Find out pharmacy information since he is out of town.

## 2023-11-24 NOTE — TELEPHONE ENCOUNTER
I have signed for the following orders AND/OR meds.  Please call the patient and ask the patient to schedule the testing AND/OR inform about any medications that were sent.      No orders of the defined types were placed in this encounter.      Medications Ordered This Encounter   Medications    doxycycline (VIBRAMYCIN) 100 MG Cap     Sig: Take 1 capsule (100 mg total) by mouth every 12 (twelve) hours.     Dispense:  20 capsule     Refill:  0    methylPREDNISolone (MEDROL DOSEPACK) 4 mg tablet     Sig: follow package directions     Dispense:  21 tablet     Refill:  0    promethazine-dextromethorphan (PROMETHAZINE-DM) 6.25-15 mg/5 mL Syrp     Sig: Take 5 mLs by mouth every 4 (four) hours as needed (cough).     Dispense:  120 mL     Refill:  0

## 2023-12-13 ENCOUNTER — TELEPHONE (OUTPATIENT)
Dept: FAMILY MEDICINE | Facility: CLINIC | Age: 63
End: 2023-12-13
Payer: COMMERCIAL

## 2023-12-13 DIAGNOSIS — M46.1 SACROILIITIS: Primary | ICD-10-CM

## 2023-12-13 RX ORDER — HYDROCODONE BITARTRATE AND ACETAMINOPHEN 10; 325 MG/1; MG/1
1 TABLET ORAL EVERY 8 HOURS PRN
Qty: 90 TABLET | Refills: 0 | Status: SHIPPED | OUTPATIENT
Start: 2023-12-13 | End: 2024-01-09 | Stop reason: SDUPTHER

## 2023-12-13 NOTE — TELEPHONE ENCOUNTER
I have signed for the following orders AND/OR meds.  Please call the patient and ask the patient to schedule the testing AND/OR inform about any medications that were sent.      No orders of the defined types were placed in this encounter.      Medications Ordered This Encounter   Medications    HYDROcodone-acetaminophen (NORCO)  mg per tablet     Sig: Take 1 tablet by mouth every 8 (eight) hours as needed for Pain.     Dispense:  90 tablet     Refill:  0     Quantity prescribed more than 7 day supply? Yes, quantity medically necessary     Order Specific Question:   I have reviewed the Prescription Drug Monitoring Program (PDMP) database for this patient prior to prescribing the above opioid medication     Answer:   Yes

## 2023-12-13 NOTE — TELEPHONE ENCOUNTER
----- Message from Elvie Frankel MA sent at 12/13/2023  3:02 PM CST -----  advise  ----- Message -----  From: Karina Toscano  Sent: 12/13/2023  10:14 AM CST  To: Zhanna Muhammad Staff    Pt is calling in regards to pain medicine hydrocodone, The pharmacy is having a it til after a month. Please prescribe NORCO 10 instead.Please call  [phone]       Please send [id number] - [place], [place] - [address] AT [facility] [address] [address]

## 2023-12-14 ENCOUNTER — PATIENT OUTREACH (OUTPATIENT)
Dept: ADMINISTRATIVE | Facility: HOSPITAL | Age: 63
End: 2023-12-14
Payer: COMMERCIAL

## 2023-12-27 ENCOUNTER — TELEPHONE (OUTPATIENT)
Dept: FAMILY MEDICINE | Facility: CLINIC | Age: 63
End: 2023-12-27
Payer: COMMERCIAL

## 2023-12-27 NOTE — TELEPHONE ENCOUNTER
----- Message from Jose Mott sent at 12/27/2023  7:18 AM CST -----  Contact: [phone]  Pt is calling requesting medication for cough , body aches , fever to be sent to local pharmacy . Please call back at [phone] . Thanks[id number] - [place], [place] - [address] AT [facility] [address] [address]  [address] [address]  Phone: [phone] Fax: [phone]

## 2024-01-09 DIAGNOSIS — M46.1 SACROILIITIS: ICD-10-CM

## 2024-01-09 RX ORDER — HYDROCODONE BITARTRATE AND ACETAMINOPHEN 10; 325 MG/1; MG/1
1 TABLET ORAL EVERY 8 HOURS PRN
Qty: 90 TABLET | Refills: 0 | Status: SHIPPED | OUTPATIENT
Start: 2024-01-09

## 2024-01-09 NOTE — TELEPHONE ENCOUNTER
----- Message from Jb Chao MD sent at 1/9/2024  2:59 PM CST -----  Contact: kathie  Send Rx request please  ----- Message -----  From: Dominga Haddad LPN  Sent: 1/9/2024   2:35 PM CST  To: Jb Chao MD    Patient states [facility] in [place] doesn't have the 7.5 mg Norco. Last month you called in the 10 mg and he would like you to change this one for him as well. Please advise   ----- Message -----  From: Jerry Griffin  Sent: 1/9/2024   2:26 PM CST  To: Zhanna Muhammad Staff    Type:  RX Refill Request  Who Called: kathie  Refill or New Rx:refill  RX Name and Strength:Jb Caho MD   How is the patient currently taking it? (ex. 1XDay):unknown  Is this a 30 day or 90 day RX:unknown  Preferred Pharmacy with phone number:  [facility][id number] [place], [place] - [address] AT [facility] [address] [address]  [address] [address]  Phone: [phone] Fax: [phone]  Local or Mail Order:local  Ordering Provider:dr giles  Would the patient rather a call back or a response via [facility]? Call back  Best Call Back Number:[phone]  Additional Information: He states the RX has to be called in .

## 2024-01-09 NOTE — TELEPHONE ENCOUNTER
Care Due:                  Date            Visit Type   Department     Provider  --------------------------------------------------------------------------------                                EP -                              PRIMARY      [facility] FAMILY  Last Visit: 11-      CARE ([facility])   MEDICINE       Jb Chao                              EP -                              PRIMARY      [facility] FAMILY  Next Visit: 02-      CARE ([facility])   MEDICINE       Jb Chao                                                            Last  Test          Frequency    Reason                     Performed    Due Date  --------------------------------------------------------------------------------    CBC.........  12 months..  naproxen.................  Not Found    Overdue    Cr..........  12 months..  naproxen.................  03- 02-    [facility] Embedded Care Due Messages. Reference number: [id number].   1/09/2024 3:02:27 PM CST

## 2024-02-06 DIAGNOSIS — Z12.11 COLON CANCER SCREENING: ICD-10-CM

## 2024-02-09 ENCOUNTER — OFFICE VISIT (OUTPATIENT)
Dept: FAMILY MEDICINE | Facility: CLINIC | Age: 64
End: 2024-02-09
Payer: COMMERCIAL

## 2024-02-09 VITALS
DIASTOLIC BLOOD PRESSURE: 86 MMHG | WEIGHT: 186 LBS | HEIGHT: 72 IN | SYSTOLIC BLOOD PRESSURE: 130 MMHG | OXYGEN SATURATION: 98 % | BODY MASS INDEX: 25.19 KG/M2 | HEART RATE: 92 BPM

## 2024-02-09 DIAGNOSIS — Z79.899 ENCOUNTER FOR LONG-TERM (CURRENT) USE OF MEDICATIONS: ICD-10-CM

## 2024-02-09 DIAGNOSIS — R05.1 ACUTE COUGH: ICD-10-CM

## 2024-02-09 DIAGNOSIS — M46.1 SACROILIITIS: ICD-10-CM

## 2024-02-09 DIAGNOSIS — B96.89 ACUTE BACTERIAL SINUSITIS: ICD-10-CM

## 2024-02-09 DIAGNOSIS — R21 RASH: ICD-10-CM

## 2024-02-09 DIAGNOSIS — R11.0 NAUSEA: ICD-10-CM

## 2024-02-09 DIAGNOSIS — M46.1 SACROILIITIS: Primary | ICD-10-CM

## 2024-02-09 DIAGNOSIS — J01.90 ACUTE BACTERIAL SINUSITIS: ICD-10-CM

## 2024-02-09 LAB
CTP QC/QA: YES
CTP QC/QA: YES
POC MOLECULAR INFLUENZA A AGN: NEGATIVE
POC MOLECULAR INFLUENZA B AGN: NEGATIVE
SARS-COV-2 RDRP RESP QL NAA+PROBE: NEGATIVE

## 2024-02-09 PROCEDURE — 3079F DIAST BP 80-89 MM HG: CPT | Mod: CPTII,S$GLB,, | Performed by: NURSE PRACTITIONER

## 2024-02-09 PROCEDURE — 1160F RVW MEDS BY RX/DR IN RCRD: CPT | Mod: CPTII,S$GLB,, | Performed by: NURSE PRACTITIONER

## 2024-02-09 PROCEDURE — 1159F MED LIST DOCD IN RCRD: CPT | Mod: CPTII,S$GLB,, | Performed by: NURSE PRACTITIONER

## 2024-02-09 PROCEDURE — 3008F BODY MASS INDEX DOCD: CPT | Mod: CPTII,S$GLB,, | Performed by: NURSE PRACTITIONER

## 2024-02-09 PROCEDURE — 3075F SYST BP GE 130 - 139MM HG: CPT | Mod: CPTII,S$GLB,, | Performed by: NURSE PRACTITIONER

## 2024-02-09 PROCEDURE — 99999 PR PBB SHADOW E&M-EST. PATIENT-LVL IV: CPT | Mod: PBBFAC,,, | Performed by: NURSE PRACTITIONER

## 2024-02-09 PROCEDURE — 87635 SARS-COV-2 COVID-19 AMP PRB: CPT | Mod: QW,S$GLB,, | Performed by: NURSE PRACTITIONER

## 2024-02-09 PROCEDURE — 87502 INFLUENZA DNA AMP PROBE: CPT | Mod: QW,S$GLB,, | Performed by: NURSE PRACTITIONER

## 2024-02-09 PROCEDURE — 99214 OFFICE O/P EST MOD 30 MIN: CPT | Mod: S$GLB,,, | Performed by: NURSE PRACTITIONER

## 2024-02-09 RX ORDER — CARISOPRODOL 350 MG/1
350 TABLET ORAL 4 TIMES DAILY PRN
Qty: 120 TABLET | Refills: 0 | Status: SHIPPED | OUTPATIENT
Start: 2024-02-09 | End: 2024-03-27 | Stop reason: SDUPTHER

## 2024-02-09 RX ORDER — HYDROCODONE BITARTRATE AND ACETAMINOPHEN 7.5; 325 MG/1; MG/1
1 TABLET ORAL EVERY 6 HOURS PRN
Qty: 120 TABLET | Refills: 0 | Status: SHIPPED | OUTPATIENT
Start: 2024-03-08 | End: 2024-03-27 | Stop reason: SDUPTHER

## 2024-02-09 RX ORDER — HYDROCODONE BITARTRATE AND ACETAMINOPHEN 7.5; 325 MG/1; MG/1
1 TABLET ORAL EVERY 6 HOURS PRN
Qty: 120 TABLET | Refills: 0 | Status: SHIPPED | OUTPATIENT
Start: 2024-02-09 | End: 2024-03-27 | Stop reason: SDUPTHER

## 2024-02-09 RX ORDER — CARISOPRODOL 350 MG/1
350 TABLET ORAL 4 TIMES DAILY PRN
Qty: 120 TABLET | Refills: 0 | Status: SHIPPED | OUTPATIENT
Start: 2024-04-07 | End: 2024-03-27 | Stop reason: SDUPTHER

## 2024-02-09 RX ORDER — PROMETHAZINE HYDROCHLORIDE 25 MG/1
25 TABLET ORAL EVERY 6 HOURS PRN
Qty: 30 TABLET | Refills: 6 | Status: SHIPPED | OUTPATIENT
Start: 2024-02-09

## 2024-02-09 RX ORDER — CARISOPRODOL 350 MG/1
350 TABLET ORAL 4 TIMES DAILY PRN
Qty: 120 TABLET | Refills: 0 | Status: CANCELLED | OUTPATIENT
Start: 2024-02-09

## 2024-02-09 RX ORDER — AZITHROMYCIN 250 MG/1
TABLET, FILM COATED ORAL
Qty: 6 TABLET | Refills: 0 | Status: SHIPPED | OUTPATIENT
Start: 2024-02-09 | End: 2024-02-14

## 2024-02-09 RX ORDER — HYDROCODONE BITARTRATE AND ACETAMINOPHEN 10; 325 MG/1; MG/1
1 TABLET ORAL EVERY 8 HOURS PRN
Qty: 90 TABLET | Refills: 0 | Status: CANCELLED | OUTPATIENT
Start: 2024-02-09

## 2024-02-09 RX ORDER — ONDANSETRON 8 MG/1
8 TABLET, ORALLY DISINTEGRATING ORAL EVERY 6 HOURS PRN
Qty: 30 TABLET | Refills: 1 | Status: SHIPPED | OUTPATIENT
Start: 2024-02-09

## 2024-02-09 RX ORDER — CARISOPRODOL 350 MG/1
350 TABLET ORAL 4 TIMES DAILY PRN
Qty: 120 TABLET | Refills: 0 | Status: SHIPPED | OUTPATIENT
Start: 2024-03-08 | End: 2024-03-27 | Stop reason: SDUPTHER

## 2024-02-09 RX ORDER — KETOCONAZOLE 20 MG/G
CREAM TOPICAL 2 TIMES DAILY
Qty: 60 G | Refills: 6 | Status: SHIPPED | OUTPATIENT
Start: 2024-02-09 | End: 2024-03-10

## 2024-02-09 RX ORDER — HYDROCODONE BITARTRATE AND ACETAMINOPHEN 7.5; 325 MG/1; MG/1
1 TABLET ORAL EVERY 6 HOURS PRN
Qty: 120 TABLET | Refills: 0 | Status: SHIPPED | OUTPATIENT
Start: 2024-04-07 | End: 2024-03-27 | Stop reason: SDUPTHER

## 2024-02-09 NOTE — PROGRESS NOTES
Assessment/Plan:  Problem List Items Addressed This Visit          Derm    Rash    Overview     Chronic.  Intermittent control.  Patient reports compliance and good efficacy with ketoconazole cream.  Requesting refill.          Current Assessment & Plan     Refill ketoconazole.  Discussed risk and benefits of medication use.  Follow-up if no improvement.         Relevant Medications    ketoconazole (NIZORAL) 2 % cream       GI    Nausea    Overview     Chronic.  Recurrent.  Intermittent control.  Patient uses Zofran or phenergan as needed.  Requesting refill.         Current Assessment & Plan     Medications refilled. Follow-up with GI if no improvement in symptoms.  ER precautions for severe symptoms.          Relevant Medications    ondansetron (ZOFRAN-ODT) 8 MG TbDL    promethazine (PHENERGAN) 25 MG tablet       Orthopedic    Sacroiliitis - Primary (Chronic)    Overview     Feb 2024: here for refills. No change in HPI. Tolerating hydrocodone and Soma which he has been taking for several years. No SE reported. Pain stable.     November 2023: Patient here for medication refills.  Patient has had some difficulty with changing pharmacies lately.  Chronic.  August 2023:   Patient needing refills.  No new issues.   May 2023:  Noted injuries or falls.  Patient here for medication refills.  March 2023:  No new injuries or falls.  Stable on current medication regimen.  November 2022:  Patient here for medication refill.  Stable.  Patient chronic pain medication with hydrocodone 7.5 and Soma 350 milligram.  Patient was previously managed by previous PCP Dr. Jaime Hernández who is retiring.  Patient has been stable on this medication regimen for years.  No side effects reported.    March 2022: Patient reports medication regimen is controlling his symptoms.  No change in HPI.  No new injuries.  May 2022:  Patient reports he has been very active at work and pain medication regimen is working well.  August 2022: Patient here for  medication refills.  No change in HPI.  Medication continues to work well.           Current Assessment & Plan     Continue pain medication as prescribed per PCP. RTC in 3 months for re-evaluation. Plan was disucssed with Dr. Chao. The patient was checked in the [facility] Board of Pharmacy's  Prescription Monitoring Program. There appears to be no incongruities with the patient's verbalized history.  If no improvement with pain medications patient will be referred to pain management for further evaluation.            Other    Encounter for long-term (current) use of medications (Chronic)    Overview     Feb 2024: Reviewed labs.  November 2023: Reviewed labs.  August 2023 reviewed labs.  May 2023: Reviewed labs.  March 2023:  Reviewed labs November 2022: Reviewed labs.  October 2022: Reviewed labs.  August 2022: Reviewed labs.  Initial HPI March 2020:  CHRONIC. Stable. Compliant with medications for managed conditions. See medication list. No SE reported. Routine lab analysis is being monitored. Refills were addressed.JUNE 2020:  Labs are stable.  Refill medication.CHRONIC. Stable. Compliant with medications for managed conditions. See medication list. No SE reported. Routine lab analysis is being monitored. Refills were addressed.SEPTEMBER 2020:  CHRONIC. Stable. Compliant with medications for managed conditions. See medication list. No SE reported. Routine lab analysis is being monitored. Refills were addressed.DECEMBER 2020:  Reviewed labs.  CHRONIC. Stable. Compliant with medications for managed conditions. See medication list. No SE reported. Routine lab analysis is being monitored. Refills were addressed.  March 2021:  Reviewed labs.  June 2021:  Reviewed labs.  December 2021:  Reviewed labs. March 2022: Reviewed labs.  May 2022:  Reviewed labs.  Lab Results   Component Value Date    WBC 7.78 03/25/2020    HGB 14.9 03/25/2020    HCT 48.5 03/25/2020     (H) 03/25/2020     03/25/2020        Chemistry        Component Value Date/Time     03/02/2023 0741    K 4.8 03/02/2023 0741     03/02/2023 0741    CO2 25 03/02/2023 0741    BUN 21 03/02/2023 0741    CREATININE 1.1 03/02/2023 0741     (H) 03/02/2023 0741        Component Value Date/Time    CALCIUM 9.5 03/02/2023 0741    ALKPHOS 49 (L) 03/02/2023 0741    AST 18 03/02/2023 0741    ALT 16 03/02/2023 0741    BILITOT 0.7 03/02/2023 0741    ESTGFRAFRICA >60.0 06/01/2022 0809    EGFRNONAA >60.0 06/01/2022 0809        Lab Results   Component Value Date    TSH 1.236 09/04/2020          Current Assessment & Plan     Complete history and physical was completed today.  Complete and thorough medication reconciliation was performed.  Discussed risks and benefits of medications.  Advised patient on orders and health maintenance.  We discussed old records and old labs if available.  Will request any records not available through epic.  Continue current medications listed on your summary sheet.         Relevant Medications    blood sugar diagnostic (BLOOD GLUCOSE TEST) Strp    ondansetron (ZOFRAN-ODT) 8 MG TbDL    promethazine (PHENERGAN) 25 MG tablet     Other Visit Diagnoses       Acute bacterial sinusitis        Relevant Medications    azithromycin (Z-NOEL) 250 MG tablet    - negative covid and flu  - patient states he had recent steroids last month from urgent care  - start ABX as prescribed  - supportive care- rest, increase hydration with water, OTC Tylenol/Ibuprofen for pain/fever.      Acute cough        Relevant Orders    POCT COVID-19 Rapid Screening (Completed)    POCT Influenza A/B Molecular (Completed)     Follow up in about 3 months (around 5/9/2024), or if symptoms worsen or fail to improve, for follow up with PCP.  ER precautions for severe or worsening symptoms.     Mirian Parekh  NP  _____________________________________________________________________________________________________________________________________________________    CC: medication refills; sinus problem     HPI: Patient is a 63-year-old male who presents in clinic today as an established patient here for medication refills. Chronic condition has been reviewed and remains stable. Further details as stated above.     He also reports sinus problem. This is a new problem. The current episode started a week ago. The problem is gradually worsening. There has been no fever. He is experiencing no pain. Associated symptoms include congestion, postnasal drip, sore throat, headache, and sinus pressure. Pertinent negatives include no chills, diaphoresis, ear pain, hoarse voice, neck pain, shortness of breath, sneezing, or swollen glands. Past treatments include mucinex. The treatment provided no relief. He has had no known sick contacts. He is fully vaccinated for covid.     Past Medical History:  Past Medical History:   Diagnosis Date    Diabetes mellitus, type 2     Fatty liver     Hypertension     Penetrating foot wound     Sciatica      Past Surgical History:   Procedure Laterality Date    HERNIA REPAIR       Review of patient's allergies indicates:   Allergen Reactions    Atorvastatin     Bactrim [sulfamethoxazole-trimethoprim] Hives     Social History     Tobacco Use    Smoking status: Never     Passive exposure: Never    Smokeless tobacco: Never   Substance Use Topics    Alcohol use: No    Drug use: Never     History reviewed. No pertinent family history.  Current Outpatient Medications on File Prior to Visit   Medication Sig Dispense Refill    carisoprodoL (SOMA) 350 MG tablet Take 1 tablet (350 mg total) by mouth 4 (four) times daily as needed for Muscle spasms. 120 tablet 0    carisoprodoL (SOMA) 350 MG tablet Take 1 tablet (350 mg total) by mouth 4 (four) times daily as needed for Muscle spasms. 120 tablet 0    carisoprodoL  (SOMA) 350 MG tablet Take 1 tablet (350 mg total) by mouth 4 (four) times daily as needed for Muscle spasms. 120 tablet 0    fexofenadine (ALLEGRA) 180 MG tablet Take 1 tablet (180 mg total) by mouth once daily. 90 tablet 3    HYDROcodone-acetaminophen (NORCO)  mg per tablet Take 1 tablet by mouth every 8 (eight) hours as needed for Pain. 90 tablet 0    HYDROcodone-acetaminophen (NORCO) 7.5-325 mg per tablet Take 1 tablet by mouth every 6 (six) hours as needed for Pain. 120 tablet 0    HYDROcodone-acetaminophen (NORCO) 7.5-325 mg per tablet Take 1 tablet by mouth every 6 (six) hours as needed for Pain. 120 tablet 0    HYDROcodone-acetaminophen (NORCO) 7.5-325 mg per tablet Take 1 tablet by mouth every 6 (six) hours as needed for Pain. 120 tablet 0    mupirocin (BACTROBAN) 2 % ointment Apply topically 3 (three) times daily. 15 g 0    naproxen (NAPROSYN) 500 MG tablet Take 1 tablet (500 mg total) by mouth 2 (two) times daily with meals. 60 tablet 11    [DISCONTINUED] blood sugar diagnostic (BLOOD GLUCOSE TEST) Strp Check glucose 3 times per day before each meal 100 strip prn    [DISCONTINUED] doxycycline (VIBRAMYCIN) 100 MG Cap Take 1 capsule (100 mg total) by mouth every 12 (twelve) hours. 20 capsule 0    [DISCONTINUED] ondansetron (ZOFRAN-ODT) 8 MG TbDL Take 1 tablet (8 mg total) by mouth every 6 (six) hours as needed (nausea). 30 tablet 1    [DISCONTINUED] promethazine (PHENERGAN) 25 MG tablet Take 1 tablet (25 mg total) by mouth every 6 (six) hours as needed for Nausea. 30 tablet 6    [DISCONTINUED] ketoconazole (NIZORAL) 2 % cream Apply topically 2 (two) times daily. 60 g 6    [DISCONTINUED] methylPREDNISolone (MEDROL DOSEPACK) 4 mg tablet follow package directions 21 tablet 0     No current facility-administered medications on file prior to visit.     Review of Systems   Constitutional:  Negative for appetite change, chills, fatigue, fever and unexpected weight change.   HENT:  Positive for congestion,  postnasal drip, rhinorrhea, sinus pressure and sore throat. Negative for ear pain and sinus pain.    Eyes:  Negative for redness and visual disturbance.   Respiratory:  Positive for cough. Negative for shortness of breath.    Cardiovascular:  Negative for chest pain, palpitations and leg swelling.   Gastrointestinal:  Negative for abdominal pain, diarrhea, nausea and vomiting.   Endocrine: Negative for cold intolerance and heat intolerance.   Genitourinary:  Negative for difficulty urinating, dysuria and hematuria.   Musculoskeletal:  Positive for back pain. Negative for arthralgias and myalgias.   Skin:  Negative for rash and wound.   Allergic/Immunologic: Negative for environmental allergies and food allergies.   Neurological:  Positive for headaches. Negative for dizziness and weakness.   Hematological:  Negative for adenopathy. Does not bruise/bleed easily.   Psychiatric/Behavioral:  Negative for behavioral problems and sleep disturbance. The patient is not nervous/anxious.      Vitals:    02/09/24 0725   BP: 130/86   Pulse: 92   SpO2: 98%   Weight: 84.4 kg (186 lb)   Height: 6' (1.829 m)     Wt Readings from Last 3 Encounters:   02/09/24 84.4 kg (186 lb)   11/13/23 87.7 kg (193 lb 4.8 oz)   08/22/23 87.1 kg (192 lb)     Physical Exam  Vitals and nursing note reviewed.   Constitutional:       General: He is not in acute distress.     Appearance: Normal appearance. He is well-developed. He is not ill-appearing or diaphoretic.   HENT:      Head: Normocephalic and atraumatic.      Right Ear: Tympanic membrane, ear canal and external ear normal. There is no impacted cerumen.      Left Ear: Tympanic membrane, ear canal and external ear normal. There is no impacted cerumen.      Nose: Mucosal edema and congestion present. No rhinorrhea.      Right Sinus: Maxillary sinus tenderness present.      Left Sinus: Maxillary sinus tenderness present.      Mouth/Throat:      Mouth: Mucous membranes are moist.      Pharynx:  Posterior oropharyngeal erythema present.   Eyes:      Extraocular Movements: Extraocular movements intact.      Conjunctiva/sclera: Conjunctivae normal.      Pupils: Pupils are equal, round, and reactive to light.   Neck:      Vascular: No carotid bruit.   Cardiovascular:      Rate and Rhythm: Normal rate.      Pulses: Normal pulses.      Heart sounds: Normal heart sounds.   Pulmonary:      Effort: Pulmonary effort is normal. No respiratory distress.      Breath sounds: Normal breath sounds. No wheezing.   Abdominal:      General: Abdomen is flat. Bowel sounds are normal. There is no distension.      Palpations: Abdomen is soft.   Musculoskeletal:         General: Tenderness and deformity present. Normal range of motion.      Cervical back: Normal range of motion and neck supple.   Lymphadenopathy:      Cervical: No cervical adenopathy.   Skin:     General: Skin is warm and dry.      Capillary Refill: Capillary refill takes less than 2 seconds.      Coloration: Skin is not pale.      Findings: No rash.   Neurological:      General: No focal deficit present.      Mental Status: He is alert and oriented to person, place, and time. Mental status is at baseline.   Psychiatric:         Attention and Perception: He is attentive.         Mood and Affect: Mood normal. Mood is not anxious or depressed. Affect is not labile, blunt, angry or inappropriate.         Speech: Speech normal. He is communicative. Speech is not rapid and pressured, delayed, slurred or tangential.         Behavior: Behavior normal. Behavior is not agitated, slowed, aggressive, withdrawn, hyperactive or combative. Behavior is cooperative.         Thought Content: Thought content normal. Thought content is not paranoid or delusional. Thought content does not include homicidal or suicidal ideation. Thought content does not include homicidal or suicidal plan.         Cognition and Memory: Memory is not impaired.         Judgment: Judgment normal.  Judgment is not impulsive or inappropriate.       Health Maintenance   Topic Date Due    Low Dose Statin  Never done    Shingles Vaccine (1 of 2) Never done    Eye Exam  05/23/2020    Colorectal Cancer Screening  07/06/2023    Hemoglobin A1c  09/02/2023    Foot Exam  11/30/2023    Lipid Panel  03/02/2024    PROSTATE-SPECIFIC ANTIGEN  03/02/2024    TETANUS VACCINE  09/03/2031    Hepatitis C Screening  Completed

## 2024-02-09 NOTE — ASSESSMENT & PLAN NOTE
Medications refilled. Follow-up with GI if no improvement in symptoms.  ER precautions for severe symptoms.

## 2024-02-09 NOTE — TELEPHONE ENCOUNTER
No care due was identified.  [facility] Embedded Care Due Messages. Reference number: [id number].   2/09/2024 3:41:17 PM CST

## 2024-02-09 NOTE — ASSESSMENT & PLAN NOTE
Refill ketoconazole.  Discussed risk and benefits of medication use.  Follow-up if no improvement.

## 2024-02-09 NOTE — ASSESSMENT & PLAN NOTE
Continue pain medication as prescribed per PCP. RTC in 3 months for re-evaluation. Plan was disucssed with Dr. Chao. The patient was checked in the [facility] Board of Pharmacy's  Prescription Monitoring Program. There appears to be no incongruities with the patient's verbalized history.  If no improvement with pain medications patient will be referred to pain management for further evaluation.

## 2024-02-09 NOTE — PROGRESS NOTES
Patient requests pain medication refills. Please see recent encounter for details. See  below.

## 2024-02-09 NOTE — TELEPHONE ENCOUNTER
----- Message from Felicita Garcia sent at 2/9/2024  2:18 PM CST -----  Contact: [phone]  Patient is calling in regards to getting a prescription for freestyle lite test stripes. He stated that the wrong prescription for stripes was sent over to the pharmacy this morning.          [id number] - [place], [place] - [address] AT [facility] [address] [address]  [address] [address]  Phone: [phone] Fax: [phone]       Pt contact number is [phone]. Thanks KB

## 2024-02-26 ENCOUNTER — OFFICE VISIT (OUTPATIENT)
Dept: FAMILY MEDICINE | Facility: CLINIC | Age: 64
End: 2024-02-26
Payer: COMMERCIAL

## 2024-02-26 VITALS
DIASTOLIC BLOOD PRESSURE: 85 MMHG | TEMPERATURE: 98 F | BODY MASS INDEX: 26.23 KG/M2 | SYSTOLIC BLOOD PRESSURE: 162 MMHG | RESPIRATION RATE: 18 BRPM | WEIGHT: 193.63 LBS | OXYGEN SATURATION: 99 % | HEIGHT: 72 IN | HEART RATE: 84 BPM

## 2024-02-26 DIAGNOSIS — L72.3 INFECTED SEBACEOUS CYST: Primary | ICD-10-CM

## 2024-02-26 DIAGNOSIS — L08.9 INFECTED SEBACEOUS CYST: Primary | ICD-10-CM

## 2024-02-26 PROCEDURE — 1160F RVW MEDS BY RX/DR IN RCRD: CPT | Mod: CPTII,S$GLB,, | Performed by: NURSE PRACTITIONER

## 2024-02-26 PROCEDURE — 99999 PR PBB SHADOW E&M-EST. PATIENT-LVL V: CPT | Mod: PBBFAC,,, | Performed by: NURSE PRACTITIONER

## 2024-02-26 PROCEDURE — 99213 OFFICE O/P EST LOW 20 MIN: CPT | Mod: 25,S$GLB,, | Performed by: NURSE PRACTITIONER

## 2024-02-26 PROCEDURE — 3008F BODY MASS INDEX DOCD: CPT | Mod: CPTII,S$GLB,, | Performed by: NURSE PRACTITIONER

## 2024-02-26 PROCEDURE — 10060 I&D ABSCESS SIMPLE/SINGLE: CPT | Mod: S$GLB,,, | Performed by: NURSE PRACTITIONER

## 2024-02-26 PROCEDURE — 1159F MED LIST DOCD IN RCRD: CPT | Mod: CPTII,S$GLB,, | Performed by: NURSE PRACTITIONER

## 2024-02-26 PROCEDURE — 3079F DIAST BP 80-89 MM HG: CPT | Mod: CPTII,S$GLB,, | Performed by: NURSE PRACTITIONER

## 2024-02-26 PROCEDURE — 3077F SYST BP >= 140 MM HG: CPT | Mod: CPTII,S$GLB,, | Performed by: NURSE PRACTITIONER

## 2024-02-26 RX ORDER — AMOXICILLIN AND CLAVULANATE POTASSIUM 875; 125 MG/1; MG/1
1 TABLET, FILM COATED ORAL EVERY 12 HOURS
Qty: 20 TABLET | Refills: 0 | Status: SHIPPED | OUTPATIENT
Start: 2024-02-26 | End: 2024-03-07

## 2024-02-26 NOTE — PROGRESS NOTES
Subjective:       Patient ID: Ned Toledo Jr. is a 63 y.o. male.    Chief Complaint: Recurrent Skin Infections    Abscess  Chronicity:  RecurrentProgression Since Onset: gradually worsening  Abscess location: back.  Associated Symptoms: no fever  Characteristics: painful, redness and swelling    Treatments Tried:  Nothing      Review of Systems   Constitutional:  Negative for fatigue, fever and unexpected weight change.   HENT:  Negative for ear pain and sore throat.    Eyes: Negative.  Negative for pain and visual disturbance.   Respiratory:  Negative for cough and shortness of breath.    Cardiovascular:  Negative for chest pain and palpitations.   Gastrointestinal:  Negative for abdominal pain, diarrhea, nausea and vomiting.   Genitourinary:  Negative for dysuria and frequency.   Musculoskeletal:  Negative for arthralgias and myalgias.   Skin:  Negative for color change and rash.   Neurological:  Negative for dizziness and headaches.   Psychiatric/Behavioral:  Negative for sleep disturbance. The patient is not nervous/anxious.        Vitals:    02/26/24 1535   BP: (!) 162/85   Pulse: 84   Resp:    Temp:        Objective:     Current Outpatient Medications   Medication Sig Dispense Refill    blood sugar diagnostic (BLOOD GLUCOSE TEST) Strp Check glucose 3 times per day before each meal 100 strip prn    blood sugar diagnostic (FREESTYLE INSULINX TEST STRIPS) Strp Use 3 times a day as needed to test blood sugar. 100 strip 12    [START ON 4/7/2024] carisoprodoL (SOMA) 350 MG tablet Take 1 tablet (350 mg total) by mouth 4 (four) times daily as needed for Muscle spasms. 120 tablet 0    [START ON 3/8/2024] carisoprodoL (SOMA) 350 MG tablet Take 1 tablet (350 mg total) by mouth 4 (four) times daily as needed for Muscle spasms. 120 tablet 0    carisoprodoL (SOMA) 350 MG tablet Take 1 tablet (350 mg total) by mouth 4 (four) times daily as needed for Muscle spasms. 120 tablet 0    fexofenadine (ALLEGRA) 180 MG tablet  Take 1 tablet (180 mg total) by mouth once daily. 90 tablet 3    HYDROcodone-acetaminophen (NORCO)  mg per tablet Take 1 tablet by mouth every 8 (eight) hours as needed for Pain. 90 tablet 0    HYDROcodone-acetaminophen (NORCO) 7.5-325 mg per tablet Take 1 tablet by mouth every 6 (six) hours as needed for Pain. 120 tablet 0    [START ON 3/8/2024] HYDROcodone-acetaminophen (NORCO) 7.5-325 mg per tablet Take 1 tablet by mouth every 6 (six) hours as needed for Pain. 120 tablet 0    [START ON 4/7/2024] HYDROcodone-acetaminophen (NORCO) 7.5-325 mg per tablet Take 1 tablet by mouth every 6 (six) hours as needed for Pain. 120 tablet 0    ketoconazole (NIZORAL) 2 % cream Apply topically 2 (two) times daily. 60 g 6    mupirocin (BACTROBAN) 2 % ointment Apply topically 3 (three) times daily. 15 g 0    naproxen (NAPROSYN) 500 MG tablet Take 1 tablet (500 mg total) by mouth 2 (two) times daily with meals. 60 tablet 11    ondansetron (ZOFRAN-ODT) 8 MG TbDL Take 1 tablet (8 mg total) by mouth every 6 (six) hours as needed (nausea). 30 tablet 1    promethazine (PHENERGAN) 25 MG tablet Take 1 tablet (25 mg total) by mouth every 6 (six) hours as needed for Nausea. 30 tablet 6    amoxicillin-clavulanate 875-125mg (AUGMENTIN) 875-125 mg per tablet Take 1 tablet by mouth every 12 (twelve) hours. for 10 days 20 tablet 0     No current facility-administered medications for this visit.       Physical Exam  Constitutional:       Appearance: He is well-developed.   HENT:      Head: Normocephalic.   Pulmonary:      Effort: Pulmonary effort is normal. No respiratory distress.   Musculoskeletal:      Cervical back: Normal range of motion.   Skin:     Findings: Abscess present.          Neurological:      Mental Status: He is alert and oriented to person, place, and time.   Psychiatric:         Thought Content: Thought content normal.         Judgment: Judgment normal.         I&D: consent signed. Area prepped with betadine and alcohol.  Pain ease used to anesthetize the area.  Incision made with 11. Blade.  Large amount of purulent drainage along with sebum was removed.  Patient tolerated well.  Area cleaned and covered with Neosporin and a bandage.    Assessment:       1. Infected sebaceous cyst        Plan:   Infected sebaceous cyst    Other orders  -     amoxicillin-clavulanate 875-125mg (AUGMENTIN) 875-125 mg per tablet; Take 1 tablet by mouth every 12 (twelve) hours. for 10 days  Dispense: 20 tablet; Refill: 0      Clean area with antibacterial soap and water, air dry, cover with Neosporin and a bandage while it is draining.  Once the area stops draining leave open to air 4-6 hours each day.  Cover while working.  Follow-up for worsening symptoms    No follow-ups on file.    There are no Patient Instructions on file for this visit.

## 2024-03-25 ENCOUNTER — PATIENT OUTREACH (OUTPATIENT)
Dept: ADMINISTRATIVE | Facility: HOSPITAL | Age: 64
End: 2024-03-25
Payer: COMMERCIAL

## 2024-03-25 DIAGNOSIS — E11.65 TYPE 2 DIABETES MELLITUS WITH HYPERGLYCEMIA, WITHOUT LONG-TERM CURRENT USE OF INSULIN: Primary | Chronic | ICD-10-CM

## 2024-03-25 NOTE — LETTER
May 10, 2024        Jony Bob, OD  [address] [address]                                                                                                 2nd Request              [facility]  [address]  [facility] [address]  Phone: [phone]   Patient: Ned Toledo Jr.   MR Number: [id number]   YOB: 1960   Date of Visit: 3/25/2024       Dear Dr. Bob:     We are seeing Ned Toledo , YOB: 1960, at [facility]. Jb Chao MD is their primary care physician. To help with our health maintenance records could you please send the following:     Last Diabetic Eye Exam Report that states Positive or Negative Retinopathy.    Please send fax to [phone] Attention Dora MCKEON LPN Care Coordination.    Thank-you in advance for your assistance. If you have any questions or concerns, please don't hesitate to contact me at [phone].     Dora MCKEON LPN  Care Coordination Department  [facility]  Phone number [phone]

## 2024-03-25 NOTE — PROGRESS NOTES
VBHM Score: 7     Colon Cancer Screening  Urine Screening  Eye Exam  Hemoglobin A1c  Lipid Panel  Foot Exam  Uncontrolled BP    Influenza Vaccine  Shingles/Zoster Vaccine  RSV Vaccine                  Health Maintenance Topic(s) Outreach Outcomes & Actions Taken:    Colorectal Cancer Screening - Outreach Outcomes & Actions Taken  : will discuss with PCP    Eye Exam - Outreach Outcomes & Actions Taken  : will discuss with PCP    Lab(s) - Outreach Outcomes & Actions Taken  : Overdue Lab(s) Scheduled    Diabetic Foot Exam - Outreach Outcomes & Actions Taken  : will discuss with PCP    Primary Care Appt - Outreach Outcomes & Actions Taken  : Primary Care Appt Scheduled    Blood Pressure - Outreach Outcomes & Actions Taken  : Primary Care Follow Up Visit Scheduled                        Additional Notes:  Patient busy preparing for granddaughter's 16th birthday. Accepts appt for PCP for med refill & diabetic labs, f/u call in one month. Unable to set up Portal pt reports phone has issues he he will try some other time. Previously declined Dig Med.               Care Management, Digital Medicine, and/or Education Referrals    OPCM Risk Score: 18.7         Next Steps - Referral Actions: to f/u in 1 month        Additional Notes:

## 2024-03-25 NOTE — LETTER
April 25, 2024        Jony Bob, OD  [address] [address]             [place] [id number]  [facility] [address]  Phone: [phone]   Patient: Ned Toledo Jr.   MR Number: [id number]   YOB: 1960   Date of Visit: 3/25/2024       Dear Dr. Bob:     We are seeing Ned Toledo , YOB: 1960, at [facility]. Jb Chao MD is their primary care physician. To help with our health maintenance records could you please send the following:     Last Diabetic Eye Exam Report that states Positive or Negative Retinopathy.    Please send fax to [phone] Attention Dora MCKEON LPN Care Coordination.    Thank-you in advance for your assistance. If you have any questions or concerns, please don't hesitate to contact me at [phone].     Dora MCKEON LPN  Care Coordination Department  [facility]  Phone number [phone]

## 2024-03-26 ENCOUNTER — LAB VISIT (OUTPATIENT)
Dept: LAB | Facility: HOSPITAL | Age: 64
End: 2024-03-26
Attending: FAMILY MEDICINE
Payer: COMMERCIAL

## 2024-03-26 DIAGNOSIS — E11.65 TYPE 2 DIABETES MELLITUS WITH HYPERGLYCEMIA, WITHOUT LONG-TERM CURRENT USE OF INSULIN: ICD-10-CM

## 2024-03-26 DIAGNOSIS — M46.1 SACROILIITIS: ICD-10-CM

## 2024-03-26 DIAGNOSIS — J30.2 SEASONAL ALLERGIES: ICD-10-CM

## 2024-03-26 DIAGNOSIS — Z79.899 ENCOUNTER FOR LONG-TERM (CURRENT) USE OF MEDICATIONS: ICD-10-CM

## 2024-03-26 DIAGNOSIS — Z12.5 ENCOUNTER FOR PROSTATE CANCER SCREENING: ICD-10-CM

## 2024-03-26 DIAGNOSIS — E11.9 TYPE 2 DIABETES MELLITUS WITHOUT COMPLICATION, WITHOUT LONG-TERM CURRENT USE OF INSULIN: ICD-10-CM

## 2024-03-26 LAB
ALBUMIN SERPL BCP-MCNC: 4.2 G/DL (ref 3.5–5.2)
ALBUMIN/CREAT UR: 26.6 UG/MG (ref 0–30)
ALP SERPL-CCNC: 55 U/L (ref 55–135)
ALT SERPL W/O P-5'-P-CCNC: 24 U/L (ref 10–44)
ANION GAP SERPL CALC-SCNC: 8 MMOL/L (ref 8–16)
AST SERPL-CCNC: 17 U/L (ref 10–40)
BILIRUB SERPL-MCNC: 0.8 MG/DL (ref 0.1–1)
BUN SERPL-MCNC: 10 MG/DL (ref 8–23)
CALCIUM SERPL-MCNC: 9.8 MG/DL (ref 8.7–10.5)
CHLORIDE SERPL-SCNC: 102 MMOL/L (ref 95–110)
CHOLEST SERPL-MCNC: 222 MG/DL (ref 120–199)
CHOLEST/HDLC SERPL: 5.6 {RATIO} (ref 2–5)
CO2 SERPL-SCNC: 24 MMOL/L (ref 23–29)
COMPLEXED PSA SERPL-MCNC: 1.6 NG/ML (ref 0–4)
CREAT SERPL-MCNC: 1.2 MG/DL (ref 0.5–1.4)
CREAT UR-MCNC: 139 MG/DL (ref 23–375)
ERYTHROCYTE [DISTWIDTH] IN BLOOD BY AUTOMATED COUNT: 12.3 % (ref 11.5–14.5)
EST. GFR  (NO RACE VARIABLE): >60 ML/MIN/1.73 M^2
ESTIMATED AVG GLUCOSE: 200 MG/DL (ref 68–131)
GLUCOSE SERPL-MCNC: 203 MG/DL (ref 70–110)
HBA1C MFR BLD: 8.6 % (ref 4–5.6)
HCT VFR BLD AUTO: 46 % (ref 40–54)
HDLC SERPL-MCNC: 40 MG/DL (ref 40–75)
HDLC SERPL: 18 % (ref 20–50)
HGB BLD-MCNC: 15.1 G/DL (ref 14–18)
LDLC SERPL CALC-MCNC: 151.6 MG/DL (ref 63–159)
MCH RBC QN AUTO: 31.9 PG (ref 27–31)
MCHC RBC AUTO-ENTMCNC: 32.8 G/DL (ref 32–36)
MCV RBC AUTO: 97 FL (ref 82–98)
MICROALBUMIN UR DL<=1MG/L-MCNC: 37 UG/ML
NONHDLC SERPL-MCNC: 182 MG/DL
PLATELET # BLD AUTO: 353 K/UL (ref 150–450)
PMV BLD AUTO: 9.3 FL (ref 9.2–12.9)
POTASSIUM SERPL-SCNC: 4.3 MMOL/L (ref 3.5–5.1)
PROT SERPL-MCNC: 7.1 G/DL (ref 6–8.4)
RBC # BLD AUTO: 4.73 M/UL (ref 4.6–6.2)
SODIUM SERPL-SCNC: 134 MMOL/L (ref 136–145)
TRIGL SERPL-MCNC: 152 MG/DL (ref 30–150)
TSH SERPL DL<=0.005 MIU/L-ACNC: 2.19 UIU/ML (ref 0.4–4)
WBC # BLD AUTO: 5.99 K/UL (ref 3.9–12.7)

## 2024-03-26 PROCEDURE — 80061 LIPID PANEL: CPT | Performed by: FAMILY MEDICINE

## 2024-03-26 PROCEDURE — 36415 COLL VENOUS BLD VENIPUNCTURE: CPT | Mod: PO | Performed by: FAMILY MEDICINE

## 2024-03-26 PROCEDURE — 82043 UR ALBUMIN QUANTITATIVE: CPT | Performed by: FAMILY MEDICINE

## 2024-03-26 PROCEDURE — 84443 ASSAY THYROID STIM HORMONE: CPT | Performed by: FAMILY MEDICINE

## 2024-03-26 PROCEDURE — 84153 ASSAY OF PSA TOTAL: CPT | Performed by: FAMILY MEDICINE

## 2024-03-26 PROCEDURE — 80053 COMPREHEN METABOLIC PANEL: CPT | Performed by: FAMILY MEDICINE

## 2024-03-26 PROCEDURE — 83036 HEMOGLOBIN GLYCOSYLATED A1C: CPT | Performed by: FAMILY MEDICINE

## 2024-03-26 PROCEDURE — 85027 COMPLETE CBC AUTOMATED: CPT | Performed by: FAMILY MEDICINE

## 2024-03-27 ENCOUNTER — OFFICE VISIT (OUTPATIENT)
Dept: FAMILY MEDICINE | Facility: CLINIC | Age: 64
End: 2024-03-27
Payer: COMMERCIAL

## 2024-03-27 VITALS
WEIGHT: 191.63 LBS | OXYGEN SATURATION: 99 % | HEIGHT: 72 IN | BODY MASS INDEX: 25.95 KG/M2 | SYSTOLIC BLOOD PRESSURE: 138 MMHG | DIASTOLIC BLOOD PRESSURE: 72 MMHG | HEART RATE: 84 BPM

## 2024-03-27 DIAGNOSIS — F19.20 COMBINED OPIOID WITH NON-OPIOID DRUG DEPENDENCE, CONTINUOUS: ICD-10-CM

## 2024-03-27 DIAGNOSIS — E11.65 TYPE 2 DIABETES MELLITUS WITH HYPERGLYCEMIA, WITHOUT LONG-TERM CURRENT USE OF INSULIN: ICD-10-CM

## 2024-03-27 DIAGNOSIS — I15.2 HYPERTENSION ASSOCIATED WITH DIABETES: ICD-10-CM

## 2024-03-27 DIAGNOSIS — F11.20 COMBINED OPIOID WITH NON-OPIOID DRUG DEPENDENCE, CONTINUOUS: ICD-10-CM

## 2024-03-27 DIAGNOSIS — M46.1 SACROILIITIS: Primary | ICD-10-CM

## 2024-03-27 DIAGNOSIS — E11.59 HYPERTENSION ASSOCIATED WITH DIABETES: ICD-10-CM

## 2024-03-27 DIAGNOSIS — Z79.899 ENCOUNTER FOR LONG-TERM (CURRENT) USE OF MEDICATIONS: ICD-10-CM

## 2024-03-27 PROBLEM — A49.9 BACTERIAL INFECTION: Status: RESOLVED | Noted: 2023-06-06 | Resolved: 2024-03-27

## 2024-03-27 PROCEDURE — 3066F NEPHROPATHY DOC TX: CPT | Mod: CPTII,S$GLB,, | Performed by: FAMILY MEDICINE

## 2024-03-27 PROCEDURE — 3061F NEG MICROALBUMINURIA REV: CPT | Mod: CPTII,S$GLB,, | Performed by: FAMILY MEDICINE

## 2024-03-27 PROCEDURE — 1160F RVW MEDS BY RX/DR IN RCRD: CPT | Mod: CPTII,S$GLB,, | Performed by: FAMILY MEDICINE

## 2024-03-27 PROCEDURE — 99214 OFFICE O/P EST MOD 30 MIN: CPT | Mod: S$GLB,,, | Performed by: FAMILY MEDICINE

## 2024-03-27 PROCEDURE — 3008F BODY MASS INDEX DOCD: CPT | Mod: CPTII,S$GLB,, | Performed by: FAMILY MEDICINE

## 2024-03-27 PROCEDURE — 1159F MED LIST DOCD IN RCRD: CPT | Mod: CPTII,S$GLB,, | Performed by: FAMILY MEDICINE

## 2024-03-27 PROCEDURE — 99999 PR PBB SHADOW E&M-EST. PATIENT-LVL IV: CPT | Mod: PBBFAC,,, | Performed by: FAMILY MEDICINE

## 2024-03-27 PROCEDURE — 3075F SYST BP GE 130 - 139MM HG: CPT | Mod: CPTII,S$GLB,, | Performed by: FAMILY MEDICINE

## 2024-03-27 PROCEDURE — 3078F DIAST BP <80 MM HG: CPT | Mod: CPTII,S$GLB,, | Performed by: FAMILY MEDICINE

## 2024-03-27 PROCEDURE — 3052F HG A1C>EQUAL 8.0%<EQUAL 9.0%: CPT | Mod: CPTII,S$GLB,, | Performed by: FAMILY MEDICINE

## 2024-03-27 RX ORDER — CARISOPRODOL 350 MG/1
350 TABLET ORAL 4 TIMES DAILY PRN
Qty: 120 TABLET | Refills: 0 | Status: SHIPPED | OUTPATIENT
Start: 2024-03-27

## 2024-03-27 RX ORDER — HYDROCODONE BITARTRATE AND ACETAMINOPHEN 7.5; 325 MG/1; MG/1
1 TABLET ORAL EVERY 6 HOURS PRN
Qty: 120 TABLET | Refills: 0 | Status: SHIPPED | OUTPATIENT
Start: 2024-05-25

## 2024-03-27 RX ORDER — CARISOPRODOL 350 MG/1
350 TABLET ORAL 4 TIMES DAILY PRN
Qty: 120 TABLET | Refills: 0 | Status: SHIPPED | OUTPATIENT
Start: 2024-04-26

## 2024-03-27 RX ORDER — HYDROCODONE BITARTRATE AND ACETAMINOPHEN 7.5; 325 MG/1; MG/1
1 TABLET ORAL EVERY 6 HOURS PRN
Qty: 120 TABLET | Refills: 0 | Status: SHIPPED | OUTPATIENT
Start: 2024-04-26

## 2024-03-27 RX ORDER — CARISOPRODOL 350 MG/1
350 TABLET ORAL 4 TIMES DAILY PRN
Qty: 120 TABLET | Refills: 0 | Status: SHIPPED | OUTPATIENT
Start: 2024-05-25

## 2024-03-27 RX ORDER — HYDROCODONE BITARTRATE AND ACETAMINOPHEN 7.5; 325 MG/1; MG/1
1 TABLET ORAL EVERY 6 HOURS PRN
Qty: 120 TABLET | Refills: 0 | Status: SHIPPED | OUTPATIENT
Start: 2024-03-27

## 2024-03-27 NOTE — PATIENT INSTRUCTIONS
Follow up in about 3 months (around 6/27/2024), or if symptoms worsen or fail to improve, for Med refills, LAB RESULTS.     Dear patient,   As a result of recent federal legislation (The Federal Cures Act), you may receive lab or pathology results from your visit in your [facility] account before your physician is able to contact you. Your physician or their representative will relay the results to you with their recommendations at their soonest availability.     If no improvement in symptoms or symptoms worsen, please be advised to call MD, follow-up at clinic and/or go to ER if becomes severe.    Jb Chao M.D.        We Offer TELEHEALTH & Same Day Appointments!   Book your Telehealth appointment with me through my nurse or   Clinic appointments on [employer]!    [address] [place] [address]    Office: [phone]   FAX: [phone]    Check out my Facebook Page and Follow Me at: [email or web address]    Check out my website at [employer] by clicking on: [email or web address]    To Schedule appointments online, go to [employer]: [email or web address]

## 2024-03-27 NOTE — PROGRESS NOTES
PLAN:    Assessment & Plan  1. Medication refills.  I refilled his medications.  Discussed risk and benefits of medication use.    2. Diabetes.  His A1c has been trending up. I will recheck his A1c in 3 months. He was advised to drink enough water, exercise, and eat less carbs.  Patient declines medications.  We will plan to monitor hemoglobin A1c at designated intervals 3 to 6 months.  I recommend ongoing Education for diabetic diet and exercise protocol.  We will continue to monitor for side effects.    Please be advised of symptoms to monitor for and to notify me immediately if persistent or worsening.  Follow up with Ophthalmology/Optometry and Podiatry at least annually.    Counseled on importance of hypertension disease course, I recommend ongoing Education for DASH-diet and exercise.  Counseled on medication regimen importance of treating high blood pressure.  Please be advised of risk of untreated blood pressure as discussed.  Please advised of ER precautions were given for symptoms of hypertensive urgency and emergency.      Follow-up  The patient will follow up in 3 months.    Problem List Items Addressed This Visit       Hypertension associated with diabetes (Chronic)    Type 2 diabetes mellitus with hyperglycemia, without long-term current use of insulin (Chronic)    Encounter for long-term (current) use of medications (Chronic)    Relevant Medications    HYDROcodone-acetaminophen (NORCO) 7.5-325 mg per tablet (Start on 5/25/2024)    HYDROcodone-acetaminophen (NORCO) 7.5-325 mg per tablet (Start on 4/26/2024)    HYDROcodone-acetaminophen (NORCO) 7.5-325 mg per tablet    carisoprodoL (SOMA) 350 MG tablet (Start on 5/25/2024)    carisoprodoL (SOMA) 350 MG tablet (Start on 4/26/2024)    carisoprodoL (SOMA) 350 MG tablet    Sacroiliitis - Primary (Chronic)    Relevant Medications    HYDROcodone-acetaminophen (NORCO) 7.5-325 mg per tablet (Start on 5/25/2024)    HYDROcodone-acetaminophen (NORCO) 7.5-325 mg  per tablet (Start on 4/26/2024)    HYDROcodone-acetaminophen (NORCO) 7.5-325 mg per tablet    carisoprodoL (SOMA) 350 MG tablet (Start on 5/25/2024)    carisoprodoL (SOMA) 350 MG tablet (Start on 4/26/2024)    carisoprodoL (SOMA) 350 MG tablet    Combined opioid with non-opioid drug dependence, continuous (Chronic)     Future Appointments       Date Provider Specialty Appt Notes    6/27/2024  Lab     7/3/2024 Jb Chao MD Family Medicine 3 month           Medication Management for assessment above:   Medication List with Changes/Refills   Current Medications    BLOOD SUGAR DIAGNOSTIC (BLOOD GLUCOSE TEST) STRP    Check glucose 3 times per day before each meal    BLOOD SUGAR DIAGNOSTIC (FREESTYLE INSULINX TEST STRIPS) STRP    Use 3 times a day as needed to test blood sugar.    FEXOFENADINE (ALLEGRA) 180 MG TABLET    Take 1 tablet (180 mg total) by mouth once daily.    HYDROCODONE-ACETAMINOPHEN (NORCO)  MG PER TABLET    Take 1 tablet by mouth every 8 (eight) hours as needed for Pain.    KETOCONAZOLE (NIZORAL) 2 % CREAM    Apply topically 2 (two) times daily.    MUPIROCIN (BACTROBAN) 2 % OINTMENT    Apply topically 3 (three) times daily.    NAPROXEN (NAPROSYN) 500 MG TABLET    Take 1 tablet (500 mg total) by mouth 2 (two) times daily with meals.    ONDANSETRON (ZOFRAN-ODT) 8 MG TBDL    Take 1 tablet (8 mg total) by mouth every 6 (six) hours as needed (nausea).    PROMETHAZINE (PHENERGAN) 25 MG TABLET    Take 1 tablet (25 mg total) by mouth every 6 (six) hours as needed for Nausea.   Changed and/or Refilled Medications    Modified Medication Previous Medication    CARISOPRODOL (SOMA) 350 MG TABLET carisoprodoL (SOMA) 350 MG tablet       Take 1 tablet (350 mg total) by mouth 4 (four) times daily as needed for Muscle spasms.    Take 1 tablet (350 mg total) by mouth 4 (four) times daily as needed for Muscle spasms.    CARISOPRODOL (SOMA) 350 MG TABLET carisoprodoL (SOMA) 350 MG tablet       Take 1 tablet  (350 mg total) by mouth 4 (four) times daily as needed for Muscle spasms.    Take 1 tablet (350 mg total) by mouth 4 (four) times daily as needed for Muscle spasms.    CARISOPRODOL (SOMA) 350 MG TABLET carisoprodoL (SOMA) 350 MG tablet       Take 1 tablet (350 mg total) by mouth 4 (four) times daily as needed for Muscle spasms.    Take 1 tablet (350 mg total) by mouth 4 (four) times daily as needed for Muscle spasms.    HYDROCODONE-ACETAMINOPHEN (NORCO) 7.5-325 MG PER TABLET HYDROcodone-acetaminophen (NORCO) 7.5-325 mg per tablet       Take 1 tablet by mouth every 6 (six) hours as needed for Pain.    Take 1 tablet by mouth every 6 (six) hours as needed for Pain.    HYDROCODONE-ACETAMINOPHEN (NORCO) 7.5-325 MG PER TABLET HYDROcodone-acetaminophen (NORCO) 7.5-325 mg per tablet       Take 1 tablet by mouth every 6 (six) hours as needed for Pain.    Take 1 tablet by mouth every 6 (six) hours as needed for Pain.    HYDROCODONE-ACETAMINOPHEN (NORCO) 7.5-325 MG PER TABLET HYDROcodone-acetaminophen (NORCO) 7.5-325 mg per tablet       Take 1 tablet by mouth every 6 (six) hours as needed for Pain.    Take 1 tablet by mouth every 6 (six) hours as needed for Pain.       Jb Chao M.D.  ==========================================================================  Subjective:   Patient ID: Ned Toledo Jr. is a 63 y.o. male.  has a past medical history of Diabetes mellitus, type 2, Fatty liver, Hypertension, Penetrating foot wound, and Sciatica.   Chief Complaint: Medication Refill      History of Present Illness  The patient is a 63-year-old male coming in for medication refills. The patient reports he has been doing well. No issues with medications.    He had his labs done yesterday. He thinks his A1c might be up because he was eating ice cream. He has been doing what he is doing to lower his A1c. He does not want metformin. He sees [facility] for his eyes.    Supplemental Information  He had problems with ear  "wax. His ear was flushed.   His grandfather and oldest brother had diabetes.    Diabetes Management Status    Statin: Not taking  ACE/ARB: Not taking    Screening or Prevention Patient's value Goal Complete/Controlled?   HgA1C Testing and Control   Lab Results   Component Value Date    HGBA1C 8.6 (H) 03/26/2024      Annually/Less than 8% No   Lipid profile : 03/26/2024 Annually Yes   LDL control Lab Results   Component Value Date    LDLCALC 151.6 03/26/2024    Annually/Less than 100 mg/dl  No   Nephropathy screening Lab Results   Component Value Date    LABMICR 37.0 03/26/2024     Lab Results   Component Value Date    PROTEINUA Negative 12/31/2018     No results found for: "UTPCR"   Annually Yes   Blood pressure BP Readings from Last 1 Encounters:   03/27/24 138/72    Less than 140/90 Yes   Dilated retinal exam : 03/27/2024 Annually Yes   Foot exam   : 03/27/2024 Annually Yes       Problem List Items Addressed This Visit       Hypertension associated with diabetes (Chronic)    Overview     CHRONIC. STABLE. BP Reviewed.  Currently not on medication . No SE reported.  March 2021:  Initial blood pressure elevated due to patient rushing for appointment.  He reports blood pressure well controlled at home 130/80.  May 2022:  Blood pressure remains below goal.  (-) CP, SOB, palpitations, dizziness, lightheadedness, HA, arm numbness, tingling or weakness, syncope.  Creatinine   Date Value Ref Range Status   03/16/2022 1.0 0.5 - 1.4 mg/dL Final          Type 2 diabetes mellitus with hyperglycemia, without long-term current use of insulin (Chronic)    Overview     November 2023:  Patient diet control with lifestyle modification diet and exercise.  August 2023:   March 2023:  Patient here for labs and urine for diabetes monitoring.  Patient reports no issues with blood sugar at this time.  August 2022: Patient doing well with diet controlled diabetes.  Patient denies any history of thyroid cancer, pancreatitis, MEN syndrome.   " "March 2020:  Reviewed Diabetes Management StatusPatient cannot take statin due to side effects.SEPTEMBER 2020:  Diabetes Management StatusStatin: TakingACE/ARB: Not takingDecember 2021:  Diabetes Management StatusStatin: Not takingACE/ARB: Not takingMarch 2022:Diabetes Management StatusStatin: Not takingACE/ARB: Not takingMay 2022:  Patient reports that he has changed his diet significantly and that his sugar has been much better controlled.  Diabetes Management Status    Statin: Not taking  ACE/ARB: Not taking    Screening or Prevention Patient's value Goal Complete/Controlled?   HgA1C Testing and Control   Lab Results   Component Value Date    HGBA1C 7.0 (H) 03/02/2023      Annually/Less than 8% Yes   Lipid profile : 03/02/2023 Annually Yes   LDL control Lab Results   Component Value Date    LDLCALC 117.4 03/02/2023    Annually/Less than 100 mg/dl  No   Nephropathy screening Lab Results   Component Value Date    LABMICR 15.0 03/02/2023     Lab Results   Component Value Date    PROTEINUA Negative 12/31/2018     No results found for: "UTPCR"   Annually Yes   Blood pressure BP Readings from Last 1 Encounters:   11/13/23 136/82    Less than 140/90 Yes   Dilated retinal exam : 05/23/2019 Annually No   Foot exam   : 11/30/2022 Annually Yes            Encounter for long-term (current) use of medications (Chronic)    Overview     Feb 2024: Reviewed labs.  November 2023: Reviewed labs.  August 2023 reviewed labs.  May 2023: Reviewed labs.  March 2023:  Reviewed labs November 2022: Reviewed labs.  October 2022: Reviewed labs.  August 2022: Reviewed labs.  Initial HPI March 2020:  CHRONIC. Stable. Compliant with medications for managed conditions. See medication list. No SE reported. Routine lab analysis is being monitored. Refills were addressed.JUNE 2020:  Labs are stable.  Refill medication.CHRONIC. Stable. Compliant with medications for managed conditions. See medication list. No SE reported. Routine lab analysis is " being monitored. Refills were addressed.SEPTEMBER 2020:  CHRONIC. Stable. Compliant with medications for managed conditions. See medication list. No SE reported. Routine lab analysis is being monitored. Refills were addressed.DECEMBER 2020:  Reviewed labs.  CHRONIC. Stable. Compliant with medications for managed conditions. See medication list. No SE reported. Routine lab analysis is being monitored. Refills were addressed.  March 2021:  Reviewed labs.  June 2021:  Reviewed labs.  December 2021:  Reviewed labs. March 2022: Reviewed labs.  May 2022:  Reviewed labs.  Lab Results   Component Value Date    WBC 7.78 03/25/2020    HGB 14.9 03/25/2020    HCT 48.5 03/25/2020     (H) 03/25/2020     03/25/2020       Chemistry        Component Value Date/Time     03/02/2023 0741    K 4.8 03/02/2023 0741     03/02/2023 0741    CO2 25 03/02/2023 0741    BUN 21 03/02/2023 0741    CREATININE 1.1 03/02/2023 0741     (H) 03/02/2023 0741        Component Value Date/Time    CALCIUM 9.5 03/02/2023 0741    ALKPHOS 49 (L) 03/02/2023 0741    AST 18 03/02/2023 0741    ALT 16 03/02/2023 0741    BILITOT 0.7 03/02/2023 0741    ESTGFRAFRICA >60.0 06/01/2022 0809    EGFRNONAA >60.0 06/01/2022 0809        Lab Results   Component Value Date    TSH 1.236 09/04/2020          Sacroiliitis - Primary (Chronic)    Overview     Feb 2024: here for refills. No change in HPI. Tolerating hydrocodone and Soma which he has been taking for several years. No SE reported. Pain stable.     November 2023: Patient here for medication refills.  Patient has had some difficulty with changing pharmacies lately.  Chronic.  August 2023:   Patient needing refills.  No new issues.   May 2023:  Noted injuries or falls.  Patient here for medication refills.  March 2023:  No new injuries or falls.  Stable on current medication regimen.  November 2022:  Patient here for medication refill.  Stable.  Patient chronic pain medication with  hydrocodone 7.5 and Soma 350 milligram.  Patient was previously managed by previous PCP Dr. Jaime Hernández who is retiring.  Patient has been stable on this medication regimen for years.  No side effects reported.    March 2022: Patient reports medication regimen is controlling his symptoms.  No change in HPI.  No new injuries.  May 2022:  Patient reports he has been very active at work and pain medication regimen is working well.  August 2022: Patient here for medication refills.  No change in HPI.  Medication continues to work well.           Combined opioid with non-opioid drug dependence, continuous (Chronic)    Overview     Chronic.  Stable.  Patient is maintained on [place] and Soma for his chronic back and joint pains.  Patient has failed joint injections in the past.  Patient is not interested in surgery.  Patient has transition care from previous PCP Dr. Jaime Hernández who was managing these prescriptions.  I will continue to fill these to avoid withdrawal.    No abuse suspected.The patient was checked in the [facility] Board of Pharmacy's  Prescription Monitoring Program. There appears to be no incongruities with the patient's verbalized history.                Review of patient's allergies indicates:   Allergen Reactions    Atorvastatin     Bactrim [sulfamethoxazole-trimethoprim] Hives     Current Outpatient Medications   Medication Instructions    blood sugar diagnostic (BLOOD GLUCOSE TEST) Strp Check glucose 3 times per day before each meal    blood sugar diagnostic (FREESTYLE INSULINX TEST STRIPS) Strp Use 3 times a day as needed to test blood sugar.    [START ON 5/25/2024] carisoprodoL (SOMA) 350 mg, Oral, 4 times daily PRN    [START ON 4/26/2024] carisoprodoL (SOMA) 350 mg, Oral, 4 times daily PRN    carisoprodoL (SOMA) 350 mg, Oral, 4 times daily PRN    fexofenadine (ALLEGRA) 180 mg, Oral, Daily    HYDROcodone-acetaminophen (NORCO)  mg per tablet 1 tablet, Oral, Every 8 hours PRN    [START ON  5/25/2024] HYDROcodone-acetaminophen (NORCO) 7.5-325 mg per tablet 1 tablet, Oral, Every 6 hours PRN    [START ON 4/26/2024] HYDROcodone-acetaminophen (NORCO) 7.5-325 mg per tablet 1 tablet, Oral, Every 6 hours PRN    HYDROcodone-acetaminophen (NORCO) 7.5-325 mg per tablet 1 tablet, Oral, Every 6 hours PRN    ketoconazole (NIZORAL) 2 % cream Topical (Top), 2 times daily    mupirocin (BACTROBAN) 2 % ointment Topical (Top), 3 times daily    naproxen (NAPROSYN) 500 mg, Oral, 2 times daily with meals    ondansetron (ZOFRAN-ODT) 8 mg, Oral, Every 6 hours PRN    promethazine (PHENERGAN) 25 mg, Oral, Every 6 hours PRN      I have reviewed the PMH, social history, FamilyHx, surgical history, allergies and medications documented / confirmed by the patient at the time of this visit.  Review of Systems   Constitutional:  Negative for chills, fatigue, fever and unexpected weight change.   HENT:  Negative for ear pain, sinus pressure, sinus pain and sore throat.    Eyes:  Negative for redness and visual disturbance.   Respiratory:  Negative for cough and shortness of breath.    Cardiovascular:  Negative for chest pain and palpitations.   Gastrointestinal:  Negative for nausea and vomiting.   Endocrine: Negative for cold intolerance and heat intolerance.   Genitourinary:  Negative for difficulty urinating and hematuria.   Musculoskeletal:  Positive for arthralgias and back pain. Negative for myalgias.   Skin:  Negative for rash and wound.   Allergic/Immunologic: Negative for environmental allergies and food allergies.   Neurological:  Negative for weakness and headaches.   Hematological:  Negative for adenopathy. Does not bruise/bleed easily.   Psychiatric/Behavioral:  Negative for sleep disturbance. The patient is not nervous/anxious.      Objective:   /72   Pulse 84   Ht 6' (1.829 m)   Wt 86.9 kg (191 lb 9.6 oz)   SpO2 99%   BMI 25.99 kg/m²   Physical Exam  Vitals and nursing note reviewed.   Constitutional:        General: He is not in acute distress.     Appearance: He is well-developed. He is not diaphoretic.   HENT:      Head: Normocephalic and atraumatic.      Right Ear: Tympanic membrane, ear canal and external ear normal. There is no impacted cerumen.      Left Ear: Tympanic membrane, ear canal and external ear normal. There is no impacted cerumen.      Nose: No congestion or rhinorrhea.      Mouth/Throat:      Pharynx: No posterior oropharyngeal erythema.   Eyes:      Extraocular Movements: Extraocular movements intact.      Pupils: Pupils are equal, round, and reactive to light.   Neck:      Vascular: No carotid bruit.   Cardiovascular:      Rate and Rhythm: Normal rate.      Pulses: Normal pulses.   Pulmonary:      Effort: Pulmonary effort is normal. No respiratory distress.      Breath sounds: Normal breath sounds.   Abdominal:      General: Bowel sounds are normal.      Palpations: Abdomen is soft.   Musculoskeletal:         General: Tenderness and deformity present. Normal range of motion.      Cervical back: Normal range of motion and neck supple.   Lymphadenopathy:      Cervical: No cervical adenopathy.   Skin:     General: Skin is warm and dry.      Capillary Refill: Capillary refill takes less than 2 seconds.      Findings: No rash.   Neurological:      General: No focal deficit present.      Mental Status: He is alert and oriented to person, place, and time.   Psychiatric:         Attention and Perception: He is attentive.         Mood and Affect: Mood normal. Mood is not anxious or depressed. Affect is not labile, blunt, angry or inappropriate.         Speech: He is communicative. Speech is not rapid and pressured, delayed, slurred or tangential.         Behavior: Behavior normal. Behavior is not agitated, slowed, aggressive, withdrawn, hyperactive or combative.         Thought Content: Thought content normal. Thought content is not paranoid or delusional. Thought content does not include homicidal or  suicidal ideation. Thought content does not include homicidal or suicidal plan.         Cognition and Memory: Memory is not impaired.         Judgment: Judgment normal. Judgment is not impulsive or inappropriate.       Physical Exam      Results  Laboratory Studies  Labs were reviewed with the patient.    Assessment:     1. Sacroiliitis    2. Encounter for long-term (current) use of medications    3. Hypertension associated with diabetes    4. Combined opioid with non-opioid drug dependence, continuous    5. Type 2 diabetes mellitus with hyperglycemia, without long-term current use of insulin      MDM:   Moderate medical complexity.  Moderate risk.  Total time: 31 minutes.  This includes total time spent on the encounter, which includes face to face time and non-face to face time preparing to see the patient (eg, review of previous medical records, tests), Obtaining and/or reviewing separately obtained history, documenting clinical information in the electronic or other health record, independently interpreting results (not separately reported)/communicating results to the patient/family/caregiver, and/or care coordination (not separately reported).    I have Reviewed and summarized old records.  I have performed thorough medication reconciliation today and discussed risk and benefits of medications.  I have reviewed labs and discussed with patient.  All questions were answered.  I am requesting old records and will review them once they are available.    I have signed for the following orders AND/OR meds.  No orders of the defined types were placed in this encounter.    Medications Ordered This Encounter   Medications    carisoprodoL (SOMA) 350 MG tablet     Sig: Take 1 tablet (350 mg total) by mouth 4 (four) times daily as needed for Muscle spasms.     Dispense:  120 tablet     Refill:  0    carisoprodoL (SOMA) 350 MG tablet     Sig: Take 1 tablet (350 mg total) by mouth 4 (four) times daily as needed for Muscle  spasms.     Dispense:  120 tablet     Refill:  0    carisoprodoL (SOMA) 350 MG tablet     Sig: Take 1 tablet (350 mg total) by mouth 4 (four) times daily as needed for Muscle spasms.     Dispense:  120 tablet     Refill:  0    HYDROcodone-acetaminophen (NORCO) 7.5-325 mg per tablet     Sig: Take 1 tablet by mouth every 6 (six) hours as needed for Pain.     Dispense:  120 tablet     Refill:  0     Quantity prescribed more than 7 day supply? Yes, quantity medically necessary     Order Specific Question:   I have reviewed the Prescription Drug Monitoring Program (PDMP) database for this patient prior to prescribing the above opioid medication     Answer:   Yes    HYDROcodone-acetaminophen (NORCO) 7.5-325 mg per tablet     Sig: Take 1 tablet by mouth every 6 (six) hours as needed for Pain.     Dispense:  120 tablet     Refill:  0     Quantity prescribed more than 7 day supply? Yes, quantity medically necessary     Order Specific Question:   I have reviewed the Prescription Drug Monitoring Program (PDMP) database for this patient prior to prescribing the above opioid medication     Answer:   Yes    HYDROcodone-acetaminophen (NORCO) 7.5-325 mg per tablet     Sig: Take 1 tablet by mouth every 6 (six) hours as needed for Pain.     Dispense:  120 tablet     Refill:  0     Quantity prescribed more than 7 day supply? Yes, quantity medically necessary     Order Specific Question:   I have reviewed the Prescription Drug Monitoring Program (PDMP) database for this patient prior to prescribing the above opioid medication     Answer:   Yes        Follow up in about 3 months (around 6/27/2024), or if symptoms worsen or fail to improve, for Med refills, LAB RESULTS.  Future Appointments       Date Provider Specialty Appt Notes    6/27/2024  Lab     7/3/2024 Jb Chao MD Family Medicine 3 month          If no improvement in symptoms or symptoms worsen, advised to call/follow-up at clinic or go to ER. Patient voiced  understanding and all questions/concerns were addressed.   DISCLAIMER: This note was compiled by using a speech recognition dictation system and therefore please be aware that typographical / speech recognition errors can and do occur.  Please contact me if you see any errors specifically.  Consent was obtained for DONNA recording system prior to the visit.    Jb Chao M.D.       Office: [phone]   [address] [place] [address]  FAX: [phone]

## 2024-04-25 NOTE — PROGRESS NOTES
1 month f/u most HM has been completed, colon overdue & was to be discussed with PCP, no answer when called, LVM per PCP visit Eye done at [facility]. Fax form sent for Diabetic Eye Exam result.

## 2024-06-05 ENCOUNTER — TELEPHONE (OUTPATIENT)
Dept: FAMILY MEDICINE | Facility: CLINIC | Age: 64
End: 2024-06-05
Payer: COMMERCIAL

## 2024-06-05 NOTE — TELEPHONE ENCOUNTER
----- Message from Ancelmo Gonzales sent at 6/5/2024  9:32 AM CDT -----  Patient called in requesting a call back  after missing a call from your office, please call back [phone]

## 2024-06-05 NOTE — TELEPHONE ENCOUNTER
----- Message from Higinio Mcintyre sent at 6/5/2024  8:14 AM CDT -----  Pt is calling in regards to rs 7/3/24 appt for 6/27/24 due to being out of town for holiday. Pt can be reached at [phone].          Thanks  MACI

## 2024-06-12 DIAGNOSIS — Z79.899 ENCOUNTER FOR LONG-TERM (CURRENT) USE OF MEDICATIONS: ICD-10-CM

## 2024-06-12 NOTE — TELEPHONE ENCOUNTER
Refill Decision Note   Clifton Paris  is requesting a refill authorization.  Brief Assessment and Rationale for Refill:  Approve     Medication Therapy Plan:         Alert overridden per protocol: Yes   Comments:     Note composed:12:57 PM 06/12/2024

## 2024-06-12 NOTE — TELEPHONE ENCOUNTER
No care due was identified.  [facility] Embedded Care Due Messages. Reference number: [id number].   6/12/2024 9:14:59 AM CDT

## 2024-06-12 NOTE — TELEPHONE ENCOUNTER
----- Message from Malissa Robertson sent at 6/12/2024  9:01 AM CDT -----  Contact: Patient  Patient is requesting a refill on his test strips. His preferred pharmacy is .  [id number] - [place], [place] - [address] [facility] [address] [address]  [address] [address]  Phone: [phone] Fax: [phone]    Please peterson, patient to confirm at .[phone]

## 2024-06-24 ENCOUNTER — LAB VISIT (OUTPATIENT)
Dept: LAB | Facility: HOSPITAL | Age: 64
End: 2024-06-24
Attending: FAMILY MEDICINE
Payer: COMMERCIAL

## 2024-06-24 DIAGNOSIS — J30.2 SEASONAL ALLERGIES: ICD-10-CM

## 2024-06-24 DIAGNOSIS — E11.65 TYPE 2 DIABETES MELLITUS WITH HYPERGLYCEMIA, WITHOUT LONG-TERM CURRENT USE OF INSULIN: ICD-10-CM

## 2024-06-24 DIAGNOSIS — Z79.899 ENCOUNTER FOR LONG-TERM (CURRENT) USE OF MEDICATIONS: ICD-10-CM

## 2024-06-24 DIAGNOSIS — M46.1 SACROILIITIS: ICD-10-CM

## 2024-06-24 LAB
ESTIMATED AVG GLUCOSE: 163 MG/DL (ref 68–131)
HBA1C MFR BLD: 7.3 % (ref 4–5.6)

## 2024-06-24 PROCEDURE — 83036 HEMOGLOBIN GLYCOSYLATED A1C: CPT | Performed by: FAMILY MEDICINE

## 2024-06-24 PROCEDURE — 36415 COLL VENOUS BLD VENIPUNCTURE: CPT | Mod: PO | Performed by: FAMILY MEDICINE

## 2024-06-26 ENCOUNTER — OFFICE VISIT (OUTPATIENT)
Dept: FAMILY MEDICINE | Facility: CLINIC | Age: 64
End: 2024-06-26
Payer: COMMERCIAL

## 2024-06-26 VITALS
DIASTOLIC BLOOD PRESSURE: 78 MMHG | WEIGHT: 183 LBS | BODY MASS INDEX: 24.79 KG/M2 | HEART RATE: 74 BPM | HEIGHT: 72 IN | OXYGEN SATURATION: 97 % | SYSTOLIC BLOOD PRESSURE: 134 MMHG

## 2024-06-26 DIAGNOSIS — M46.1 SACROILIITIS: ICD-10-CM

## 2024-06-26 DIAGNOSIS — Z79.899 ENCOUNTER FOR LONG-TERM (CURRENT) USE OF MEDICATIONS: ICD-10-CM

## 2024-06-26 DIAGNOSIS — E11.65 TYPE 2 DIABETES MELLITUS WITH HYPERGLYCEMIA, WITHOUT LONG-TERM CURRENT USE OF INSULIN: Primary | Chronic | ICD-10-CM

## 2024-06-26 PROCEDURE — G2211 COMPLEX E/M VISIT ADD ON: HCPCS | Mod: S$GLB,,, | Performed by: FAMILY MEDICINE

## 2024-06-26 PROCEDURE — 3008F BODY MASS INDEX DOCD: CPT | Mod: CPTII,S$GLB,, | Performed by: FAMILY MEDICINE

## 2024-06-26 PROCEDURE — 3075F SYST BP GE 130 - 139MM HG: CPT | Mod: CPTII,S$GLB,, | Performed by: FAMILY MEDICINE

## 2024-06-26 PROCEDURE — 3066F NEPHROPATHY DOC TX: CPT | Mod: CPTII,S$GLB,, | Performed by: FAMILY MEDICINE

## 2024-06-26 PROCEDURE — 1160F RVW MEDS BY RX/DR IN RCRD: CPT | Mod: CPTII,S$GLB,, | Performed by: FAMILY MEDICINE

## 2024-06-26 PROCEDURE — 99214 OFFICE O/P EST MOD 30 MIN: CPT | Mod: S$GLB,,, | Performed by: FAMILY MEDICINE

## 2024-06-26 PROCEDURE — 1159F MED LIST DOCD IN RCRD: CPT | Mod: CPTII,S$GLB,, | Performed by: FAMILY MEDICINE

## 2024-06-26 PROCEDURE — 3078F DIAST BP <80 MM HG: CPT | Mod: CPTII,S$GLB,, | Performed by: FAMILY MEDICINE

## 2024-06-26 PROCEDURE — 99999 PR PBB SHADOW E&M-EST. PATIENT-LVL IV: CPT | Mod: PBBFAC,,, | Performed by: FAMILY MEDICINE

## 2024-06-26 PROCEDURE — 3061F NEG MICROALBUMINURIA REV: CPT | Mod: CPTII,S$GLB,, | Performed by: FAMILY MEDICINE

## 2024-06-26 PROCEDURE — 3051F HG A1C>EQUAL 7.0%<8.0%: CPT | Mod: CPTII,S$GLB,, | Performed by: FAMILY MEDICINE

## 2024-06-26 RX ORDER — CARISOPRODOL 350 MG/1
350 TABLET ORAL 4 TIMES DAILY PRN
Qty: 120 TABLET | Refills: 0 | Status: SHIPPED | OUTPATIENT
Start: 2024-06-30

## 2024-06-26 RX ORDER — HYDROCODONE BITARTRATE AND ACETAMINOPHEN 10; 325 MG/1; MG/1
1 TABLET ORAL EVERY 8 HOURS PRN
Qty: 90 TABLET | Refills: 0 | Status: SHIPPED | OUTPATIENT
Start: 2024-08-24

## 2024-06-26 RX ORDER — HYDROCODONE BITARTRATE AND ACETAMINOPHEN 7.5; 325 MG/1; MG/1
1 TABLET ORAL EVERY 6 HOURS PRN
Qty: 120 TABLET | Refills: 0 | Status: SHIPPED | OUTPATIENT
Start: 2024-06-30

## 2024-06-26 RX ORDER — HYDROCODONE BITARTRATE AND ACETAMINOPHEN 7.5; 325 MG/1; MG/1
1 TABLET ORAL EVERY 6 HOURS PRN
Qty: 120 TABLET | Refills: 0 | Status: SHIPPED | OUTPATIENT
Start: 2024-08-28

## 2024-06-26 RX ORDER — CARISOPRODOL 350 MG/1
350 TABLET ORAL 4 TIMES DAILY PRN
Qty: 120 TABLET | Refills: 0 | Status: SHIPPED | OUTPATIENT
Start: 2024-08-28

## 2024-06-26 RX ORDER — HYDROCODONE BITARTRATE AND ACETAMINOPHEN 7.5; 325 MG/1; MG/1
1 TABLET ORAL EVERY 6 HOURS PRN
Qty: 120 TABLET | Refills: 0 | Status: SHIPPED | OUTPATIENT
Start: 2024-06-26 | End: 2024-06-26 | Stop reason: SDUPTHER

## 2024-06-26 RX ORDER — INSULIN PUMP SYRINGE, 3 ML
EACH MISCELLANEOUS
Qty: 1 EACH | Refills: 0 | Status: SHIPPED | OUTPATIENT
Start: 2024-06-26 | End: 2025-06-26

## 2024-06-26 RX ORDER — CARISOPRODOL 350 MG/1
350 TABLET ORAL 4 TIMES DAILY PRN
Qty: 120 TABLET | Refills: 0 | Status: SHIPPED | OUTPATIENT
Start: 2024-06-26 | End: 2024-06-26 | Stop reason: SDUPTHER

## 2024-06-26 RX ORDER — CARISOPRODOL 350 MG/1
350 TABLET ORAL 4 TIMES DAILY PRN
Qty: 120 TABLET | Refills: 0 | Status: SHIPPED | OUTPATIENT
Start: 2024-07-29

## 2024-06-26 RX ORDER — HYDROCODONE BITARTRATE AND ACETAMINOPHEN 7.5; 325 MG/1; MG/1
1 TABLET ORAL EVERY 6 HOURS PRN
Qty: 120 TABLET | Refills: 0 | Status: SHIPPED | OUTPATIENT
Start: 2024-07-29

## 2024-06-26 RX ORDER — HYDROCODONE BITARTRATE AND ACETAMINOPHEN 7.5; 325 MG/1; MG/1
1 TABLET ORAL EVERY 6 HOURS PRN
Qty: 120 TABLET | Refills: 0 | Status: SHIPPED | OUTPATIENT
Start: 2024-07-25 | End: 2024-06-26 | Stop reason: SDUPTHER

## 2024-06-26 RX ORDER — CARISOPRODOL 350 MG/1
350 TABLET ORAL 4 TIMES DAILY PRN
Qty: 120 TABLET | Refills: 0 | Status: SHIPPED | OUTPATIENT
Start: 2024-08-24 | End: 2024-06-26 | Stop reason: SDUPTHER

## 2024-06-26 RX ORDER — LANCETS
EACH MISCELLANEOUS
Qty: 100 EACH | Refills: 0 | Status: SHIPPED | OUTPATIENT
Start: 2024-06-26

## 2024-06-26 RX ORDER — CARISOPRODOL 350 MG/1
350 TABLET ORAL 4 TIMES DAILY PRN
Qty: 120 TABLET | Refills: 0 | Status: SHIPPED | OUTPATIENT
Start: 2024-07-25 | End: 2024-06-26 | Stop reason: SDUPTHER

## 2024-06-26 NOTE — ASSESSMENT & PLAN NOTE
Refill medications as prescribed.  No abuse suspected. The patient was [place] in the [place] [employer]'s  Prescription Monitoring Program. There appears to be no incongruities with the patient's verbalized history.       An opioid pain contract was completed  after having an extensive conversation about chronic opioid use for pain management. We discussed the risks and benefits of opioids.  I discouraged the patient from escalation of opioid use and try to minimize its use to decrease chance of dependence, tolerance, and opioid-based hyperalgesia.  I reviewed the  in great detail and there are no inconsistencies and it is appropriate with patient's history.  There are no signs of aberrant drug behavior.  The opioid risk tool was also completed, low risk.  Pt counseled about the side effects of long term use of opioids including dependence, tolerance, addiction, respiratory depression, somnolence, immune and endocrine dysfunction.  The patient expressed understanding.      If no improvement or worsening.  Referral to physical therapy, update imaging and consider spine/back clinic versus surgical specialist.

## 2024-06-26 NOTE — TELEPHONE ENCOUNTER
No care due was identified.  Health [facility] Embedded Care Due Messages. Reference number: [id number].   6/26/2024 11:01:31 AM CDT

## 2024-06-26 NOTE — PROGRESS NOTES
PLAN:    Assessment & Plan  1. Medication refills.  Fasting labs will be conducted during the next visit.    Follow-up  A follow-up appointment is scheduled for 3 months from now.    Problem List Items Addressed This Visit       Type 2 diabetes mellitus with hyperglycemia, without long-term current use of insulin - Primary (Chronic)     Update labs and urine.We will plan to monitor hemoglobin A1c at designated intervals 3 to 6 months.  I recommend ongoing Education for diabetic diet and exercise protocol.  We will continue to monitor for side effects.    Please be advised of symptoms to monitor for and to notify me immediately if persistent or worsening.  Follow up with Ophthalmology/Optometry and Podiatry at least annually.           Relevant Medications    blood-glucose meter kit    lancets Misc    blood sugar diagnostic Strp    Encounter for long-term (current) use of medications (Chronic)     Complete history and physical was completed today.  Complete and thorough medication reconciliation was performed.  Discussed risks and benefits of medications.  Advised patient on orders and health maintenance.  We discussed old records and old labs if available.  Will request any records not available through epic.  Continue current medications listed on your summary sheet.           Sacroiliitis (Chronic)     Refill medications as prescribed.  No abuse suspected. The patient was checked in the [facility] Board of Pharmacy's  Prescription Monitoring Program. There appears to be no incongruities with the patient's verbalized history.       An opioid pain contract was completed  after having an extensive conversation about chronic opioid use for pain management. We discussed the risks and benefits of opioids.  I discouraged the patient from escalation of opioid use and try to minimize its use to decrease chance of dependence, tolerance, and opioid-based hyperalgesia.  I reviewed the  in great detail and there are no  inconsistencies and it is appropriate with patient's history.  There are no signs of aberrant drug behavior.  The opioid risk tool was also completed, low risk.  Pt counseled about the side effects of long term use of opioids including dependence, tolerance, addiction, respiratory depression, somnolence, immune and endocrine dysfunction.  The patient expressed understanding.      If no improvement or worsening.  Referral to physical therapy, update imaging and consider spine/back clinic versus surgical specialist.         Relevant Medications    HYDROcodone-acetaminophen (NORCO)  mg per tablet (Start on 8/24/2024)     Future Appointments       Date Provider Specialty Appt Notes    9/19/2024 Jb Chao MD Family Medicine 3mo.f/uColonDue           Medication Management for assessment above:   Medication List with Changes/Refills   New Medications    BLOOD SUGAR DIAGNOSTIC STRP    To check BG 1 times daily, to use with insurance preferred meter    BLOOD-GLUCOSE METER KIT    To check BG 1 times daily, to use with insurance preferred meter    LANCETS MISC    To check BG 1 times daily, to use with insurance preferred meter   Current Medications    BLOOD SUGAR DIAGNOSTIC (BLOOD GLUCOSE TEST) STRP    Check glucose 3 times per day before each meal    BLOOD SUGAR DIAGNOSTIC (FREESTYLE INSULINX TEST STRIPS) STRP    Use 3 times a day as needed to test blood sugar.    FEXOFENADINE (ALLEGRA) 180 MG TABLET    Take 1 tablet (180 mg total) by mouth once daily.    KETOCONAZOLE (NIZORAL) 2 % CREAM    Apply topically 2 (two) times daily.    MUPIROCIN (BACTROBAN) 2 % OINTMENT    Apply topically 3 (three) times daily.    NAPROXEN (NAPROSYN) 500 MG TABLET    Take 1 tablet (500 mg total) by mouth 2 (two) times daily with meals.    ONDANSETRON (ZOFRAN-ODT) 8 MG TBDL    Take 1 tablet (8 mg total) by mouth every 6 (six) hours as needed (nausea).    PROMETHAZINE (PHENERGAN) 25 MG TABLET    Take 1 tablet (25 mg total) by mouth  every 6 (six) hours as needed for Nausea.   Changed and/or Refilled Medications    Modified Medication Previous Medication    CARISOPRODOL (SOMA) 350 MG TABLET carisoprodoL (SOMA) 350 MG tablet       Take 1 tablet (350 mg total) by mouth 4 (four) times daily as needed for Muscle spasms.    Take 1 tablet (350 mg total) by mouth 4 (four) times daily as needed for Muscle spasms.    CARISOPRODOL (SOMA) 350 MG TABLET carisoprodoL (SOMA) 350 MG tablet       Take 1 tablet (350 mg total) by mouth 4 (four) times daily as needed for Muscle spasms.    Take 1 tablet (350 mg total) by mouth 4 (four) times daily as needed for Muscle spasms.    CARISOPRODOL (SOMA) 350 MG TABLET carisoprodoL (SOMA) 350 MG tablet       Take 1 tablet (350 mg total) by mouth 4 (four) times daily as needed for Muscle spasms.    Take 1 tablet (350 mg total) by mouth 4 (four) times daily as needed for Muscle spasms.    HYDROCODONE-ACETAMINOPHEN (NORCO)  MG PER TABLET HYDROcodone-acetaminophen (NORCO)  mg per tablet       Take 1 tablet by mouth every 8 (eight) hours as needed for Pain.    Take 1 tablet by mouth every 8 (eight) hours as needed for Pain.    HYDROCODONE-ACETAMINOPHEN (NORCO) 7.5-325 MG PER TABLET HYDROcodone-acetaminophen (NORCO) 7.5-325 mg per tablet       Take 1 tablet by mouth every 6 (six) hours as needed for Pain.    Take 1 tablet by mouth every 6 (six) hours as needed for Pain.    HYDROCODONE-ACETAMINOPHEN (NORCO) 7.5-325 MG PER TABLET HYDROcodone-acetaminophen (NORCO) 7.5-325 mg per tablet       Take 1 tablet by mouth every 6 (six) hours as needed for Pain.    Take 1 tablet by mouth every 6 (six) hours as needed for Pain.    HYDROCODONE-ACETAMINOPHEN (NORCO) 7.5-325 MG PER TABLET HYDROcodone-acetaminophen (NORCO) 7.5-325 mg per tablet       Take 1 tablet by mouth every 6 (six) hours as needed for Pain.    Take 1 tablet by mouth every 6 (six) hours as needed for Pain.       Jb Chao,  M.D.  ==========================================================================  Subjective:   Patient ID: Ned Toledo Jr. is a 63 y.o. male.  has a past medical history of Diabetes mellitus, type 2, Fatty liver, Hypertension, Penetrating foot wound, and Sciatica.   Chief Complaint: Medication Refill      History of Present Illness  The patient is a 63-year-old male here for medication refills.    The patient reports a general sense of well-being. His current medication regimen includes Soma, administered four times daily, and thrice daily. He attempted to procure test strips yesterday, but was informed that a referral was required. He monitors his blood glucose levels daily, which have been satisfactory. His most recent A1c level was 7.3.    Problem List Items Addressed This Visit       Type 2 diabetes mellitus with hyperglycemia, without long-term current use of insulin - Primary (Chronic)    Overview     June 2024:  Patient is diet controlled.  A1c has improved.  November 2023:  Patient diet control with lifestyle modification diet and exercise.  August 2023:   March 2023:  Patient here for labs and urine for diabetes monitoring.  Patient reports no issues with blood sugar at this time.  August 2022: Patient doing well with diet controlled diabetes.  Patient denies any history of thyroid cancer, pancreatitis, MEN syndrome.   March 2020:  Reviewed Diabetes Management StatusPatient cannot take statin due to side effects.SEPTEMBER 2020:  Diabetes Management StatusStatin: TakingACE/ARB: Not takingDecember 2021:  Diabetes Management StatusStatin: Not takingACE/ARB: Not takingMarch 2022:Diabetes Management StatusStatin: Not takingACE/ARB: Not takingMay 2022:  Patient reports that he has changed his diet significantly and that his sugar has been much better controlled.  Diabetes Management Status    Statin: Not taking  ACE/ARB: Not taking    Screening or Prevention Patient's value Goal Complete/Controlled?   HgA1C  "Testing and Control   Lab Results   Component Value Date    HGBA1C 7.3 (H) 06/24/2024      Annually/Less than 8% Yes   Lipid profile : 03/26/2024 Annually Yes   LDL control Lab Results   Component Value Date    LDLCALC 151.6 03/26/2024    Annually/Less than 100 mg/dl  No   Nephropathy screening Lab Results   Component Value Date    LABMICR 37.0 03/26/2024     Lab Results   Component Value Date    PROTEINUA Negative 12/31/2018     No results found for: "UTPCR"   Annually Yes   Blood pressure BP Readings from Last 1 Encounters:   06/26/24 134/78    Less than 140/90 Yes   Dilated retinal exam : 05/23/2019 Annually No   Foot exam   : 03/27/2024 Annually Yes            Current Assessment & Plan     Update labs and urine.We will plan to monitor hemoglobin A1c at designated intervals 3 to 6 months.  I recommend ongoing Education for diabetic diet and exercise protocol.  We will continue to monitor for side effects.    Please be advised of symptoms to monitor for and to notify me immediately if persistent or worsening.  Follow up with Ophthalmology/Optometry and Podiatry at least annually.           Encounter for long-term (current) use of medications (Chronic)    Overview     June 2024: Reviewed labs.  Feb 2024: Reviewed labs.  November 2023: Reviewed labs.  August 2023 reviewed labs.  May 2023: Reviewed labs.  March 2023:  Reviewed labs November 2022: Reviewed labs.  October 2022: Reviewed labs.  August 2022: Reviewed labs.  Initial HPI March 2020:  CHRONIC. Stable. Compliant with medications for managed conditions. See medication list. No SE reported. Routine lab analysis is being monitored. Refills were addressed.JUNE 2020:  Labs are stable.  Refill medication.CHRONIC. Stable. Compliant with medications for managed conditions. See medication list. No SE reported. Routine lab analysis is being monitored. Refills were addressed.SEPTEMBER 2020:  CHRONIC. Stable. Compliant with medications for managed conditions. See " medication list. No SE reported. Routine lab analysis is being monitored. Refills were addressed.DECEMBER 2020:  Reviewed labs.  CHRONIC. Stable. Compliant with medications for managed conditions. See medication list. No SE reported. Routine lab analysis is being monitored. Refills were addressed.  March 2021:  Reviewed labs.  June 2021:  Reviewed labs.  December 2021:  Reviewed labs. March 2022: Reviewed labs.  May 2022:  Reviewed labs.  Lab Results   Component Value Date    WBC 5.99 03/26/2024    HGB 15.1 03/26/2024    HCT 46.0 03/26/2024    MCV 97 03/26/2024     03/26/2024         Chemistry        Component Value Date/Time     (L) 03/26/2024 0726    K 4.3 03/26/2024 0726     03/26/2024 0726    CO2 24 03/26/2024 0726    BUN 10 03/26/2024 0726    CREATININE 1.2 03/26/2024 0726     (H) 03/26/2024 0726        Component Value Date/Time    CALCIUM 9.8 03/26/2024 0726    ALKPHOS 55 03/26/2024 0726    AST 17 03/26/2024 0726    ALT 24 03/26/2024 0726    BILITOT 0.8 03/26/2024 0726    ESTGFRAFRICA >60.0 06/01/2022 0809    EGFRNONAA >60.0 06/01/2022 0809          Lab Results   Component Value Date    TSH 2.195 03/26/2024            Current Assessment & Plan     Complete history and physical was completed today.  Complete and thorough medication reconciliation was performed.  Discussed risks and benefits of medications.  Advised patient on orders and health maintenance.  We discussed old records and old labs if available.  Will request any records not available through epic.  Continue current medications listed on your summary sheet.           Sacroiliitis (Chronic)    Overview     June 2024:  Patient needing medication refills for his chronic pain.  Feb 2024: here for refills. No change in HPI. Tolerating hydrocodone and Soma which he has been taking for several years. No SE reported. Pain stable.     November 2023: Patient here for medication refills.  Patient has had some difficulty with changing  pharmacies lately.  Chronic.  August 2023:   Patient needing refills.  No new issues.   May 2023:  Noted injuries or falls.  Patient here for medication refills.  March 2023:  No new injuries or falls.  Stable on current medication regimen.  November 2022:  Patient here for medication refill.  Stable.  Patient chronic pain medication with hydrocodone 7.5 and Soma 350 milligram.  Patient was previously managed by previous PCP Dr. Jaime Hernández who is retiring.  Patient has been stable on this medication regimen for years.  No side effects reported.    March 2022: Patient reports medication regimen is controlling his symptoms.  No change in HPI.  No new injuries.  May 2022:  Patient reports he has been very active at work and pain medication regimen is working well.  August 2022: Patient here for medication refills.  No change in HPI.  Medication continues to work well.           Current Assessment & Plan     Refill medications as prescribed.  No abuse suspected. The patient was checked in the [facility] Board of Pharmacy's  Prescription Monitoring Program. There appears to be no incongruities with the patient's verbalized history.       An opioid pain contract was completed  after having an extensive conversation about chronic opioid use for pain management. We discussed the risks and benefits of opioids.  I discouraged the patient from escalation of opioid use and try to minimize its use to decrease chance of dependence, tolerance, and opioid-based hyperalgesia.  I reviewed the  in great detail and there are no inconsistencies and it is appropriate with patient's history.  There are no signs of aberrant drug behavior.  The opioid risk tool was also completed, low risk.  Pt counseled about the side effects of long term use of opioids including dependence, tolerance, addiction, respiratory depression, somnolence, immune and endocrine dysfunction.  The patient expressed understanding.      If no improvement or  worsening.  Referral to physical therapy, update imaging and consider spine/back clinic versus surgical specialist.             Review of patient's allergies indicates:   Allergen Reactions    Atorvastatin     Bactrim [sulfamethoxazole-trimethoprim] Hives     Current Outpatient Medications   Medication Instructions    blood sugar diagnostic (BLOOD GLUCOSE TEST) Strp Check glucose 3 times per day before each meal    blood sugar diagnostic (FREESTYLE INSULINX TEST STRIPS) Strp Use 3 times a day as needed to test blood sugar.    blood sugar diagnostic Strp To check BG 1 times daily, to use with insurance preferred meter    blood-glucose meter kit To check BG 1 times daily, to use with insurance preferred meter    [START ON 6/30/2024] carisoprodoL (SOMA) 350 mg, Oral, 4 times daily PRN    [START ON 7/29/2024] carisoprodoL (SOMA) 350 mg, Oral, 4 times daily PRN    [START ON 8/28/2024] carisoprodoL (SOMA) 350 mg, Oral, 4 times daily PRN    fexofenadine (ALLEGRA) 180 mg, Oral, Daily    [START ON 8/24/2024] HYDROcodone-acetaminophen (NORCO)  mg per tablet 1 tablet, Oral, Every 8 hours PRN    [START ON 6/30/2024] HYDROcodone-acetaminophen (NORCO) 7.5-325 mg per tablet 1 tablet, Oral, Every 6 hours PRN    [START ON 7/29/2024] HYDROcodone-acetaminophen (NORCO) 7.5-325 mg per tablet 1 tablet, Oral, Every 6 hours PRN    [START ON 8/28/2024] HYDROcodone-acetaminophen (NORCO) 7.5-325 mg per tablet 1 tablet, Oral, Every 6 hours PRN    ketoconazole (NIZORAL) 2 % cream Topical (Top), 2 times daily    lancets Misc To check BG 1 times daily, to use with insurance preferred meter    mupirocin (BACTROBAN) 2 % ointment Topical (Top), 3 times daily    naproxen (NAPROSYN) 500 mg, Oral, 2 times daily with meals    ondansetron (ZOFRAN-ODT) 8 mg, Oral, Every 6 hours PRN    promethazine (PHENERGAN) 25 mg, Oral, Every 6 hours PRN      I have reviewed the PMH, social history, FamilyHx, surgical history, allergies and medications documented  / confirmed by the patient at the time of this visit.  Review of Systems   Constitutional:  Negative for chills, fatigue, fever and unexpected weight change.   HENT:  Negative for ear pain, sinus pressure, sinus pain and sore throat.    Eyes:  Negative for redness and visual disturbance.   Respiratory:  Negative for cough and shortness of breath.    Cardiovascular:  Negative for chest pain and palpitations.   Gastrointestinal:  Negative for nausea and vomiting.   Endocrine: Negative for cold intolerance and heat intolerance.   Genitourinary:  Negative for difficulty urinating and hematuria.   Musculoskeletal:  Positive for arthralgias and back pain. Negative for myalgias.   Skin:  Negative for rash and wound.   Allergic/Immunologic: Negative for environmental allergies and food allergies.   Neurological:  Negative for weakness and headaches.   Hematological:  Negative for adenopathy. Does not bruise/bleed easily.   Psychiatric/Behavioral:  Negative for sleep disturbance. The patient is not nervous/anxious.      Objective:   /78   Pulse 74   Ht 6' (1.829 m)   Wt 83 kg (183 lb)   SpO2 97%   BMI 24.82 kg/m²   Physical Exam  Vitals and nursing note reviewed.   Constitutional:       General: He is not in acute distress.     Appearance: He is well-developed. He is not diaphoretic.   HENT:      Head: Normocephalic and atraumatic.      Right Ear: External ear normal.      Left Ear: External ear normal.      Nose: Nose normal. No rhinorrhea.   Eyes:      Extraocular Movements: Extraocular movements intact.      Pupils: Pupils are equal, round, and reactive to light.   Cardiovascular:      Rate and Rhythm: Normal rate.      Pulses: Normal pulses.   Pulmonary:      Effort: Pulmonary effort is normal. No respiratory distress.      Breath sounds: Normal breath sounds.   Abdominal:      General: Bowel sounds are normal.      Palpations: Abdomen is soft.   Musculoskeletal:         General: Normal range of motion.       Cervical back: Normal range of motion and neck supple.   Skin:     General: Skin is warm and dry.      Capillary Refill: Capillary refill takes less than 2 seconds.      Findings: No rash.   Neurological:      General: No focal deficit present.      Mental Status: He is alert and oriented to person, place, and time.      Cranial Nerves: No cranial nerve deficit.      Motor: No weakness.   Psychiatric:         Attention and Perception: He is attentive.         Mood and Affect: Mood normal. Mood is not anxious or depressed. Affect is not labile, blunt, angry or inappropriate.         Speech: He is communicative. Speech is not rapid and pressured, delayed, slurred or tangential.         Behavior: Behavior normal. Behavior is not agitated, slowed, aggressive, withdrawn, hyperactive or combative.         Thought Content: Thought content normal. Thought content is not paranoid or delusional. Thought content does not include homicidal or suicidal ideation. Thought content does not include homicidal or suicidal plan.         Cognition and Memory: Memory is not impaired.         Judgment: Judgment normal. Judgment is not impulsive or inappropriate.       Physical Exam      Results  Laboratory Studies  A1c is 7.3.    Assessment:     1. Type 2 diabetes mellitus with hyperglycemia, without long-term current use of insulin    2. Sacroiliitis    3. Encounter for long-term (current) use of medications      MDM:   Moderate medical complexity.  Moderate risk.  Total time: 21 minutes.  This includes total time spent on the encounter, which includes face to face time and non-face to face time preparing to see the patient (eg, review of previous medical records, tests), Obtaining and/or reviewing separately obtained history, documenting clinical information in the electronic or other health record, independently interpreting results (not separately reported)/communicating results to the patient/family/caregiver, and/or care  coordination (not separately reported).    I have Reviewed and summarized old records.  I have performed thorough medication reconciliation today and discussed risk and benefits of medications.  I have reviewed labs and discussed with patient.  All questions were answered.  I am requesting old records and will review them once they are available.Visit today included increased complexity associated with the care of the episodic problem see above assessment addressed and managing the longitudinal care of the patient due to the serious and/or complex managed problem(s) see above.    I have signed for the following orders AND/OR meds.  No orders of the defined types were placed in this encounter.    Medications Ordered This Encounter   Medications    blood sugar diagnostic Strp     Sig: To check BG 1 times daily, to use with insurance preferred meter     Dispense:  100 each     Refill:  0    blood-glucose meter kit     Sig: To check BG 1 times daily, to use with insurance preferred meter     Dispense:  1 each     Refill:  0    HYDROcodone-acetaminophen (NORCO)  mg per tablet     Sig: Take 1 tablet by mouth every 8 (eight) hours as needed for Pain.     Dispense:  90 tablet     Refill:  0     Quantity prescribed more than 7 day supply? Yes, quantity medically necessary     Order Specific Question:   I have reviewed the Prescription Drug Monitoring Program (PDMP) database for this patient prior to prescribing the above opioid medication     Answer:   Yes    lancets Misc     Sig: To check BG 1 times daily, to use with insurance preferred meter     Dispense:  100 each     Refill:  0        Follow up in about 3 months (around 9/26/2024), or if symptoms worsen or fail to improve, for Med refills, LAB RESULTS.  Future Appointments       Date Provider Specialty Appt Notes    9/19/2024 Jb Chao MD Family Medicine 3mo.f/[place]          If no improvement in symptoms or symptoms worsen, advised to call/follow-up  at clinic or go to ER. Patient voiced understanding and all questions/concerns were addressed.   DISCLAIMER: This note was compiled by using a speech recognition dictation system and therefore please be aware that typographical / speech recognition errors can and do occur.  Please contact me if you see any errors specifically.  Consent was obtained for [facility] recording system prior to the visit.    Jb Chao M.D.       Office: [phone] [address] [place] [address]  FAX: [phone]

## 2024-06-26 NOTE — PATIENT INSTRUCTIONS
Follow up in about 3 months (around 9/26/2024), or if symptoms worsen or fail to improve.     Dear patient,   As a result of recent federal legislation (The Federal Cures Act), you may receive lab or pathology results from your visit in your [facility] account before your physician is able to contact you. Your physician or their representative will relay the results to you with their recommendations at their soonest availability.     If no improvement in symptoms or symptoms worsen, please be advised to call MD, follow-up at clinic and/or go to ER if becomes severe.    Jb Chao M.D.        We Offer TELEHEALTH & Same Day Appointments!   Book your Telehealth appointment with me through my nurse or   Clinic appointments on [facility]!    [address] [place] [address]    Office: [phone]   FAX: [phone]    Check out my Facebook Page and Follow Me at: [email or web address]    Check out my website at [facility] by clicking on: [email or web address]    To Schedule appointments online, go to [email or web address]: [email or web address]

## 2024-06-26 NOTE — TELEPHONE ENCOUNTER
----- Message from Miriam Forbes sent at 6/26/2024 10:15 AM CDT -----  Contact: Ned  .Patient is calling to speak with the nurse regarding concerns  . Reports one of the medications wasn't called in. Please give patient a call back at   [phone]

## 2024-07-02 DIAGNOSIS — Z79.899 ENCOUNTER FOR LONG-TERM (CURRENT) USE OF MEDICATIONS: ICD-10-CM

## 2024-07-02 DIAGNOSIS — M46.1 SACROILIITIS: ICD-10-CM

## 2024-07-02 RX ORDER — CARISOPRODOL 350 MG/1
350 TABLET ORAL 4 TIMES DAILY PRN
Qty: 120 TABLET | Refills: 0 | Status: SHIPPED | OUTPATIENT
Start: 2024-07-02

## 2024-07-02 RX ORDER — HYDROCODONE BITARTRATE AND ACETAMINOPHEN 7.5; 325 MG/1; MG/1
1 TABLET ORAL EVERY 6 HOURS PRN
Qty: 120 TABLET | Refills: 0 | Status: SHIPPED | OUTPATIENT
Start: 2024-07-02

## 2024-07-02 NOTE — TELEPHONE ENCOUNTER
----- Message from Miriam Forbes sent at 7/2/2024  8:28 AM CDT -----  Contact: Ned  .Patient is calling to speak with the nurse regarding medication  . Reports date on the script is wrong and patient needs medication for July  . Please give patient a call back at   [phone]  
No care due was identified.  [facility] Embedded Care Due Messages. Reference number: [id number].   7/02/2024 10:06:14 AM CDT  
Pharmacy states they did not receive the Rx tat was suppose to be filled on 06/30/2024.   
unable to assess

## 2024-09-19 ENCOUNTER — TELEPHONE (OUTPATIENT)
Dept: FAMILY MEDICINE | Facility: CLINIC | Age: 64
End: 2024-09-19

## 2024-09-19 ENCOUNTER — OFFICE VISIT (OUTPATIENT)
Dept: FAMILY MEDICINE | Facility: CLINIC | Age: 64
End: 2024-09-19
Payer: COMMERCIAL

## 2024-09-19 VITALS
WEIGHT: 186 LBS | SYSTOLIC BLOOD PRESSURE: 136 MMHG | OXYGEN SATURATION: 96 % | BODY MASS INDEX: 25.19 KG/M2 | HEIGHT: 72 IN | DIASTOLIC BLOOD PRESSURE: 86 MMHG | HEART RATE: 90 BPM

## 2024-09-19 DIAGNOSIS — I15.2 HYPERTENSION ASSOCIATED WITH DIABETES: ICD-10-CM

## 2024-09-19 DIAGNOSIS — E11.59 HYPERTENSION ASSOCIATED WITH DIABETES: ICD-10-CM

## 2024-09-19 DIAGNOSIS — R11.0 NAUSEA: ICD-10-CM

## 2024-09-19 DIAGNOSIS — M46.1 SACROILIITIS: Primary | ICD-10-CM

## 2024-09-19 DIAGNOSIS — Z79.899 ENCOUNTER FOR LONG-TERM (CURRENT) USE OF MEDICATIONS: ICD-10-CM

## 2024-09-19 PROCEDURE — 3061F NEG MICROALBUMINURIA REV: CPT | Mod: CPTII,S$GLB,, | Performed by: FAMILY MEDICINE

## 2024-09-19 PROCEDURE — 3051F HG A1C>EQUAL 7.0%<8.0%: CPT | Mod: CPTII,S$GLB,, | Performed by: FAMILY MEDICINE

## 2024-09-19 PROCEDURE — G2211 COMPLEX E/M VISIT ADD ON: HCPCS | Mod: S$GLB,,, | Performed by: FAMILY MEDICINE

## 2024-09-19 PROCEDURE — 3079F DIAST BP 80-89 MM HG: CPT | Mod: CPTII,S$GLB,, | Performed by: FAMILY MEDICINE

## 2024-09-19 PROCEDURE — 1159F MED LIST DOCD IN RCRD: CPT | Mod: CPTII,S$GLB,, | Performed by: FAMILY MEDICINE

## 2024-09-19 PROCEDURE — 99999 PR PBB SHADOW E&M-EST. PATIENT-LVL IV: CPT | Mod: PBBFAC,,, | Performed by: FAMILY MEDICINE

## 2024-09-19 PROCEDURE — 99214 OFFICE O/P EST MOD 30 MIN: CPT | Mod: S$GLB,,, | Performed by: FAMILY MEDICINE

## 2024-09-19 PROCEDURE — 3066F NEPHROPATHY DOC TX: CPT | Mod: CPTII,S$GLB,, | Performed by: FAMILY MEDICINE

## 2024-09-19 PROCEDURE — 3008F BODY MASS INDEX DOCD: CPT | Mod: CPTII,S$GLB,, | Performed by: FAMILY MEDICINE

## 2024-09-19 PROCEDURE — 1160F RVW MEDS BY RX/DR IN RCRD: CPT | Mod: CPTII,S$GLB,, | Performed by: FAMILY MEDICINE

## 2024-09-19 PROCEDURE — 3075F SYST BP GE 130 - 139MM HG: CPT | Mod: CPTII,S$GLB,, | Performed by: FAMILY MEDICINE

## 2024-09-19 RX ORDER — CARISOPRODOL 350 MG/1
350 TABLET ORAL 3 TIMES DAILY PRN
Qty: 90 TABLET | Refills: 0 | Status: SHIPPED | OUTPATIENT
Start: 2024-10-18

## 2024-09-19 RX ORDER — CARISOPRODOL 350 MG/1
350 TABLET ORAL 3 TIMES DAILY PRN
Qty: 90 TABLET | Refills: 0 | Status: SHIPPED | OUTPATIENT
Start: 2024-11-17

## 2024-09-19 RX ORDER — PROMETHAZINE HYDROCHLORIDE 25 MG/1
25 TABLET ORAL EVERY 6 HOURS PRN
Qty: 30 TABLET | Refills: 6 | Status: SHIPPED | OUTPATIENT
Start: 2024-09-19

## 2024-09-19 RX ORDER — ONDANSETRON 8 MG/1
8 TABLET, ORALLY DISINTEGRATING ORAL EVERY 6 HOURS PRN
Qty: 30 TABLET | Refills: 5 | Status: SHIPPED | OUTPATIENT
Start: 2024-09-19

## 2024-09-19 RX ORDER — HYDROCODONE BITARTRATE AND ACETAMINOPHEN 7.5; 325 MG/1; MG/1
1 TABLET ORAL EVERY 8 HOURS PRN
Qty: 90 TABLET | Refills: 0 | Status: SHIPPED | OUTPATIENT
Start: 2024-11-17

## 2024-09-19 RX ORDER — HYDROCODONE BITARTRATE AND ACETAMINOPHEN 10; 325 MG/1; MG/1
1 TABLET ORAL EVERY 8 HOURS PRN
Qty: 90 TABLET | Refills: 0 | Status: SHIPPED | OUTPATIENT
Start: 2024-09-19

## 2024-09-19 RX ORDER — CARISOPRODOL 350 MG/1
350 TABLET ORAL 3 TIMES DAILY PRN
Qty: 90 TABLET | Refills: 0 | Status: SHIPPED | OUTPATIENT
Start: 2024-09-19

## 2024-09-19 RX ORDER — HYDROCODONE BITARTRATE AND ACETAMINOPHEN 7.5; 325 MG/1; MG/1
1 TABLET ORAL EVERY 8 HOURS PRN
Qty: 90 TABLET | Refills: 0 | Status: SHIPPED | OUTPATIENT
Start: 2024-10-18

## 2024-09-19 NOTE — TELEPHONE ENCOUNTER
----- Message from Manju Germain sent at 9/19/2024  8:41 AM CDT -----  Contact: Patient, [phone]  Calling to speak with the nurse regarding his appointment today. He has an emergency at work. Please call him. Thanks.

## 2024-09-19 NOTE — PROGRESS NOTES
PLAN:    Assessment & Plan  1. Chronic Pain.  He is currently taking Norco and Soma for pain management. He was advised to continue Norco with a quantity of 90. He agreed to reduce Soma intake to three times a day. The risks associated with Soma were discussed, and he agreed to cut back on its usage.    2. Nausea.  He uses Zofran occasionally for nausea. Refills for Zofran were provided. He experiences heartburn, especially during physical exertion.    Medication Management.  He will continue to get his medications from [facility].    Complete history and physical was completed today.  Complete and thorough medication reconciliation was performed.  Discussed risks and benefits of medications.  Advised patient on orders and health maintenance.  We discussed old records and old labs if available.  Will request any records not available through epic.  Continue current medications listed on your summary sheet.      Problem List Items Addressed This Visit       Encounter for long-term (current) use of medications (Chronic)    Relevant Medications    HYDROcodone-acetaminophen (NORCO) 7.5-325 mg per tablet (Start on 10/18/2024)    HYDROcodone-acetaminophen (NORCO) 7.5-325 mg per tablet (Start on 11/17/2024)    carisoprodoL (SOMA) 350 MG tablet    carisoprodoL (SOMA) 350 MG tablet (Start on 10/18/2024)    carisoprodoL (SOMA) 350 MG tablet (Start on 11/17/2024)    ondansetron (ZOFRAN-ODT) 8 MG TbDL    promethazine (PHENERGAN) 25 MG tablet    Sacroiliitis - Primary (Chronic)    Relevant Medications    HYDROcodone-acetaminophen (NORCO)  mg per tablet    HYDROcodone-acetaminophen (NORCO) 7.5-325 mg per tablet (Start on 10/18/2024)    HYDROcodone-acetaminophen (NORCO) 7.5-325 mg per tablet (Start on 11/17/2024)    carisoprodoL (SOMA) 350 MG tablet    carisoprodoL (SOMA) 350 MG tablet (Start on 10/18/2024)    carisoprodoL (SOMA) 350 MG tablet (Start on 11/17/2024)    Hypertension associated with diabetes (Chronic)        Blood pressure well controlled with pain control.   Consider ACE-inhibitor with comorbid diabetes.  Patient defers at this time.Counseled on importance of hypertension disease course, I recommend ongoing Education for DASH-diet and exercise.  Counseled on medication regimen importance of treating high blood pressure.  Please be advised of risk of untreated blood pressure as discussed.  Please advised of ER precautions were given for symptoms of hypertensive urgency and emergency.           Nausea    Relevant Medications    ondansetron (ZOFRAN-ODT) 8 MG TbDL    promethazine (PHENERGAN) 25 MG tablet        Medication Management for assessment above:   Medication List with Changes/Refills   Current Medications    BLOOD SUGAR DIAGNOSTIC (BLOOD GLUCOSE TEST) STRP    Check glucose 3 times per day before each meal    BLOOD SUGAR DIAGNOSTIC (FREESTYLE INSULINX TEST STRIPS) STRP    Use 3 times a day as needed to test blood sugar.    BLOOD SUGAR DIAGNOSTIC STRP    To check BG 1 times daily, to use with insurance preferred meter    BLOOD-GLUCOSE METER KIT    To check BG 1 times daily, to use with insurance preferred meter    FEXOFENADINE (ALLEGRA) 180 MG TABLET    Take 1 tablet (180 mg total) by mouth once daily.    KETOCONAZOLE (NIZORAL) 2 % CREAM    Apply topically 2 (two) times daily.    LANCETS MISC    To check BG 1 times daily, to use with insurance preferred meter    MUPIROCIN (BACTROBAN) 2 % OINTMENT    Apply topically 3 (three) times daily.    NAPROXEN (NAPROSYN) 500 MG TABLET    Take 1 tablet (500 mg total) by mouth 2 (two) times daily with meals.   Changed and/or Refilled Medications    Modified Medication Previous Medication    CARISOPRODOL (SOMA) 350 MG TABLET carisoprodoL (SOMA) 350 MG tablet       Take 1 tablet (350 mg total) by mouth 3 (three) times daily as needed for Muscle spasms.    Take 1 tablet (350 mg total) by mouth 4 (four) times daily as needed for Muscle spasms.    CARISOPRODOL (SOMA) 350 MG TABLET  carisoprodoL (SOMA) 350 MG tablet       Take 1 tablet (350 mg total) by mouth 3 (three) times daily as needed for Muscle spasms.    Take 1 tablet (350 mg total) by mouth 4 (four) times daily as needed for Muscle spasms.    CARISOPRODOL (SOMA) 350 MG TABLET carisoprodoL (SOMA) 350 MG tablet       Take 1 tablet (350 mg total) by mouth 3 (three) times daily as needed for Muscle spasms.    Take 1 tablet (350 mg total) by mouth 4 (four) times daily as needed for Muscle spasms.    HYDROCODONE-ACETAMINOPHEN (NORCO)  MG PER TABLET HYDROcodone-acetaminophen (NORCO)  mg per tablet       Take 1 tablet by mouth every 8 (eight) hours as needed for Pain.    Take 1 tablet by mouth every 8 (eight) hours as needed for Pain.    HYDROCODONE-ACETAMINOPHEN (NORCO) 7.5-325 MG PER TABLET HYDROcodone-acetaminophen (NORCO) 7.5-325 mg per tablet       Take 1 tablet by mouth every 8 (eight) hours as needed for Pain.    Take 1 tablet by mouth every 6 (six) hours as needed for Pain.    HYDROCODONE-ACETAMINOPHEN (NORCO) 7.5-325 MG PER TABLET HYDROcodone-acetaminophen (NORCO) 7.5-325 mg per tablet       Take 1 tablet by mouth every 8 (eight) hours as needed for Pain.    Take 1 tablet by mouth every 6 (six) hours as needed for Pain.    ONDANSETRON (ZOFRAN-ODT) 8 MG TBDL ondansetron (ZOFRAN-ODT) 8 MG TbDL       Take 1 tablet (8 mg total) by mouth every 6 (six) hours as needed (nausea).    Take 1 tablet (8 mg total) by mouth every 6 (six) hours as needed (nausea).    PROMETHAZINE (PHENERGAN) 25 MG TABLET promethazine (PHENERGAN) 25 MG tablet       Take 1 tablet (25 mg total) by mouth every 6 (six) hours as needed for Nausea.    Take 1 tablet (25 mg total) by mouth every 6 (six) hours as needed for Nausea.   Discontinued Medications    HYDROCODONE-ACETAMINOPHEN (NORCO) 7.5-325 MG PER TABLET    Take 1 tablet by mouth every 6 (six) hours as needed for Pain.       Jb Chao,  M.D.  ==========================================================================  Subjective:   Patient ID: Ned Toledo Jr. is a 63 y.o. male.  has a past medical history of Diabetes mellitus, type 2, Fatty liver, Hypertension, Penetrating foot wound, and Sciatica.   Chief Complaint: Medication Refill      History of Present Illness  The patient is a 63-year-old male who presents here for medication refills and to discuss chronic medical conditions.    He continues his regimen of Soma, but has reduced the dosage. He also experiences occasional nausea.  CHRONIC. Stable. Compliant with medications for managed conditions. See medication list. No SE reported.   Routine lab analysis is being monitored. Refills were addressed.  Lab Results   Component Value Date    WBC 5.99 03/26/2024    HGB 15.1 03/26/2024    HCT 46.0 03/26/2024    MCV 97 03/26/2024     03/26/2024         Chemistry        Component Value Date/Time     (L) 03/26/2024 0726    K 4.3 03/26/2024 0726     03/26/2024 0726    CO2 24 03/26/2024 0726    BUN 10 03/26/2024 0726    CREATININE 1.2 03/26/2024 0726     (H) 03/26/2024 0726        Component Value Date/Time    CALCIUM 9.8 03/26/2024 0726    ALKPHOS 55 03/26/2024 0726    AST 17 03/26/2024 0726    ALT 24 03/26/2024 0726    BILITOT 0.8 03/26/2024 0726    ESTGFRAFRICA >60.0 06/01/2022 0809    EGFRNONAA >60.0 06/01/2022 0809          Lab Results   Component Value Date    TSH 2.195 03/26/2024       Problem List Items Addressed This Visit       Encounter for long-term (current) use of medications (Chronic)    Overview     June 2024: Reviewed labs.  Feb 2024: Reviewed labs.  November 2023: Reviewed labs.  August 2023 reviewed labs.  May 2023: Reviewed labs.  March 2023:  Reviewed labs November 2022: Reviewed labs.  October 2022: Reviewed labs.  August 2022: Reviewed labs.  Initial HPI March 2020:  CHRONIC. Stable. Compliant with medications for managed conditions. See medication list.  No SE reported. Routine lab analysis is being monitored. Refills were addressed.JUNE 2020:  Labs are stable.  Refill medication.CHRONIC. Stable. Compliant with medications for managed conditions. See medication list. No SE reported. Routine lab analysis is being monitored. Refills were addressed.SEPTEMBER 2020:  CHRONIC. Stable. Compliant with medications for managed conditions. See medication list. No SE reported. Routine lab analysis is being monitored. Refills were addressed.DECEMBER 2020:  Reviewed labs.  CHRONIC. Stable. Compliant with medications for managed conditions. See medication list. No SE reported. Routine lab analysis is being monitored. Refills were addressed.  March 2021:  Reviewed labs.  June 2021:  Reviewed labs.  December 2021:  Reviewed labs. March 2022: Reviewed labs.  May 2022:  Reviewed labs.  Lab Results   Component Value Date    WBC 5.99 03/26/2024    HGB 15.1 03/26/2024    HCT 46.0 03/26/2024    MCV 97 03/26/2024     03/26/2024         Chemistry        Component Value Date/Time     (L) 03/26/2024 0726    K 4.3 03/26/2024 0726     03/26/2024 0726    CO2 24 03/26/2024 0726    BUN 10 03/26/2024 0726    CREATININE 1.2 03/26/2024 0726     (H) 03/26/2024 0726        Component Value Date/Time    CALCIUM 9.8 03/26/2024 0726    ALKPHOS 55 03/26/2024 0726    AST 17 03/26/2024 0726    ALT 24 03/26/2024 0726    BILITOT 0.8 03/26/2024 0726    ESTGFRAFRICA >60.0 06/01/2022 0809    EGFRNONAA >60.0 06/01/2022 0809          Lab Results   Component Value Date    TSH 2.195 03/26/2024            Sacroiliitis - Primary (Chronic)    Overview     June 2024:  Patient needing medication refills for his chronic pain.  Feb 2024: here for refills. No change in HPI. Tolerating hydrocodone and Soma which he has been taking for several years. No SE reported. Pain stable.     November 2023: Patient here for medication refills.  Patient has had some difficulty with changing pharmacies lately.   Chronic.  August 2023:   Patient needing refills.  No new issues.   May 2023:  Noted injuries or falls.  Patient here for medication refills.  March 2023:  No new injuries or falls.  Stable on current medication regimen.  November 2022:  Patient here for medication refill.  Stable.  Patient chronic pain medication with hydrocodone 7.5 and Soma 350 milligram.  Patient was previously managed by previous PCP Dr. Jaime Hernández who is retiring.  Patient has been stable on this medication regimen for years.  No side effects reported.    March 2022: Patient reports medication regimen is controlling his symptoms.  No change in HPI.  No new injuries.  May 2022:  Patient reports he has been very active at work and pain medication regimen is working well.  August 2022: Patient here for medication refills.  No change in HPI.  Medication continues to work well.           Hypertension associated with diabetes (Chronic)    Overview     September 2024:    CHRONIC. STABLE. BP Reviewed.  Currently not on medication . No SE reported.  March 2021:  Initial blood pressure elevated due to patient rushing for appointment.  He reports blood pressure well controlled at home 130/80.  May 2022:  Blood pressure remains below goal.  (-) CP, SOB, palpitations, dizziness, lightheadedness, HA, arm numbness, tingling or weakness, syncope.  Creatinine   Date Value Ref Range Status   03/26/2024 1.2 0.5 - 1.4 mg/dL Final            Current Assessment & Plan       Blood pressure well controlled with pain control.   Consider ACE-inhibitor with comorbid diabetes.  Patient defers at this time.Counseled on importance of hypertension disease course, I recommend ongoing Education for DASH-diet and exercise.  Counseled on medication regimen importance of treating high blood pressure.  Please be advised of risk of untreated blood pressure as discussed.  Please advised of ER precautions were given for symptoms of hypertensive urgency and emergency.            Nausea    Overview     Chronic.  Recurrent.  Intermittent control.  Patient uses Zofran or phenergan as needed.  Requesting refill.             Review of patient's allergies indicates:   Allergen Reactions    Atorvastatin     Bactrim [sulfamethoxazole-trimethoprim] Hives     Current Outpatient Medications   Medication Instructions    blood sugar diagnostic (BLOOD GLUCOSE TEST) Strp Check glucose 3 times per day before each meal    blood sugar diagnostic (FREESTYLE INSULINX TEST STRIPS) Strp Use 3 times a day as needed to test blood sugar.    blood sugar diagnostic Strp To check BG 1 times daily, to use with insurance preferred meter    blood-glucose meter kit To check BG 1 times daily, to use with insurance preferred meter    carisoprodoL (SOMA) 350 mg, Oral, 3 times daily PRN    [START ON 10/18/2024] carisoprodoL (SOMA) 350 mg, Oral, 3 times daily PRN    [START ON 11/17/2024] carisoprodoL (SOMA) 350 mg, Oral, 3 times daily PRN    fexofenadine (ALLEGRA) 180 mg, Oral, Daily    HYDROcodone-acetaminophen (NORCO)  mg per tablet 1 tablet, Oral, Every 8 hours PRN    [START ON 10/18/2024] HYDROcodone-acetaminophen (NORCO) 7.5-325 mg per tablet 1 tablet, Oral, Every 8 hours PRN    [START ON 11/17/2024] HYDROcodone-acetaminophen (NORCO) 7.5-325 mg per tablet 1 tablet, Oral, Every 8 hours PRN    ketoconazole (NIZORAL) 2 % cream Topical (Top), 2 times daily    lancets Misc To check BG 1 times daily, to use with insurance preferred meter    mupirocin (BACTROBAN) 2 % ointment Topical (Top), 3 times daily    naproxen (NAPROSYN) 500 mg, Oral, 2 times daily with meals    ondansetron (ZOFRAN-ODT) 8 mg, Oral, Every 6 hours PRN    promethazine (PHENERGAN) 25 mg, Oral, Every 6 hours PRN      I have reviewed the PMH, social history, FamilyHx, surgical history, allergies and medications documented / confirmed by the patient at the time of this visit.  Review of Systems   Constitutional:  Negative for chills, fatigue, fever and  unexpected weight change.   HENT:  Negative for ear pain, sinus pressure, sinus pain and sore throat.    Eyes:  Negative for redness and visual disturbance.   Respiratory:  Negative for cough and shortness of breath.    Cardiovascular:  Negative for chest pain and palpitations.   Gastrointestinal:  Negative for nausea and vomiting.   Endocrine: Negative for cold intolerance and heat intolerance.   Genitourinary:  Negative for difficulty urinating and hematuria.   Musculoskeletal:  Positive for arthralgias and back pain. Negative for myalgias.   Skin:  Negative for rash and wound.   Allergic/Immunologic: Negative for environmental allergies and food allergies.   Neurological:  Negative for weakness and headaches.   Hematological:  Negative for adenopathy. Does not bruise/bleed easily.   Psychiatric/Behavioral:  Negative for sleep disturbance. The patient is not nervous/anxious.      Objective:   /86   Pulse 90   Ht 6' (1.829 m)   Wt 84.4 kg (186 lb)   SpO2 96%   BMI 25.23 kg/m²   Physical Exam  Vitals and nursing note reviewed.   Constitutional:       General: He is not in acute distress.     Appearance: He is well-developed. He is not diaphoretic.   HENT:      Head: Normocephalic and atraumatic.      Right Ear: Tympanic membrane, ear canal and external ear normal. There is no impacted cerumen.      Left Ear: Tympanic membrane, ear canal and external ear normal. There is no impacted cerumen.      Nose: No congestion or rhinorrhea.      Mouth/Throat:      Pharynx: No posterior oropharyngeal erythema.   Eyes:      Extraocular Movements: Extraocular movements intact.      Pupils: Pupils are equal, round, and reactive to light.   Neck:      Vascular: No carotid bruit.   Cardiovascular:      Rate and Rhythm: Normal rate.      Pulses: Normal pulses.   Pulmonary:      Effort: Pulmonary effort is normal. No respiratory distress.      Breath sounds: Normal breath sounds.   Abdominal:      General: Bowel sounds are  normal.      Palpations: Abdomen is soft.   Musculoskeletal:         General: Tenderness and deformity present. Normal range of motion.      Cervical back: Normal range of motion and neck supple.   Lymphadenopathy:      Cervical: No cervical adenopathy.   Skin:     General: Skin is warm and dry.      Capillary Refill: Capillary refill takes less than 2 seconds.      Findings: No rash.   Neurological:      General: No focal deficit present.      Mental Status: He is alert and oriented to person, place, and time.   Psychiatric:         Attention and Perception: He is attentive.         Mood and Affect: Mood normal. Mood is not anxious or depressed. Affect is not labile, blunt, angry or inappropriate.         Speech: He is communicative. Speech is not rapid and pressured, delayed, slurred or tangential.         Behavior: Behavior normal. Behavior is not agitated, slowed, aggressive, withdrawn, hyperactive or combative.         Thought Content: Thought content normal. Thought content is not paranoid or delusional. Thought content does not include homicidal or suicidal ideation. Thought content does not include homicidal or suicidal plan.         Cognition and Memory: Memory is not impaired.         Judgment: Judgment normal. Judgment is not impulsive or inappropriate.       Physical Exam      Results      Assessment:     1. Sacroiliitis    2. Encounter for long-term (current) use of medications    3. Nausea    4. Hypertension associated with diabetes      MDM:   Moderate medical complexity.  Moderate risk.  Total time: 21 minutes.  This includes total time spent on the encounter, which includes face to face time and non-face to face time preparing to see the patient (eg, review of previous medical records, tests), Obtaining and/or reviewing separately obtained history, documenting clinical information in the electronic or other health record, independently interpreting results (not separately reported)/communicating  results to the patient/family/caregiver, and/or care coordination (not separately reported).    I have Reviewed and summarized old records.  I have performed thorough medication reconciliation today and discussed risk and benefits of medications.  I have reviewed labs and discussed with patient.  All questions were answered.  I am requesting old records and will review them once they are available.  Visit today included increased complexity associated with the care of the episodic problem see above assessment addressed and managing the longitudinal care of the patient due to the serious and/or complex managed problem(s) see above.  I have signed for the following orders AND/OR meds.  No orders of the defined types were placed in this encounter.    Medications Ordered This Encounter   Medications    carisoprodoL (SOMA) 350 MG tablet     Sig: Take 1 tablet (350 mg total) by mouth 3 (three) times daily as needed for Muscle spasms.     Dispense:  90 tablet     Refill:  0    carisoprodoL (SOMA) 350 MG tablet     Sig: Take 1 tablet (350 mg total) by mouth 3 (three) times daily as needed for Muscle spasms.     Dispense:  90 tablet     Refill:  0    carisoprodoL (SOMA) 350 MG tablet     Sig: Take 1 tablet (350 mg total) by mouth 3 (three) times daily as needed for Muscle spasms.     Dispense:  90 tablet     Refill:  0    HYDROcodone-acetaminophen (NORCO)  mg per tablet     Sig: Take 1 tablet by mouth every 8 (eight) hours as needed for Pain.     Dispense:  90 tablet     Refill:  0     Quantity prescribed more than 7 day supply? Yes, quantity medically necessary     Order Specific Question:   I have reviewed the Prescription Drug Monitoring Program (PDMP) database for this patient prior to prescribing the above opioid medication     Answer:   Yes    HYDROcodone-acetaminophen (NORCO) 7.5-325 mg per tablet     Sig: Take 1 tablet by mouth every 8 (eight) hours as needed for Pain.     Dispense:  90 tablet     Refill:  0      Quantity prescribed more than 7 day supply? Yes, quantity medically necessary     Order Specific Question:   I have reviewed the Prescription Drug Monitoring Program (PDMP) database for this patient prior to prescribing the above opioid medication     Answer:   Yes    HYDROcodone-acetaminophen (NORCO) 7.5-325 mg per tablet     Sig: Take 1 tablet by mouth every 8 (eight) hours as needed for Pain.     Dispense:  90 tablet     Refill:  0     Quantity prescribed more than 7 day supply? Yes, quantity medically necessary     Order Specific Question:   I have reviewed the Prescription Drug Monitoring Program (PDMP) database for this patient prior to prescribing the above opioid medication     Answer:   Yes    ondansetron (ZOFRAN-ODT) 8 MG TbDL     Sig: Take 1 tablet (8 mg total) by mouth every 6 (six) hours as needed (nausea).     Dispense:  30 tablet     Refill:  5    promethazine (PHENERGAN) 25 MG tablet     Sig: Take 1 tablet (25 mg total) by mouth every 6 (six) hours as needed for Nausea.     Dispense:  30 tablet     Refill:  6        Follow up in about 3 months (around 12/19/2024), or if symptoms worsen or fail to improve, for Med refills, LAB RESULTS.    If no improvement in symptoms or symptoms worsen, advised to call/follow-up at clinic or go to ER. Patient voiced understanding and all questions/concerns were addressed.   DISCLAIMER: This note was compiled by using a speech recognition dictation system and therefore please be aware that typographical / speech recognition errors can and do occur.  Please contact me if you see any errors specifically.  Consent was obtained for [facility] recording system prior to the visit.    Jb Chao M.D.       Office: [phone] [id number] [place] [address]  FAX: [phone]

## 2024-09-20 NOTE — PATIENT INSTRUCTIONS
Follow up in about 3 months (around 12/19/2024), or if symptoms worsen or fail to improve.     Dear patient,   As a result of recent federal legislation (The Federal Cures Act), you may receive lab or pathology results from your visit in your [facility] account before your physician is able to contact you. Your physician or their representative will relay the results to you with their recommendations at their soonest availability.     If no improvement in symptoms or symptoms worsen, please be advised to call MD, follow-up at clinic and/or go to ER if becomes severe.    Jb Chao M.D.        We Offer TELEHEALTH & Same Day Appointments!   Book your Telehealth appointment with me through my nurse or   Clinic appointments on [employer]!    [address] [place] [address]    Office: [phone]   FAX: [phone]    Check out my Facebook Page and Follow Me at: [email or web address]    Check out my website at [employer] by clicking on: [email or web address]    To Schedule appointments online, go to [email or web address]: [email or web address]

## 2024-09-26 ENCOUNTER — PATIENT OUTREACH (OUTPATIENT)
Dept: ADMINISTRATIVE | Facility: HOSPITAL | Age: 64
End: 2024-09-26
Payer: COMMERCIAL

## 2024-09-26 NOTE — PROGRESS NOTES
"VBC: Spoke with Pt who informs he "daxa forgot" about doing colon & eye screening, Pt informs he still has Cologuard & will complete both soon. Informed Pt I will f/u in 3 months.     "

## 2024-11-25 ENCOUNTER — OFFICE VISIT (OUTPATIENT)
Dept: FAMILY MEDICINE | Facility: CLINIC | Age: 64
End: 2024-11-25
Payer: COMMERCIAL

## 2024-11-25 VITALS
HEIGHT: 72 IN | DIASTOLIC BLOOD PRESSURE: 91 MMHG | HEART RATE: 68 BPM | OXYGEN SATURATION: 100 % | TEMPERATURE: 98 F | BODY MASS INDEX: 25.69 KG/M2 | WEIGHT: 189.69 LBS | SYSTOLIC BLOOD PRESSURE: 159 MMHG

## 2024-11-25 DIAGNOSIS — J02.9 PHARYNGITIS, UNSPECIFIED ETIOLOGY: ICD-10-CM

## 2024-11-25 DIAGNOSIS — J06.9 UPPER RESPIRATORY TRACT INFECTION, UNSPECIFIED TYPE: Primary | ICD-10-CM

## 2024-11-25 PROCEDURE — 1159F MED LIST DOCD IN RCRD: CPT | Mod: CPTII,S$GLB,, | Performed by: NURSE PRACTITIONER

## 2024-11-25 PROCEDURE — 99999 PR PBB SHADOW E&M-EST. PATIENT-LVL V: CPT | Mod: PBBFAC,,, | Performed by: NURSE PRACTITIONER

## 2024-11-25 PROCEDURE — 1160F RVW MEDS BY RX/DR IN RCRD: CPT | Mod: CPTII,S$GLB,, | Performed by: NURSE PRACTITIONER

## 2024-11-25 PROCEDURE — 1101F PT FALLS ASSESS-DOCD LE1/YR: CPT | Mod: CPTII,S$GLB,, | Performed by: NURSE PRACTITIONER

## 2024-11-25 PROCEDURE — 3061F NEG MICROALBUMINURIA REV: CPT | Mod: CPTII,S$GLB,, | Performed by: NURSE PRACTITIONER

## 2024-11-25 PROCEDURE — 3077F SYST BP >= 140 MM HG: CPT | Mod: CPTII,S$GLB,, | Performed by: NURSE PRACTITIONER

## 2024-11-25 PROCEDURE — 3080F DIAST BP >= 90 MM HG: CPT | Mod: CPTII,S$GLB,, | Performed by: NURSE PRACTITIONER

## 2024-11-25 PROCEDURE — 3288F FALL RISK ASSESSMENT DOCD: CPT | Mod: CPTII,S$GLB,, | Performed by: NURSE PRACTITIONER

## 2024-11-25 PROCEDURE — 99213 OFFICE O/P EST LOW 20 MIN: CPT | Mod: S$GLB,,, | Performed by: NURSE PRACTITIONER

## 2024-11-25 PROCEDURE — 3008F BODY MASS INDEX DOCD: CPT | Mod: CPTII,S$GLB,, | Performed by: NURSE PRACTITIONER

## 2024-11-25 PROCEDURE — 3066F NEPHROPATHY DOC TX: CPT | Mod: CPTII,S$GLB,, | Performed by: NURSE PRACTITIONER

## 2024-11-25 PROCEDURE — 3051F HG A1C>EQUAL 7.0%<8.0%: CPT | Mod: CPTII,S$GLB,, | Performed by: NURSE PRACTITIONER

## 2024-11-25 RX ORDER — AMOXICILLIN 500 MG/1
500 TABLET, FILM COATED ORAL EVERY 12 HOURS
Qty: 20 TABLET | Refills: 0 | Status: SHIPPED | OUTPATIENT
Start: 2024-11-25 | End: 2024-12-05

## 2024-11-25 RX ORDER — AMOXICILLIN 875 MG/1
875 TABLET, FILM COATED ORAL EVERY 12 HOURS
Qty: 20 TABLET | Refills: 0 | Status: CANCELLED | OUTPATIENT
Start: 2024-11-25 | End: 2024-12-05

## 2024-11-25 NOTE — PATIENT INSTRUCTIONS
Warm salt water gargles PRN  Hydrate well  Rest  Diabetic tussin OTC as directed  Report to ER immediately if symptoms worsen or persist  Sidney Marino,     If you are due for any health screening(s) below please notify me so we can arrange them to be ordered and scheduled. Most healthy patients at your age complete them, but you are free to accept or refuse.     If you can't do it, I'll definitely understand. If you can, I'd certainly appreciate it!    Tests to Keep You Healthy    Eye Exam: DUE  Colon Cancer Screening: ORDERED  Last Blood Pressure <= 139/89 (9/19/2024): Yes  Last HbA1c < 8 (06/24/2024): Yes      Its time for your colon cancer screening     Colorectal cancer is one of the leading causes of cancer death for men and women but it doesnt have to be. Screenings can prevent colorectal cancer or find it early enough to treat and cure the disease.     Our records indicate that you may be overdue for colon cancer screening. A colonoscopy or stool screening test can help identify patients at risk for developing colon cancer. Cancer screenings save lives, so schedule yours today to stay healthy.     A colonoscopy is the preferred test for detecting colon cancer. It is needed only once every 10 years if results are negative. While you are sedated, a flexible, lighted tube with a tiny camera is inserted into the rectum and advanced through the colon to look for cancers.     An alternative screening test that is used at home and returned to the lab may also be used. It detects hidden blood in bowel movements which could indicate cancer in the colon. If results are positive, you will need a colonoscopy to determine if the blood is a sign of cancer. This type of follow up (diagnostic) colonoscopy usually requires additional copays as required by your insurance provider.     If you recently had your colon cancer screening performed outside of [facility], please let your Health care team know so that they  can update your health record. Please contact your PCP if you have any questions.    Your diabetic retinal eye exam is due     Diabetes is the #1 cause of blindness in the [place] - early detection before signs or symptoms develop can prevent debilitating blindness.     Our records indicate that you may be overdue for your annual diabetic eye exam. Eye screening can help identify patients at risk for developing vision loss which is common in diabetes. This simple screening is an important step to keeping you healthy and preventing complications from diabetes.     This recommended diabetic eye exam should take place once per year and can prevent and treat diabetes complications in the eye before developing symptoms. This can be done with a special camera is used to take photographs of the back of your eye without having to dilate them, or you can see an eye doctor for a full dilated exam.     If you recently had your yearly diabetic eye exam performed outside of [facility], please let your Health care team know so that they can update your health record.

## 2024-11-25 NOTE — PROGRESS NOTES
"Subjective     Patient ID: Ned Toledo Jr. is a 64 y.o. male.    Chief Complaint: Sore Throat (2 weeks )    Sore Throat   This is a new problem. The current episode started 1 to 4 weeks ago (x 2 w). The problem has been waxing and waning. There has been no fever. Associated symptoms include congestion and coughing. Pertinent negatives include no abdominal pain, diarrhea, drooling, ear discharge, ear pain, headaches, hoarse voice, plugged ear sensation, neck pain, shortness of breath, stridor, swollen glands, trouble swallowing or vomiting. He has had no exposure to strep or mono. Treatments tried: OTC cold medication; declines COVID-19/influenza testing. Pt states, "I want amoxicillin. That's what doc gives me and it works for me." The treatment provided no relief.     Past Medical History:   Diagnosis Date    Diabetes mellitus, type 2     Fatty liver     Hypertension     Penetrating foot wound     Sciatica      Social History     Socioeconomic History    Marital status:    Tobacco Use    Smoking status: Never     Passive exposure: Never    Smokeless tobacco: Never   Substance and Sexual Activity    Alcohol use: No    Drug use: Never    Sexual activity: Yes     Partners: Female     Birth control/protection: None     Social Drivers of Health     Stress: No Stress Concern Present (6/9/2020)    [employer] - Occupational Stress Questionnaire     Feeling of Stress : Only a little     Past Surgical History:   Procedure Laterality Date    HERNIA REPAIR         Review of Systems   Constitutional: Negative.    HENT:  Positive for nasal congestion, sinus pressure/congestion and sore throat. Negative for drooling, ear discharge, ear pain, hoarse voice and trouble swallowing.    Eyes: Negative.    Respiratory:  Positive for cough. Negative for shortness of breath and stridor.    Cardiovascular: Negative.    Gastrointestinal: Negative.  Negative for abdominal pain, diarrhea and vomiting. "   Endocrine: Negative.    Genitourinary: Negative.    Musculoskeletal: Negative.  Negative for neck pain.   Integumentary:  Negative.   Allergic/Immunologic: Negative.    Neurological: Negative.  Negative for headaches.   Psychiatric/Behavioral: Negative.            Objective     Physical Exam  Vitals and nursing note reviewed.   Constitutional:       Appearance: Normal appearance.   HENT:      Head: Normocephalic.      Right Ear: Hearing, tympanic membrane, ear canal and external ear normal.      Left Ear: Hearing, tympanic membrane, ear canal and external ear normal.      Nose: Congestion present.      Mouth/Throat:      Mouth: Mucous membranes are moist.      Pharynx: Posterior oropharyngeal erythema present. No pharyngeal swelling, oropharyngeal exudate, uvula swelling or postnasal drip.   Eyes:      Conjunctiva/sclera: Conjunctivae normal.      Pupils: Pupils are equal, round, and reactive to light.   Cardiovascular:      Rate and Rhythm: Normal rate and regular rhythm.      Pulses: Normal pulses.      Heart sounds: Normal heart sounds.   Pulmonary:      Effort: Pulmonary effort is normal.      Breath sounds: Normal breath sounds.   Abdominal:      General: Bowel sounds are normal.      Palpations: Abdomen is soft.   Musculoskeletal:         General: Normal range of motion.      Cervical back: Normal range of motion and neck supple.   Skin:     General: Skin is warm and dry.      Capillary Refill: Capillary refill takes 2 to 3 seconds.   Neurological:      Mental Status: He is alert and oriented to person, place, and time.   Psychiatric:         Mood and Affect: Mood normal.         Behavior: Behavior normal.         Thought Content: Thought content normal.         Judgment: Judgment normal.            Assessment and Plan     Ned was seen today for sore throat.    Diagnoses and all orders for this visit:    Upper respiratory tract infection, unspecified type  Pharyngitis, unspecified etiology  -     (Magic  mouthwash) 1:1:1 diphenhydrAMINE(Benadryl) 12.5mg/5ml liq, aluminum & magnesium hydroxide-simethicone (Maalox), LIDOcaine viscous 2%; Swish and spit 5 mLs every 8 (eight) hours as needed. Gargle and spit. DO NOT SWALLOW  -     amoxicillin (AMOXIL) 500 MG Tab; Take 1 tablet (500 mg total) by mouth every 12 (twelve) hours. for 10 days  Warm salt water gargles PRN  Hydrate well  Rest  Diabetic tussin OTC as directed  Report to ER immediately if symptoms worsen or persist                 No follow-ups on file.

## 2024-12-04 ENCOUNTER — TELEPHONE (OUTPATIENT)
Dept: FAMILY MEDICINE | Facility: CLINIC | Age: 64
End: 2024-12-04
Payer: COMMERCIAL

## 2024-12-04 NOTE — TELEPHONE ENCOUNTER
----- Message from Summer sent at 12/4/2024  3:01 PM CST -----  Contact: Ned  Patient called asking for advice about having flu like symptoms. He is wanted some medication called in. Please call patient at [phone]. Thanks!    [id number] - [place], [place] - [address] [facility] [address] [facility] [address] [address]  [address] [facility] [address]  Phone: [phone] Fax: [phone]

## 2024-12-10 ENCOUNTER — TELEPHONE (OUTPATIENT)
Dept: FAMILY MEDICINE | Facility: CLINIC | Age: 64
End: 2024-12-10
Payer: COMMERCIAL

## 2024-12-10 NOTE — TELEPHONE ENCOUNTER
Pt asking for an appt with dr serrano only , I do not see anything available   Please call him to schedule the referral

## 2024-12-10 NOTE — TELEPHONE ENCOUNTER
----- Message from Shantal sent at 12/10/2024  4:53 PM CST -----  Name of Who is Calling: Pt         What is the request in detail:Pt would like a call back to [address] f/u appt. Please advise thank you         Can the clinic reply by [facility]:no        What Number to Call Back if not in [facility]:Telephone Information:  Mobile          [phone]

## 2024-12-13 ENCOUNTER — OFFICE VISIT (OUTPATIENT)
Dept: FAMILY MEDICINE | Facility: CLINIC | Age: 64
End: 2024-12-13
Payer: COMMERCIAL

## 2024-12-13 VITALS
SYSTOLIC BLOOD PRESSURE: 138 MMHG | HEART RATE: 91 BPM | DIASTOLIC BLOOD PRESSURE: 64 MMHG | BODY MASS INDEX: 25.26 KG/M2 | WEIGHT: 186.5 LBS | HEIGHT: 72 IN | OXYGEN SATURATION: 97 %

## 2024-12-13 DIAGNOSIS — Z86.16 HISTORY OF COVID-19: ICD-10-CM

## 2024-12-13 DIAGNOSIS — M46.1 SACROILIITIS: Primary | ICD-10-CM

## 2024-12-13 DIAGNOSIS — E11.59 HYPERTENSION ASSOCIATED WITH DIABETES: ICD-10-CM

## 2024-12-13 DIAGNOSIS — Z79.899 ENCOUNTER FOR LONG-TERM (CURRENT) USE OF MEDICATIONS: ICD-10-CM

## 2024-12-13 DIAGNOSIS — I15.2 HYPERTENSION ASSOCIATED WITH DIABETES: ICD-10-CM

## 2024-12-13 PROCEDURE — 99999 PR PBB SHADOW E&M-EST. PATIENT-LVL V: CPT | Mod: PBBFAC,,, | Performed by: FAMILY MEDICINE

## 2024-12-13 RX ORDER — CARISOPRODOL 350 MG/1
350 TABLET ORAL 3 TIMES DAILY PRN
Qty: 90 TABLET | Refills: 0 | Status: SHIPPED | OUTPATIENT
Start: 2024-12-13

## 2024-12-13 RX ORDER — CARISOPRODOL 350 MG/1
350 TABLET ORAL 3 TIMES DAILY PRN
Qty: 90 TABLET | Refills: 0 | Status: SHIPPED | OUTPATIENT
Start: 2025-01-11

## 2024-12-13 RX ORDER — CARISOPRODOL 350 MG/1
350 TABLET ORAL 3 TIMES DAILY PRN
Qty: 90 TABLET | Refills: 0 | Status: SHIPPED | OUTPATIENT
Start: 2025-02-10

## 2024-12-13 RX ORDER — HYDROCODONE BITARTRATE AND ACETAMINOPHEN 7.5; 325 MG/1; MG/1
1 TABLET ORAL EVERY 8 HOURS PRN
Qty: 90 TABLET | Refills: 0 | Status: SHIPPED | OUTPATIENT
Start: 2025-02-10

## 2024-12-13 RX ORDER — HYDROCODONE BITARTRATE AND ACETAMINOPHEN 10; 325 MG/1; MG/1
1 TABLET ORAL EVERY 8 HOURS PRN
Qty: 90 TABLET | Refills: 0 | Status: SHIPPED | OUTPATIENT
Start: 2025-01-11

## 2024-12-13 RX ORDER — PROMETHAZINE HYDROCHLORIDE AND DEXTROMETHORPHAN HYDROBROMIDE 6.25; 15 MG/5ML; MG/5ML
5 SYRUP ORAL EVERY 4 HOURS PRN
Qty: 240 ML | Refills: 0 | Status: SHIPPED | OUTPATIENT
Start: 2024-12-13 | End: 2024-12-23

## 2024-12-13 RX ORDER — ALBUTEROL SULFATE 90 UG/1
2 INHALANT RESPIRATORY (INHALATION) EVERY 4 HOURS PRN
Qty: 18 G | Refills: 1 | Status: SHIPPED | OUTPATIENT
Start: 2024-12-13 | End: 2025-06-11

## 2024-12-13 RX ORDER — METHYLPREDNISOLONE 4 MG/1
TABLET ORAL
Qty: 21 TABLET | Refills: 0 | Status: SHIPPED | OUTPATIENT
Start: 2024-12-13

## 2024-12-13 RX ORDER — ALBUTEROL SULFATE 90 UG/1
INHALANT RESPIRATORY (INHALATION)
COMMUNITY
Start: 2024-12-05 | End: 2024-12-13 | Stop reason: SDUPTHER

## 2024-12-13 RX ORDER — HYDROCODONE BITARTRATE AND ACETAMINOPHEN 7.5; 325 MG/1; MG/1
1 TABLET ORAL EVERY 8 HOURS PRN
Qty: 90 TABLET | Refills: 0 | Status: SHIPPED | OUTPATIENT
Start: 2024-12-13

## 2024-12-13 NOTE — PATIENT INSTRUCTIONS
Sidney Marino,     If you are due for any health screening(s) below please notify me so we can arrange them to be ordered and scheduled. Most healthy patients at your age complete them, but you are free to accept or refuse.     If you can't do it, I'll definitely understand. If you can, I'd certainly appreciate it!    Tests to Keep You Healthy    Eye Exam: DUE  Colon Cancer Screening: ORDERED  Last Blood Pressure <= 139/89 (12/13/2024): Yes  Last HbA1c < 8 (06/24/2024): Yes      Its time for your colon cancer screening     Colorectal cancer is one of the leading causes of cancer death for men and women but it doesnt have to be. Screenings can prevent colorectal cancer or find it early enough to treat and cure the disease.     Our records indicate that you may be overdue for colon cancer screening. A colonoscopy or stool screening test can help identify patients at risk for developing colon cancer. Cancer screenings save lives, so schedule yours today to stay healthy.     A colonoscopy is the preferred test for detecting colon cancer. It is needed only once every 10 years if results are negative. While you are sedated, a flexible, lighted tube with a tiny camera is inserted into the rectum and advanced through the colon to look for cancers.     An alternative screening test that is used at home and returned to the lab may also be used. It detects hidden blood in bowel movements which could indicate cancer in the colon. If results are positive, you will need a colonoscopy to determine if the blood is a sign of cancer. This type of follow up (diagnostic) colonoscopy usually requires additional copays as required by your insurance provider.     If you recently had your colon cancer screening performed outside of [facility], please let your Health care team know so that they can update your health record. Please contact your PCP if you have any questions.    Your diabetic retinal eye exam is due     Diabetes is  the #1 cause of blindness in the [place] - early detection before signs or symptoms develop can prevent debilitating blindness.     Our records indicate that you may be overdue for your annual diabetic eye exam. Eye screening can help identify patients at risk for developing vision loss which is common in diabetes. This simple screening is an important step to keeping you healthy and preventing complications from diabetes.     This recommended diabetic eye exam should take place once per year and can prevent and treat diabetes complications in the eye before developing symptoms. This can be done with a special camera is used to take photographs of the back of your eye without having to dilate them, or you can see an eye doctor for a full dilated exam.     If you recently had your yearly diabetic eye exam performed outside of [facility], please let your Health care team know so that they can update your health record.

## 2024-12-13 NOTE — ASSESSMENT & PLAN NOTE
Refill medications as prescribed.  No abuse suspected. The patient was checked in the [facility] Board of Pharmacy's  Prescription Monitoring Program. There appears to be no incongruities with the patient's verbalized history.       An opioid pain contract was completed  after having an extensive conversation about chronic opioid use for pain management. We discussed the risks and benefits of opioids.  I discouraged the patient from escalation of opioid use and try to minimize its use to decrease chance of dependence, tolerance, and opioid-based hyperalgesia.  I reviewed the  in great detail and there are no inconsistencies and it is appropriate with patient's history.  There are no signs of aberrant drug behavior.  The opioid risk tool was also completed, low risk.  Pt counseled about the side effects of long term use of opioids including dependence, tolerance, addiction, respiratory depression, somnolence, immune and endocrine dysfunction.  The patient expressed understanding.      If no improvement or worsening.  Referral to physical therapy, update imaging and consider spine/back clinic versus surgical specialist.

## 2024-12-13 NOTE — PROGRESS NOTES
PLAN:      Assessment & Plan  1. COVID-19 infection.  He tested positive for COVID-19 at a walk-in clinic and received a steroid injection. He has been experiencing chills and a persistent cough. A prescription for a Medrol Dosepak will be provided, to be used only if symptoms worsen. A refill for the albuterol inhaler will be sent to [facility], to be used every 4 to 6 hours as needed. A cough syrup will also be prescribed for nighttime use.  Discussed condition course and signs and symptoms to expect.  Patient advised take anti-inflammatories and or Tylenol for pain or fever.  ER precautions.  Call MD or follow-up to clinic if not improving or worsening symptoms.      2. Medication management.  He requested refills for his pain medication, hydrocodone, which he last filled on 11/25/2024. He is advised to attempt to fill it on 12/20/2024. If there are any issues with the pharmacy, he should contact the office.    3. Diabetes mellitus.  His A1c levels were within the normal range in June 2024. He is advised to undergo laboratory tests during his next visit.  We will plan to monitor hemoglobin A1c at designated intervals 3 to 6 months.  I recommend ongoing Education for diabetic diet and exercise protocol.  We will continue to monitor for side effects.    Please be advised of symptoms to monitor for and to notify me immediately if persistent or worsening.  Follow up with Ophthalmology/Optometry and Podiatry at least annually.      Problem List Items Addressed This Visit       Hypertension associated with diabetes (Chronic)       Blood pressure well controlled with pain control.   Consider ACE-inhibitor with comorbid diabetes.  Patient defers at this time.Counseled on importance of hypertension disease course, I recommend ongoing Education for DASH-diet and exercise.  Counseled on medication regimen importance of treating high blood pressure.  Please be advised of risk of untreated blood pressure as discussed.  Please  advised of ER precautions were given for symptoms of hypertensive urgency and emergency.           Encounter for long-term (current) use of medications (Chronic)     Complete history and physical was completed today.  Complete and thorough medication reconciliation was performed.  Discussed risks and benefits of medications.  Advised patient on orders and health maintenance.  We discussed old records and old labs if available.  Will request any records not available through epic.  Continue current medications listed on your summary sheet.           Relevant Medications    carisoprodoL (SOMA) 350 MG tablet (Start on 1/11/2025)    carisoprodoL (SOMA) 350 MG tablet (Start on 2/10/2025)    carisoprodoL (SOMA) 350 MG tablet    HYDROcodone-acetaminophen (NORCO) 7.5-325 mg per tablet (Start on 2/10/2025)    HYDROcodone-acetaminophen (NORCO) 7.5-325 mg per tablet    Sacroiliitis - Primary (Chronic)     Refill medications as prescribed.  No abuse suspected. The patient was checked in the [facility] Board of Pharmacy's  Prescription Monitoring Program. There appears to be no incongruities with the patient's verbalized history.       An opioid pain contract was completed  after having an extensive conversation about chronic opioid use for pain management. We discussed the risks and benefits of opioids.  I discouraged the patient from escalation of opioid use and try to minimize its use to decrease chance of dependence, tolerance, and opioid-based hyperalgesia.  I reviewed the  in great detail and there are no inconsistencies and it is appropriate with patient's history.  There are no signs of aberrant drug behavior.  The opioid risk tool was also completed, low risk.  Pt counseled about the side effects of long term use of opioids including dependence, tolerance, addiction, respiratory depression, somnolence, immune and endocrine dysfunction.  The patient expressed understanding.      If no improvement or worsening.   Referral to physical therapy, update imaging and consider spine/back clinic versus surgical specialist.         Relevant Medications    carisoprodoL (SOMA) 350 MG tablet (Start on 1/11/2025)    carisoprodoL (SOMA) 350 MG tablet (Start on 2/10/2025)    carisoprodoL (SOMA) 350 MG tablet    HYDROcodone-acetaminophen (NORCO)  mg per tablet (Start on 1/11/2025)    HYDROcodone-acetaminophen (NORCO) 7.5-325 mg per tablet (Start on 2/10/2025)    HYDROcodone-acetaminophen (NORCO) 7.5-325 mg per tablet    methylPREDNISolone (MEDROL DOSEPACK) 4 mg tablet    History of COVID-19    Relevant Medications    methylPREDNISolone (MEDROL DOSEPACK) 4 mg tablet    albuterol (PROVENTIL/VENTOLIN HFA) 90 mcg/actuation inhaler    promethazine-dextromethorphan (PROMETHAZINE-DM) 6.25-15 mg/5 mL Syrp        Medication Management for assessment above:   Medication List with Changes/Refills   New Medications    METHYLPREDNISOLONE (MEDROL DOSEPACK) 4 MG TABLET    follow package directions    PROMETHAZINE-DEXTROMETHORPHAN (PROMETHAZINE-DM) 6.25-15 MG/5 ML SYRP    Take 5 mLs by mouth every 4 (four) hours as needed.   Current Medications    BLOOD SUGAR DIAGNOSTIC (BLOOD GLUCOSE TEST) STRP    Check glucose 3 times per day before each meal    BLOOD SUGAR DIAGNOSTIC (FREESTYLE INSULINX TEST STRIPS) STRP    Use 3 times a day as needed to test blood sugar.    BLOOD SUGAR DIAGNOSTIC STRP    To check BG 1 times daily, to use with insurance preferred meter    BLOOD-GLUCOSE METER KIT    To check BG 1 times daily, to use with insurance preferred meter    FEXOFENADINE (ALLEGRA) 180 MG TABLET    Take 1 tablet (180 mg total) by mouth once daily.    KETOCONAZOLE (NIZORAL) 2 % CREAM    Apply topically 2 (two) times daily.    LANCETS MISC    To check BG 1 times daily, to use with insurance preferred meter    MUPIROCIN (BACTROBAN) 2 % OINTMENT    Apply topically 3 (three) times daily.    NAPROXEN (NAPROSYN) 500 MG TABLET    Take 1 tablet (500 mg total) by  mouth 2 (two) times daily with meals.    ONDANSETRON (ZOFRAN-ODT) 8 MG TBDL    Take 1 tablet (8 mg total) by mouth every 6 (six) hours as needed (nausea).    PROMETHAZINE (PHENERGAN) 25 MG TABLET    Take 1 tablet (25 mg total) by mouth every 6 (six) hours as needed for Nausea.   Changed and/or Refilled Medications    Modified Medication Previous Medication    ALBUTEROL (PROVENTIL/VENTOLIN HFA) 90 MCG/ACTUATION INHALER albuterol (PROVENTIL/VENTOLIN HFA) 90 mcg/actuation inhaler       Inhale 2 puffs into the lungs every 4 (four) hours as needed for Wheezing. Rescue    SMARTSI Puff(s) By Mouth Every 4-6 Hours PRN    CARISOPRODOL (SOMA) 350 MG TABLET carisoprodoL (SOMA) 350 MG tablet       Take 1 tablet (350 mg total) by mouth 3 (three) times daily as needed for Muscle spasms.    Take 1 tablet (350 mg total) by mouth 3 (three) times daily as needed for Muscle spasms.    CARISOPRODOL (SOMA) 350 MG TABLET carisoprodoL (SOMA) 350 MG tablet       Take 1 tablet (350 mg total) by mouth 3 (three) times daily as needed for Muscle spasms.    Take 1 tablet (350 mg total) by mouth 3 (three) times daily as needed for Muscle spasms.    CARISOPRODOL (SOMA) 350 MG TABLET carisoprodoL (SOMA) 350 MG tablet       Take 1 tablet (350 mg total) by mouth 3 (three) times daily as needed for Muscle spasms.    Take 1 tablet (350 mg total) by mouth 3 (three) times daily as needed for Muscle spasms.    HYDROCODONE-ACETAMINOPHEN (NORCO)  MG PER TABLET HYDROcodone-acetaminophen (NORCO)  mg per tablet       Take 1 tablet by mouth every 8 (eight) hours as needed for Pain.    Take 1 tablet by mouth every 8 (eight) hours as needed for Pain.    HYDROCODONE-ACETAMINOPHEN (NORCO) 7.5-325 MG PER TABLET HYDROcodone-acetaminophen (NORCO) 7.5-325 mg per tablet       Take 1 tablet by mouth every 8 (eight) hours as needed for Pain.    Take 1 tablet by mouth every 8 (eight) hours as needed for Pain.    HYDROCODONE-ACETAMINOPHEN (NORCO) 7.5-325 MG  PER TABLET HYDROcodone-acetaminophen (NORCO) 7.5-325 mg per tablet       Take 1 tablet by mouth every 8 (eight) hours as needed for Pain.    Take 1 tablet by mouth every 8 (eight) hours as needed for Pain.       Jb Chao M.D.  ==========================================================================  Subjective:   Patient ID: Ned Toledo Jr. is a 64 y.o. male.  has a past medical history of Diabetes mellitus, type 2, Fatty liver, Hypertension, Penetrating foot wound, and Sciatica.   Chief Complaint: Medication Refill      History of Present Illness  The patient is a 64-year-old male who presents for medication refills.    He has been experiencing chills for the past 3 weeks, with some improvement noted today. He was previously prescribed antibiotics by another physician. He was tested positive for COVID-19 at a walk-in clinic and received a steroid injection. He has been managing his cough with Mucinex and albuterol inhaler, which he finds beneficial. He is requesting a Medrol Dosepak to have on hand in case he gets sick over the holidays.    He is also seeking refills for his pain medication, hydrocodone, which he last filled on 11/25/2024.    MEDICATIONS  Current: Mucinex, albuterol inhaler, hydrocodone    Problem List Items Addressed This Visit       Hypertension associated with diabetes (Chronic)    Overview     December 2024:     September 2024:    CHRONIC. STABLE. BP Reviewed.  Currently not on medication . No SE reported.  March 2021:  Initial blood pressure elevated due to patient rushing for appointment.  He reports blood pressure well controlled at home 130/80.  May 2022:  Blood pressure remains below goal.  (-) CP, SOB, palpitations, dizziness, lightheadedness, HA, arm numbness, tingling or weakness, syncope.  Creatinine   Date Value Ref Range Status   03/26/2024 1.2 0.5 - 1.4 mg/dL Final            Current Assessment & Plan       Blood pressure well controlled with pain control.   Consider  ACE-inhibitor with comorbid diabetes.  Patient defers at this time.Counseled on importance of hypertension disease course, I recommend ongoing Education for DASH-diet and exercise.  Counseled on medication regimen importance of treating high blood pressure.  Please be advised of risk of untreated blood pressure as discussed.  Please advised of ER precautions were given for symptoms of hypertensive urgency and emergency.           Encounter for long-term (current) use of medications (Chronic)    Overview     December 2024:  Reviewed labs.  June 2024: Reviewed labs.  Feb 2024: Reviewed labs.  November 2023: Reviewed labs.  August 2023 reviewed labs.  May 2023: Reviewed labs.  March 2023:  Reviewed labs November 2022: Reviewed labs.  October 2022: Reviewed labs.  August 2022: Reviewed labs.  Initial HPI March 2020:  CHRONIC. Stable. Compliant with medications for managed conditions. See medication list. No SE reported. Routine lab analysis is being monitored. Refills were addressed.JUNE 2020:  Labs are stable.  Refill medication.CHRONIC. Stable. Compliant with medications for managed conditions. See medication list. No SE reported. Routine lab analysis is being monitored. Refills were addressed.SEPTEMBER 2020:  CHRONIC. Stable. Compliant with medications for managed conditions. See medication list. No SE reported. Routine lab analysis is being monitored. Refills were addressed.DECEMBER 2020:  Reviewed labs.  CHRONIC. Stable. Compliant with medications for managed conditions. See medication list. No SE reported. Routine lab analysis is being monitored. Refills were addressed.  March 2021:  Reviewed labs.  June 2021:  Reviewed labs.  December 2021:  Reviewed labs. March 2022: Reviewed labs.  May 2022:  Reviewed labs.  Lab Results   Component Value Date    WBC 5.99 03/26/2024    HGB 15.1 03/26/2024    HCT 46.0 03/26/2024    MCV 97 03/26/2024     03/26/2024         Chemistry        Component Value Date/Time    NA  134 (L) 03/26/2024 0726    K 4.3 03/26/2024 0726     03/26/2024 0726    CO2 24 03/26/2024 0726    BUN 10 03/26/2024 0726    CREATININE 1.2 03/26/2024 0726     (H) 03/26/2024 0726        Component Value Date/Time    CALCIUM 9.8 03/26/2024 0726    ALKPHOS 55 03/26/2024 0726    AST 17 03/26/2024 0726    ALT 24 03/26/2024 0726    BILITOT 0.8 03/26/2024 0726    ESTGFRAFRICA >60.0 06/01/2022 0809    EGFRNONAA >60.0 06/01/2022 0809          Lab Results   Component Value Date    TSH 2.195 03/26/2024            Current Assessment & Plan     Complete history and physical was completed today.  Complete and thorough medication reconciliation was performed.  Discussed risks and benefits of medications.  Advised patient on orders and health maintenance.  We discussed old records and old labs if available.  Will request any records not available through epic.  Continue current medications listed on your summary sheet.           Sacroiliitis - Primary (Chronic)    Overview     December 2024:  For medication refills.  Reports compliance.  No side effects reported.  Symptoms are controlled.    June 2024:  Patient needing medication refills for his chronic pain.  Feb 2024: here for refills. No change in HPI. Tolerating hydrocodone and Soma which he has been taking for several years. No SE reported. Pain stable.     November 2023: Patient here for medication refills.  Patient has had some difficulty with changing pharmacies lately.  Chronic.  August 2023:   Patient needing refills.  No new issues.   May 2023:  Noted injuries or falls.  Patient here for medication refills.  March 2023:  No new injuries or falls.  Stable on current medication regimen.  November 2022:  Patient here for medication refill.  Stable.  Patient chronic pain medication with hydrocodone 7.5 and Soma 350 milligram.  Patient was previously managed by previous PCP Dr. Jaime Hernández who is retiring.  Patient has been stable on this medication regimen for  years.  No side effects reported.    March 2022: Patient reports medication regimen is controlling his symptoms.  No change in HPI.  No new injuries.  May 2022:  Patient reports he has been very active at work and pain medication regimen is working well.  August 2022: Patient here for medication refills.  No change in HPI.  Medication continues to work well.           Current Assessment & Plan     Refill medications as prescribed.  No abuse suspected. The patient was checked in the [facility] Board of Pharmacy's  Prescription Monitoring Program. There appears to be no incongruities with the patient's verbalized history.       An opioid pain contract was completed  after having an extensive conversation about chronic opioid use for pain management. We discussed the risks and benefits of opioids.  I discouraged the patient from escalation of opioid use and try to minimize its use to decrease chance of dependence, tolerance, and opioid-based hyperalgesia.  I reviewed the  in great detail and there are no inconsistencies and it is appropriate with patient's history.  There are no signs of aberrant drug behavior.  The opioid risk tool was also completed, low risk.  Pt counseled about the side effects of long term use of opioids including dependence, tolerance, addiction, respiratory depression, somnolence, immune and endocrine dysfunction.  The patient expressed understanding.      If no improvement or worsening.  Referral to physical therapy, update imaging and consider spine/back clinic versus surgical specialist.         History of COVID-19        Review of patient's allergies indicates:   Allergen Reactions    Atorvastatin     Bactrim [sulfamethoxazole-trimethoprim] Hives     Current Outpatient Medications   Medication Instructions    albuterol (PROVENTIL/VENTOLIN HFA) 90 mcg/actuation inhaler 2 puffs, Inhalation, Every 4 hours PRN, Rescue    blood sugar diagnostic (BLOOD GLUCOSE TEST) Strp Check glucose 3  times per day before each meal    blood sugar diagnostic (FREESTYLE INSULINX TEST STRIPS) Strp Use 3 times a day as needed to test blood sugar.    blood sugar diagnostic Strp To check BG 1 times daily, to use with insurance preferred meter    blood-glucose meter kit To check BG 1 times daily, to use with insurance preferred meter    [START ON 1/11/2025] carisoprodoL (SOMA) 350 mg, Oral, 3 times daily PRN    [START ON 2/10/2025] carisoprodoL (SOMA) 350 mg, Oral, 3 times daily PRN    carisoprodoL (SOMA) 350 mg, Oral, 3 times daily PRN    fexofenadine (ALLEGRA) 180 mg, Oral, Daily    [START ON 1/11/2025] HYDROcodone-acetaminophen (NORCO)  mg per tablet 1 tablet, Oral, Every 8 hours PRN    [START ON 2/10/2025] HYDROcodone-acetaminophen (NORCO) 7.5-325 mg per tablet 1 tablet, Oral, Every 8 hours PRN    HYDROcodone-acetaminophen (NORCO) 7.5-325 mg per tablet 1 tablet, Oral, Every 8 hours PRN    ketoconazole (NIZORAL) 2 % cream Topical (Top), 2 times daily    lancets Misc To check BG 1 times daily, to use with insurance preferred meter    methylPREDNISolone (MEDROL DOSEPACK) 4 mg tablet follow package directions    mupirocin (BACTROBAN) 2 % ointment Topical (Top), 3 times daily    naproxen (NAPROSYN) 500 mg, Oral, 2 times daily with meals    ondansetron (ZOFRAN-ODT) 8 mg, Oral, Every 6 hours PRN    promethazine (PHENERGAN) 25 mg, Oral, Every 6 hours PRN    promethazine-dextromethorphan (PROMETHAZINE-DM) 6.25-15 mg/5 mL Syrp 5 mLs, Oral, Every 4 hours PRN      I have reviewed the PMH, social history, FamilyHx, surgical history, allergies and medications documented / confirmed by the patient at the time of this visit.  Review of Systems   Constitutional:  Negative for chills, fatigue, fever and unexpected weight change.   HENT:  Negative for ear pain, sinus pressure, sinus pain and sore throat.    Eyes:  Negative for redness and visual disturbance.   Respiratory:  Negative for cough and shortness of breath.     Cardiovascular:  Negative for chest pain and palpitations.   Gastrointestinal:  Negative for nausea and vomiting.   Endocrine: Negative for cold intolerance and heat intolerance.   Genitourinary:  Negative for difficulty urinating and hematuria.   Musculoskeletal:  Positive for arthralgias and back pain. Negative for myalgias.   Skin:  Negative for rash and wound.   Allergic/Immunologic: Negative for environmental allergies and food allergies.   Neurological:  Negative for weakness and headaches.   Hematological:  Negative for adenopathy. Does not bruise/bleed easily.   Psychiatric/Behavioral:  Negative for sleep disturbance. The patient is not nervous/anxious.      Objective:   /64   Pulse 91   Ht 6' (1.829 m)   Wt 84.6 kg (186 lb 8 oz)   SpO2 97%   BMI 25.29 kg/m²   Physical Exam  Vitals and nursing note reviewed.   Constitutional:       General: He is not in acute distress.     Appearance: He is well-developed. He is not diaphoretic.   HENT:      Head: Normocephalic and atraumatic.      Right Ear: Tympanic membrane, ear canal and external ear normal. There is no impacted cerumen.      Left Ear: Tympanic membrane, ear canal and external ear normal. There is no impacted cerumen.      Nose: No congestion or rhinorrhea.      Mouth/Throat:      Pharynx: No posterior oropharyngeal erythema.   Eyes:      Extraocular Movements: Extraocular movements intact.      Pupils: Pupils are equal, round, and reactive to light.   Neck:      Vascular: No carotid bruit.   Cardiovascular:      Rate and Rhythm: Normal rate.      Pulses: Normal pulses.   Pulmonary:      Effort: Pulmonary effort is normal. No respiratory distress.      Breath sounds: Normal breath sounds.   Abdominal:      General: Bowel sounds are normal.      Palpations: Abdomen is soft.   Musculoskeletal:         General: Tenderness and deformity present. Normal range of motion.      Cervical back: Normal range of motion and neck supple.    Lymphadenopathy:      Cervical: No cervical adenopathy.   Skin:     General: Skin is warm and dry.      Capillary Refill: Capillary refill takes less than 2 seconds.      Findings: No rash.   Neurological:      General: No focal deficit present.      Mental Status: He is alert and oriented to person, place, and time.   Psychiatric:         Attention and Perception: He is attentive.         Mood and Affect: Mood normal. Mood is not anxious or depressed. Affect is not labile, blunt, angry or inappropriate.         Speech: He is communicative. Speech is not rapid and pressured, delayed, slurred or tangential.         Behavior: Behavior normal. Behavior is not agitated, slowed, aggressive, withdrawn, hyperactive or combative.         Thought Content: Thought content normal. Thought content is not paranoid or delusional. Thought content does not include homicidal or suicidal ideation. Thought content does not include homicidal or suicidal plan.         Cognition and Memory: Memory is not impaired.         Judgment: Judgment normal. Judgment is not impulsive or inappropriate.       Physical Exam  Lungs were auscultated.    Results  Laboratory Studies  A1c was normal in June.    Assessment:     1. Sacroiliitis    2. Encounter for long-term (current) use of medications    3. History of COVID-19    4. Hypertension associated with diabetes      MDM:   Moderate medical complexity.  Moderate risk.  Total time: 22 minutes.  This includes total time spent on the encounter, which includes face to face time and non-face to face time preparing to see the patient (eg, review of previous medical records, tests), Obtaining and/or reviewing separately obtained history, documenting clinical information in the electronic or other health record, independently interpreting results (not separately reported)/communicating results to the patient/family/caregiver, and/or care coordination (not separately reported).    I have Reviewed and  summarized old records.  I have performed thorough medication reconciliation today and discussed risk and benefits of medications.  I have reviewed labs and discussed with patient.  All questions were answered.  I am requesting old records and will review them once they are available.  Visit today included increased complexity associated with the care of the episodic problem see above assessment addressed and managing the longitudinal care of the patient due to the serious and/or complex managed problem(s) see above.    I have signed for the following orders AND/OR meds.  No orders of the defined types were placed in this encounter.    Medications Ordered This Encounter   Medications    albuterol (PROVENTIL/VENTOLIN HFA) 90 mcg/actuation inhaler     Sig: Inhale 2 puffs into the lungs every 4 (four) hours as needed for Wheezing. Rescue     Dispense:  18 g     Refill:  1    carisoprodoL (SOMA) 350 MG tablet     Sig: Take 1 tablet (350 mg total) by mouth 3 (three) times daily as needed for Muscle spasms.     Dispense:  90 tablet     Refill:  0    carisoprodoL (SOMA) 350 MG tablet     Sig: Take 1 tablet (350 mg total) by mouth 3 (three) times daily as needed for Muscle spasms.     Dispense:  90 tablet     Refill:  0    carisoprodoL (SOMA) 350 MG tablet     Sig: Take 1 tablet (350 mg total) by mouth 3 (three) times daily as needed for Muscle spasms.     Dispense:  90 tablet     Refill:  0    HYDROcodone-acetaminophen (NORCO)  mg per tablet     Sig: Take 1 tablet by mouth every 8 (eight) hours as needed for Pain.     Dispense:  90 tablet     Refill:  0     Quantity prescribed more than 7 day supply? Yes, quantity medically necessary     Order Specific Question:   I have reviewed the Prescription Drug Monitoring Program (PDMP) database for this patient prior to prescribing the above opioid medication     Answer:   Yes    HYDROcodone-acetaminophen (NORCO) 7.5-325 mg per tablet     Sig: Take 1 tablet by mouth every 8  (eight) hours as needed for Pain.     Dispense:  90 tablet     Refill:  0     Quantity prescribed more than 7 day supply? Yes, quantity medically necessary     Order Specific Question:   I have reviewed the Prescription Drug Monitoring Program (PDMP) database for this patient prior to prescribing the above opioid medication     Answer:   Yes    HYDROcodone-acetaminophen (NORCO) 7.5-325 mg per tablet     Sig: Take 1 tablet by mouth every 8 (eight) hours as needed for Pain.     Dispense:  90 tablet     Refill:  0     Quantity prescribed more than 7 day supply? Yes, quantity medically necessary     Order Specific Question:   I have reviewed the Prescription Drug Monitoring Program (PDMP) database for this patient prior to prescribing the above opioid medication     Answer:   Yes    methylPREDNISolone (MEDROL DOSEPACK) 4 mg tablet     Sig: follow package directions     Dispense:  21 tablet     Refill:  0    promethazine-dextromethorphan (PROMETHAZINE-DM) 6.25-15 mg/5 mL Syrp     Sig: Take 5 mLs by mouth every 4 (four) hours as needed.     Dispense:  240 mL     Refill:  0        Follow up in about 3 months (around 3/13/2025), or if symptoms worsen or fail to improve, for Med refills, LAB RESULTS.    If no improvement in symptoms or symptoms worsen, advised to call/follow-up at clinic or go to ER. Patient voiced understanding and all questions/concerns were addressed.   DISCLAIMER: This note was compiled by using a speech recognition dictation system and therefore please be aware that typographical / speech recognition errors can and do occur.  Please contact me if you see any errors specifically.  Consent was obtained for DONNA recording system prior to the visit.    This note was generated with the assistance of ambient listening technology. Verbal consent was obtained by the patient and accompanying visitor(s) for the recording of patient appointment to facilitate this note. I attest to having reviewed and edited the  generated note for accuracy, though some syntax or spelling errors may persist. Please contact the author of this note for any clarification.    Jb Chao M.D.       Office: [phone] [address] [place] [address]  FAX: [phone]

## 2024-12-17 ENCOUNTER — TELEPHONE (OUTPATIENT)
Dept: FAMILY MEDICINE | Facility: CLINIC | Age: 64
End: 2024-12-17
Payer: COMMERCIAL

## 2024-12-17 NOTE — TELEPHONE ENCOUNTER
----- Message from Shantal sent at 12/17/2024  1:23 PM CST -----  Name of Who is Calling: Pt         What is the request in detail:Pt would like a call back to in regards to receiving  meds by Thursday. Please advise thank you         Can the clinic reply by [facility]:no         What Number to Call Back if not in [facility]:Telephone Information:  Mobile          [phone]

## 2024-12-19 ENCOUNTER — TELEPHONE (OUTPATIENT)
Dept: FAMILY MEDICINE | Facility: CLINIC | Age: 64
End: 2024-12-19
Payer: COMMERCIAL

## 2024-12-19 NOTE — TELEPHONE ENCOUNTER
----- Message from Summer sent at 12/19/2024 10:58 AM CST -----  Contact: Ned  Type:  RX Refill Request    Who Called:  Ned   Refill or New Rx: Refill  RX Name and Strength:HYDROcodone-acetaminophen (NORCO)  mg per tablet   How is the patient currently taking it? (ex. 1XDay): 3xday  Is this a 30 day or 90 day RX: 90 day  Preferred Pharmacy with phone number:  [id number] - [place], [place] - [address] AT [facility] OF [address] ([place] [address]  [address] [facility] [place] [address]  Phone: [phone] Fax: [phone]  Local or Mail Order: Local  Ordering Provider: Jb Chao  Would the patient rather a call back or a response via [facility]? Call back   Best Call Back Number: [phone]  Additional Information: Pt is going out of town

## 2024-12-20 ENCOUNTER — PATIENT OUTREACH (OUTPATIENT)
Dept: ADMINISTRATIVE | Facility: HOSPITAL | Age: 64
End: 2024-12-20
Payer: COMMERCIAL

## 2025-02-03 ENCOUNTER — TELEPHONE (OUTPATIENT)
Dept: FAMILY MEDICINE | Facility: CLINIC | Age: 65
End: 2025-02-03
Payer: COMMERCIAL

## 2025-02-03 NOTE — TELEPHONE ENCOUNTER
----- Message from Nishantdavidmellisa sent at 2/3/2025  8:57 AM CST -----  Contact: RUDDY MCKEON JR. [[id number]]  ..Type:  Patient Requesting Call    Who Called:RUDDY MCKEON JR. [[id number]]  Does the patient know what this is regarding?:pt wants to speak with nurse/provider about being seen today he complains of cut inside the right eye   Would the patient rather a call back or a response via [facility]? call  Best Call Back Number:. [phone] (work)    Additional Information:  
Called pt, stated that he cut his eye. Went to  but will follow up with us in office if needed.   
no fever

## 2025-03-05 ENCOUNTER — TELEPHONE (OUTPATIENT)
Dept: FAMILY MEDICINE | Facility: CLINIC | Age: 65
End: 2025-03-05
Payer: COMMERCIAL

## 2025-03-05 NOTE — TELEPHONE ENCOUNTER
----- Message from Edith sent at 3/5/2025  2:35 PM CST -----  Name of Who is Calling:RUDDY MCKEON JR. [[id number]]  What is the request in detail:Pt missed a phone call from the office and requesting a call back today if possible.  Can the clinic reply by [facility]:Call  What Number to Call Back if not in [facility]:Telephone Information:Mobile          [phone]

## 2025-03-05 NOTE — TELEPHONE ENCOUNTER
----- Message from Lissa sent at 3/5/2025 10:04 AM CST -----  Contact: pt  Type:  Sooner Apoointment RequestCaller is requesting a sooner appointment.  Caller declined first available appointment listed below.  Caller will not accept being placed on the waitlist and is requesting a message be sent to doctor.Name of Caller: ptWhen is the first available appointment? Symptoms: 3 mo of/uWould the patient rather a call back or a response via [facility]? [employer] Call Back Number: [phone] Information:

## 2025-03-12 ENCOUNTER — OFFICE VISIT (OUTPATIENT)
Dept: FAMILY MEDICINE | Facility: CLINIC | Age: 65
End: 2025-03-12
Payer: MEDICARE

## 2025-03-12 ENCOUNTER — LAB VISIT (OUTPATIENT)
Dept: LAB | Facility: HOSPITAL | Age: 65
End: 2025-03-12
Attending: FAMILY MEDICINE
Payer: MEDICARE

## 2025-03-12 VITALS
BODY MASS INDEX: 24.99 KG/M2 | OXYGEN SATURATION: 97 % | HEIGHT: 72 IN | SYSTOLIC BLOOD PRESSURE: 138 MMHG | HEART RATE: 82 BPM | WEIGHT: 184.5 LBS | DIASTOLIC BLOOD PRESSURE: 86 MMHG

## 2025-03-12 DIAGNOSIS — E11.59 HYPERTENSION ASSOCIATED WITH DIABETES: ICD-10-CM

## 2025-03-12 DIAGNOSIS — F19.20 COMBINED OPIOID WITH NON-OPIOID DRUG DEPENDENCE, CONTINUOUS: ICD-10-CM

## 2025-03-12 DIAGNOSIS — M46.1 SACROILIITIS: ICD-10-CM

## 2025-03-12 DIAGNOSIS — E11.65 TYPE 2 DIABETES MELLITUS WITH HYPERGLYCEMIA, WITHOUT LONG-TERM CURRENT USE OF INSULIN: Chronic | ICD-10-CM

## 2025-03-12 DIAGNOSIS — F11.20 COMBINED OPIOID WITH NON-OPIOID DRUG DEPENDENCE, CONTINUOUS: ICD-10-CM

## 2025-03-12 DIAGNOSIS — R11.0 NAUSEA: ICD-10-CM

## 2025-03-12 DIAGNOSIS — Z79.899 ENCOUNTER FOR LONG-TERM (CURRENT) USE OF MEDICATIONS: ICD-10-CM

## 2025-03-12 DIAGNOSIS — I15.2 HYPERTENSION ASSOCIATED WITH DIABETES: ICD-10-CM

## 2025-03-12 DIAGNOSIS — E11.65 TYPE 2 DIABETES MELLITUS WITH HYPERGLYCEMIA, WITHOUT LONG-TERM CURRENT USE OF INSULIN: Primary | ICD-10-CM

## 2025-03-12 LAB
ALBUMIN SERPL BCP-MCNC: 4.2 G/DL (ref 3.5–5.2)
ALP SERPL-CCNC: 51 U/L (ref 40–150)
ALT SERPL W/O P-5'-P-CCNC: 21 U/L (ref 10–44)
ANION GAP SERPL CALC-SCNC: 9 MMOL/L (ref 8–16)
AST SERPL-CCNC: 18 U/L (ref 10–40)
BILIRUB SERPL-MCNC: 0.8 MG/DL (ref 0.1–1)
BUN SERPL-MCNC: 11 MG/DL (ref 8–23)
CALCIUM SERPL-MCNC: 9.6 MG/DL (ref 8.7–10.5)
CHLORIDE SERPL-SCNC: 105 MMOL/L (ref 95–110)
CHOLEST SERPL-MCNC: 189 MG/DL (ref 120–199)
CHOLEST/HDLC SERPL: 4.7 {RATIO} (ref 2–5)
CO2 SERPL-SCNC: 23 MMOL/L (ref 23–29)
CREAT SERPL-MCNC: 0.9 MG/DL (ref 0.5–1.4)
ERYTHROCYTE [DISTWIDTH] IN BLOOD BY AUTOMATED COUNT: 12.4 % (ref 11.5–14.5)
EST. GFR  (NO RACE VARIABLE): >60 ML/MIN/1.73 M^2
ESTIMATED AVG GLUCOSE: 229 MG/DL (ref 68–131)
GLUCOSE SERPL-MCNC: 176 MG/DL (ref 70–110)
HBA1C MFR BLD: 9.6 % (ref 4–5.6)
HCT VFR BLD AUTO: 45.4 % (ref 40–54)
HDLC SERPL-MCNC: 40 MG/DL (ref 40–75)
HDLC SERPL: 21.2 % (ref 20–50)
HGB BLD-MCNC: 14.8 G/DL (ref 14–18)
LDLC SERPL CALC-MCNC: 127 MG/DL (ref 63–159)
MCH RBC QN AUTO: 31.7 PG (ref 27–31)
MCHC RBC AUTO-ENTMCNC: 32.6 G/DL (ref 32–36)
MCV RBC AUTO: 97 FL (ref 82–98)
NONHDLC SERPL-MCNC: 149 MG/DL
PLATELET # BLD AUTO: 380 K/UL (ref 150–450)
PMV BLD AUTO: 9 FL (ref 9.2–12.9)
POTASSIUM SERPL-SCNC: 4.1 MMOL/L (ref 3.5–5.1)
PROT SERPL-MCNC: 7.3 G/DL (ref 6–8.4)
RBC # BLD AUTO: 4.67 M/UL (ref 4.6–6.2)
SODIUM SERPL-SCNC: 137 MMOL/L (ref 136–145)
TRIGL SERPL-MCNC: 110 MG/DL (ref 30–150)
TSH SERPL DL<=0.005 MIU/L-ACNC: 1.46 UIU/ML (ref 0.4–4)
WBC # BLD AUTO: 7.74 K/UL (ref 3.9–12.7)

## 2025-03-12 PROCEDURE — 3079F DIAST BP 80-89 MM HG: CPT | Mod: CPTII,S$GLB,, | Performed by: FAMILY MEDICINE

## 2025-03-12 PROCEDURE — 3008F BODY MASS INDEX DOCD: CPT | Mod: CPTII,S$GLB,, | Performed by: FAMILY MEDICINE

## 2025-03-12 PROCEDURE — 80053 COMPREHEN METABOLIC PANEL: CPT | Performed by: FAMILY MEDICINE

## 2025-03-12 PROCEDURE — 80061 LIPID PANEL: CPT | Performed by: FAMILY MEDICINE

## 2025-03-12 PROCEDURE — 1125F AMNT PAIN NOTED PAIN PRSNT: CPT | Mod: CPTII,S$GLB,, | Performed by: FAMILY MEDICINE

## 2025-03-12 PROCEDURE — 1159F MED LIST DOCD IN RCRD: CPT | Mod: CPTII,S$GLB,, | Performed by: FAMILY MEDICINE

## 2025-03-12 PROCEDURE — G2211 COMPLEX E/M VISIT ADD ON: HCPCS | Mod: S$GLB,,, | Performed by: FAMILY MEDICINE

## 2025-03-12 PROCEDURE — 3075F SYST BP GE 130 - 139MM HG: CPT | Mod: CPTII,S$GLB,, | Performed by: FAMILY MEDICINE

## 2025-03-12 PROCEDURE — 84443 ASSAY THYROID STIM HORMONE: CPT | Performed by: FAMILY MEDICINE

## 2025-03-12 PROCEDURE — 99999 PR PBB SHADOW E&M-EST. PATIENT-LVL IV: CPT | Mod: PBBFAC,,, | Performed by: FAMILY MEDICINE

## 2025-03-12 PROCEDURE — 99214 OFFICE O/P EST MOD 30 MIN: CPT | Mod: S$GLB,,, | Performed by: FAMILY MEDICINE

## 2025-03-12 PROCEDURE — 83036 HEMOGLOBIN GLYCOSYLATED A1C: CPT | Performed by: FAMILY MEDICINE

## 2025-03-12 PROCEDURE — 1160F RVW MEDS BY RX/DR IN RCRD: CPT | Mod: CPTII,S$GLB,, | Performed by: FAMILY MEDICINE

## 2025-03-12 PROCEDURE — 85027 COMPLETE CBC AUTOMATED: CPT | Performed by: FAMILY MEDICINE

## 2025-03-12 PROCEDURE — 1101F PT FALLS ASSESS-DOCD LE1/YR: CPT | Mod: CPTII,S$GLB,, | Performed by: FAMILY MEDICINE

## 2025-03-12 PROCEDURE — 36415 COLL VENOUS BLD VENIPUNCTURE: CPT | Mod: PO | Performed by: FAMILY MEDICINE

## 2025-03-12 PROCEDURE — 3288F FALL RISK ASSESSMENT DOCD: CPT | Mod: CPTII,S$GLB,, | Performed by: FAMILY MEDICINE

## 2025-03-12 RX ORDER — CARISOPRODOL 350 MG/1
350 TABLET ORAL 3 TIMES DAILY PRN
Qty: 90 TABLET | Refills: 0 | Status: SHIPPED | OUTPATIENT
Start: 2025-04-11

## 2025-03-12 RX ORDER — HYDROCODONE BITARTRATE AND ACETAMINOPHEN 10; 325 MG/1; MG/1
1 TABLET ORAL EVERY 8 HOURS PRN
Qty: 90 TABLET | Refills: 0 | Status: SHIPPED | OUTPATIENT
Start: 2025-05-11

## 2025-03-12 RX ORDER — HYDROCODONE BITARTRATE AND ACETAMINOPHEN 7.5; 325 MG/1; MG/1
1 TABLET ORAL EVERY 8 HOURS PRN
Qty: 90 TABLET | Refills: 0 | Status: SHIPPED | OUTPATIENT
Start: 2025-03-12

## 2025-03-12 RX ORDER — CARISOPRODOL 350 MG/1
350 TABLET ORAL 3 TIMES DAILY PRN
Qty: 90 TABLET | Refills: 0 | Status: SHIPPED | OUTPATIENT
Start: 2025-05-11

## 2025-03-12 RX ORDER — ONDANSETRON 8 MG/1
8 TABLET, ORALLY DISINTEGRATING ORAL EVERY 6 HOURS PRN
Qty: 30 TABLET | Refills: 5 | Status: SHIPPED | OUTPATIENT
Start: 2025-03-12

## 2025-03-12 RX ORDER — PROMETHAZINE HYDROCHLORIDE 25 MG/1
25 TABLET ORAL EVERY 6 HOURS PRN
Qty: 30 TABLET | Refills: 6 | Status: SHIPPED | OUTPATIENT
Start: 2025-03-12

## 2025-03-12 RX ORDER — HYDROCODONE BITARTRATE AND ACETAMINOPHEN 7.5; 325 MG/1; MG/1
1 TABLET ORAL EVERY 8 HOURS PRN
Qty: 90 TABLET | Refills: 0 | Status: SHIPPED | OUTPATIENT
Start: 2025-04-11

## 2025-03-12 RX ORDER — CARISOPRODOL 350 MG/1
350 TABLET ORAL 3 TIMES DAILY PRN
Qty: 90 TABLET | Refills: 0 | Status: SHIPPED | OUTPATIENT
Start: 2025-03-12

## 2025-03-12 NOTE — PATIENT INSTRUCTIONS
Sidney Marino,     If you are due for any health screening(s) below please notify me so we can arrange them to be ordered and scheduled. Most healthy patients at your age complete them, but you are free to accept or refuse.     If you can't do it, I'll definitely understand. If you can, I'd certainly appreciate it!    Tests to Keep You Healthy    Eye Exam: DUE  Colon Cancer Screening: DUE  Last Blood Pressure <= 139/89 (3/12/2025): Yes  Last HbA1c < 8 (06/24/2024): Yes      Its time for your colon cancer screening     Colorectal cancer is one of the leading causes of cancer death for men and women but it doesnt have to be. Screenings can prevent colorectal cancer or find it early enough to treat and cure the disease.     Our records indicate that you may be overdue for colon cancer screening. A colonoscopy or stool screening test can help identify patients at risk for developing colon cancer. Cancer screenings save lives, so schedule yours today to stay healthy.     A colonoscopy is the preferred test for detecting colon cancer. It is needed only once every 10 years if results are negative. While you are sedated, a flexible, lighted tube with a tiny camera is inserted into the rectum and advanced through the colon to look for cancers.     An alternative screening test that is used at home and returned to the lab may also be used. It detects hidden blood in bowel movements which could indicate cancer in the colon. If results are positive, you will need a colonoscopy to determine if the blood is a sign of cancer. This type of follow up (diagnostic) colonoscopy usually requires additional copays as required by your insurance provider.     If you recently had your colon cancer screening performed outside of [facility], please let your Health care team know so that they can update your health record. Please contact your PCP if you have any questions.    Your diabetic retinal eye exam is due     Diabetes is the  #1 cause of blindness in the [place] - early detection before signs or symptoms develop can prevent debilitating blindness.     Our records indicate that you may be overdue for your annual diabetic eye exam. Eye screening can help identify patients at risk for developing vision loss which is common in diabetes. This simple screening is an important step to keeping you healthy and preventing complications from diabetes.     This recommended diabetic eye exam should take place once per year and can prevent and treat diabetes complications in the eye before developing symptoms. This can be done with a special camera is used to take photographs of the back of your eye without having to dilate them, or you can see an eye doctor for a full dilated exam.     If you recently had your yearly diabetic eye exam performed outside of [facility], please let your Health care team know so that they can update your health record.

## 2025-03-12 NOTE — PROGRESS NOTES
PLAN:      Assessment & Plan  1. Nausea.  He reports experiencing nausea, which makes it difficult for him to eat. A prescription for Zofran has been issued to manage his symptoms.    2. Health maintenance.  He is due for a urine diabetes screen. A urine sample will be collected today for this purpose. Additionally, a urine microalbumin test will be conducted.    Problem List Items Addressed This Visit       Type 2 diabetes mellitus with hyperglycemia, without long-term current use of insulin - Primary (Chronic)    Update labs and urine.We will plan to monitor hemoglobin A1c at designated intervals 3 to 6 months.  I recommend ongoing Education for diabetic diet and exercise protocol.  We will continue to monitor for side effects.    Please be advised of symptoms to monitor for and to notify me immediately if persistent or worsening.  Follow up with Ophthalmology/Optometry and Podiatry at least annually.           Relevant Orders    HEMOGLOBIN A1C    Microalbumin/Creatinine Ratio, Urine    Hypertension associated with diabetes (Chronic)      Blood pressure well controlled with pain control.   Consider ACE-inhibitor with comorbid diabetes.  Patient defers at this time.Counseled on importance of hypertension disease course, I recommend ongoing Education for DASH-diet and exercise.  Counseled on medication regimen importance of treating high blood pressure.  Please be advised of risk of untreated blood pressure as discussed.  Please advised of ER precautions were given for symptoms of hypertensive urgency and emergency.           Encounter for long-term (current) use of medications (Chronic)    Complete history and physical was completed today.  Complete and thorough medication reconciliation was performed.  Discussed risks and benefits of medications.  Advised patient on orders and health maintenance.  We discussed old records and old labs if available.  Will request any records not available through epic.  Continue  current medications listed on your summary sheet.           Relevant Medications    carisoprodoL (SOMA) 350 MG tablet (Start on 5/11/2025)    carisoprodoL (SOMA) 350 MG tablet (Start on 4/11/2025)    carisoprodoL (SOMA) 350 MG tablet    HYDROcodone-acetaminophen (NORCO) 7.5-325 mg per tablet (Start on 4/11/2025)    HYDROcodone-acetaminophen (NORCO) 7.5-325 mg per tablet    ondansetron (ZOFRAN-ODT) 8 MG TbDL    promethazine (PHENERGAN) 25 MG tablet    Combined opioid with non-opioid drug dependence, continuous (Chronic)    Refill medications every three months.   reviewed.         Sacroiliitis (Chronic)    Refill medications as prescribed.  No abuse suspected. The patient was checked in the [facility] Board of Pharmacy's  Prescription Monitoring Program. There appears to be no incongruities with the patient's verbalized history.       An opioid pain contract was completed  after having an extensive conversation about chronic opioid use for pain management. We discussed the risks and benefits of opioids.  I discouraged the patient from escalation of opioid use and try to minimize its use to decrease chance of dependence, tolerance, and opioid-based hyperalgesia.  I reviewed the  in great detail and there are no inconsistencies and it is appropriate with patient's history.  There are no signs of aberrant drug behavior.  The opioid risk tool was also completed, low risk.  Pt counseled about the side effects of long term use of opioids including dependence, tolerance, addiction, respiratory depression, somnolence, immune and endocrine dysfunction.  The patient expressed understanding.      If no improvement or worsening.  Referral to physical therapy, update imaging and consider spine/back clinic versus surgical specialist.         Relevant Medications    carisoprodoL (SOMA) 350 MG tablet (Start on 5/11/2025)    carisoprodoL (SOMA) 350 MG tablet (Start on 4/11/2025)    carisoprodoL (SOMA) 350 MG tablet     HYDROcodone-acetaminophen (NORCO)  mg per tablet (Start on 5/11/2025)    HYDROcodone-acetaminophen (NORCO) 7.5-325 mg per tablet (Start on 4/11/2025)    HYDROcodone-acetaminophen (NORCO) 7.5-325 mg per tablet    Nausea    Medications refilled. Follow-up with GI if no improvement in symptoms.  ER precautions for severe symptoms.          Relevant Medications    ondansetron (ZOFRAN-ODT) 8 MG TbDL    promethazine (PHENERGAN) 25 MG tablet        Medication Management for assessment above:   Medication List with Changes/Refills   Current Medications    ALBUTEROL (PROVENTIL/VENTOLIN HFA) 90 MCG/ACTUATION INHALER    Inhale 2 puffs into the lungs every 4 (four) hours as needed for Wheezing. Rescue    BLOOD SUGAR DIAGNOSTIC (BLOOD GLUCOSE TEST) STRP    Check glucose 3 times per day before each meal    BLOOD SUGAR DIAGNOSTIC (FREESTYLE INSULINX TEST STRIPS) STRP    Use 3 times a day as needed to test blood sugar.    BLOOD SUGAR DIAGNOSTIC STRP    To check BG 1 times daily, to use with insurance preferred meter    BLOOD-GLUCOSE METER KIT    To check BG 1 times daily, to use with insurance preferred meter    FEXOFENADINE (ALLEGRA) 180 MG TABLET    Take 1 tablet (180 mg total) by mouth once daily.    KETOCONAZOLE (NIZORAL) 2 % CREAM    Apply topically 2 (two) times daily.    LANCETS MISC    To check BG 1 times daily, to use with insurance preferred meter    METHYLPREDNISOLONE (MEDROL DOSEPACK) 4 MG TABLET    follow package directions    MUPIROCIN (BACTROBAN) 2 % OINTMENT    Apply topically 3 (three) times daily.    NAPROXEN (NAPROSYN) 500 MG TABLET    Take 1 tablet (500 mg total) by mouth 2 (two) times daily with meals.   Changed and/or Refilled Medications    Modified Medication Previous Medication    CARISOPRODOL (SOMA) 350 MG TABLET carisoprodoL (SOMA) 350 MG tablet       Take 1 tablet (350 mg total) by mouth 3 (three) times daily as needed for Muscle spasms.    Take 1 tablet (350 mg total) by mouth 3 (three) times  daily as needed for Muscle spasms.    CARISOPRODOL (SOMA) 350 MG TABLET carisoprodoL (SOMA) 350 MG tablet       Take 1 tablet (350 mg total) by mouth 3 (three) times daily as needed for Muscle spasms.    Take 1 tablet (350 mg total) by mouth 3 (three) times daily as needed for Muscle spasms.    CARISOPRODOL (SOMA) 350 MG TABLET carisoprodoL (SOMA) 350 MG tablet       Take 1 tablet (350 mg total) by mouth 3 (three) times daily as needed for Muscle spasms.    Take 1 tablet (350 mg total) by mouth 3 (three) times daily as needed for Muscle spasms.    HYDROCODONE-ACETAMINOPHEN (NORCO)  MG PER TABLET HYDROcodone-acetaminophen (NORCO)  mg per tablet       Take 1 tablet by mouth every 8 (eight) hours as needed for Pain.    Take 1 tablet by mouth every 8 (eight) hours as needed for Pain.    HYDROCODONE-ACETAMINOPHEN (NORCO) 7.5-325 MG PER TABLET HYDROcodone-acetaminophen (NORCO) 7.5-325 mg per tablet       Take 1 tablet by mouth every 8 (eight) hours as needed for Pain.    Take 1 tablet by mouth every 8 (eight) hours as needed for Pain.    HYDROCODONE-ACETAMINOPHEN (NORCO) 7.5-325 MG PER TABLET HYDROcodone-acetaminophen (NORCO) 7.5-325 mg per tablet       Take 1 tablet by mouth every 8 (eight) hours as needed for Pain.    Take 1 tablet by mouth every 8 (eight) hours as needed for Pain.    ONDANSETRON (ZOFRAN-ODT) 8 MG TBDL ondansetron (ZOFRAN-ODT) 8 MG TbDL       Take 1 tablet (8 mg total) by mouth every 6 (six) hours as needed (nausea).    Take 1 tablet (8 mg total) by mouth every 6 (six) hours as needed (nausea).    PROMETHAZINE (PHENERGAN) 25 MG TABLET promethazine (PHENERGAN) 25 MG tablet       Take 1 tablet (25 mg total) by mouth every 6 (six) hours as needed for Nausea.    Take 1 tablet (25 mg total) by mouth every 6 (six) hours as needed for Nausea.       Jb Chao M.D.  ==========================================================================  Subjective:   Patient ID: Ned Toledo Jr. is a  64 y.o. male.  has a past medical history of Diabetes mellitus, type 2, Fatty liver, Hypertension, Penetrating foot wound, and Sciatica.   Chief Complaint: Medication Refill      History of Present Illness  The patient is a 64-year-old male who presents for medication refills and chronic medical conditions.    He has been experiencing persistent nausea, which has significantly impacted his ability to consume food.    He is fasting today for blood work. He inquired about the possibility of conducting a PSA test during this visit, but it was deemed too early. He has not experienced any issues related to his PSA levels. Additionally, he expressed a need for a urine microalbumin test.    MEDICATIONS  Current: BuSpar, Zofran    Problem List Items Addressed This Visit       Type 2 diabetes mellitus with hyperglycemia, without long-term current use of insulin - Primary (Chronic)    Overview   March 2025:   June 2024:  Patient is diet controlled.  A1c has improved.  November 2023:  Patient diet control with lifestyle modification diet and exercise.  August 2023:   March 2023:  Patient here for labs and urine for diabetes monitoring.  Patient reports no issues with blood sugar at this time.  August 2022: Patient doing well with diet controlled diabetes.  Patient denies any history of thyroid cancer, pancreatitis, MEN syndrome.   March 2020:  Reviewed Diabetes Management StatusPatient cannot take statin due to side effects.SEPTEMBER 2020:  Diabetes Management StatusStatin: TakingACE/ARB: Not takingDecember 2021:  Diabetes Management StatusStatin: Not takingACE/ARB: Not takingMarch 2022:Diabetes Management StatusStatin: Not takingACE/ARB: Not takingMay 2022:  Patient reports that he has changed his diet significantly and that his sugar has been much better controlled.Diabetes Management Status    Statin: Not taking  ACE/ARB: Not taking    Screening or Prevention Patient's value Goal Complete/Controlled?   HgA1C Testing and  "Control   Lab Results   Component Value Date    HGBA1C 7.3 (H) 06/24/2024      Annually/Less than 8% Yes   Lipid profile : 03/26/2024 Annually Yes   LDL control Lab Results   Component Value Date    LDLCALC 151.6 03/26/2024    Annually/Less than 100 mg/dl  No   Nephropathy screening Lab Results   Component Value Date    LABMICR 37.0 03/26/2024     Lab Results   Component Value Date    PROTEINUA Negative 12/31/2018     No results found for: "UTPCR"   Annually Yes   Blood pressure BP Readings from Last 1 Encounters:   03/12/25 138/86    Less than 140/90 Yes   Dilated retinal exam : 05/23/2019 Annually No   Foot exam   : 03/27/2024 Annually Yes              Current Assessment & Plan   Update labs and urine.We will plan to monitor hemoglobin A1c at designated intervals 3 to 6 months.  I recommend ongoing Education for diabetic diet and exercise protocol.  We will continue to monitor for side effects.    Please be advised of symptoms to monitor for and to notify me immediately if persistent or worsening.  Follow up with Ophthalmology/Optometry and Podiatry at least annually.           Hypertension associated with diabetes (Chronic)    Overview   March 2025:  Blood pressure well controlled.     December 2024:     September 2024:    CHRONIC. STABLE. BP Reviewed.  Currently not on medication . No SE reported.  March 2021:  Initial blood pressure elevated due to patient rushing for appointment.  He reports blood pressure well controlled at home 130/80.  May 2022:  Blood pressure remains below goal.  (-) CP, SOB, palpitations, dizziness, lightheadedness, HA, arm numbness, tingling or weakness, syncope.  Creatinine   Date Value Ref Range Status   03/26/2024 1.2 0.5 - 1.4 mg/dL Final            Current Assessment & Plan     Blood pressure well controlled with pain control.   Consider ACE-inhibitor with comorbid diabetes.  Patient defers at this time.Counseled on importance of hypertension disease course, I recommend ongoing " Education for DASH-diet and exercise.  Counseled on medication regimen importance of treating high blood pressure.  Please be advised of risk of untreated blood pressure as discussed.  Please advised of ER precautions were given for symptoms of hypertensive urgency and emergency.           Encounter for long-term (current) use of medications (Chronic)    Overview   March 2025:  Reviewed labs.  December 2024:  Reviewed labs.  June 2024: Reviewed labs.  Feb 2024: Reviewed labs.  November 2023: Reviewed labs.  August 2023 reviewed labs.  May 2023: Reviewed labs.  March 2023:  Reviewed labs November 2022: Reviewed labs.  October 2022: Reviewed labs.  August 2022: Reviewed labs.  Initial HPI March 2020:  CHRONIC. Stable. Compliant with medications for managed conditions. See medication list. No SE reported. Routine lab analysis is being monitored. Refills were addressed.JUNE 2020:  Labs are stable.  Refill medication.CHRONIC. Stable. Compliant with medications for managed conditions. See medication list. No SE reported. Routine lab analysis is being monitored. Refills were addressed.SEPTEMBER 2020:  CHRONIC. Stable. Compliant with medications for managed conditions. See medication list. No SE reported. Routine lab analysis is being monitored. Refills were addressed.DECEMBER 2020:  Reviewed labs.  CHRONIC. Stable. Compliant with medications for managed conditions. See medication list. No SE reported. Routine lab analysis is being monitored. Refills were addressed.  March 2021:  Reviewed labs.  June 2021:  Reviewed labs.  December 2021:  Reviewed labs. March 2022: Reviewed labs.  May 2022:  Reviewed labs.  Lab Results   Component Value Date    WBC 5.99 03/26/2024    HGB 15.1 03/26/2024    HCT 46.0 03/26/2024    MCV 97 03/26/2024     03/26/2024         Chemistry        Component Value Date/Time     (L) 03/26/2024 0726    K 4.3 03/26/2024 0726     03/26/2024 0726    CO2 24 03/26/2024 0726    BUN 10  03/26/2024 0726    CREATININE 1.2 03/26/2024 0726     (H) 03/26/2024 0726        Component Value Date/Time    CALCIUM 9.8 03/26/2024 0726    ALKPHOS 55 03/26/2024 0726    AST 17 03/26/2024 0726    ALT 24 03/26/2024 0726    BILITOT 0.8 03/26/2024 0726    ESTGFRAFRICA >60.0 06/01/2022 0809    EGFRNONAA >60.0 06/01/2022 0809          Lab Results   Component Value Date    TSH 2.195 03/26/2024            Current Assessment & Plan   Complete history and physical was completed today.  Complete and thorough medication reconciliation was performed.  Discussed risks and benefits of medications.  Advised patient on orders and health maintenance.  We discussed old records and old labs if available.  Will request any records not available through epic.  Continue current medications listed on your summary sheet.           Combined opioid with non-opioid drug dependence, continuous (Chronic)    Overview   Chronic.  Stable.  Patient is maintained on Winner and Soma for his chronic back and joint pains.  Patient has failed joint injections in the past.  Patient is not interested in surgery.  Patient has transition care from previous PCP Dr. Jaime Hernández who was managing these prescriptions.  I will continue to fill these to avoid withdrawal.    No abuse suspected.The patient was checked in the [facility] Board of Pharmacy's  Prescription Monitoring Program. There appears to be no incongruities with the patient's verbalized history.            Current Assessment & Plan   Refill medications every three months.   reviewed.         Sacroiliitis (Chronic)    Overview   March 2025:  Patient presents for medication refills.  Reports compliance no side effects reported symptoms are controlled.    December 2024:  For medication refills.  Reports compliance.  No side effects reported.  Symptoms are controlled.    June 2024:  Patient needing medication refills for his chronic pain.  Feb 2024: here for refills. No change in HPI.  Tolerating hydrocodone and Soma which he has been taking for several years. No SE reported. Pain stable.     November 2023: Patient here for medication refills.  Patient has had some difficulty with changing pharmacies lately.  Chronic.  August 2023:   Patient needing refills.  No new issues.   May 2023:  Noted injuries or falls.  Patient here for medication refills.  March 2023:  No new injuries or falls.  Stable on current medication regimen.  November 2022:  Patient here for medication refill.  Stable.  Patient chronic pain medication with hydrocodone 7.5 and Soma 350 milligram.  Patient was previously managed by previous PCP Dr. Jaime Hernández who is retiring.  Patient has been stable on this medication regimen for years.  No side effects reported.    March 2022: Patient reports medication regimen is controlling his symptoms.  No change in HPI.  No new injuries.  May 2022:  Patient reports he has been very active at work and pain medication regimen is working well.  August 2022: Patient here for medication refills.  No change in HPI.  Medication continues to work well.           Current Assessment & Plan   Refill medications as prescribed.  No abuse suspected. The patient was checked in the [facility] Board of Pharmacy's  Prescription Monitoring Program. There appears to be no incongruities with the patient's verbalized history.       An opioid pain contract was completed  after having an extensive conversation about chronic opioid use for pain management. We discussed the risks and benefits of opioids.  I discouraged the patient from escalation of opioid use and try to minimize its use to decrease chance of dependence, tolerance, and opioid-based hyperalgesia.  I reviewed the  in great detail and there are no inconsistencies and it is appropriate with patient's history.  There are no signs of aberrant drug behavior.  The opioid risk tool was also completed, low risk.  Pt counseled about the side effects  of long term use of opioids including dependence, tolerance, addiction, respiratory depression, somnolence, immune and endocrine dysfunction.  The patient expressed understanding.      If no improvement or worsening.  Referral to physical therapy, update imaging and consider spine/back clinic versus surgical specialist.         Nausea    Overview   Chronic.  Recurrent.  Intermittent control.  Patient uses Zofran or phenergan as needed.  Requesting refill.         Current Assessment & Plan   Medications refilled. Follow-up with GI if no improvement in symptoms.  ER precautions for severe symptoms.              Review of patient's allergies indicates:   Allergen Reactions    Atorvastatin     Bactrim [sulfamethoxazole-trimethoprim] Hives     Current Outpatient Medications   Medication Instructions    albuterol (PROVENTIL/VENTOLIN HFA) 90 mcg/actuation inhaler 2 puffs, Inhalation, Every 4 hours PRN, Rescue    blood sugar diagnostic (BLOOD GLUCOSE TEST) Strp Check glucose 3 times per day before each meal    blood sugar diagnostic (FREESTYLE INSULINX TEST STRIPS) Strp Use 3 times a day as needed to test blood sugar.    blood sugar diagnostic Strp To check BG 1 times daily, to use with insurance preferred meter    blood-glucose meter kit To check BG 1 times daily, to use with insurance preferred meter    [START ON 5/11/2025] carisoprodoL (SOMA) 350 mg, Oral, 3 times daily PRN    [START ON 4/11/2025] carisoprodoL (SOMA) 350 mg, Oral, 3 times daily PRN    carisoprodoL (SOMA) 350 mg, Oral, 3 times daily PRN    fexofenadine (ALLEGRA) 180 mg, Oral, Daily    [START ON 5/11/2025] HYDROcodone-acetaminophen (NORCO)  mg per tablet 1 tablet, Oral, Every 8 hours PRN    [START ON 4/11/2025] HYDROcodone-acetaminophen (NORCO) 7.5-325 mg per tablet 1 tablet, Oral, Every 8 hours PRN    HYDROcodone-acetaminophen (NORCO) 7.5-325 mg per tablet 1 tablet, Oral, Every 8 hours PRN    ketoconazole (NIZORAL) 2 % cream Topical (Top), 2 times  daily    lancets Misc To check BG 1 times daily, to use with insurance preferred meter    methylPREDNISolone (MEDROL DOSEPACK) 4 mg tablet follow package directions    mupirocin (BACTROBAN) 2 % ointment Topical (Top), 3 times daily    naproxen (NAPROSYN) 500 mg, Oral, 2 times daily with meals    ondansetron (ZOFRAN-ODT) 8 mg, Oral, Every 6 hours PRN    promethazine (PHENERGAN) 25 mg, Oral, Every 6 hours PRN      I have reviewed the PMH, social history, FamilyHx, surgical history, allergies and medications documented / confirmed by the patient at the time of this visit.  Review of Systems   Constitutional:  Negative for chills, fatigue, fever and unexpected weight change.   HENT:  Negative for ear pain, sinus pressure, sinus pain and sore throat.    Eyes:  Negative for redness and visual disturbance.   Respiratory:  Negative for cough and shortness of breath.    Cardiovascular:  Negative for chest pain and palpitations.   Gastrointestinal:  Negative for nausea and vomiting.   Endocrine: Negative for cold intolerance and heat intolerance.   Genitourinary:  Negative for difficulty urinating and hematuria.   Musculoskeletal:  Positive for arthralgias and back pain. Negative for myalgias.   Skin:  Negative for rash and wound.   Allergic/Immunologic: Negative for environmental allergies and food allergies.   Neurological:  Negative for weakness and headaches.   Hematological:  Negative for adenopathy. Does not bruise/bleed easily.   Psychiatric/Behavioral:  Negative for sleep disturbance. The patient is not nervous/anxious.      Objective:   /86   Pulse 82   Ht 6' (1.829 m)   Wt 83.7 kg (184 lb 8 oz)   SpO2 97%   BMI 25.02 kg/m²   Physical Exam  Vitals and nursing note reviewed.   Constitutional:       General: He is not in acute distress.     Appearance: He is well-developed. He is not diaphoretic.   HENT:      Head: Normocephalic and atraumatic.      Right Ear: Tympanic membrane, ear canal and external ear  normal. There is no impacted cerumen.      Left Ear: Tympanic membrane, ear canal and external ear normal. There is no impacted cerumen.      Nose: No congestion or rhinorrhea.      Mouth/Throat:      Pharynx: No posterior oropharyngeal erythema.   Eyes:      Extraocular Movements: Extraocular movements intact.      Pupils: Pupils are equal, round, and reactive to light.   Neck:      Vascular: No carotid bruit.   Cardiovascular:      Rate and Rhythm: Normal rate and regular rhythm.      Pulses: Normal pulses.           Dorsalis pedis pulses are 2+ on the right side and 2+ on the left side.      Heart sounds: Normal heart sounds. No murmur heard.  Pulmonary:      Effort: Pulmonary effort is normal. No respiratory distress.      Breath sounds: Normal breath sounds. No wheezing.   Abdominal:      General: Bowel sounds are normal.      Palpations: Abdomen is soft.   Musculoskeletal:         General: Tenderness and deformity present. Normal range of motion.      Cervical back: Normal range of motion and neck supple.      Right foot: Normal range of motion. No deformity.      Left foot: Normal range of motion. No deformity.   Feet:      Right foot:      Protective Sensation: 10 sites tested.  9 sites sensed.      Skin integrity: No ulcer, blister, skin breakdown, erythema, warmth, callus or dry skin.      Left foot:      Protective Sensation: 10 sites tested.  9 sites sensed.      Skin integrity: No ulcer, blister, skin breakdown, erythema, warmth, callus or dry skin.   Lymphadenopathy:      Cervical: No cervical adenopathy.   Skin:     General: Skin is warm and dry.      Capillary Refill: Capillary refill takes less than 2 seconds.      Findings: No rash.   Neurological:      General: No focal deficit present.      Mental Status: He is alert and oriented to person, place, and time.      Cranial Nerves: No cranial nerve deficit.   Psychiatric:         Attention and Perception: He is attentive.         Mood and Affect:  Mood normal. Mood is not anxious or depressed. Affect is not labile, blunt, angry or inappropriate.         Speech: He is communicative. Speech is not rapid and pressured, delayed, slurred or tangential.         Behavior: Behavior normal. Behavior is not agitated, slowed, aggressive, withdrawn, hyperactive or combative.         Thought Content: Thought content normal. Thought content is not paranoid or delusional. Thought content does not include homicidal or suicidal ideation. Thought content does not include homicidal or suicidal plan.         Cognition and Memory: Memory is not impaired.         Judgment: Judgment normal. Judgment is not impulsive or inappropriate.       Physical Exam      Results      Assessment:     1. Type 2 diabetes mellitus with hyperglycemia, without long-term current use of insulin    2. Sacroiliitis    3. Encounter for long-term (current) use of medications    4. Nausea    5. Hypertension associated with diabetes    6. Combined opioid with non-opioid drug dependence, continuous      MDM:   Moderate medical complexity.  Moderate risk.  Total time: 31 minutes.  This includes total time spent on the encounter, which includes face to face time and non-face to face time preparing to see the patient (eg, review of previous medical records, tests), Obtaining and/or reviewing separately obtained history, documenting clinical information in the electronic or other health record, independently interpreting results (not separately reported)/communicating results to the patient/family/caregiver, and/or care coordination (not separately reported).    I have Reviewed and summarized old records.  I have performed thorough medication reconciliation today and discussed risk and benefits of medications.  I have reviewed labs and discussed with patient.  All questions were answered.  I am requesting old records and will review them once they are available.  Visit today included increased complexity  associated with the care of the episodic problem see above assessment addressed and managing the longitudinal care of the patient due to the serious and/or complex managed problem(s) see above.    I have signed for the following orders AND/OR meds.  Orders Placed This Encounter   Procedures    HEMOGLOBIN A1C     Standing Status:   Future     Expected Date:   3/12/2025     Expiration Date:   6/10/2026    Microalbumin/Creatinine Ratio, Urine     Standing Status:   Future     Number of Occurrences:   1     Expected Date:   3/12/2025     Expiration Date:   5/11/2026     Specimen Source:   Urine     Medications Ordered This Encounter   Medications    carisoprodoL (SOMA) 350 MG tablet     Sig: Take 1 tablet (350 mg total) by mouth 3 (three) times daily as needed for Muscle spasms.     Dispense:  90 tablet     Refill:  0    carisoprodoL (SOMA) 350 MG tablet     Sig: Take 1 tablet (350 mg total) by mouth 3 (three) times daily as needed for Muscle spasms.     Dispense:  90 tablet     Refill:  0    carisoprodoL (SOMA) 350 MG tablet     Sig: Take 1 tablet (350 mg total) by mouth 3 (three) times daily as needed for Muscle spasms.     Dispense:  90 tablet     Refill:  0    HYDROcodone-acetaminophen (NORCO)  mg per tablet     Sig: Take 1 tablet by mouth every 8 (eight) hours as needed for Pain.     Dispense:  90 tablet     Refill:  0     Quantity prescribed more than 7 day supply? Yes, quantity medically necessary     I have reviewed the Prescription Drug Monitoring Program (PDMP) database for this patient prior to prescribing the above opioid medication:   Yes    HYDROcodone-acetaminophen (NORCO) 7.5-325 mg per tablet     Sig: Take 1 tablet by mouth every 8 (eight) hours as needed for Pain.     Dispense:  90 tablet     Refill:  0     Quantity prescribed more than 7 day supply? Yes, quantity medically necessary     I have reviewed the Prescription Drug Monitoring Program (PDMP) database for this patient prior to prescribing  the above opioid medication:   Yes    HYDROcodone-acetaminophen (NORCO) 7.5-325 mg per tablet     Sig: Take 1 tablet by mouth every 8 (eight) hours as needed for Pain.     Dispense:  90 tablet     Refill:  0     Quantity prescribed more than 7 day supply? Yes, quantity medically necessary     I have reviewed the Prescription Drug Monitoring Program (PDMP) database for this patient prior to prescribing the above opioid medication:   Yes    ondansetron (ZOFRAN-ODT) 8 MG TbDL     Sig: Take 1 tablet (8 mg total) by mouth every 6 (six) hours as needed (nausea).     Dispense:  30 tablet     Refill:  5    promethazine (PHENERGAN) 25 MG tablet     Sig: Take 1 tablet (25 mg total) by mouth every 6 (six) hours as needed for Nausea.     Dispense:  30 tablet     Refill:  6        Follow up in about 3 months (around 6/12/2025), or if symptoms worsen or fail to improve, for Med refills, LAB RESULTS.    If no improvement in symptoms or symptoms worsen, advised to call/follow-up at clinic or go to ER. Patient voiced understanding and all questions/concerns were addressed.   DISCLAIMER: This note was compiled by using a speech recognition dictation system and therefore please be aware that typographical / speech recognition errors can and do occur.  Please contact me if you see any errors specifically.  Consent was obtained for DONNA recording system prior to the visit.    This note was generated with the assistance of ambient listening technology. Verbal consent was obtained by the patient and accompanying visitor(s) for the recording of patient appointment to facilitate this note. I attest to having reviewed and edited the generated note for accuracy, though some syntax or spelling errors may persist. Please contact the author of this note for any clarification.    Jb Chao M.D.       Office: [phone]   [address] [place] [address]  FAX: [phone]

## 2025-03-12 NOTE — ASSESSMENT & PLAN NOTE
Refill medications as prescribed.  No abuse suspected. The patient was checked in the [facility] Board of Pharmacy's  Prescription Monitoring Program. There appears to be no incongruities with the patient's verbalized history.       An opioid pain contract was completed  after having an extensive conversation about chronic opioid use for pain management. We discussed the risks and benefits of opioids.  I discouraged the patient from escalation of opioid use and try to minimize its use to decrease chance of dependence, tolerance, and opioid-based hyperalgesia.  I reviewed the  in great detail and there are no inconsistencies and it is appropriate with patient's history.  There are no signs of aberrant drug behavior.  The opioid risk tool was also completed, low risk.  Pt counseled about the side effects of long term use of opioids including dependence, tolerance, addiction, respiratory depression, somnolence, immune and endocrine dysfunction.  The patient expressed understanding.      If no improvement or worsening.  Referral to physical therapy, update imaging and consider spine/back clinic versus surgical specialist.

## 2025-03-13 ENCOUNTER — RESULTS FOLLOW-UP (OUTPATIENT)
Dept: FAMILY MEDICINE | Facility: CLINIC | Age: 65
End: 2025-03-13

## 2025-03-13 DIAGNOSIS — E11.65 TYPE 2 DIABETES MELLITUS WITH HYPERGLYCEMIA, WITHOUT LONG-TERM CURRENT USE OF INSULIN: Primary | ICD-10-CM

## 2025-03-13 NOTE — PROGRESS NOTES
Make follow-up lab appointment per recommendation below.  Check to see if patient has seen the results through my chart.  If not then,  #CALL THE PATIENT# to discuss results/see if they have questions and document verification of contact. Make F/U appt if needed. [phone]      Metabolic panel is stable.  Normal kidney function and liver function.  A1c is significantly elevated from previous at 9.6.  At this point patient must take medication to avoid further complication of diabetes.  I am going to send a prescription for Januvia for him to take.  Blood counts are stable.  No significant anemia.  Thyroid screening test is normal.  Cholesterol is improved from previous.  Keep up the great work!    Patient must start checking A1c every three months until A1c is controlled less than 8.0.  He needs a diabetic eye exam as soon as possible.  Also due for colon cancer screening.  =========================  Also please address any outstanding health maintenance that may be due:   Health Maintenance Due   Topic Date Due    Diabetic Eye Exam  05/23/2020    Colorectal Cancer Screening  07/06/2023

## 2025-03-13 NOTE — PROGRESS NOTES
URINE microalbumin CREATININE RATIO NORMAL-The urine microalbumin/creatinine screen for protein in the urine was normal.  Repeat this annually.

## 2025-03-14 ENCOUNTER — TELEPHONE (OUTPATIENT)
Dept: FAMILY MEDICINE | Facility: CLINIC | Age: 65
End: 2025-03-14
Payer: MEDICARE

## 2025-03-14 NOTE — TELEPHONE ENCOUNTER
----- Message from EloisaZoodles sent at 3/14/2025  9:58 AM CDT -----  Contact: self  Type:  Needs Medical AdviceWho Called: Ned Toledo Jr.Would the patient rather a call back or a response via [facility]? Call Denis Call Back Number: [phone] Information: the patient is having trouble with his prescriptions and would like a call back.

## 2025-03-14 NOTE — TELEPHONE ENCOUNTER
Patient called to ask why he needed to take the Januvia and I explained why Dr Chao ordered it patient stated he will not be taken that medication.

## 2025-03-20 ENCOUNTER — PATIENT OUTREACH (OUTPATIENT)
Dept: ADMINISTRATIVE | Facility: HOSPITAL | Age: 65
End: 2025-03-20
Payer: MEDICARE

## 2025-03-20 DIAGNOSIS — Z12.11 SCREENING FOR COLORECTAL CANCER: Primary | ICD-10-CM

## 2025-03-20 DIAGNOSIS — Z12.12 SCREENING FOR COLORECTAL CANCER: Primary | ICD-10-CM

## 2025-03-20 NOTE — PROGRESS NOTES
VBC Program: Spoke with Pt about overdue Colon & Eye exams, Pt says he hasn't had time for eye exam due to busy schedule & he'll do the colon soon. Cologuard ordered.

## 2025-03-24 DIAGNOSIS — Z00.00 ENCOUNTER FOR MEDICARE ANNUAL WELLNESS EXAM: ICD-10-CM

## 2025-04-04 NOTE — PATIENT INSTRUCTIONS
Follow up in about 3 months (around 11/22/2023), or if symptoms worsen or fail to improve, for Med refills.     Dear patient,   As a result of recent federal legislation (The Federal Cures Act), you may receive lab or pathology results from your visit in your [facility] account before your physician is able to contact you. Your physician or their representative will relay the results to you with their recommendations at their soonest availability.     If no improvement in symptoms or symptoms worsen, please be advised to call MD, follow-up at clinic and/or go to ER if becomes severe.    Jb Chao M.D.        We Offer TELEHEALTH & Same Day Appointments!   Book your Telehealth appointment with me through my nurse or   Clinic appointments on [employer]!    [address] [place] [address]    Office: [phone]   FAX: [phone]    Check out my Facebook Page and Follow Me at: [email or web address]    Check out my website at [employer] by clicking on: [email or web address]    To Schedule appointments online, go to [employer]: [email or web address]   
1
Admitted

## 2025-04-11 ENCOUNTER — PATIENT OUTREACH (OUTPATIENT)
Dept: ADMINISTRATIVE | Facility: HOSPITAL | Age: 65
End: 2025-04-11
Payer: MEDICARE

## 2025-04-24 NOTE — ASSESSMENT & PLAN NOTE
Update labs.Patient cannot tolerate statins due to worsening pain.  Patient reports no issues with his blood sugar at home.We will plan to monitor hemoglobin A1c at designated intervals 3 to 6 months.  I recommend ongoing Education for diabetic diet and exercise protocol.  We will continue to monitor for side effects.    Please be advised of symptoms to monitor for and to notify me immediately if persistent or worsening.  Follow up with Ophthalmology/Optometry and Podiatry at least annually.     Additional Notes: Discussed new medications, including topical treatment, biologics and oral medication. Patient tried Otezla and had severe GI side effects. At this time she prefers to continue topical steroids which offers mild relief Render Risk Assessment In Note?: no Detail Level: Simple

## 2025-06-03 ENCOUNTER — RESULTS FOLLOW-UP (OUTPATIENT)
Dept: FAMILY MEDICINE | Facility: CLINIC | Age: 65
End: 2025-06-03
Payer: MEDICARE

## 2025-06-09 ENCOUNTER — TELEPHONE (OUTPATIENT)
Dept: FAMILY MEDICINE | Facility: CLINIC | Age: 65
End: 2025-06-09
Payer: COMMERCIAL

## 2025-06-09 NOTE — TELEPHONE ENCOUNTER
Copied from CRM #[id number]. Topic: General Inquiry - Patient Advice  >> Jun 9, 2025 10:22 AM Tia wrote:  Who Called: Pt    What is the request in detail: Requesting call back to discuss scheduling follow up visit. Please advise    Can the clinic reply by [facility]? No    Best Call Back Number: [phone]      Additional Information:

## 2025-06-13 ENCOUNTER — OFFICE VISIT (OUTPATIENT)
Dept: FAMILY MEDICINE | Facility: CLINIC | Age: 65
End: 2025-06-13
Payer: COMMERCIAL

## 2025-06-13 ENCOUNTER — LAB VISIT (OUTPATIENT)
Dept: LAB | Facility: HOSPITAL | Age: 65
End: 2025-06-13
Attending: FAMILY MEDICINE
Payer: COMMERCIAL

## 2025-06-13 VITALS — HEART RATE: 87 BPM | WEIGHT: 178.69 LBS | BODY MASS INDEX: 24.2 KG/M2 | OXYGEN SATURATION: 99 % | HEIGHT: 72 IN

## 2025-06-13 DIAGNOSIS — E11.65 TYPE 2 DIABETES MELLITUS WITH HYPERGLYCEMIA, WITHOUT LONG-TERM CURRENT USE OF INSULIN: ICD-10-CM

## 2025-06-13 DIAGNOSIS — Z12.5 ENCOUNTER FOR PROSTATE CANCER SCREENING: ICD-10-CM

## 2025-06-13 DIAGNOSIS — Z79.899 ENCOUNTER FOR LONG-TERM (CURRENT) USE OF MEDICATIONS: ICD-10-CM

## 2025-06-13 DIAGNOSIS — E11.65 TYPE 2 DIABETES MELLITUS WITH HYPERGLYCEMIA, WITHOUT LONG-TERM CURRENT USE OF INSULIN: Primary | ICD-10-CM

## 2025-06-13 DIAGNOSIS — M46.1 SACROILIITIS: ICD-10-CM

## 2025-06-13 LAB
EAG (OHS): 186 MG/DL (ref 68–131)
HBA1C MFR BLD: 8.1 % (ref 4–5.6)
PSA SERPL-MCNC: 2.96 NG/ML

## 2025-06-13 PROCEDURE — 83036 HEMOGLOBIN GLYCOSYLATED A1C: CPT

## 2025-06-13 PROCEDURE — 84153 ASSAY OF PSA TOTAL: CPT

## 2025-06-13 PROCEDURE — 36415 COLL VENOUS BLD VENIPUNCTURE: CPT | Mod: PO

## 2025-06-13 PROCEDURE — 99999 PR PBB SHADOW E&M-EST. PATIENT-LVL IV: CPT | Mod: PBBFAC,,, | Performed by: FAMILY MEDICINE

## 2025-06-13 RX ORDER — CARISOPRODOL 350 MG/1
350 TABLET ORAL 3 TIMES DAILY PRN
Qty: 90 TABLET | Refills: 0 | Status: SHIPPED | OUTPATIENT
Start: 2025-07-12

## 2025-06-13 RX ORDER — HYDROCODONE BITARTRATE AND ACETAMINOPHEN 10; 325 MG/1; MG/1
1 TABLET ORAL EVERY 8 HOURS PRN
Qty: 90 TABLET | Refills: 0 | Status: SHIPPED | OUTPATIENT
Start: 2025-08-12

## 2025-06-13 RX ORDER — LEVOCETIRIZINE DIHYDROCHLORIDE 5 MG/1
5 TABLET, FILM COATED ORAL NIGHTLY
COMMUNITY
Start: 2025-05-01

## 2025-06-13 RX ORDER — CARISOPRODOL 350 MG/1
350 TABLET ORAL 3 TIMES DAILY PRN
Qty: 90 TABLET | Refills: 0 | Status: SHIPPED | OUTPATIENT
Start: 2025-06-13

## 2025-06-13 RX ORDER — HYDROCODONE BITARTRATE AND ACETAMINOPHEN 7.5; 325 MG/1; MG/1
1 TABLET ORAL EVERY 8 HOURS PRN
Qty: 90 TABLET | Refills: 0 | Status: SHIPPED | OUTPATIENT
Start: 2025-07-12

## 2025-06-13 RX ORDER — CARISOPRODOL 350 MG/1
350 TABLET ORAL 3 TIMES DAILY PRN
Qty: 90 TABLET | Refills: 0 | Status: SHIPPED | OUTPATIENT
Start: 2025-08-12

## 2025-06-13 RX ORDER — HYDROCODONE BITARTRATE AND ACETAMINOPHEN 7.5; 325 MG/1; MG/1
1 TABLET ORAL EVERY 8 HOURS PRN
Qty: 90 TABLET | Refills: 0 | Status: SHIPPED | OUTPATIENT
Start: 2025-06-13

## 2025-06-13 NOTE — PROGRESS NOTES
PLAN:      Assessment & Plan  Diabetes see problem below    2. Health maintenance  - Due for A1c and PSA tests.  - Blood work will be conducted today to assess these levels.  - Counseling provided on not giving up private insurance.  - Follow-up scheduled in 3 months.    Problem List Items Addressed This Visit       Type 2 diabetes mellitus with hyperglycemia, without long-term current use of insulin - Primary (Chronic)    Update labs.  We will plan to monitor hemoglobin A1c at designated intervals 3 to 6 months.  I recommend ongoing Education for diabetic diet and exercise protocol.  We will continue to monitor for side effects.    Please be advised of symptoms to monitor for and to notify me immediately if persistent or worsening.  Follow up with Ophthalmology/Optometry and Podiatry at least annually.           Relevant Orders    HEMOGLOBIN A1C    Encounter for long-term (current) use of medications (Chronic)    Complete history and physical was completed today.  Complete and thorough medication reconciliation was performed.  Discussed risks and benefits of medications.  Advised patient on orders and health maintenance.  We discussed old records and old labs if available.  Will request any records not available through epic.  Continue current medications listed on your summary sheet.           Relevant Medications    carisoprodoL (SOMA) 350 MG tablet (Start on 8/12/2025)    carisoprodoL (SOMA) 350 MG tablet (Start on 7/12/2025)    carisoprodoL (SOMA) 350 MG tablet    HYDROcodone-acetaminophen (NORCO) 7.5-325 mg per tablet (Start on 7/12/2025)    HYDROcodone-acetaminophen (NORCO) 7.5-325 mg per tablet    Sacroiliitis (Chronic)    Refill medications as prescribed.  No abuse suspected. The patient was checked in the [facility] Board of Pharmacy's  Prescription Monitoring Program. There appears to be no incongruities with the patient's verbalized history.       An opioid pain contract was completed  after having  an extensive conversation about chronic opioid use for pain management. We discussed the risks and benefits of opioids.  I discouraged the patient from escalation of opioid use and try to minimize its use to decrease chance of dependence, tolerance, and opioid-based hyperalgesia.  I reviewed the  in great detail and there are no inconsistencies and it is appropriate with patient's history.  There are no signs of aberrant drug behavior.  The opioid risk tool was also completed, low risk.  Pt counseled about the side effects of long term use of opioids including dependence, tolerance, addiction, respiratory depression, somnolence, immune and endocrine dysfunction.  The patient expressed understanding.      If no improvement or worsening.  Referral to physical therapy, update imaging and consider spine/back clinic versus surgical specialist.         Relevant Medications    carisoprodoL (SOMA) 350 MG tablet (Start on 8/12/2025)    carisoprodoL (SOMA) 350 MG tablet (Start on 7/12/2025)    carisoprodoL (SOMA) 350 MG tablet    HYDROcodone-acetaminophen (NORCO)  mg per tablet (Start on 8/12/2025)    HYDROcodone-acetaminophen (NORCO) 7.5-325 mg per tablet (Start on 7/12/2025)    HYDROcodone-acetaminophen (NORCO) 7.5-325 mg per tablet     Other Visit Diagnoses         Encounter for prostate cancer screening        Relevant Orders    PSA, SCREENING          Future Appointments       Date Specialty Appt Notes    6/13/2025 Lab labs           Medication Management for assessment above:   Medication List with Changes/Refills   Current Medications    ALBUTEROL (PROVENTIL/VENTOLIN HFA) 90 MCG/ACTUATION INHALER    Inhale 2 puffs into the lungs every 4 (four) hours as needed for Wheezing. Rescue    BLOOD SUGAR DIAGNOSTIC (BLOOD GLUCOSE TEST) STRP    Check glucose 3 times per day before each meal    BLOOD SUGAR DIAGNOSTIC (FREESTYLE INSULINX TEST STRIPS) STRP    Use 3 times a day as needed to test blood sugar.    BLOOD SUGAR  DIAGNOSTIC STRP    To check BG 1 times daily, to use with insurance preferred meter    BLOOD-GLUCOSE METER KIT    To check BG 1 times daily, to use with insurance preferred meter    FEXOFENADINE (ALLEGRA) 180 MG TABLET    Take 1 tablet (180 mg total) by mouth once daily.    KETOCONAZOLE (NIZORAL) 2 % CREAM    Apply topically 2 (two) times daily.    LANCETS MISC    To check BG 1 times daily, to use with insurance preferred meter    LEVOCETIRIZINE (XYZAL) 5 MG TABLET    Take 5 mg by mouth every evening.    NAPROXEN (NAPROSYN) 500 MG TABLET    Take 1 tablet (500 mg total) by mouth 2 (two) times daily with meals.    ONDANSETRON (ZOFRAN-ODT) 8 MG TBDL    Take 1 tablet (8 mg total) by mouth every 6 (six) hours as needed (nausea).    PROMETHAZINE (PHENERGAN) 25 MG TABLET    Take 1 tablet (25 mg total) by mouth every 6 (six) hours as needed for Nausea.    SITAGLIPTIN PHOSPHATE (JANUVIA) 100 MG TAB    Take 1 tablet (100 mg total) by mouth once daily.   Changed and/or Refilled Medications    Modified Medication Previous Medication    CARISOPRODOL (SOMA) 350 MG TABLET carisoprodoL (SOMA) 350 MG tablet       Take 1 tablet (350 mg total) by mouth 3 (three) times daily as needed for Muscle spasms.    Take 1 tablet (350 mg total) by mouth 3 (three) times daily as needed for Muscle spasms.    CARISOPRODOL (SOMA) 350 MG TABLET carisoprodoL (SOMA) 350 MG tablet       Take 1 tablet (350 mg total) by mouth 3 (three) times daily as needed for Muscle spasms.    Take 1 tablet (350 mg total) by mouth 3 (three) times daily as needed for Muscle spasms.    CARISOPRODOL (SOMA) 350 MG TABLET carisoprodoL (SOMA) 350 MG tablet       Take 1 tablet (350 mg total) by mouth 3 (three) times daily as needed for Muscle spasms.    Take 1 tablet (350 mg total) by mouth 3 (three) times daily as needed for Muscle spasms.    HYDROCODONE-ACETAMINOPHEN (NORCO)  MG PER TABLET HYDROcodone-acetaminophen (NORCO)  mg per tablet       Take 1 tablet by  mouth every 8 (eight) hours as needed for Pain.    Take 1 tablet by mouth every 8 (eight) hours as needed for Pain.    HYDROCODONE-ACETAMINOPHEN (NORCO) 7.5-325 MG PER TABLET HYDROcodone-acetaminophen (NORCO) 7.5-325 mg per tablet       Take 1 tablet by mouth every 8 (eight) hours as needed for Pain.    Take 1 tablet by mouth every 8 (eight) hours as needed for Pain.    HYDROCODONE-ACETAMINOPHEN (NORCO) 7.5-325 MG PER TABLET HYDROcodone-acetaminophen (NORCO) 7.5-325 mg per tablet       Take 1 tablet by mouth every 8 (eight) hours as needed for Pain.    Take 1 tablet by mouth every 8 (eight) hours as needed for Pain.       Jb Chao M.D.  ==========================================================================  Subjective:   Patient ID: Ned Toledo Jr. is a 64 y.o. male.  has a past medical history of Diabetes mellitus, type 2, Fatty liver, Hypertension, Penetrating foot wound, and Sciatica.   Chief Complaint: Medication Refill      History of Present Illness  The patient is a 64-year-old male who presents for medication refills. He has moderate to high medical complexity and is at moderate to high risk.    He reports overall good health, with the exception of a recent illness that lasted approximately 2 to 3 months. During this period, he sought medical attention from an urgent care facility where he was prescribed an antibiotic and received a steroid injection. These interventions resulted in the resolution of his symptoms within a two-week timeframe.    Problem List Items Addressed This Visit       Type 2 diabetes mellitus with hyperglycemia, without long-term current use of insulin - Primary (Chronic)    Overview   June2025:  Diabetes Medications              SITagliptin phosphate (JANUVIA) 100 MG Tab Take 1 tablet (100 mg total) by mouth once daily.        March 2025: June 2024:  Patient is diet controlled.  A1c has improved.  November 2023:  Patient diet control with lifestyle modification diet  "and exercise.  August 2023:   March 2023:  Patient here for labs and urine for diabetes monitoring.  Patient reports no issues with blood sugar at this time.  August 2022: Patient doing well with diet controlled diabetes.  Patient denies any history of thyroid cancer, pancreatitis, MEN syndrome.   March 2020:  Reviewed Diabetes Management StatusPatient cannot take statin due to side effects.SEPTEMBER 2020:  Diabetes Management StatusStatin: TakingACE/ARB: Not takingDecember 2021:  Diabetes Management StatusStatin: Not takingACE/ARB: Not takingMarch 2022:Diabetes Management StatusStatin: Not takingACE/ARB: Not takingMay 2022:  Patient reports that he has changed his diet significantly and that his sugar has been much better controlled.Diabetes Management Status  Diabetes Management Status    Statin: Not taking  ACE/ARB: Not taking    Screening or Prevention Patient's value Goal Complete/Controlled?   HgA1C Testing and Control   Lab Results   Component Value Date    HGBA1C 9.6 (H) 03/12/2025      Annually/Less than 8% No   Lipid profile : 03/12/2025 Annually Yes   LDL control Lab Results   Component Value Date    LDLCALC 127.0 03/12/2025    Annually/Less than 100 mg/dl  No   Nephropathy screening Lab Results   Component Value Date    LABMICR 16.0 03/12/2025     Lab Results   Component Value Date    PROTEINUA Negative 12/31/2018     No results found for: "UTPCR"   Annually Yes   Blood pressure BP Readings from Last 1 Encounters:   03/12/25 138/86    Less than 140/90 Yes   Dilated retinal exam : 05/23/2019 Annually No   Foot exam   : 03/12/2025 Annually Yes              Current Assessment & Plan   Update labs.  We will plan to monitor hemoglobin A1c at designated intervals 3 to 6 months.  I recommend ongoing Education for diabetic diet and exercise protocol.  We will continue to monitor for side effects.    Please be advised of symptoms to monitor for and to notify me immediately if persistent or worsening.  Follow up " with Ophthalmology/Optometry and Podiatry at least annually.           Encounter for long-term (current) use of medications (Chronic)    Overview   June2025:  Reviewed labs.  March 2025:  Reviewed labs.  December 2024:  Reviewed labs.  June 2024: Reviewed labs.  Feb 2024: Reviewed labs.  November 2023: Reviewed labs.  August 2023 reviewed labs.  May 2023: Reviewed labs.  March 2023:  Reviewed labs November 2022: Reviewed labs.  October 2022: Reviewed labs.  August 2022: Reviewed labs.  Initial HPI March 2020:  CHRONIC. Stable. Compliant with medications for managed conditions. See medication list. No SE reported. Routine lab analysis is being monitored. Refills were addressed.JUNE 2020:  Labs are stable.  Refill medication.CHRONIC. Stable. Compliant with medications for managed conditions. See medication list. No SE reported. Routine lab analysis is being monitored. Refills were addressed.SEPTEMBER 2020:  CHRONIC. Stable. Compliant with medications for managed conditions. See medication list. No SE reported. Routine lab analysis is being monitored. Refills were addressed.DECEMBER 2020:  Reviewed labs.  CHRONIC. Stable. Compliant with medications for managed conditions. See medication list. No SE reported. Routine lab analysis is being monitored. Refills were addressed.  March 2021:  Reviewed labs.  June 2021:  Reviewed labs.  December 2021:  Reviewed labs. March 2022: Reviewed labs.  May 2022:  Reviewed labs.  Lab Results   Component Value Date    WBC 7.74 03/12/2025    HGB 14.8 03/12/2025    HCT 45.4 03/12/2025    MCV 97 03/12/2025     03/12/2025         Chemistry        Component Value Date/Time     03/12/2025 1054    K 4.1 03/12/2025 1054     03/12/2025 1054    CO2 23 03/12/2025 1054    BUN 11 03/12/2025 1054    CREATININE 0.9 03/12/2025 1054     (H) 03/12/2025 1054        Component Value Date/Time    CALCIUM 9.6 03/12/2025 1054    ALKPHOS 51 03/12/2025 1054    AST 18 03/12/2025 1054     ALT 21 03/12/2025 1054    BILITOT 0.8 03/12/2025 1054    ESTGFRAFRICA >60.0 06/01/2022 0809    EGFRNONAA >60.0 06/01/2022 0809          Lab Results   Component Value Date    TSH 1.461 03/12/2025            Current Assessment & Plan   Complete history and physical was completed today.  Complete and thorough medication reconciliation was performed.  Discussed risks and benefits of medications.  Advised patient on orders and health maintenance.  We discussed old records and old labs if available.  Will request any records not available through epic.  Continue current medications listed on your summary sheet.           Sacroiliitis (Chronic)    Overview   June2025:  Here for medication refills.  No issues.    March 2025:  Patient presents for medication refills.  Reports compliance no side effects reported symptoms are controlled.    December 2024:  For medication refills.  Reports compliance.  No side effects reported.  Symptoms are controlled.    June 2024:  Patient needing medication refills for his chronic pain.  Feb 2024: here for refills. No change in HPI. Tolerating hydrocodone and Soma which he has been taking for several years. No SE reported. Pain stable.     November 2023: Patient here for medication refills.  Patient has had some difficulty with changing pharmacies lately.  Chronic.  August 2023:   Patient needing refills.  No new issues.   May 2023:  Noted injuries or falls.  Patient here for medication refills.  March 2023:  No new injuries or falls.  Stable on current medication regimen.  November 2022:  Patient here for medication refill.  Stable.  Patient chronic pain medication with hydrocodone 7.5 and Soma 350 milligram.  Patient was previously managed by previous PCP Dr. Jaime Hernández who is retiring.  Patient has been stable on this medication regimen for years.  No side effects reported.    March 2022: Patient reports medication regimen is controlling his symptoms.  No change in HPI.  No new  injuries.  May 2022:  Patient reports he has been very active at work and pain medication regimen is working well.  August 2022: Patient here for medication refills.  No change in HPI.  Medication continues to work well.           Current Assessment & Plan   Refill medications as prescribed.  No abuse suspected. The patient was checked in the [facility] Board of Pharmacy's  Prescription Monitoring Program. There appears to be no incongruities with the patient's verbalized history.       An opioid pain contract was completed  after having an extensive conversation about chronic opioid use for pain management. We discussed the risks and benefits of opioids.  I discouraged the patient from escalation of opioid use and try to minimize its use to decrease chance of dependence, tolerance, and opioid-based hyperalgesia.  I reviewed the  in great detail and there are no inconsistencies and it is appropriate with patient's history.  There are no signs of aberrant drug behavior.  The opioid risk tool was also completed, low risk.  Pt counseled about the side effects of long term use of opioids including dependence, tolerance, addiction, respiratory depression, somnolence, immune and endocrine dysfunction.  The patient expressed understanding.      If no improvement or worsening.  Referral to physical therapy, update imaging and consider spine/back clinic versus surgical specialist.          Other Visit Diagnoses         Encounter for prostate cancer screening                 Review of patient's allergies indicates:   Allergen Reactions    Atorvastatin     Bactrim [sulfamethoxazole-trimethoprim] Hives     Current Outpatient Medications   Medication Instructions    albuterol (PROVENTIL/VENTOLIN HFA) 90 mcg/actuation inhaler 2 puffs, Inhalation, Every 4 hours PRN, Rescue    blood sugar diagnostic (BLOOD GLUCOSE TEST) Strp Check glucose 3 times per day before each meal    blood sugar diagnostic (FREESTYLE INSULINX TEST  STRIPS) Strp Use 3 times a day as needed to test blood sugar.    blood sugar diagnostic Strp To check BG 1 times daily, to use with insurance preferred meter    blood-glucose meter kit To check BG 1 times daily, to use with insurance preferred meter    [START ON 8/12/2025] carisoprodoL (SOMA) 350 mg, Oral, 3 times daily PRN    [START ON 7/12/2025] carisoprodoL (SOMA) 350 mg, Oral, 3 times daily PRN    carisoprodoL (SOMA) 350 mg, Oral, 3 times daily PRN    fexofenadine (ALLEGRA) 180 mg, Oral, Daily    [START ON 8/12/2025] HYDROcodone-acetaminophen (NORCO)  mg per tablet 1 tablet, Oral, Every 8 hours PRN    [START ON 7/12/2025] HYDROcodone-acetaminophen (NORCO) 7.5-325 mg per tablet 1 tablet, Oral, Every 8 hours PRN    HYDROcodone-acetaminophen (NORCO) 7.5-325 mg per tablet 1 tablet, Oral, Every 8 hours PRN    ketoconazole (NIZORAL) 2 % cream Topical (Top), 2 times daily    lancets Misc To check BG 1 times daily, to use with insurance preferred meter    levocetirizine (XYZAL) 5 mg, Nightly    naproxen (NAPROSYN) 500 mg, Oral, 2 times daily with meals    ondansetron (ZOFRAN-ODT) 8 mg, Oral, Every 6 hours PRN    promethazine (PHENERGAN) 25 mg, Oral, Every 6 hours PRN    SITagliptin phosphate (JANUVIA) 100 mg, Oral, Daily      I have reviewed the PMH, social history, FamilyHx, surgical history, allergies and medications documented / confirmed by the patient at the time of this visit.  Review of Systems   Constitutional:  Negative for chills, fatigue, fever and unexpected weight change.   HENT:  Negative for ear pain, sinus pressure, sinus pain and sore throat.    Eyes:  Negative for redness and visual disturbance.   Respiratory:  Negative for cough and shortness of breath.    Cardiovascular:  Negative for chest pain and palpitations.   Gastrointestinal:  Negative for nausea and vomiting.   Endocrine: Negative for cold intolerance and heat intolerance.   Genitourinary:  Negative for difficulty urinating and  hematuria.   Musculoskeletal:  Positive for arthralgias and back pain. Negative for myalgias.   Skin:  Negative for rash and wound.   Allergic/Immunologic: Negative for environmental allergies and food allergies.   Neurological:  Negative for weakness and headaches.   Hematological:  Negative for adenopathy. Does not bruise/bleed easily.   Psychiatric/Behavioral:  Negative for sleep disturbance. The patient is not nervous/anxious.      Objective:   Pulse 87   Ht 6' (1.829 m)   Wt 81.1 kg (178 lb 11.2 oz)   SpO2 99%   BMI 24.24 kg/m²   Physical Exam  Vitals and nursing note reviewed.   Constitutional:       General: He is not in acute distress.     Appearance: He is well-developed. He is not diaphoretic.   HENT:      Head: Normocephalic and atraumatic.      Right Ear: Tympanic membrane, ear canal and external ear normal. There is no impacted cerumen.      Left Ear: Tympanic membrane, ear canal and external ear normal. There is no impacted cerumen.      Nose: No congestion or rhinorrhea.      Mouth/Throat:      Pharynx: No posterior oropharyngeal erythema.   Eyes:      Extraocular Movements: Extraocular movements intact.      Pupils: Pupils are equal, round, and reactive to light.   Neck:      Vascular: No carotid bruit.   Cardiovascular:      Rate and Rhythm: Normal rate and regular rhythm.      Pulses: Normal pulses.           Dorsalis pedis pulses are 2+ on the right side and 2+ on the left side.      Heart sounds: Normal heart sounds. No murmur heard.  Pulmonary:      Effort: Pulmonary effort is normal. No respiratory distress.      Breath sounds: Normal breath sounds. No wheezing.   Abdominal:      General: Bowel sounds are normal.      Palpations: Abdomen is soft.   Musculoskeletal:         General: Tenderness and deformity present. Normal range of motion.      Cervical back: Normal range of motion and neck supple.      Right foot: Normal range of motion. No deformity.      Left foot: Normal range of motion.  No deformity.   Feet:      Right foot:      Protective Sensation: 10 sites tested.  9 sites sensed.      Skin integrity: No ulcer, blister, skin breakdown, erythema, warmth, callus or dry skin.      Left foot:      Protective Sensation: 10 sites tested.  9 sites sensed.      Skin integrity: No ulcer, blister, skin breakdown, erythema, warmth, callus or dry skin.   Lymphadenopathy:      Cervical: No cervical adenopathy.   Skin:     General: Skin is warm and dry.      Capillary Refill: Capillary refill takes less than 2 seconds.      Findings: No rash.   Neurological:      General: No focal deficit present.      Mental Status: He is alert and oriented to person, place, and time.      Cranial Nerves: No cranial nerve deficit.   Psychiatric:         Attention and Perception: He is attentive.         Mood and Affect: Mood normal. Mood is not anxious or depressed. Affect is not labile, blunt, angry or inappropriate.         Speech: He is communicative. Speech is not rapid and pressured, delayed, slurred or tangential.         Behavior: Behavior normal. Behavior is not agitated, slowed, aggressive, withdrawn, hyperactive or combative.         Thought Content: Thought content normal. Thought content is not paranoid or delusional. Thought content does not include homicidal or suicidal ideation. Thought content does not include homicidal or suicidal plan.         Cognition and Memory: Memory is not impaired.         Judgment: Judgment normal. Judgment is not impulsive or inappropriate.       Physical Exam  Ears: Cerumen impaction improved after irrigation, otitis externa  Nose: Nasal turbinates, narrow airway  Neck: Enlarged neck circumference  Respiratory: Clear to auscultation, no wheezing, rales or rhonchi  Cardiovascular: Regular rate and rhythm, no murmurs, rubs, or gallops  Extremities: Present right foot    Results      Assessment:     1. Type 2 diabetes mellitus with hyperglycemia, without long-term current use of  insulin    2. Sacroiliitis    3. Encounter for long-term (current) use of medications    4. Encounter for prostate cancer screening      MDM:   Moderate medical complexity.  Moderate risk.  Total time: 33 minutes.  This includes total time spent on the encounter, which includes face to face time and non-face to face time preparing to see the patient (eg, review of previous medical records, tests), Obtaining and/or reviewing separately obtained history, documenting clinical information in the electronic or other health record, independently interpreting results (not separately reported)/communicating results to the patient/family/caregiver, and/or care coordination (not separately reported).    I have Reviewed and summarized old records.  I have performed thorough medication reconciliation today and discussed risk and benefits of medications.  I have reviewed labs and discussed with patient.  All questions were answered.  I am requesting old records and will review them once they are available.  Visit today included increased complexity associated with the care of the episodic problem see above assessment addressed and managing the longitudinal care of the patient due to the serious and/or complex managed problem(s) see above.    I have signed for the following orders AND/OR meds.  Orders Placed This Encounter   Procedures    HEMOGLOBIN A1C     Standing Status:   Future     Expected Date:   6/13/2025     Expiration Date:   9/11/2026    PSA, SCREENING     Standing Status:   Future     Expected Date:   6/13/2025     Expiration Date:   9/11/2026     Medications Ordered This Encounter   Medications    carisoprodoL (SOMA) 350 MG tablet     Sig: Take 1 tablet (350 mg total) by mouth 3 (three) times daily as needed for Muscle spasms.     Dispense:  90 tablet     Refill:  0    carisoprodoL (SOMA) 350 MG tablet     Sig: Take 1 tablet (350 mg total) by mouth 3 (three) times daily as needed for Muscle spasms.     Dispense:  90  tablet     Refill:  0    carisoprodoL (SOMA) 350 MG tablet     Sig: Take 1 tablet (350 mg total) by mouth 3 (three) times daily as needed for Muscle spasms.     Dispense:  90 tablet     Refill:  0    HYDROcodone-acetaminophen (NORCO)  mg per tablet     Sig: Take 1 tablet by mouth every 8 (eight) hours as needed for Pain.     Dispense:  90 tablet     Refill:  0     Quantity prescribed more than 7 day supply? Yes, quantity medically necessary     I have reviewed the Prescription Drug Monitoring Program (PDMP) database for this patient prior to prescribing the above opioid medication:   Yes    HYDROcodone-acetaminophen (NORCO) 7.5-325 mg per tablet     Sig: Take 1 tablet by mouth every 8 (eight) hours as needed for Pain.     Dispense:  90 tablet     Refill:  0     Quantity prescribed more than 7 day supply? Yes, quantity medically necessary     I have reviewed the Prescription Drug Monitoring Program (PDMP) database for this patient prior to prescribing the above opioid medication:   Yes    HYDROcodone-acetaminophen (NORCO) 7.5-325 mg per tablet     Sig: Take 1 tablet by mouth every 8 (eight) hours as needed for Pain.     Dispense:  90 tablet     Refill:  0     Quantity prescribed more than 7 day supply? Yes, quantity medically necessary     I have reviewed the Prescription Drug Monitoring Program (PDMP) database for this patient prior to prescribing the above opioid medication:   Yes        Follow up in about 3 months (around 9/13/2025), or if symptoms worsen or fail to improve.  Future Appointments       Date Specialty Appt Notes    6/13/2025 Lab labs          If no improvement in symptoms or symptoms worsen, advised to call/follow-up at clinic or go to ER. Patient voiced understanding and all questions/concerns were addressed.   DISCLAIMER: This note was compiled by using a speech recognition dictation system and therefore please be aware that typographical / speech recognition errors can and do occur.  Please  contact me if you see any errors specifically.  Consent was obtained for DONNA recording system prior to the visit.    This note was generated with the assistance of ambient listening technology. Verbal consent was obtained by the patient and accompanying visitor(s) for the recording of patient appointment to facilitate this note. I attest to having reviewed and edited the generated note for accuracy, though some syntax or spelling errors may persist. Please contact the author of this note for any clarification.    Jb Chao M.D.       Office: [phone] [id number] [place] [address]  FAX: [phone]

## 2025-06-13 NOTE — PATIENT INSTRUCTIONS
Sidney Marino,     If you are due for any health screening(s) below please notify me so we can arrange them to be ordered and scheduled. Most healthy patients at your age complete them, but you are free to accept or refuse.     If you can't do it, I'll definitely understand. If you can, I'd certainly appreciate it!    Tests to Keep You Healthy    Eye Exam: DUE  Colon Cancer Screening: Met on 5/25/2025  Last Blood Pressure <= 139/89 (3/12/2025): Yes  Last HbA1c < 8 (03/12/2025): NO      Lets manage your A1c levels     Your last hemoglobin A1c was higher than the goal of less than 8. Hemoglobin A1c or HbA1c is a blood test that measures your average blood sugar levels over the past 3 months. It is the main test to help you and your health care team manage your diabetes.     Higher A1c levels are linked to diabetes complications, such as loss of vision, kidney disease, and nerve damage. Keeping your A1c at least less than 8 is important to stay healthy and we are here to help. Talk with your provider on how you can further improve your A1c.     We recommend that you set up blood work to get a repeat hemoglobin A1c in 3 months to monitor your diabetes. Let your health care team know if you have questions.    Your diabetic retinal eye exam is due     Diabetes is the #1 cause of blindness in the [place] - early detection before signs or symptoms develop can prevent debilitating blindness.     Our records indicate that you may be overdue for your annual diabetic eye exam. Eye screening can help identify patients at risk for developing vision loss which is common in diabetes. This simple screening is an important step to keeping you healthy and preventing complications from diabetes.     This recommended diabetic eye exam should take place once per year and can prevent and treat diabetes complications in the eye before developing symptoms. This can be done with a special camera is used to take photographs of the back of your eye  without having to dilate them, or you can see an eye doctor for a full dilated exam.     If you recently had your yearly diabetic eye exam performed outside of [facility], please let your Health care team know so that they can update your health record.

## 2025-06-13 NOTE — ASSESSMENT & PLAN NOTE
Refill medications as prescribed.  No abuse suspected. The patient was checked in the [facility] [employer] [facility]'s  Prescription Monitoring Program. There appears to be no incongruities with the patient's verbalized history.       An opioid pain contract was completed  after having an extensive conversation about chronic opioid use for pain management. We discussed the risks and benefits of opioids.  I discouraged the patient from escalation of opioid use and try to minimize its use to decrease chance of dependence, tolerance, and opioid-based hyperalgesia.  I reviewed the  in great detail and there are no inconsistencies and it is appropriate with patient's history.  There are no signs of aberrant drug behavior.  The opioid risk tool was also completed, low risk.  Pt counseled about the side effects of long term use of opioids including dependence, tolerance, addiction, respiratory depression, somnolence, immune and endocrine dysfunction.  The patient expressed understanding.      If no improvement or worsening.  Referral to physical therapy, update imaging and consider spine/back clinic versus surgical specialist.

## 2025-06-13 NOTE — ASSESSMENT & PLAN NOTE
Update labs.  We will plan to monitor hemoglobin A1c at designated intervals 3 to 6 months.  I recommend ongoing Education for diabetic diet and exercise protocol.  We will continue to monitor for side effects.    Please be advised of symptoms to monitor for and to notify me immediately if persistent or worsening.  Follow up with Ophthalmology/Optometry and Podiatry at least annually.

## 2025-06-16 ENCOUNTER — RESULTS FOLLOW-UP (OUTPATIENT)
Dept: FAMILY MEDICINE | Facility: CLINIC | Age: 65
End: 2025-06-16

## 2025-06-17 ENCOUNTER — TELEPHONE (OUTPATIENT)
Dept: FAMILY MEDICINE | Facility: CLINIC | Age: 65
End: 2025-06-17
Payer: COMMERCIAL

## 2025-06-17 NOTE — TELEPHONE ENCOUNTER
Copied from CRM #[id number]. Topic: General Inquiry - Return Call  >> Jun 17, 2025  1:30 PM Magdy wrote:  .Type:  Patient Returning Call    Who Called:.Ned Toledo Jr.  Who Left Message for Patient:miles  Does the patient know what this is regarding?:yes  Would the patient rather a call back or a response via [facility]? Call back  Best Call Back Number:.[phone]  Additional Information: pt is returning a call and would like a call back.

## 2025-06-20 ENCOUNTER — PATIENT OUTREACH (OUTPATIENT)
Dept: ADMINISTRATIVE | Facility: HOSPITAL | Age: 65
End: 2025-06-20
Payer: COMMERCIAL

## 2025-08-14 ENCOUNTER — PATIENT OUTREACH (OUTPATIENT)
Dept: ADMINISTRATIVE | Facility: HOSPITAL | Age: 65
End: 2025-08-14
Payer: COMMERCIAL